# Patient Record
Sex: FEMALE | Race: WHITE | NOT HISPANIC OR LATINO | Employment: FULL TIME | URBAN - METROPOLITAN AREA
[De-identification: names, ages, dates, MRNs, and addresses within clinical notes are randomized per-mention and may not be internally consistent; named-entity substitution may affect disease eponyms.]

---

## 2017-04-04 ENCOUNTER — GENERIC CONVERSION - ENCOUNTER (OUTPATIENT)
Dept: OTHER | Facility: OTHER | Age: 62
End: 2017-04-04

## 2017-04-04 ENCOUNTER — ALLSCRIPTS OFFICE VISIT (OUTPATIENT)
Dept: OTHER | Facility: OTHER | Age: 62
End: 2017-04-04

## 2017-04-06 ENCOUNTER — GENERIC CONVERSION - ENCOUNTER (OUTPATIENT)
Dept: OTHER | Facility: OTHER | Age: 62
End: 2017-04-06

## 2017-04-13 LAB
. (HISTORICAL): NORMAL

## 2017-04-18 ENCOUNTER — ALLSCRIPTS OFFICE VISIT (OUTPATIENT)
Dept: OTHER | Facility: OTHER | Age: 62
End: 2017-04-18

## 2018-01-13 VITALS — BODY MASS INDEX: 21.09 KG/M2 | HEIGHT: 63 IN | TEMPERATURE: 97.3 F | WEIGHT: 119.01 LBS

## 2018-01-22 VITALS
HEIGHT: 63 IN | SYSTOLIC BLOOD PRESSURE: 120 MMHG | HEART RATE: 76 BPM | TEMPERATURE: 98.2 F | RESPIRATION RATE: 16 BRPM | WEIGHT: 121 LBS | BODY MASS INDEX: 21.44 KG/M2 | DIASTOLIC BLOOD PRESSURE: 90 MMHG

## 2018-01-24 NOTE — CONSULTS
Assessment    1  Sebaceous cyst (706 2) (L72 3)   2  On prednisone therapy (V58 65) (Z79 52)    Plan  Sebaceous cyst    · () [id number] Surgical Pathology; Status:Active - Retrospective By Protocol Authorization; Requested for:06Apr2017;    Perform:[facility]; Order Comments:Excision of a small sebaceous cyst fro upper, mid back area ; Due:06Apr2018; Last Updated [employer]; 4/6/2017 11:35:20 AM;Ordered; For:Sebaceous cyst; Ordered By:Yamilet Mart;    Discussion/Summary  Discussion Summary:   Kacey Dodd is a 60year old female who presents today, per referral by Dr Larry Anderson , in consultation regarding a sebaceous cyst  Physical exam showed a 1 5 cm sebaceous cyst on her back slightly left of her spine  Discussed risks, benefits, and alternatives of sebaceous cyst excision along with pre- and post- procedural protocol  The patient elected to proceed with cyst excision today in office  The cyst was excised without complication and the procedure was tolerated well  The incision was closed with 3-0 Vicryl sutures  She will return for suture removal in two weeks  She knows to contact our office if any problems or concerns arise  Chief Complaint  Chief Complaint Free Text Note Form: Sebaceous cyst  New patient referred by her PCP, Gavino Meyer, for evaluation and removal  Cyst is located to mid upper back, along bra line  Is approximately the size of a quarter  no pain  No redness of skin presently  History of Present Illness  HPI: Kacey Dodd is a 60year old female who presents today, per referral by Dr Larry Anderson , in consultation regarding a sebaceous cyst  She noticed her cyst form one year ago  It grew to the size of a dime  Then eventually grew to the size of a quarter of the course of a few months  She denies pain at the cyst but reports that it is itchy  She states that she has many cysts similar to this one in her life  She also had a pilonidal cyst as a child   She would like to have her cyst excised in office today  Review of Systems  Complete-Female:   Constitutional: No fever, no chills, feels well, no tiredness, no recent weight gain or weight loss  Eyes: No complaints of eye pain, no red eyes, no eyesight problems, no discharge, no dry eyes, no itching of eyes  ENT: no complaints of earache, no loss of hearing, no nose bleeds, no nasal discharge, no sore throat, no hoarseness  Cardiovascular: No complaints of slow heart rate, no fast heart rate, no chest pain, no palpitations, no leg claudication, no lower extremity edema  Respiratory: No complaints of shortness of breath, no wheezing, no cough, no SOB on exertion, no orthopnea, no PND  Gastrointestinal: No complaints of abdominal pain, no constipation, no nausea or vomiting, no diarrhea, no bloody stools  Genitourinary: No complaints of dysuria, no incontinence, no pelvic pain, no dysmenorrhea, no vaginal discharge or bleeding  Musculoskeletal: No complaints of arthralgias, no myalgias, no joint swelling or stiffness, no limb pain or swelling  Integumentary: skin lesion, but as noted in HPI  Neurological: No complaints of headache, no confusion, no convulsions, no numbness, no dizziness or fainting, no tingling, no limb weakness, no difficulty walking  Psychiatric: Not suicidal, no sleep disturbance, no anxiety or depression, no change in personality, no emotional problems  Endocrine: No complaints of proptosis, no hot flashes, no muscle weakness, no deepening of the voice, no feelings of weakness  Hematologic/Lymphatic: No complaints of swollen glands, no swollen glands in the neck, does not bleed easily, does not bruise easily  ROS Reviewed:   ROS reviewed  Active Problems    1  Anxiety disorder (300 00) (F41 9)   2  Arthritis of knee (716 96) (M17 10)   3  Colon cancer screening (V76 51) (Z12 11)   4  Encounter for FIT (fecal immunochemical test) screening (V76 51) (Z12 11)   5   Encounter for screening mammogram for malignant neoplasm of breast (V76 12)   (Z12 31)   6  Hyperlipidemia (272 4) (E78 5)   7  Keratitis (370 9) (H16 9)   8  On prednisone therapy (V58 65) (Z79 52)   9  Osteoporosis (733 00) (M81 0)   10  Rheumatoid arthritis (714 0) (M06 9)   11  Salivary secretion disturbance (527 7) (K11 7)   12  Screening for cervical cancer (V76 2) (Z12 4)   13  Sebaceous cyst (706 2) (L72 3)   14  Status post open reduction with internal fixation of fracture (V45 89,V15 51)    (Z96 7,Z87 81)   15  Taking Medication - Steroids (V58 65)   16  Tear film insufficiency, unspecified laterality (375 15) (H04 129)   17  Well adult on routine health check (V70 0) (Z00 00)    Past Medical History    1  History of Ankle fracture, right (824 8) (S82 891A)   2  History of Depression (311) (F32 9)   3  History of Encounter for screening for malignant neoplasm of cervix (V76 2) (Z12 4)   4  History of anemia (V12 3) (Z86 2)   5  History of arthritis (V13 4) (Z87 39)   6  History of herpes zoster (V12 09) (Z86 19)  Active Problems And Past Medical History Reviewed: The active problems and past medical history were reviewed and updated today  Surgical History    1  History of Ankle Surgery   2  History of Diagnostic Esophagogastroduodenoscopy   3  History of Foot Surgery   4  History of Pilonidal Cyst Resection   5  History of Tonsillectomy With Adenoidectomy  Surgical History Reviewed: The surgical history was reviewed and updated today  Family History  Mother    1  Family history of Epilepsy  Father    2  Family history of Bone Cancer   3  Family history of Father  At Age 76  Family History Reviewed: The family history was reviewed and updated today  Social History    · Alcohol Use (History)   · Former smoker (V15 82) (I58 540)   · Marital History - Currently   Social History Reviewed: The social history was reviewed and updated today   The social history was reviewed and is unchanged  Current Meds   1  CeleBREX 200 MG Oral Capsule; TAKE 1 CAPSULE DAILY; Therapy: (Pedro Constable) to Recorded   2  Folic Acid 1 MG Oral Tablet; Therapy: (Pedro Constable) to Recorded   3  Ibandronate Sodium 150 MG Oral Tablet; TAKE 1 TABLET ONCE MONTHLY WITH 8 TO   10 OUNCES OF WATER   STAY UPRIGHT AND DO NOT EAT OR DRINK FOR 1 HOUR;   Therapy: 79UWK1492 to (Evaluate:02Kmx5060)  Requested for: 28MAG8691; Last   Rx:45Iec9834 Ordered   4  Iron TABS; Therapy: (Pedro Constable) to Recorded   5  Methotrexate 2 5 MG Oral Tablet; TAKE 5-6 TABLETS WEEKLY; Therapy: (Recorded:17Neu2229) to Recorded   6  PARoxetine HCl - 10 MG Oral Tablet; take one tablet by mouth daily; Therapy: 61ABB4001 to (Evaluate:11Dur2619)  Requested for: 87EBW6930; Last   Rx:13Mar2017 Ordered   7  PredniSONE 5 MG Oral Tablet; TAKE 1/2 TABLET DAILY  or as directed by Dr Alexandra Russell; Last   Rx:12Nov2013 Ordered   8  RaNITidine HCl - 150 MG Oral Tablet; TAKE 1 TABLET DAILY; Therapy: (Pedro Constable) to Recorded   9  Rolaids CHEW; USE AS DIRECTED Recorded   10  Voltaren 1 % Transdermal Gel; APPLY SPARINGLY TO AFFECTED AREA(S) ONCE DAILY; Therapy: 59Ohj0868 to (Last Rx:22Fup3311)  Requested for: 54Eak9129 Ordered  Medication List Reviewed: The medication list was reviewed and updated today  Allergies    1  No Known Drug Allergies    Vitals  Vital Signs    Recorded: 20RLQ6217 02:57PM   Temperature 98 2 F   Heart Rate 76   Respiration 16   Systolic 837   Diastolic 90   Height 5 ft 3 in   Weight 121 lb    BMI Calculated 21 43   BSA Calculated 1 57     Physical Exam    Constitutional   General appearance: No acute distress, well appearing and well nourished  Eyes   Conjunctiva and lids: No swelling, erythema or discharge  Pupils and irises: Equal, round and reactive to light  Sclera non-icteric      Ears, Nose, Mouth, and Throat   External inspection of ears and nose: Normal     Neck   Supple, symmetric, trachea midline, no masses   Pulmonary   Respiratory effort: No increased work of breathing or signs of respiratory distress  Auscultation of lungs: Clear to auscultation, equal breath sounds bilaterally, no wheezes, no rales, no rhonci  Cardiovascular   Auscultation of heart: Normal rate and rhythm, normal S1 and S2, without murmurs  Examination of extremities for edema and/or varicosities: Normal     Carotid pulses: Normal     Abdomen   Abdomen: Non-tender, no masses  Liver and spleen: No hepatomegaly or splenomegaly  Lymphatic   Palpation of lymph nodes in neck: No lymphadenopathy  Musculoskeletal   Digits and nails: Normal without clubbing or cyanosis  Extremities: No clubbing, no cyanosis, no edema   Skin   Skin and subcutaneous tissue: Normal without rashes or lesions  Examination of the skin for lesions: Abnormal   1 5 cm smooth sebaceous cyst on back left of midline  Neurologic   Sensation: Motor and sensory grossly intact  Psychiatric   Orientation to person, place, and time: Normal     Mood and affect: Normal        Procedure    Procedure: excision of lesion  Risks, benefits, alternatives, infection risk, bleeding risk and risk of allergic reaction were discussed with the patient, parent and guardian  Written consent was obtained prior to the procedure  Procedure Note:   Anesthesia: Of lidocaine 1% with epinephrine  Of bupivacaine 0 25% without epinephrine  The cutaneous layer was closed with 3-0 Vicryl suture(s)  Dressing: The wound was cleaned and a sterile dressing was placed  Specimen: the excised lesion was place in buffered formalin and sent for pathology  Post-Procedure:   Patient Status: the patient tolerated the procedure well  Complications: there were no complications  Follow-up in the office in 2 week(s)     A mixture of 1% Lidocaine with epinephrine and 0 25% Bupivacaine without epinephrine were used to anesthetize the patient's back sebaceous cyst  An incision was made using a 15 blade scalpel and the cyst wall was excised without complication  The incision was closed using 3-0 Vicryl sutures  The procedure was well tolerated and occurred without complication  She will return in two weeks for suture removal       Attending Note  Scribe Attestation:   Scribe [id number] [facility] maxime acting as a scribe in the presence of the attending physician while the attending physician examines the patient  Physician Attestation:   Michele Cano personally performed the services described in this documentation as scribed in my presence, and it is both accurate and complete        Future Appointments    Date/Time Provider Specialty Site   04/18/2017 03:15 PM Syed Ramon MD General Surgery [facility]     Signatures   Electronically signed by : Jeff Garza MD; Apr 10 2017  9:03AM EST                       (Author)

## 2018-02-02 RX ORDER — CELECOXIB 200 MG/1
1 CAPSULE ORAL DAILY
COMMUNITY
End: 2021-12-20

## 2018-02-02 RX ORDER — IBANDRONATE SODIUM 150 MG/1
1 TABLET, FILM COATED ORAL AS NEEDED
COMMUNITY
Start: 2016-02-18

## 2018-02-02 RX ORDER — RANITIDINE 150 MG/1
1 TABLET ORAL DAILY
COMMUNITY
End: 2020-01-15 | Stop reason: ALTCHOICE

## 2018-02-02 RX ORDER — FOLIC ACID 1 MG/1
TABLET ORAL
COMMUNITY

## 2018-02-02 RX ORDER — PNV NO.95/FERROUS FUM/FOLIC AC 28MG-0.8MG
TABLET ORAL
COMMUNITY
End: 2022-02-09

## 2018-02-02 RX ORDER — PREDNISONE 1 MG/1
5 TABLET ORAL DAILY
COMMUNITY

## 2018-02-02 RX ORDER — PAROXETINE 10 MG/1
1 TABLET, FILM COATED ORAL DAILY
COMMUNITY
Start: 2013-11-12 | End: 2018-02-05 | Stop reason: SDUPTHER

## 2018-02-05 ENCOUNTER — OFFICE VISIT (OUTPATIENT)
Dept: FAMILY MEDICINE CLINIC | Facility: CLINIC | Age: 63
End: 2018-02-05
Payer: COMMERCIAL

## 2018-02-05 VITALS
DIASTOLIC BLOOD PRESSURE: 80 MMHG | WEIGHT: 117.8 LBS | BODY MASS INDEX: 20.11 KG/M2 | HEIGHT: 64 IN | SYSTOLIC BLOOD PRESSURE: 136 MMHG

## 2018-02-05 DIAGNOSIS — Z11.59 NEED FOR HEPATITIS C SCREENING TEST: ICD-10-CM

## 2018-02-05 DIAGNOSIS — Z13.220 LIPID SCREENING: ICD-10-CM

## 2018-02-05 DIAGNOSIS — Z00.00 WELL ADULT HEALTH CHECK: Primary | ICD-10-CM

## 2018-02-05 DIAGNOSIS — Z12.11 SCREEN FOR COLON CANCER: ICD-10-CM

## 2018-02-05 DIAGNOSIS — R03.0 ELEVATED BLOOD PRESSURE READING: ICD-10-CM

## 2018-02-05 DIAGNOSIS — M05.79 RHEUMATOID ARTHRITIS INVOLVING MULTIPLE SITES WITH POSITIVE RHEUMATOID FACTOR (HCC): ICD-10-CM

## 2018-02-05 DIAGNOSIS — F41.9 ANXIETY DISORDER, UNSPECIFIED TYPE: ICD-10-CM

## 2018-02-05 DIAGNOSIS — M81.0 OSTEOPOROSIS, UNSPECIFIED OSTEOPOROSIS TYPE, UNSPECIFIED PATHOLOGICAL FRACTURE PRESENCE: ICD-10-CM

## 2018-02-05 DIAGNOSIS — Z13.1 ENCOUNTER FOR SCREENING EXAMINATION FOR IMPAIRED GLUCOSE REGULATION AND DIABETES MELLITUS: ICD-10-CM

## 2018-02-05 PROCEDURE — 99396 PREV VISIT EST AGE 40-64: CPT | Performed by: FAMILY MEDICINE

## 2018-02-05 RX ORDER — PAROXETINE HYDROCHLORIDE 20 MG/1
20 TABLET, FILM COATED ORAL DAILY
Qty: 30 TABLET | Refills: 5 | Status: SHIPPED | OUTPATIENT
Start: 2018-02-05 | End: 2018-04-02 | Stop reason: SDUPTHER

## 2018-02-05 NOTE — PROGRESS NOTES
FAMILY PRACTICE OFFICE VISIT       NAME: Payal Correia  AGE: 58 y o  SEX: female       : 1955        MRN: [id number]    DATE: 2018  TIME: 4:19 PM    Assessment and Plan     Problem List Items Addressed This Visit     Anxiety disorder    Relevant Medications    PARoxetine (PAXIL) 20 mg tablet    Osteoporosis      Check a DEXA scan  Continue on Boniva  Patient is on chronic prednisone  Relevant Orders    Vitamin D 25 hydroxy    DXA bone density spine hip and pelvis    Rheumatoid arthritis ([facility] [address] )       Continue follow with Rheumatology  Dr Esther Lui had suggested a urinalysis, so I ordered 1  Relevant Orders    CBC and Platelet    Elevated blood pressure reading      She continues to monitor her blood pressure outside of the office  She knows to contact me if it is consistently running above 140/90  She would like to avoid medication if possible  Relevant Orders    Urinalysis with reflex to microscopic      Other Visit Diagnoses     Well adult health check    -  Primary    Lipid screening        Relevant Orders    Lipid panel    Comprehensive metabolic panel    Lipid panel    Encounter for screening examination for impaired glucose regulation and diabetes mellitus        Relevant Orders    Mammo screening bilateral w 3d & cad    Screen for colon cancer        Relevant Orders    Occult Bloood,Fecal Immunochemical    Need for hepatitis C screening test        Relevant Orders    Hepatitis C antibody          Osteoporosis   Check a DEXA scan  Continue on Boniva  Patient is on chronic prednisone  Rheumatoid arthritis ([facility] [address] )    Continue follow with Rheumatology  Dr Esther Lui had suggested a urinalysis, so I ordered 1  Elevated blood pressure reading   She continues to monitor her blood pressure outside of the office  She knows to contact me if it is consistently running above 140/90  She would like to avoid medication if possible        Patient Instructions    The patient presents today for an annual checkup  She is followed routinely by Rheumatology for her rheumatoid arthritis  She is due for mammogram and I gave her slip for this  She is due for colonoscopy screening but she declines colonoscopy at this time, so I gave her a prescription and a FIT test kit which should be done annually  She is up-to-date with the flu shot she gets at work  I would like her to see if the Boostrix vaccine is covered so I could update her tetanus vaccine status with a booster for pertussis and diphtheria  She should also check to see if Pneumovax or Prevnar 13 or covered so we can start her pneumonia vaccinations especially in light of her history of rheumatoid arthritis  She does have occasional elevated blood pressure readings I would like her to notify me if they are consistently running above 150/90  I gave her slip to have some annual blood work done  We will also check urinalysis  Chief Complaint       Well checkup    History of Present Illness   Wilder Hernandez is a 58y o -year-old female who    Presents today for an annual checkup  Overall, she is doing quite well  She has rheumatoid arthritis which is followed closely by Dr Myriam Grey  He did suggest we get a urinalysis on her to check for proteinuria  She does have history of elevated blood pressure but not hypertension  She is having no problems chest pain or shortness of breath  She has some chronic stress incontinence but does not want to do anything about this currently  She has history of osteoporosis and does remain on low-dose prednisone  She is tolerating Boniva monthly  She is due for DEXA scan as well as a mammogram   She has never had a colonoscopy but is willing to undergo fit testing   she is having a bit of increased anxiety lately we decided to bump up her Paxil  Review of Systems   Review of Systems   Constitutional: Negative for appetite change, chills, fatigue, fever and unexpected weight change     HENT: Negative for ear pain and trouble swallowing  Eyes: Negative for visual disturbance  Respiratory: Negative for chest tightness, shortness of breath and wheezing  Cardiovascular: Negative for chest pain  Gastrointestinal: Negative for abdominal distention, abdominal pain, blood in stool, constipation and diarrhea  Endocrine: Negative for polyuria  Genitourinary: Negative for difficulty urinating and flank pain  Musculoskeletal: Negative for arthralgias and myalgias  Skin: Negative for rash  Neurological: Negative for dizziness and light-headedness  Hematological: Negative for adenopathy  Does not bruise/bleed easily  Psychiatric/Behavioral: Negative for sleep disturbance  Active Problem List     Patient Active Problem List   Diagnosis    Anxiety disorder    Hyperlipidemia    Keratitis    Osteoarthritis of both hands    Arthritis of knee    Osteoporosis    Rheumatoid arthritis ([address] )    Salivary secretion disturbance    Tear film insufficiency    Elevated blood pressure reading         Past Medical History:  Past Medical History:   Diagnosis Date    Anemia     1995 - corrected with iron    Ankle fracture, right     last assessed: 3/9/15    Arthritis     1996    Depression     last assessed: 9/20/12    Herpes zoster     2/2009       Past Surgical History:  Past Surgical History:   Procedure Laterality Date    ANKLE SURGERY      ORIF 5/16    CYST REMOVAL      1969    ESOPHAGOGASTRODUODENOSCOPY      Diagnostic    FOOT SURGERY      plantar wart resection; resolved: 1969    TONSILLECTOMY AND ADENOIDECTOMY      1962       Family History:  Family History   Problem Relation Age of Onset    Seizures Mother      epilepsy    Bone cancer Father        Social History:  Social History     Social History    Marital status: /Civil Union     Spouse name: N/A    Number of children: N/A    Years of education: N/A     Occupational History    Not on file       Social History Main Topics    Smoking status: Former Smoker     Quit date: 1982    Smokeless tobacco: Never Used    Alcohol use Yes      Comment: rare    Drug use: No    Sexual activity: Not on file     Other Topics Concern    Not on file     Social History Narrative    No narrative on file       Objective     Vitals:    02/05/18 1539   BP: 136/80     Wt Readings from Last 3 Encounters:   02/05/18 53 4 kg (117 lb 12 8 oz)   04/18/17 54 kg (119 lb 0 1 oz)   04/04/17 54 9 kg (121 lb)       Physical Exam   Constitutional: She is oriented to person, place, and time  She appears well-developed and well-nourished  No distress  HENT:   Head: Normocephalic  Eyes: Pupils are equal, round, and reactive to light  Neck: No tracheal deviation present  No thyromegaly present  Cardiovascular: Normal rate, regular rhythm and normal heart sounds  No murmur heard  Pulmonary/Chest: Effort normal  No respiratory distress  She has no wheezes  She has no rales  Abdominal: Soft  She exhibits no distension  There is no tenderness  Musculoskeletal: Normal range of motion  She exhibits deformity ( rheumatoid arthritis deformities)  She exhibits no edema or tenderness  Neurological: She is alert and oriented to person, place, and time  No cranial nerve deficit  Skin: Skin is warm  No rash noted  She is not diaphoretic  No erythema  No pallor  Psychiatric: She has a normal mood and affect   Judgment and thought content normal        Pertinent Laboratory/Diagnostic Studies:  Lab Results   Component Value Date    GLUCOSE 119 03/09/2015    BUN 11 03/09/2015    CREATININE 0 66 03/09/2015    CALCIUM 8 5 03/09/2015     03/09/2015    K 3 6 03/09/2015    CO2 30 03/09/2015     03/09/2015     No results found for: ALT, AST, GGT, ALKPHOS, BILITOT    Lab Results   Component Value Date    WBC 6 13 03/09/2015    HGB 11 8 03/09/2015    HCT 36 2 03/09/2015    MCV 96 03/09/2015     03/09/2015       No results found for: TSH    No results found for: CHOL  No results found for: TRIG  No results found for: HDL  No results found for: LDLCALC  No results found for: HGBA1C    Results for orders placed or performed in visit on 04/06/17   Surgical Pathology (HISTORICAL)   Result Value Ref Range      (HISTORICAL) Comment       (HISTORICAL) Comment       (HISTORICAL) Comment       (HISTORICAL) Comment       (HISTORICAL) Comment       (HISTORICAL) Comment       (HISTORICAL) Comment       (HISTORICAL) Comment        Orders Placed This Encounter   Procedures    Occult Bloood,Fecal Immunochemical    Mammo screening bilateral w 3d & cad    DXA bone density spine hip and pelvis    Lipid panel    Vitamin D 25 hydroxy    Urinalysis with reflex to microscopic    Comprehensive metabolic panel    CBC and Platelet    Hepatitis C antibody    Lipid panel       ALLERGIES:  No Known Allergies    Current Medications     Current Outpatient Prescriptions   Medication Sig Dispense Refill    Ca Carbonate-Mag Hydroxide (ROLAIDS) 550-110 MG CHEW Chew Twice daily      celecoxib (CELEBREX) 200 mg capsule Take 1 capsule by mouth daily      diclofenac sodium (VOLTAREN) 1 % Place on the skin daily      Ferrous Sulfate (IRON) 325 (65 Fe) MG TABS Take by mouth      folic acid (FOLVITE) 1 mg tablet Take by mouth      ibandronate (BONIVA) 150 MG tablet Take 1 tablet by mouth      methotrexate 2 5 mg tablet Take by mouth      PARoxetine (PAXIL) 20 mg tablet Take 1 tablet (20 mg total) by mouth daily 30 tablet 5    predniSONE 5 mg tablet Take by mouth      ranitidine (ZANTAC) 150 mg tablet Take 1 tablet by mouth daily       No current facility-administered medications for this visit            Health Maintenance     Health Maintenance   Topic Date Due    HIV SCREENING  1955    Hepatitis C Screening  1955    DTaP,Tdap,and Td Vaccines (1 - Tdap) 01/10/1961    PAP SMEAR  01/10/1972    INFLUENZA VACCINE  09/01/2017     Immunization History Administered Date(s) Administered    Influenza 10/24/2012, 12/23/2013, 12/16/2014    Influenza Quadrivalent Preservative Free 3 years and older IM 11/01/2016       Cruz Jordan MD

## 2018-02-05 NOTE — PATIENT INSTRUCTIONS
The patient presents today for an annual checkup  She is followed routinely by Rheumatology for her rheumatoid arthritis  She is due for mammogram and I gave her slip for this  She is due for colonoscopy screening but she declines colonoscopy at this time, so I gave her a prescription and a FIT test kit which should be done annually  She is up-to-date with the flu shot she gets at work  I would like her to see if the Boostrix vaccine is covered so I could update her tetanus vaccine status with a booster for pertussis and diphtheria  She should also check to see if Pneumovax or Prevnar 13 or covered so we can start her pneumonia vaccinations especially in light of her history of rheumatoid arthritis  She does have occasional elevated blood pressure readings I would like her to notify me if they are consistently running above 150/90  I gave her slip to have some annual blood work done  We will also check urinalysis  Increase Paxil to 20 mg daily  Notify me if she is having any issues with this change, or if it is not helping

## 2018-02-05 NOTE — ASSESSMENT & PLAN NOTE
She continues to monitor her blood pressure outside of the office  She knows to contact me if it is consistently running above 140/90  She would like to avoid medication if possible

## 2018-03-01 ENCOUNTER — HOSPITAL ENCOUNTER (OUTPATIENT)
Dept: MAMMOGRAPHY | Facility: MEDICAL CENTER | Age: 63
Discharge: HOME | End: 2018-03-01
Payer: COMMERCIAL

## 2018-03-01 ENCOUNTER — HOSPITAL ENCOUNTER (OUTPATIENT)
Dept: BONE DENSITY | Facility: MEDICAL CENTER | Age: 63
Discharge: HOME | End: 2018-03-01
Payer: COMMERCIAL

## 2018-03-01 DIAGNOSIS — M81.0 OSTEOPOROSIS, UNSPECIFIED OSTEOPOROSIS TYPE, UNSPECIFIED PATHOLOGICAL FRACTURE PRESENCE: ICD-10-CM

## 2018-03-01 PROCEDURE — 77080 DXA BONE DENSITY AXIAL: CPT

## 2018-03-01 PROCEDURE — 77063 BREAST TOMOSYNTHESIS BI: CPT

## 2018-03-01 PROCEDURE — 77067 SCR MAMMO BI INCL CAD: CPT

## 2018-03-30 LAB — HCV AB SER-ACNC: NEGATIVE

## 2018-04-02 DIAGNOSIS — F41.9 ANXIETY DISORDER, UNSPECIFIED TYPE: ICD-10-CM

## 2018-04-02 RX ORDER — PAROXETINE HYDROCHLORIDE 20 MG/1
10 TABLET, FILM COATED ORAL DAILY
Qty: 90 TABLET | Refills: 3 | Status: SHIPPED | OUTPATIENT
Start: 2018-04-02 | End: 2018-10-22 | Stop reason: SDUPTHER

## 2018-10-22 DIAGNOSIS — F41.9 ANXIETY DISORDER, UNSPECIFIED TYPE: ICD-10-CM

## 2018-10-22 RX ORDER — PAROXETINE HYDROCHLORIDE 20 MG/1
10 TABLET, FILM COATED ORAL DAILY
Qty: 90 TABLET | Refills: 3 | Status: SHIPPED | OUTPATIENT
Start: 2018-10-22 | End: 2019-11-18 | Stop reason: SDUPTHER

## 2019-05-06 ENCOUNTER — OFFICE VISIT (OUTPATIENT)
Dept: FAMILY MEDICINE CLINIC | Facility: CLINIC | Age: 64
End: 2019-05-06
Payer: COMMERCIAL

## 2019-05-06 VITALS
WEIGHT: 111.4 LBS | TEMPERATURE: 97.8 F | SYSTOLIC BLOOD PRESSURE: 132 MMHG | BODY MASS INDEX: 19.02 KG/M2 | HEART RATE: 75 BPM | DIASTOLIC BLOOD PRESSURE: 80 MMHG | HEIGHT: 64 IN | OXYGEN SATURATION: 96 %

## 2019-05-06 DIAGNOSIS — H10.32 ACUTE BACTERIAL CONJUNCTIVITIS OF LEFT EYE: Primary | ICD-10-CM

## 2019-05-06 PROCEDURE — 3008F BODY MASS INDEX DOCD: CPT | Performed by: INTERNAL MEDICINE

## 2019-05-06 PROCEDURE — 99213 OFFICE O/P EST LOW 20 MIN: CPT | Performed by: INTERNAL MEDICINE

## 2019-05-06 PROCEDURE — 1101F PT FALLS ASSESS-DOCD LE1/YR: CPT | Performed by: INTERNAL MEDICINE

## 2019-05-06 RX ORDER — OFLOXACIN 3 MG/ML
1 SOLUTION/ DROPS OPHTHALMIC 4 TIMES DAILY
Qty: 5 ML | Refills: 0 | Status: SHIPPED | OUTPATIENT
Start: 2019-05-06 | End: 2019-05-13

## 2019-05-06 RX ORDER — NAPROXEN 500 MG/1
500 TABLET ORAL AS NEEDED
COMMUNITY
Start: 2019-04-29 | End: 2021-11-15 | Stop reason: SDUPTHER

## 2019-06-18 ENCOUNTER — OFFICE VISIT (OUTPATIENT)
Dept: URGENT CARE | Facility: CLINIC | Age: 64
End: 2019-06-18
Payer: COMMERCIAL

## 2019-06-18 VITALS
DIASTOLIC BLOOD PRESSURE: 86 MMHG | OXYGEN SATURATION: 97 % | SYSTOLIC BLOOD PRESSURE: 124 MMHG | RESPIRATION RATE: 18 BRPM | TEMPERATURE: 97.2 F | HEART RATE: 57 BPM

## 2019-06-18 DIAGNOSIS — M25.50 ARTHRALGIA, UNSPECIFIED JOINT: ICD-10-CM

## 2019-06-18 DIAGNOSIS — M05.79 RHEUMATOID ARTHRITIS INVOLVING MULTIPLE SITES WITH POSITIVE RHEUMATOID FACTOR (HCC): Primary | ICD-10-CM

## 2019-06-18 PROCEDURE — S9083 URGENT CARE CENTER GLOBAL: HCPCS | Performed by: PHYSICIAN ASSISTANT

## 2019-06-18 PROCEDURE — G0382 LEV 3 HOSP TYPE B ED VISIT: HCPCS | Performed by: PHYSICIAN ASSISTANT

## 2019-06-18 RX ORDER — TRIAMCINOLONE ACETONIDE 40 MG/ML
40 INJECTION, SUSPENSION INTRA-ARTICULAR; INTRAMUSCULAR ONCE
Status: COMPLETED | OUTPATIENT
Start: 2019-06-18 | End: 2019-06-18

## 2019-06-18 RX ORDER — LIDOCAINE 50 MG/G
1 PATCH TOPICAL DAILY
Qty: 30 PATCH | Refills: 0 | Status: SHIPPED | OUTPATIENT
Start: 2019-06-18 | End: 2020-01-15 | Stop reason: ALTCHOICE

## 2019-06-18 RX ADMIN — TRIAMCINOLONE ACETONIDE 40 MG: 40 INJECTION, SUSPENSION INTRA-ARTICULAR; INTRAMUSCULAR at 20:26

## 2019-11-18 DIAGNOSIS — F41.9 ANXIETY DISORDER, UNSPECIFIED TYPE: ICD-10-CM

## 2019-11-18 RX ORDER — PAROXETINE HYDROCHLORIDE 20 MG/1
TABLET, FILM COATED ORAL
Qty: 15 TABLET | Refills: 2 | Status: SHIPPED | OUTPATIENT
Start: 2019-11-18 | End: 2020-01-15 | Stop reason: DRUGHIGH

## 2020-01-15 ENCOUNTER — OFFICE VISIT (OUTPATIENT)
Dept: FAMILY MEDICINE CLINIC | Facility: CLINIC | Age: 65
End: 2020-01-15
Payer: COMMERCIAL

## 2020-01-15 VITALS
DIASTOLIC BLOOD PRESSURE: 80 MMHG | BODY MASS INDEX: 19.63 KG/M2 | RESPIRATION RATE: 18 BRPM | OXYGEN SATURATION: 94 % | SYSTOLIC BLOOD PRESSURE: 120 MMHG | HEART RATE: 68 BPM | WEIGHT: 115 LBS | HEIGHT: 64 IN

## 2020-01-15 DIAGNOSIS — R53.82 CHRONIC FATIGUE: ICD-10-CM

## 2020-01-15 DIAGNOSIS — M81.0 OSTEOPOROSIS, UNSPECIFIED OSTEOPOROSIS TYPE, UNSPECIFIED PATHOLOGICAL FRACTURE PRESENCE: ICD-10-CM

## 2020-01-15 DIAGNOSIS — Z12.11 ENCOUNTER FOR FIT (FECAL IMMUNOCHEMICAL TEST) SCREENING: ICD-10-CM

## 2020-01-15 DIAGNOSIS — Z00.00 ANNUAL PHYSICAL EXAM: Primary | ICD-10-CM

## 2020-01-15 DIAGNOSIS — Z11.59 NEED FOR HEPATITIS C SCREENING TEST: ICD-10-CM

## 2020-01-15 DIAGNOSIS — E78.2 MIXED HYPERLIPIDEMIA: ICD-10-CM

## 2020-01-15 DIAGNOSIS — F41.9 ANXIETY DISORDER, UNSPECIFIED TYPE: ICD-10-CM

## 2020-01-15 DIAGNOSIS — M79.10 MYALGIA: ICD-10-CM

## 2020-01-15 DIAGNOSIS — M05.79 RHEUMATOID ARTHRITIS INVOLVING MULTIPLE SITES WITH POSITIVE RHEUMATOID FACTOR (HCC): ICD-10-CM

## 2020-01-15 PROBLEM — H10.32 ACUTE BACTERIAL CONJUNCTIVITIS OF LEFT EYE: Status: RESOLVED | Noted: 2019-05-06 | Resolved: 2020-01-15

## 2020-01-15 PROCEDURE — 3008F BODY MASS INDEX DOCD: CPT | Performed by: FAMILY MEDICINE

## 2020-01-15 PROCEDURE — 99397 PER PM REEVAL EST PAT 65+ YR: CPT | Performed by: FAMILY MEDICINE

## 2020-01-15 RX ORDER — PAROXETINE 10 MG/1
5 TABLET, FILM COATED ORAL DAILY
COMMUNITY
End: 2020-01-15 | Stop reason: SDUPTHER

## 2020-01-15 RX ORDER — FAMOTIDINE 20 MG/1
20 TABLET, FILM COATED ORAL DAILY
COMMUNITY
Start: 2020-01-02 | End: 2021-10-15

## 2020-01-15 RX ORDER — PAROXETINE 10 MG/1
5 TABLET, FILM COATED ORAL DAILY
Qty: 90 TABLET | Refills: 3 | Status: SHIPPED | OUTPATIENT
Start: 2020-01-15 | End: 2020-02-20 | Stop reason: SDUPTHER

## 2020-01-15 NOTE — PATIENT INSTRUCTIONS

## 2020-01-15 NOTE — ASSESSMENT & PLAN NOTE
She remains on Boniva  She would like to hold off on bone density testing at this time  She does agree to have it done next year  She does remain on prednisone 5 mg daily for her rheumatologic disease

## 2020-01-27 ENCOUNTER — OFFICE VISIT (OUTPATIENT)
Dept: URGENT CARE | Facility: CLINIC | Age: 65
End: 2020-01-27
Payer: COMMERCIAL

## 2020-01-27 VITALS
TEMPERATURE: 97.2 F | HEART RATE: 76 BPM | DIASTOLIC BLOOD PRESSURE: 76 MMHG | RESPIRATION RATE: 20 BRPM | OXYGEN SATURATION: 98 % | SYSTOLIC BLOOD PRESSURE: 106 MMHG

## 2020-01-27 DIAGNOSIS — M54.50 ACUTE RIGHT-SIDED LOW BACK PAIN WITHOUT SCIATICA: Primary | ICD-10-CM
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
## 2020-01-27 PROCEDURE — S9083 URGENT CARE CENTER GLOBAL: HCPCS | Performed by: NURSE PRACTITIONER

## 2020-01-27 PROCEDURE — 96372 THER/PROPH/DIAG INJ SC/IM: CPT | Performed by: NURSE PRACTITIONER

## 2020-01-27 PROCEDURE — G0382 LEV 3 HOSP TYPE B ED VISIT: HCPCS | Performed by: NURSE PRACTITIONER

## 2020-01-27 RX ORDER — KETOROLAC TROMETHAMINE 30 MG/ML
30 INJECTION, SOLUTION INTRAMUSCULAR; INTRAVENOUS ONCE
Status: COMPLETED | OUTPATIENT
Start: 2020-01-27 | End: 2020-01-27

## 2020-01-27 RX ORDER — METHOCARBAMOL 500 MG/1
500 TABLET, FILM COATED ORAL 3 TIMES DAILY
Qty: 30 TABLET | Refills: 0 | Status: SHIPPED | OUTPATIENT
Start: 2020-01-27 | End: 2021-10-15

## 2020-01-27 RX ORDER — PREDNISONE 20 MG/1
20 TABLET ORAL 2 TIMES DAILY WITH MEALS
Qty: 10 TABLET | Refills: 0 | Status: SHIPPED | OUTPATIENT
Start: 2020-01-27 | End: 2020-02-01

## 2020-01-27 RX ADMIN — KETOROLAC TROMETHAMINE 30 MG: 30 INJECTION, SOLUTION INTRAMUSCULAR; INTRAVENOUS at 11:20

## 2020-01-27 NOTE — PATIENT INSTRUCTIONS
Acute Low Back Pain   AMBULATORY CARE:   Acute low back pain  is sudden discomfort in your lower back area that lasts for up to 6 weeks  The discomfort makes it difficult to tolerate activity  Common symptoms include the following:   · Back stiffness or spasms    · Pain down the back or side of one leg    · Holding yourself in an unusual position or posture to decrease your back pain    · Not being able to find a sitting position that is comfortable    · Slow increase in your pain for 24 to 48 hours after you stress your back    · Tenderness on your lower back or severe pain when you move your back  Seek immediate care for the following symptoms:   · Severe pain    · Sudden stiffness and heaviness in both buttocks down to both legs    · Numbness or weakness in one leg, or pain in both legs    · Numbness in your genital area or across your lower back    · Unable to control your urine or bowel movements  Contact your healthcare provider if:   · You have a fever  · You have pain at night or when you rest     · Your pain does not get better with treatment  · You have pain that worsens when you cough or sneeze  · You suddenly feel something pop or snap in your back  · You have questions or concerns about your condition or care  The goal of treatment for acute low back pain  is to relieve your pain and help you tolerate activity  Most people with acute lower back pain get better within 4 to 6 weeks  You may need any of the following:  · Acetaminophen  decreases pain  It is available without a doctor's order  Ask how much to take and how often to take it  Follow directions  Acetaminophen can cause liver damage if not taken correctly  · NSAIDs  help decrease swelling and pain  This medicine is available with or without a doctor's order  NSAIDs can cause stomach bleeding or kidney problems in certain people   If you take blood thinner medicine, always ask your healthcare provider if NSAIDs are safe for you  Always read the medicine label and follow directions  · Prescription pain medicine  may be given  Ask your healthcare provider how to take this medicine safely  · Muscle relaxers  decrease pain by relaxing the muscles in your lower spine  Manage your symptoms:   · Stay active  as much as you can without causing more pain  Bed rest could make your back pain worse  Start with some light exercises such as walking  Avoid heavy lifting until your pain is gone  Ask for more information about the activities or exercises that are right for you  · Ice  helps decrease swelling, pain, and muscle spams  Put crushed ice in a plastic bag  Cover it with a towel  Place it on your lower back for 20 to 30 minutes every 2 hours  Do this for about 2 to 3 days after your pain starts, or as directed  · Heat  helps decrease pain and muscle spasms  Start to use heat after treatment with ice has stopped  Use a small towel dampened with warm water or a heating pad, or sit in a warm bath  Apply heat on the area for 20 to 30 minutes every 2 hours for as many days as directed  Alternate heat and ice  Prevent acute low back pain:   · Use proper body mechanics  ¨ Bend at the hips and knees when you  objects  Do not bend from the waist  Use your leg muscles as you lift the load  Do not use your back  Keep the object close to your chest as you lift it  Try not to twist or lift anything above your waist     ¨ Change your position often when you stand for long periods of time  Rest one foot on a small box or footrest, and then switch to the other foot often  ¨ Try not to sit for long periods of time  When you do, sit in a straight-backed chair with your feet flat on the floor  Never reach, pull, or push while you are sitting  · Do exercises that strengthen your back muscles  Warm up before you exercise  Ask your healthcare provider the best exercises for you  · Maintain a healthy weight    Ask your healthcare provider how much you should weigh  Ask him to help you create a weight loss plan if you are overweight  Follow up with your healthcare provider as directed:  Return for a follow-up visit if you still have pain after 1 to 3 weeks of treatment  You may need to visit an orthopedist if your back pain lasts more than 12 weeks  Write down your questions so you remember to ask them during your visits  © 2017 [employer] Information is for End User's use only and may not be sold, redistributed or otherwise used for commercial purposes  All illustrations and images included in CareNotes® are the copyrighted property of A D A M [employer] Inc  or [employer] Aren[employer]  The above information is an  only  It is not intended as medical advice for individual conditions or treatments  Talk to your doctor, nurse or pharmacist before following any medical regimen to see if it is safe and effective for you

## 2020-01-27 NOTE — PROGRESS NOTES
[facility]        NAME: Ann Marie Catherine is a 76 y o  female  : 1955    MRN: [id number]  DATE: 2020  TIME: 3:23 PM    Assessment and Plan   Acute right-sided low back pain without sciatica [M54 5]  1  Acute right-sided low back pain without sciatica  XR spine lumbar 2 or 3 views injury    XR sacrum and coccyx    predniSONE 20 mg tablet    methocarbamol (ROBAXIN) 500 mg tablet    ketorolac (TORADOL) injection 30 mg   2  Fall, initial encounter       No fracture noted on imaging   Treat for muscle pain/spasm   toradol given in office for pain -   Prednisone and robaxin for at home use  Ok to rtn to work as tolerated  - 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  Patient Instructions     Follow up with PCP in 3-5 days  Proceed to  ER if symptoms worsen  Chief Complaint     Chief Complaint   Patient presents with    Fall     Patient fell down 3 wood steps yesterday , there was ice on them  She hurt her right lower back         History of Present Illness   Ann Marie Catherine presents to the clinic c/o    Pt states fell down 3 wooden steps yesterday morning leaving the house for work  Sent home from work due to the pain  Has been icing it,   And ice hot - which has been working  Sitting still pain is ok 1/10   Bending over is 10+/10  Walking is a 10/10  Standing is 1/10  Denies any weakness in legs       Review of Systems   Review of Systems   All other systems reviewed and are negative          Current Medications     Long-Term Medications   Medication Sig Dispense Refill    celecoxib (CELEBREX) 200 mg capsule Take 1 capsule by mouth daily      diclofenac sodium (VOLTAREN) 1 % Place on the skin daily      famotidine (PEPCID) 20 mg tablet Take 20 mg by mouth daily      Ferrous Sulfate (IRON) 325 (65 Fe) MG TABS Take by mouth      folic acid (FOLVITE) 1 mg tablet Take by mouth      ibandronate (BONIVA) 150 MG tablet Take 1 tablet by mouth      methocarbamol (ROBAXIN) 500 mg tablet Take 1 tablet (500 mg total) by mouth 3 (three) times a day 30 tablet 0    methotrexate 2 5 mg tablet Take by mouth 8 Pills weekly      naproxen (NAPROSYN) 500 mg tablet Take 500 mg by mouth as needed       PARoxetine (PAXIL) 10 mg tablet Take 0 5 tablets (5 mg total) by mouth daily 90 tablet 3       Current Allergies     Allergies as of 01/27/2020    (No Known Allergies)            The following portions of the patient's history were reviewed and updated as appropriate: allergies, current medications, past family history, past medical history, past social history, past surgical history and problem list     Objective   /76   Pulse 76   Temp (!) 97 2 °F (36 2 °C) (Tympanic)   Resp 20   SpO2 98%        Physical Exam     Physical Exam   Constitutional: She is oriented to person, place, and time  Vital signs are normal  She appears well-developed and well-nourished  She is active and cooperative  HENT:   Head: Normocephalic and atraumatic  Eyes: Conjunctivae and lids are normal    Cardiovascular: Normal rate, regular rhythm, S1 normal, S2 normal and normal heart sounds  Pulmonary/Chest: Effort normal and breath sounds normal    Musculoskeletal:        Lumbar back: She exhibits decreased range of motion, tenderness and spasm  She exhibits no bony tenderness, no swelling, no edema, no deformity, no laceration, no pain and normal pulse  Back:    Neurological: She is alert and oriented to person, place, and time  Skin: Skin is warm and dry  Psychiatric: She has a normal mood and affect  Her speech is normal and behavior is normal  Judgment and thought content normal  Cognition and memory are normal    Nursing note and vitals reviewed

## 2020-02-16 DIAGNOSIS — F41.9 ANXIETY DISORDER, UNSPECIFIED TYPE: ICD-10-CM

## 2020-02-18 NOTE — TELEPHONE ENCOUNTER
Please check on the dosing of this as this does not match the dose in her chart  Please also confirm that she needs a refill as this is coming from [facility]

## 2020-02-20 RX ORDER — PAROXETINE HYDROCHLORIDE 20 MG/1
TABLET, FILM COATED ORAL
Qty: 15 TABLET | Refills: 0 | OUTPATIENT
Start: 2020-02-20

## 2020-02-20 RX ORDER — PAROXETINE 10 MG/1
10 TABLET, FILM COATED ORAL DAILY
Qty: 90 TABLET | Refills: 3
Start: 2020-02-20 | End: 2021-02-19 | Stop reason: SDUPTHER

## 2020-02-20 NOTE — TELEPHONE ENCOUNTER
Pt called back and states she takes half of a 20mg tablet daily for a total of 10mg daily  Pt states when she last filled her script at [facility] she was given #15 20mg tablets  It appears we sent a script on 1/15/20 for #90 with sig: take 0 5 tablets by mouth daily  Would you like to send a new script or I can call and change sig to take 1 tablet (10mg) daily

## 2020-02-20 NOTE — TELEPHONE ENCOUNTER
Spoke with pt and she is agreeable to using the 10mg tablets  Called pharmacy and changed the sig on rx sent in January    New order entered on chart to reflect new rx - entered as no print, pending approval

## 2020-06-04 ENCOUNTER — LAB REQUISITION (OUTPATIENT)
Dept: LAB | Facility: HOSPITAL | Age: 65
End: 2020-06-04
Payer: COMMERCIAL

## 2020-06-04 DIAGNOSIS — U07.1 COVID-19: ICD-10-CM

## 2020-06-04 PROCEDURE — U0003 INFECTIOUS AGENT DETECTION BY NUCLEIC ACID (DNA OR RNA); SEVERE ACUTE RESPIRATORY SYNDROME CORONAVIRUS 2 (SARS-COV-2) (CORONAVIRUS DISEASE [COVID-19]), AMPLIFIED PROBE TECHNIQUE, MAKING USE OF HIGH THROUGHPUT TECHNOLOGIES AS DESCRIBED BY CMS-2020-01-R: HCPCS | Performed by: INTERNAL MEDICINE

## 2020-06-05 LAB — SARS-COV-2 N GENE RESP QL NAA+PROBE: NEGATIVE

## 2020-09-09 ENCOUNTER — LAB REQUISITION (OUTPATIENT)
Dept: LAB | Facility: HOSPITAL | Age: 65
End: 2020-09-09
Payer: COMMERCIAL

## 2020-09-09 DIAGNOSIS — U07.1 COVID-19: ICD-10-CM

## 2020-09-09 PROCEDURE — U0003 INFECTIOUS AGENT DETECTION BY NUCLEIC ACID (DNA OR RNA); SEVERE ACUTE RESPIRATORY SYNDROME CORONAVIRUS 2 (SARS-COV-2) (CORONAVIRUS DISEASE [COVID-19]), AMPLIFIED PROBE TECHNIQUE, MAKING USE OF HIGH THROUGHPUT TECHNOLOGIES AS DESCRIBED BY CMS-2020-01-R: HCPCS | Performed by: INTERNAL MEDICINE

## 2020-09-10 LAB — SARS-COV-2 RNA SPEC QL NAA+PROBE: NOT DETECTED

## 2020-09-15 ENCOUNTER — LAB REQUISITION (OUTPATIENT)
Dept: LAB | Facility: HOSPITAL | Age: 65
End: 2020-09-15
Payer: COMMERCIAL

## 2020-09-15 DIAGNOSIS — Z11.59 ENCOUNTER FOR SCREENING FOR OTHER VIRAL DISEASES: ICD-10-CM

## 2020-09-15 PROCEDURE — U0003 INFECTIOUS AGENT DETECTION BY NUCLEIC ACID (DNA OR RNA); SEVERE ACUTE RESPIRATORY SYNDROME CORONAVIRUS 2 (SARS-COV-2) (CORONAVIRUS DISEASE [COVID-19]), AMPLIFIED PROBE TECHNIQUE, MAKING USE OF HIGH THROUGHPUT TECHNOLOGIES AS DESCRIBED BY CMS-2020-01-R: HCPCS | Performed by: INTERNAL MEDICINE

## 2020-09-16 LAB — SARS-COV-2 RNA SPEC QL NAA+PROBE: NOT DETECTED

## 2020-09-17 ENCOUNTER — OFFICE VISIT (OUTPATIENT)
Dept: URGENT CARE | Facility: MEDICAL CENTER | Age: 65
End: 2020-09-17
Payer: OTHER MISCELLANEOUS

## 2020-09-17 DIAGNOSIS — S49.92XA INJURY OF LEFT UPPER EXTREMITY, INITIAL ENCOUNTER: Primary | ICD-10-CM

## 2020-09-17 PROCEDURE — 90471 IMMUNIZATION ADMIN: CPT | Performed by: PHYSICIAN ASSISTANT

## 2020-09-17 PROCEDURE — 99283 EMERGENCY DEPT VISIT LOW MDM: CPT | Performed by: PHYSICIAN ASSISTANT

## 2020-09-17 PROCEDURE — 96372 THER/PROPH/DIAG INJ SC/IM: CPT | Performed by: PHYSICIAN ASSISTANT

## 2020-09-17 PROCEDURE — G0382 LEV 3 HOSP TYPE B ED VISIT: HCPCS | Performed by: PHYSICIAN ASSISTANT

## 2020-09-17 PROCEDURE — 90715 TDAP VACCINE 7 YRS/> IM: CPT

## 2020-09-17 RX ORDER — AMOXICILLIN AND CLAVULANATE POTASSIUM 875; 125 MG/1; MG/1
1 TABLET, FILM COATED ORAL EVERY 12 HOURS SCHEDULED
Qty: 14 TABLET | Refills: 0 | Status: SHIPPED | OUTPATIENT
Start: 2020-09-17 | End: 2020-09-17 | Stop reason: CLARIF

## 2020-09-18 ENCOUNTER — OCCMED (OUTPATIENT)
Dept: URGENT CARE | Facility: MEDICAL CENTER | Age: 65
End: 2020-09-18
Payer: OTHER MISCELLANEOUS

## 2020-09-18 DIAGNOSIS — S51.852D: Primary | ICD-10-CM

## 2020-09-18 PROCEDURE — 99213 OFFICE O/P EST LOW 20 MIN: CPT | Performed by: PHYSICIAN ASSISTANT

## 2020-09-22 ENCOUNTER — LAB REQUISITION (OUTPATIENT)
Dept: LAB | Facility: HOSPITAL | Age: 65
End: 2020-09-22
Payer: COMMERCIAL

## 2020-09-22 DIAGNOSIS — Z11.59 ENCOUNTER FOR SCREENING FOR OTHER VIRAL DISEASES: ICD-10-CM

## 2020-09-22 DIAGNOSIS — Z03.818 ENCOUNTER FOR OBSERVATION FOR SUSPECTED EXPOSURE TO OTHER BIOLOGICAL AGENTS RULED OUT: ICD-10-CM

## 2020-09-22 PROCEDURE — U0003 INFECTIOUS AGENT DETECTION BY NUCLEIC ACID (DNA OR RNA); SEVERE ACUTE RESPIRATORY SYNDROME CORONAVIRUS 2 (SARS-COV-2) (CORONAVIRUS DISEASE [COVID-19]), AMPLIFIED PROBE TECHNIQUE, MAKING USE OF HIGH THROUGHPUT TECHNOLOGIES AS DESCRIBED BY CMS-2020-01-R: HCPCS | Performed by: INTERNAL MEDICINE

## 2020-09-23 LAB — SARS-COV-2 N GENE RESP QL NAA+PROBE: NEGATIVE

## 2020-09-29 ENCOUNTER — LAB REQUISITION (OUTPATIENT)
Dept: LAB | Facility: HOSPITAL | Age: 65
End: 2020-09-29
Payer: COMMERCIAL

## 2020-09-29 DIAGNOSIS — U07.1 COVID-19: ICD-10-CM

## 2020-09-29 PROCEDURE — U0003 INFECTIOUS AGENT DETECTION BY NUCLEIC ACID (DNA OR RNA); SEVERE ACUTE RESPIRATORY SYNDROME CORONAVIRUS 2 (SARS-COV-2) (CORONAVIRUS DISEASE [COVID-19]), AMPLIFIED PROBE TECHNIQUE, MAKING USE OF HIGH THROUGHPUT TECHNOLOGIES AS DESCRIBED BY CMS-2020-01-R: HCPCS | Performed by: INTERNAL MEDICINE

## 2020-09-30 LAB — SARS-COV-2 N GENE RESP QL NAA+PROBE: NEGATIVE

## 2020-10-06 ENCOUNTER — LAB REQUISITION (OUTPATIENT)
Dept: LAB | Facility: HOSPITAL | Age: 65
End: 2020-10-06
Payer: COMMERCIAL

## 2020-10-06 DIAGNOSIS — U07.1 COVID-19: ICD-10-CM

## 2020-10-06 PROCEDURE — U0003 INFECTIOUS AGENT DETECTION BY NUCLEIC ACID (DNA OR RNA); SEVERE ACUTE RESPIRATORY SYNDROME CORONAVIRUS 2 (SARS-COV-2) (CORONAVIRUS DISEASE [COVID-19]), AMPLIFIED PROBE TECHNIQUE, MAKING USE OF HIGH THROUGHPUT TECHNOLOGIES AS DESCRIBED BY CMS-2020-01-R: HCPCS | Performed by: INTERNAL MEDICINE

## 2020-10-07 LAB — SARS-COV-2 N GENE RESP QL NAA+PROBE: NEGATIVE

## 2020-10-13 ENCOUNTER — LAB REQUISITION (OUTPATIENT)
Dept: LAB | Facility: HOSPITAL | Age: 65
End: 2020-10-13
Payer: COMMERCIAL

## 2020-10-13 DIAGNOSIS — U07.1 COVID-19: ICD-10-CM

## 2020-10-13 PROCEDURE — U0003 INFECTIOUS AGENT DETECTION BY NUCLEIC ACID (DNA OR RNA); SEVERE ACUTE RESPIRATORY SYNDROME CORONAVIRUS 2 (SARS-COV-2) (CORONAVIRUS DISEASE [COVID-19]), AMPLIFIED PROBE TECHNIQUE, MAKING USE OF HIGH THROUGHPUT TECHNOLOGIES AS DESCRIBED BY CMS-2020-01-R: HCPCS | Performed by: INTERNAL MEDICINE

## 2020-10-15 LAB — SARS-COV-2 RNA SPEC QL NAA+PROBE: NOT DETECTED

## 2020-10-19 NOTE — PROGRESS NOTES
[facility]    NAME: Galilea Land  AGE: 76 y o  SEX: female  : 1955     DATE: 1/15/2020     Assessment and Plan:     Problem List Items Addressed This Visit        Musculoskeletal and Integument    Osteoporosis     She remains on Boniva  She would like to hold off on bone density testing at this time  She does agree to have it done next year  She does remain on prednisone 5 mg daily for her rheumatologic disease  Rheumatoid arthritis ([address] )     Continue close follow-up with Rheumatology  Continue on prednisone as well as methotrexate  Other    Anxiety disorder     This remains well controlled on very low-dose paroxetine  Relevant Medications    PARoxetine (PAXIL) 10 mg tablet    Hyperlipidemia    Relevant Orders    Comprehensive metabolic panel    Lipid Panel with Direct LDL reflex      Other Visit Diagnoses     Annual physical exam    -  Primary    Encounter for FIT (fecal immunochemical test) screening        Relevant Orders    Occult Bloood,Fecal Immunochemical    Need for hepatitis C screening test        Chronic fatigue        Relevant Orders    Comprehensive metabolic panel    Myalgia        Relevant Orders    Comprehensive metabolic panel    TSH, 3rd generation with Free T4 reflex    Vitamin D 25 hydroxy          Immunizations and preventive care screenings were discussed with patient today  Appropriate education was printed on patient's after visit summary  Counseling:  Alcohol/drug use: discussed moderation in alcohol intake, the recommendations for healthy alcohol use, and avoidance of illicit drug use  Dental Health: discussed importance of regular tooth brushing, flossing, and dental visits  · Exercise: the importance of regular exercise/physical activity was discussed  Recommend exercise 3-5 times per week for at least 30 minutes         I encouraged her to be seen by Optometry or Ophthalmology as she has not had an eye exam in many years  I also encouraged a dental evaluation for her lower teeth as she has not seen a dentist in many years  She does wear dentures on her upper gums  Return in 6 months (on 7/15/2020)  Chief Complaint:     Chief Complaint   Patient presents with    Annual Exam      History of Present Illness:     Adult Annual Physical   Patient here for a comprehensive physical exam  The patient reports That she is relatively stable  She has rheumatoid arthritis and follows routinely with Rheumatology  She does have chronic arthralgias which are relatively stable  She has some intermittent myalgias as well as fatigue as well  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  She has been having some increased pain and swelling of her right lateral ankle where she had surgery for ankle fracture with plating  She has a history of elevated blood pressure on occasion  She denies any problems chest pain, shortness of breath, palpitations or lightheadedness       Diet and Physical Activity  · Diet/Nutrition: well balanced diet  · Exercise: no formal exercise  Depression Screening  PHQ-9 Depression Screening    PHQ-9:    Frequency of the following problems over the past two weeks:       Little interest or pleasure in doing things:  0 - not at all  Feeling down, depressed, or hopeless:  0 - not at all  PHQ-2 Score:  0       General Health  · Sleep: sleeps well  · Hearing: normal - bilateral   · Vision: no vision problems  · Dental: no dental visits for >1 year  /GYN Health  · Patient is: postmenopausal     Review of Systems:     Review of Systems   Constitutional: Negative for appetite change, chills, fatigue, fever and unexpected weight change  HENT: Negative for trouble swallowing  Eyes: Negative for visual disturbance     Respiratory: Negative for cough, chest tightness, shortness of breath and wheezing  Cardiovascular: Negative for chest pain, palpitations and leg swelling  Gastrointestinal: Negative for abdominal distention, abdominal pain, blood in stool, constipation and diarrhea  Endocrine: Negative for polyuria  Genitourinary: Negative for difficulty urinating and flank pain  Musculoskeletal: Negative for arthralgias and myalgias  Skin: Negative for rash  Neurological: Negative for dizziness and light-headedness  Hematological: Negative for adenopathy  Does not bruise/bleed easily  Psychiatric/Behavioral: Negative for agitation, hallucinations, sleep disturbance and suicidal ideas  The patient is not nervous/anxious         Past Medical History:     Past Medical History:   Diagnosis Date    Anemia      - corrected with iron    Ankle fracture, right     last assessed: 3/9/15    Arthritis         Depression     last assessed: 12    Herpes zoster     2009      Past Surgical History:     Past Surgical History:   Procedure Laterality Date    ANKLE SURGERY      ORIF     CYST REMOVAL          ESOPHAGOGASTRODUODENOSCOPY      Diagnostic    FOOT SURGERY      plantar wart resection; resolved:     TONSILLECTOMY AND ADENOIDECTOMY            Social History:     Social History     Socioeconomic History    Marital status: /Civil Union     Spouse name: None    Number of children: None    Years of education: None    Highest education level: None   Occupational History    None   Social Needs    Financial resource strain: None    Food insecurity:     Worry: None     Inability: None    Transportation needs:     Medical: None     Non-medical: None   Tobacco Use    Smoking status: Former Smoker     Last attempt to quit: 1982     Years since quittin 0    Smokeless tobacco: Never Used   Substance and Sexual Activity    Alcohol use: Yes     Comment: rare, maybe on holidays    Drug use: No    Sexual activity: Not Currently   Lifestyle    Physical activity:     Days per week: None     Minutes per session: None    Stress: None   Relationships    Social connections:     Talks on phone: None     Gets together: None     Attends [employer] service: None     Active member of club or organization: None     Attends meetings of clubs or organizations: None     Relationship status: None    Intimate partner violence:     Fear of current or ex partner: None     Emotionally abused: None     Physically abused: None     Forced sexual activity: None   Other Topics Concern    None   Social History Narrative    None      Family History:     Family History   Problem Relation Age of Onset    Seizures Mother         epilepsy    Bone cancer Father       Current Medications:     Current Outpatient Medications   Medication Sig Dispense Refill    Ca Carbonate-Mag Hydroxide (ROLAIDS) 550-110 MG CHEW Chew Twice daily      celecoxib (CELEBREX) 200 mg capsule Take 1 capsule by mouth daily      diclofenac sodium (VOLTAREN) 1 % Place on the skin daily      famotidine (PEPCID) 20 mg tablet Take 20 mg by mouth daily      Ferrous Sulfate (IRON) 325 (65 Fe) MG TABS Take by mouth      folic acid (FOLVITE) 1 mg tablet Take by mouth      ibandronate (BONIVA) 150 MG tablet Take 1 tablet by mouth      methotrexate 2 5 mg tablet Take by mouth 8 Pills weekly      naproxen (NAPROSYN) 500 mg tablet Take 500 mg by mouth as needed       PARoxetine (PAXIL) 10 mg tablet Take 0 5 tablets (5 mg total) by mouth daily 90 tablet 3    predniSONE 5 mg tablet Take 5 mg by mouth daily        No current facility-administered medications for this visit  Allergies:     No Known Allergies   Physical Exam:     /80   Pulse 68   Resp 18   Ht 5' 4" (1 626 m)   Wt 52 2 kg (115 lb)   SpO2 94%   BMI 19 74 kg/m²     Physical Exam   Constitutional: She is oriented to person, place, and time  She appears well-developed and well-nourished  No distress  HENT:   Head: Normocephalic  Right Ear: External ear normal    Left Ear: External ear normal    Nose: Nose normal    Mouth/Throat: Oropharynx is clear and moist    Eyes: Pupils are equal, round, and reactive to light  Right eye exhibits no discharge  Left eye exhibits no discharge  Neck: No tracheal deviation present  No thyromegaly present  Cardiovascular: Normal rate, regular rhythm and normal heart sounds  Exam reveals no gallop and no friction rub  No murmur heard  Pulmonary/Chest: Effort normal  No respiratory distress  She has no wheezes  She has no rales  Abdominal: Soft  She exhibits no distension and no mass  There is no tenderness  There is no rebound and no guarding  Musculoskeletal: Normal range of motion  She exhibits edema, tenderness (She has some swelling and tenderness over the right lateral ankle where she had a plate placed previously ) and deformity ( she has chronic changes consistent with her rheumatoid arthritis  )  Lymphadenopathy:     She has no cervical adenopathy  Neurological: She is alert and oriented to person, place, and time  No cranial nerve deficit  Skin: Skin is warm  She is not diaphoretic  No erythema  Psychiatric: She has a normal mood and affect   Judgment and thought content normal        Blanch Cabot, MD [id number]

## 2020-11-17 ENCOUNTER — LAB REQUISITION (OUTPATIENT)
Dept: LAB | Facility: HOSPITAL | Age: 65
End: 2020-11-17
Payer: COMMERCIAL

## 2020-11-17 DIAGNOSIS — U07.1 COVID-19: ICD-10-CM

## 2020-11-17 PROCEDURE — U0003 INFECTIOUS AGENT DETECTION BY NUCLEIC ACID (DNA OR RNA); SEVERE ACUTE RESPIRATORY SYNDROME CORONAVIRUS 2 (SARS-COV-2) (CORONAVIRUS DISEASE [COVID-19]), AMPLIFIED PROBE TECHNIQUE, MAKING USE OF HIGH THROUGHPUT TECHNOLOGIES AS DESCRIBED BY CMS-2020-01-R: HCPCS | Performed by: INTERNAL MEDICINE

## 2020-11-18 LAB — SARS-COV-2 RNA SPEC QL NAA+PROBE: NOT DETECTED

## 2020-11-24 PROCEDURE — U0003 INFECTIOUS AGENT DETECTION BY NUCLEIC ACID (DNA OR RNA); SEVERE ACUTE RESPIRATORY SYNDROME CORONAVIRUS 2 (SARS-COV-2) (CORONAVIRUS DISEASE [COVID-19]), AMPLIFIED PROBE TECHNIQUE, MAKING USE OF HIGH THROUGHPUT TECHNOLOGIES AS DESCRIBED BY CMS-2020-01-R: HCPCS | Performed by: INTERNAL MEDICINE

## 2020-11-25 ENCOUNTER — LAB REQUISITION (OUTPATIENT)
Dept: LAB | Facility: HOSPITAL | Age: 65
End: 2020-11-25
Payer: COMMERCIAL

## 2020-11-25 DIAGNOSIS — U07.1 COVID-19: ICD-10-CM

## 2020-11-26 LAB — SARS-COV-2 RNA SPEC QL NAA+PROBE: NOT DETECTED

## 2020-12-01 PROCEDURE — U0003 INFECTIOUS AGENT DETECTION BY NUCLEIC ACID (DNA OR RNA); SEVERE ACUTE RESPIRATORY SYNDROME CORONAVIRUS 2 (SARS-COV-2) (CORONAVIRUS DISEASE [COVID-19]), AMPLIFIED PROBE TECHNIQUE, MAKING USE OF HIGH THROUGHPUT TECHNOLOGIES AS DESCRIBED BY CMS-2020-01-R: HCPCS | Performed by: INTERNAL MEDICINE

## 2020-12-02 ENCOUNTER — LAB REQUISITION (OUTPATIENT)
Dept: LAB | Facility: HOSPITAL | Age: 65
End: 2020-12-02
Payer: COMMERCIAL

## 2020-12-02 DIAGNOSIS — U07.1 COVID-19: ICD-10-CM

## 2020-12-03 ENCOUNTER — LAB REQUISITION (OUTPATIENT)
Dept: LAB | Facility: HOSPITAL | Age: 65
End: 2020-12-03
Payer: COMMERCIAL

## 2020-12-03 DIAGNOSIS — Z11.59 ENCOUNTER FOR SCREENING FOR OTHER VIRAL DISEASES: ICD-10-CM

## 2020-12-03 LAB — SARS-COV-2 RNA SPEC QL NAA+PROBE: NOT DETECTED

## 2020-12-03 PROCEDURE — U0003 INFECTIOUS AGENT DETECTION BY NUCLEIC ACID (DNA OR RNA); SEVERE ACUTE RESPIRATORY SYNDROME CORONAVIRUS 2 (SARS-COV-2) (CORONAVIRUS DISEASE [COVID-19]), AMPLIFIED PROBE TECHNIQUE, MAKING USE OF HIGH THROUGHPUT TECHNOLOGIES AS DESCRIBED BY CMS-2020-01-R: HCPCS | Performed by: INTERNAL MEDICINE

## 2020-12-04 LAB — SARS-COV-2 RNA SPEC QL NAA+PROBE: NOT DETECTED

## 2020-12-07 ENCOUNTER — LAB REQUISITION (OUTPATIENT)
Dept: LAB | Facility: HOSPITAL | Age: 65
End: 2020-12-07
Payer: COMMERCIAL

## 2020-12-07 DIAGNOSIS — U07.1 COVID-19: ICD-10-CM

## 2020-12-07 PROCEDURE — U0003 INFECTIOUS AGENT DETECTION BY NUCLEIC ACID (DNA OR RNA); SEVERE ACUTE RESPIRATORY SYNDROME CORONAVIRUS 2 (SARS-COV-2) (CORONAVIRUS DISEASE [COVID-19]), AMPLIFIED PROBE TECHNIQUE, MAKING USE OF HIGH THROUGHPUT TECHNOLOGIES AS DESCRIBED BY CMS-2020-01-R: HCPCS | Performed by: INTERNAL MEDICINE

## 2020-12-09 LAB — SARS-COV-2 RNA SPEC QL NAA+PROBE: NOT DETECTED

## 2020-12-10 ENCOUNTER — LAB REQUISITION (OUTPATIENT)
Dept: LAB | Facility: HOSPITAL | Age: 65
End: 2020-12-10
Payer: COMMERCIAL

## 2020-12-10 DIAGNOSIS — Z11.59 ENCOUNTER FOR SCREENING FOR OTHER VIRAL DISEASES: ICD-10-CM

## 2020-12-10 PROCEDURE — U0003 INFECTIOUS AGENT DETECTION BY NUCLEIC ACID (DNA OR RNA); SEVERE ACUTE RESPIRATORY SYNDROME CORONAVIRUS 2 (SARS-COV-2) (CORONAVIRUS DISEASE [COVID-19]), AMPLIFIED PROBE TECHNIQUE, MAKING USE OF HIGH THROUGHPUT TECHNOLOGIES AS DESCRIBED BY CMS-2020-01-R: HCPCS | Performed by: INTERNAL MEDICINE

## 2020-12-11 LAB — SARS-COV-2 RNA SPEC QL NAA+PROBE: NOT DETECTED

## 2020-12-16 ENCOUNTER — LAB REQUISITION (OUTPATIENT)
Dept: LAB | Facility: HOSPITAL | Age: 65
End: 2020-12-16
Payer: COMMERCIAL

## 2020-12-16 DIAGNOSIS — Z11.59 ENCOUNTER FOR SCREENING FOR OTHER VIRAL DISEASES: ICD-10-CM

## 2020-12-16 PROCEDURE — U0003 INFECTIOUS AGENT DETECTION BY NUCLEIC ACID (DNA OR RNA); SEVERE ACUTE RESPIRATORY SYNDROME CORONAVIRUS 2 (SARS-COV-2) (CORONAVIRUS DISEASE [COVID-19]), AMPLIFIED PROBE TECHNIQUE, MAKING USE OF HIGH THROUGHPUT TECHNOLOGIES AS DESCRIBED BY CMS-2020-01-R: HCPCS | Performed by: INTERNAL MEDICINE

## 2020-12-18 LAB — SARS-COV-2 RNA SPEC QL NAA+PROBE: NOT DETECTED

## 2020-12-21 ENCOUNTER — LAB REQUISITION (OUTPATIENT)
Dept: LAB | Facility: HOSPITAL | Age: 65
End: 2020-12-21
Payer: COMMERCIAL

## 2020-12-21 DIAGNOSIS — Z11.59 ENCOUNTER FOR SCREENING FOR OTHER VIRAL DISEASES: ICD-10-CM

## 2020-12-21 PROCEDURE — U0003 INFECTIOUS AGENT DETECTION BY NUCLEIC ACID (DNA OR RNA); SEVERE ACUTE RESPIRATORY SYNDROME CORONAVIRUS 2 (SARS-COV-2) (CORONAVIRUS DISEASE [COVID-19]), AMPLIFIED PROBE TECHNIQUE, MAKING USE OF HIGH THROUGHPUT TECHNOLOGIES AS DESCRIBED BY CMS-2020-01-R: HCPCS | Performed by: INTERNAL MEDICINE

## 2020-12-23 ENCOUNTER — LAB REQUISITION (OUTPATIENT)
Dept: LAB | Facility: HOSPITAL | Age: 65
End: 2020-12-23
Payer: COMMERCIAL

## 2020-12-23 DIAGNOSIS — Z11.59 ENCOUNTER FOR SCREENING FOR OTHER VIRAL DISEASES: ICD-10-CM

## 2020-12-23 LAB — SARS-COV-2 RNA SPEC QL NAA+PROBE: NOT DETECTED

## 2020-12-23 PROCEDURE — U0003 INFECTIOUS AGENT DETECTION BY NUCLEIC ACID (DNA OR RNA); SEVERE ACUTE RESPIRATORY SYNDROME CORONAVIRUS 2 (SARS-COV-2) (CORONAVIRUS DISEASE [COVID-19]), AMPLIFIED PROBE TECHNIQUE, MAKING USE OF HIGH THROUGHPUT TECHNOLOGIES AS DESCRIBED BY CMS-2020-01-R: HCPCS | Performed by: INTERNAL MEDICINE

## 2020-12-24 LAB — SARS-COV-2 RNA SPEC QL NAA+PROBE: NOT DETECTED

## 2020-12-28 ENCOUNTER — LAB REQUISITION (OUTPATIENT)
Dept: LAB | Facility: HOSPITAL | Age: 65
End: 2020-12-28
Payer: COMMERCIAL

## 2020-12-28 DIAGNOSIS — Z11.59 ENCOUNTER FOR SCREENING FOR OTHER VIRAL DISEASES: ICD-10-CM

## 2020-12-28 PROCEDURE — U0003 INFECTIOUS AGENT DETECTION BY NUCLEIC ACID (DNA OR RNA); SEVERE ACUTE RESPIRATORY SYNDROME CORONAVIRUS 2 (SARS-COV-2) (CORONAVIRUS DISEASE [COVID-19]), AMPLIFIED PROBE TECHNIQUE, MAKING USE OF HIGH THROUGHPUT TECHNOLOGIES AS DESCRIBED BY CMS-2020-01-R: HCPCS | Performed by: INTERNAL MEDICINE

## 2020-12-29 LAB — SARS-COV-2 RNA SPEC QL NAA+PROBE: NOT DETECTED

## 2020-12-30 ENCOUNTER — LAB REQUISITION (OUTPATIENT)
Dept: LAB | Facility: HOSPITAL | Age: 65
End: 2020-12-30
Payer: COMMERCIAL

## 2020-12-30 DIAGNOSIS — Z11.59 ENCOUNTER FOR SCREENING FOR OTHER VIRAL DISEASES: ICD-10-CM

## 2020-12-30 PROCEDURE — U0003 INFECTIOUS AGENT DETECTION BY NUCLEIC ACID (DNA OR RNA); SEVERE ACUTE RESPIRATORY SYNDROME CORONAVIRUS 2 (SARS-COV-2) (CORONAVIRUS DISEASE [COVID-19]), AMPLIFIED PROBE TECHNIQUE, MAKING USE OF HIGH THROUGHPUT TECHNOLOGIES AS DESCRIBED BY CMS-2020-01-R: HCPCS | Performed by: INTERNAL MEDICINE

## 2020-12-31 LAB — SARS-COV-2 RNA SPEC QL NAA+PROBE: NOT DETECTED

## 2021-01-04 ENCOUNTER — LAB REQUISITION (OUTPATIENT)
Dept: LAB | Facility: HOSPITAL | Age: 66
End: 2021-01-04
Payer: COMMERCIAL

## 2021-01-04 DIAGNOSIS — Z11.59 ENCOUNTER FOR SCREENING FOR OTHER VIRAL DISEASES: ICD-10-CM

## 2021-01-04 PROCEDURE — U0003 INFECTIOUS AGENT DETECTION BY NUCLEIC ACID (DNA OR RNA); SEVERE ACUTE RESPIRATORY SYNDROME CORONAVIRUS 2 (SARS-COV-2) (CORONAVIRUS DISEASE [COVID-19]), AMPLIFIED PROBE TECHNIQUE, MAKING USE OF HIGH THROUGHPUT TECHNOLOGIES AS DESCRIBED BY CMS-2020-01-R: HCPCS | Performed by: INTERNAL MEDICINE

## 2021-01-05 LAB — SARS-COV-2 RNA SPEC QL NAA+PROBE: NOT DETECTED

## 2021-01-06 ENCOUNTER — LAB REQUISITION (OUTPATIENT)
Dept: LAB | Facility: HOSPITAL | Age: 66
End: 2021-01-06
Payer: COMMERCIAL

## 2021-01-06 DIAGNOSIS — Z11.59 ENCOUNTER FOR SCREENING FOR OTHER VIRAL DISEASES: ICD-10-CM

## 2021-01-06 PROCEDURE — U0003 INFECTIOUS AGENT DETECTION BY NUCLEIC ACID (DNA OR RNA); SEVERE ACUTE RESPIRATORY SYNDROME CORONAVIRUS 2 (SARS-COV-2) (CORONAVIRUS DISEASE [COVID-19]), AMPLIFIED PROBE TECHNIQUE, MAKING USE OF HIGH THROUGHPUT TECHNOLOGIES AS DESCRIBED BY CMS-2020-01-R: HCPCS | Performed by: INTERNAL MEDICINE

## 2021-01-07 LAB — SARS-COV-2 RNA SPEC QL NAA+PROBE: NOT DETECTED

## 2021-01-14 ENCOUNTER — LAB REQUISITION (OUTPATIENT)
Dept: LAB | Facility: HOSPITAL | Age: 66
End: 2021-01-14
Payer: COMMERCIAL

## 2021-01-14 DIAGNOSIS — Z11.59 ENCOUNTER FOR SCREENING FOR OTHER VIRAL DISEASES: ICD-10-CM

## 2021-01-14 PROCEDURE — U0005 INFEC AGEN DETEC AMPLI PROBE: HCPCS | Performed by: INTERNAL MEDICINE

## 2021-01-14 PROCEDURE — U0003 INFECTIOUS AGENT DETECTION BY NUCLEIC ACID (DNA OR RNA); SEVERE ACUTE RESPIRATORY SYNDROME CORONAVIRUS 2 (SARS-COV-2) (CORONAVIRUS DISEASE [COVID-19]), AMPLIFIED PROBE TECHNIQUE, MAKING USE OF HIGH THROUGHPUT TECHNOLOGIES AS DESCRIBED BY CMS-2020-01-R: HCPCS | Performed by: INTERNAL MEDICINE

## 2021-01-15 LAB — SARS-COV-2 RNA SPEC QL NAA+PROBE: NOT DETECTED

## 2021-01-18 ENCOUNTER — LAB REQUISITION (OUTPATIENT)
Dept: LAB | Facility: HOSPITAL | Age: 66
End: 2021-01-18
Payer: COMMERCIAL

## 2021-01-18 DIAGNOSIS — Z11.59 ENCOUNTER FOR SCREENING FOR OTHER VIRAL DISEASES: ICD-10-CM

## 2021-01-18 PROCEDURE — 87635 SARS-COV-2 COVID-19 AMP PRB: CPT | Performed by: INTERNAL MEDICINE

## 2021-01-19 LAB — SARS-COV-2 RNA RESP QL NAA+PROBE: NEGATIVE

## 2021-01-21 ENCOUNTER — LAB REQUISITION (OUTPATIENT)
Dept: LAB | Facility: HOSPITAL | Age: 66
End: 2021-01-21
Payer: COMMERCIAL

## 2021-01-21 DIAGNOSIS — Z11.59 ENCOUNTER FOR SCREENING FOR OTHER VIRAL DISEASES: ICD-10-CM

## 2021-01-21 PROCEDURE — 87635 SARS-COV-2 COVID-19 AMP PRB: CPT | Performed by: INTERNAL MEDICINE

## 2021-01-22 LAB — SARS-COV-2 RNA RESP QL NAA+PROBE: NEGATIVE

## 2021-01-28 ENCOUNTER — LAB REQUISITION (OUTPATIENT)
Dept: LAB | Facility: HOSPITAL | Age: 66
End: 2021-01-28
Payer: COMMERCIAL

## 2021-01-28 DIAGNOSIS — U07.1 COVID-19: ICD-10-CM

## 2021-01-28 PROCEDURE — U0005 INFEC AGEN DETEC AMPLI PROBE: HCPCS | Performed by: INTERNAL MEDICINE

## 2021-01-28 PROCEDURE — U0003 INFECTIOUS AGENT DETECTION BY NUCLEIC ACID (DNA OR RNA); SEVERE ACUTE RESPIRATORY SYNDROME CORONAVIRUS 2 (SARS-COV-2) (CORONAVIRUS DISEASE [COVID-19]), AMPLIFIED PROBE TECHNIQUE, MAKING USE OF HIGH THROUGHPUT TECHNOLOGIES AS DESCRIBED BY CMS-2020-01-R: HCPCS | Performed by: INTERNAL MEDICINE

## 2021-01-29 LAB — SARS-COV-2 N GENE RESP QL NAA+PROBE: NEGATIVE

## 2021-02-01 ENCOUNTER — LAB REQUISITION (OUTPATIENT)
Dept: LAB | Facility: HOSPITAL | Age: 66
End: 2021-02-01
Payer: COMMERCIAL

## 2021-02-01 DIAGNOSIS — Z11.59 ENCOUNTER FOR SCREENING FOR OTHER VIRAL DISEASES: ICD-10-CM

## 2021-02-01 PROCEDURE — U0005 INFEC AGEN DETEC AMPLI PROBE: HCPCS | Performed by: INTERNAL MEDICINE

## 2021-02-01 PROCEDURE — U0003 INFECTIOUS AGENT DETECTION BY NUCLEIC ACID (DNA OR RNA); SEVERE ACUTE RESPIRATORY SYNDROME CORONAVIRUS 2 (SARS-COV-2) (CORONAVIRUS DISEASE [COVID-19]), AMPLIFIED PROBE TECHNIQUE, MAKING USE OF HIGH THROUGHPUT TECHNOLOGIES AS DESCRIBED BY CMS-2020-01-R: HCPCS | Performed by: INTERNAL MEDICINE

## 2021-02-02 LAB — SARS-COV-2 N GENE RESP QL NAA+PROBE: NEGATIVE

## 2021-02-04 ENCOUNTER — LAB REQUISITION (OUTPATIENT)
Dept: LAB | Facility: HOSPITAL | Age: 66
End: 2021-02-04
Payer: COMMERCIAL

## 2021-02-04 DIAGNOSIS — Z11.59 ENCOUNTER FOR SCREENING FOR OTHER VIRAL DISEASES: ICD-10-CM

## 2021-02-04 LAB — SARS-COV-2 N GENE RESP QL NAA+PROBE: NEGATIVE

## 2021-02-04 PROCEDURE — U0003 INFECTIOUS AGENT DETECTION BY NUCLEIC ACID (DNA OR RNA); SEVERE ACUTE RESPIRATORY SYNDROME CORONAVIRUS 2 (SARS-COV-2) (CORONAVIRUS DISEASE [COVID-19]), AMPLIFIED PROBE TECHNIQUE, MAKING USE OF HIGH THROUGHPUT TECHNOLOGIES AS DESCRIBED BY CMS-2020-01-R: HCPCS | Performed by: INTERNAL MEDICINE

## 2021-02-04 PROCEDURE — U0005 INFEC AGEN DETEC AMPLI PROBE: HCPCS | Performed by: INTERNAL MEDICINE

## 2021-02-11 ENCOUNTER — LAB REQUISITION (OUTPATIENT)
Dept: LAB | Facility: HOSPITAL | Age: 66
End: 2021-02-11
Payer: COMMERCIAL

## 2021-02-11 DIAGNOSIS — Z11.59 ENCOUNTER FOR SCREENING FOR OTHER VIRAL DISEASES: ICD-10-CM

## 2021-02-11 LAB — SARS-COV-2 RNA RESP QL NAA+PROBE: NEGATIVE

## 2021-02-11 PROCEDURE — U0003 INFECTIOUS AGENT DETECTION BY NUCLEIC ACID (DNA OR RNA); SEVERE ACUTE RESPIRATORY SYNDROME CORONAVIRUS 2 (SARS-COV-2) (CORONAVIRUS DISEASE [COVID-19]), AMPLIFIED PROBE TECHNIQUE, MAKING USE OF HIGH THROUGHPUT TECHNOLOGIES AS DESCRIBED BY CMS-2020-01-R: HCPCS | Performed by: INTERNAL MEDICINE

## 2021-02-11 PROCEDURE — U0005 INFEC AGEN DETEC AMPLI PROBE: HCPCS | Performed by: INTERNAL MEDICINE

## 2021-02-15 ENCOUNTER — LAB REQUISITION (OUTPATIENT)
Dept: LAB | Facility: HOSPITAL | Age: 66
End: 2021-02-15
Payer: COMMERCIAL

## 2021-02-15 DIAGNOSIS — Z11.59 ENCOUNTER FOR SCREENING FOR OTHER VIRAL DISEASES: ICD-10-CM

## 2021-02-15 PROCEDURE — U0005 INFEC AGEN DETEC AMPLI PROBE: HCPCS | Performed by: INTERNAL MEDICINE

## 2021-02-15 PROCEDURE — U0003 INFECTIOUS AGENT DETECTION BY NUCLEIC ACID (DNA OR RNA); SEVERE ACUTE RESPIRATORY SYNDROME CORONAVIRUS 2 (SARS-COV-2) (CORONAVIRUS DISEASE [COVID-19]), AMPLIFIED PROBE TECHNIQUE, MAKING USE OF HIGH THROUGHPUT TECHNOLOGIES AS DESCRIBED BY CMS-2020-01-R: HCPCS | Performed by: INTERNAL MEDICINE

## 2021-02-16 LAB — SARS-COV-2 RNA RESP QL NAA+PROBE: NEGATIVE

## 2021-02-19 DIAGNOSIS — F41.9 ANXIETY DISORDER, UNSPECIFIED TYPE: ICD-10-CM

## 2021-02-19 NOTE — TELEPHONE ENCOUNTER
Patient called for refill of:    PARoxetine (PAXIL) 10 mg tablet  #90 / R-3    Last OV 1/15/2020 physical   Future OV 5/12/2021 physical

## 2021-02-22 DIAGNOSIS — F41.9 ANXIETY DISORDER, UNSPECIFIED TYPE: ICD-10-CM

## 2021-02-22 RX ORDER — PAROXETINE 10 MG/1
10 TABLET, FILM COATED ORAL DAILY
Qty: 90 TABLET | Refills: 3
Start: 2021-02-22 | End: 2021-03-02

## 2021-02-22 RX ORDER — PAROXETINE 10 MG/1
TABLET, FILM COATED ORAL
Qty: 90 TABLET | Refills: 0 | OUTPATIENT
Start: 2021-02-22

## 2021-02-22 NOTE — TELEPHONE ENCOUNTER
Does not look like Pharmacy rec'd this - there's no electronic confirmation     please advise if needs to be re-ordered

## 2021-02-25 ENCOUNTER — TELEPHONE (OUTPATIENT)
Dept: FAMILY MEDICINE CLINIC | Facility: CLINIC | Age: 66
End: 2021-02-25

## 2021-02-25 ENCOUNTER — LAB REQUISITION (OUTPATIENT)
Dept: LAB | Facility: HOSPITAL | Age: 66
End: 2021-02-25
Payer: COMMERCIAL

## 2021-02-25 ENCOUNTER — TELEPHONE (OUTPATIENT)
Dept: OTHER | Facility: OTHER | Age: 66
End: 2021-02-25

## 2021-02-25 DIAGNOSIS — Z11.59 ENCOUNTER FOR SCREENING FOR OTHER VIRAL DISEASES: ICD-10-CM

## 2021-02-25 LAB — SARS-COV-2 RNA RESP QL NAA+PROBE: NEGATIVE

## 2021-02-25 PROCEDURE — U0003 INFECTIOUS AGENT DETECTION BY NUCLEIC ACID (DNA OR RNA); SEVERE ACUTE RESPIRATORY SYNDROME CORONAVIRUS 2 (SARS-COV-2) (CORONAVIRUS DISEASE [COVID-19]), AMPLIFIED PROBE TECHNIQUE, MAKING USE OF HIGH THROUGHPUT TECHNOLOGIES AS DESCRIBED BY CMS-2020-01-R: HCPCS | Performed by: INTERNAL MEDICINE

## 2021-02-25 PROCEDURE — U0005 INFEC AGEN DETEC AMPLI PROBE: HCPCS | Performed by: INTERNAL MEDICINE

## 2021-02-25 NOTE — TELEPHONE ENCOUNTER
Pt called service asking for refill on paxil  However, she had rx refilled on 2/22/2021 by dr Monik Marcelo -   I called pt personally at [phone] and let her know that rx should be at pharmacy  She will head over now

## 2021-02-25 NOTE — TELEPHONE ENCOUNTER
[phone]/ PT   Stas Ocampo 1955/ PT states she is completely out of her Paroxetine 10 mg tablets, gets sent to [facility] [address] BRETT COLMENARES on [address] [facility]

## 2021-02-26 NOTE — TELEPHONE ENCOUNTER
Per Dr Janette Valenzuela, encounter 2/25/21 4:33 P  "Pt called service asking for refill on paxil    However, she had rx refilled on 2/22/2021 by dr Xiao Blair -   I called pt personally at [phone] and let her know that rx should be at pharmacy  She will head over now "

## 2021-03-01 ENCOUNTER — LAB REQUISITION (OUTPATIENT)
Dept: LAB | Facility: HOSPITAL | Age: 66
End: 2021-03-01
Payer: COMMERCIAL

## 2021-03-01 DIAGNOSIS — Z11.59 ENCOUNTER FOR SCREENING FOR OTHER VIRAL DISEASES: ICD-10-CM

## 2021-03-01 LAB — SARS-COV-2 RNA RESP QL NAA+PROBE: NEGATIVE

## 2021-03-01 PROCEDURE — U0003 INFECTIOUS AGENT DETECTION BY NUCLEIC ACID (DNA OR RNA); SEVERE ACUTE RESPIRATORY SYNDROME CORONAVIRUS 2 (SARS-COV-2) (CORONAVIRUS DISEASE [COVID-19]), AMPLIFIED PROBE TECHNIQUE, MAKING USE OF HIGH THROUGHPUT TECHNOLOGIES AS DESCRIBED BY CMS-2020-01-R: HCPCS | Performed by: INTERNAL MEDICINE

## 2021-03-01 PROCEDURE — U0005 INFEC AGEN DETEC AMPLI PROBE: HCPCS | Performed by: INTERNAL MEDICINE

## 2021-03-02 DIAGNOSIS — F41.9 ANXIETY DISORDER, UNSPECIFIED TYPE: ICD-10-CM

## 2021-03-02 RX ORDER — PAROXETINE 10 MG/1
TABLET, FILM COATED ORAL
Qty: 90 TABLET | Refills: 3 | Status: SHIPPED | OUTPATIENT
Start: 2021-03-02 | End: 2021-03-03 | Stop reason: SDUPTHER

## 2021-03-03 DIAGNOSIS — F41.9 ANXIETY DISORDER, UNSPECIFIED TYPE: ICD-10-CM

## 2021-03-04 ENCOUNTER — LAB REQUISITION (OUTPATIENT)
Dept: LAB | Facility: HOSPITAL | Age: 66
End: 2021-03-04
Payer: COMMERCIAL

## 2021-03-04 DIAGNOSIS — Z11.59 ENCOUNTER FOR SCREENING FOR OTHER VIRAL DISEASES: ICD-10-CM

## 2021-03-04 LAB — SARS-COV-2 RNA RESP QL NAA+PROBE: NEGATIVE

## 2021-03-04 PROCEDURE — U0003 INFECTIOUS AGENT DETECTION BY NUCLEIC ACID (DNA OR RNA); SEVERE ACUTE RESPIRATORY SYNDROME CORONAVIRUS 2 (SARS-COV-2) (CORONAVIRUS DISEASE [COVID-19]), AMPLIFIED PROBE TECHNIQUE, MAKING USE OF HIGH THROUGHPUT TECHNOLOGIES AS DESCRIBED BY CMS-2020-01-R: HCPCS | Performed by: INTERNAL MEDICINE

## 2021-03-04 PROCEDURE — U0005 INFEC AGEN DETEC AMPLI PROBE: HCPCS | Performed by: INTERNAL MEDICINE

## 2021-03-04 RX ORDER — PAROXETINE 10 MG/1
10 TABLET, FILM COATED ORAL DAILY
Qty: 90 TABLET | Refills: 3 | Status: SHIPPED | OUTPATIENT
Start: 2021-03-04 | End: 2022-03-09 | Stop reason: SDUPTHER

## 2021-03-11 ENCOUNTER — LAB REQUISITION (OUTPATIENT)
Dept: LAB | Facility: HOSPITAL | Age: 66
End: 2021-03-11
Payer: COMMERCIAL

## 2021-03-11 DIAGNOSIS — Z11.59 ENCOUNTER FOR SCREENING FOR OTHER VIRAL DISEASES: ICD-10-CM

## 2021-03-11 LAB — SARS-COV-2 RNA RESP QL NAA+PROBE: NEGATIVE

## 2021-03-11 PROCEDURE — U0005 INFEC AGEN DETEC AMPLI PROBE: HCPCS | Performed by: INTERNAL MEDICINE

## 2021-03-11 PROCEDURE — U0003 INFECTIOUS AGENT DETECTION BY NUCLEIC ACID (DNA OR RNA); SEVERE ACUTE RESPIRATORY SYNDROME CORONAVIRUS 2 (SARS-COV-2) (CORONAVIRUS DISEASE [COVID-19]), AMPLIFIED PROBE TECHNIQUE, MAKING USE OF HIGH THROUGHPUT TECHNOLOGIES AS DESCRIBED BY CMS-2020-01-R: HCPCS | Performed by: INTERNAL MEDICINE

## 2021-03-15 ENCOUNTER — LAB REQUISITION (OUTPATIENT)
Dept: LAB | Facility: HOSPITAL | Age: 66
End: 2021-03-15
Payer: COMMERCIAL

## 2021-03-15 DIAGNOSIS — Z11.59 ENCOUNTER FOR SCREENING FOR OTHER VIRAL DISEASES: ICD-10-CM

## 2021-03-15 LAB — SARS-COV-2 RNA RESP QL NAA+PROBE: NEGATIVE

## 2021-03-15 PROCEDURE — U0003 INFECTIOUS AGENT DETECTION BY NUCLEIC ACID (DNA OR RNA); SEVERE ACUTE RESPIRATORY SYNDROME CORONAVIRUS 2 (SARS-COV-2) (CORONAVIRUS DISEASE [COVID-19]), AMPLIFIED PROBE TECHNIQUE, MAKING USE OF HIGH THROUGHPUT TECHNOLOGIES AS DESCRIBED BY CMS-2020-01-R: HCPCS | Performed by: INTERNAL MEDICINE

## 2021-03-15 PROCEDURE — U0005 INFEC AGEN DETEC AMPLI PROBE: HCPCS | Performed by: INTERNAL MEDICINE

## 2021-03-25 ENCOUNTER — LAB REQUISITION (OUTPATIENT)
Dept: LAB | Facility: HOSPITAL | Age: 66
End: 2021-03-25
Payer: COMMERCIAL

## 2021-03-25 DIAGNOSIS — Z11.59 ENCOUNTER FOR SCREENING FOR OTHER VIRAL DISEASES: ICD-10-CM

## 2021-03-25 PROCEDURE — U0005 INFEC AGEN DETEC AMPLI PROBE: HCPCS | Performed by: INTERNAL MEDICINE

## 2021-03-25 PROCEDURE — U0003 INFECTIOUS AGENT DETECTION BY NUCLEIC ACID (DNA OR RNA); SEVERE ACUTE RESPIRATORY SYNDROME CORONAVIRUS 2 (SARS-COV-2) (CORONAVIRUS DISEASE [COVID-19]), AMPLIFIED PROBE TECHNIQUE, MAKING USE OF HIGH THROUGHPUT TECHNOLOGIES AS DESCRIBED BY CMS-2020-01-R: HCPCS | Performed by: INTERNAL MEDICINE

## 2021-03-26 LAB — SARS-COV-2 RNA RESP QL NAA+PROBE: NEGATIVE

## 2021-03-29 ENCOUNTER — LAB REQUISITION (OUTPATIENT)
Dept: LAB | Facility: HOSPITAL | Age: 66
End: 2021-03-29
Payer: COMMERCIAL

## 2021-03-29 DIAGNOSIS — Z11.59 ENCOUNTER FOR SCREENING FOR OTHER VIRAL DISEASES: ICD-10-CM

## 2021-03-29 LAB — SARS-COV-2 RNA RESP QL NAA+PROBE: NEGATIVE

## 2021-03-29 PROCEDURE — U0003 INFECTIOUS AGENT DETECTION BY NUCLEIC ACID (DNA OR RNA); SEVERE ACUTE RESPIRATORY SYNDROME CORONAVIRUS 2 (SARS-COV-2) (CORONAVIRUS DISEASE [COVID-19]), AMPLIFIED PROBE TECHNIQUE, MAKING USE OF HIGH THROUGHPUT TECHNOLOGIES AS DESCRIBED BY CMS-2020-01-R: HCPCS | Performed by: INTERNAL MEDICINE

## 2021-03-29 PROCEDURE — U0005 INFEC AGEN DETEC AMPLI PROBE: HCPCS | Performed by: INTERNAL MEDICINE

## 2021-04-07 ENCOUNTER — LAB REQUISITION (OUTPATIENT)
Dept: LAB | Facility: HOSPITAL | Age: 66
End: 2021-04-07
Payer: COMMERCIAL

## 2021-04-07 DIAGNOSIS — Z11.59 ENCOUNTER FOR SCREENING FOR OTHER VIRAL DISEASES: ICD-10-CM

## 2021-04-07 LAB — SARS-COV-2 RNA RESP QL NAA+PROBE: NEGATIVE

## 2021-04-07 PROCEDURE — U0003 INFECTIOUS AGENT DETECTION BY NUCLEIC ACID (DNA OR RNA); SEVERE ACUTE RESPIRATORY SYNDROME CORONAVIRUS 2 (SARS-COV-2) (CORONAVIRUS DISEASE [COVID-19]), AMPLIFIED PROBE TECHNIQUE, MAKING USE OF HIGH THROUGHPUT TECHNOLOGIES AS DESCRIBED BY CMS-2020-01-R: HCPCS | Performed by: INTERNAL MEDICINE

## 2021-04-07 PROCEDURE — U0005 INFEC AGEN DETEC AMPLI PROBE: HCPCS | Performed by: INTERNAL MEDICINE

## 2021-04-12 ENCOUNTER — LAB REQUISITION (OUTPATIENT)
Dept: LAB | Facility: HOSPITAL | Age: 66
End: 2021-04-12
Payer: COMMERCIAL

## 2021-04-12 DIAGNOSIS — Z11.59 ENCOUNTER FOR SCREENING FOR OTHER VIRAL DISEASES: ICD-10-CM

## 2021-04-12 PROCEDURE — U0005 INFEC AGEN DETEC AMPLI PROBE: HCPCS | Performed by: INTERNAL MEDICINE

## 2021-04-12 PROCEDURE — U0003 INFECTIOUS AGENT DETECTION BY NUCLEIC ACID (DNA OR RNA); SEVERE ACUTE RESPIRATORY SYNDROME CORONAVIRUS 2 (SARS-COV-2) (CORONAVIRUS DISEASE [COVID-19]), AMPLIFIED PROBE TECHNIQUE, MAKING USE OF HIGH THROUGHPUT TECHNOLOGIES AS DESCRIBED BY CMS-2020-01-R: HCPCS | Performed by: INTERNAL MEDICINE

## 2021-04-13 LAB — SARS-COV-2 RNA RESP QL NAA+PROBE: NEGATIVE

## 2021-04-14 ENCOUNTER — LAB REQUISITION (OUTPATIENT)
Dept: LAB | Facility: HOSPITAL | Age: 66
End: 2021-04-14
Payer: COMMERCIAL

## 2021-04-14 DIAGNOSIS — Z11.59 ENCOUNTER FOR SCREENING FOR OTHER VIRAL DISEASES: ICD-10-CM

## 2021-04-14 PROCEDURE — U0005 INFEC AGEN DETEC AMPLI PROBE: HCPCS | Performed by: INTERNAL MEDICINE

## 2021-04-14 PROCEDURE — U0003 INFECTIOUS AGENT DETECTION BY NUCLEIC ACID (DNA OR RNA); SEVERE ACUTE RESPIRATORY SYNDROME CORONAVIRUS 2 (SARS-COV-2) (CORONAVIRUS DISEASE [COVID-19]), AMPLIFIED PROBE TECHNIQUE, MAKING USE OF HIGH THROUGHPUT TECHNOLOGIES AS DESCRIBED BY CMS-2020-01-R: HCPCS | Performed by: INTERNAL MEDICINE

## 2021-04-15 LAB — SARS-COV-2 RNA RESP QL NAA+PROBE: NEGATIVE

## 2021-04-19 ENCOUNTER — LAB REQUISITION (OUTPATIENT)
Dept: LAB | Facility: HOSPITAL | Age: 66
End: 2021-04-19
Payer: COMMERCIAL

## 2021-04-19 DIAGNOSIS — Z11.59 ENCOUNTER FOR SCREENING FOR OTHER VIRAL DISEASES: ICD-10-CM

## 2021-04-19 PROCEDURE — U0003 INFECTIOUS AGENT DETECTION BY NUCLEIC ACID (DNA OR RNA); SEVERE ACUTE RESPIRATORY SYNDROME CORONAVIRUS 2 (SARS-COV-2) (CORONAVIRUS DISEASE [COVID-19]), AMPLIFIED PROBE TECHNIQUE, MAKING USE OF HIGH THROUGHPUT TECHNOLOGIES AS DESCRIBED BY CMS-2020-01-R: HCPCS | Performed by: INTERNAL MEDICINE

## 2021-04-19 PROCEDURE — U0005 INFEC AGEN DETEC AMPLI PROBE: HCPCS | Performed by: INTERNAL MEDICINE

## 2021-04-20 LAB — SARS-COV-2 RNA RESP QL NAA+PROBE: NEGATIVE

## 2021-04-21 ENCOUNTER — LAB REQUISITION (OUTPATIENT)
Dept: LAB | Facility: HOSPITAL | Age: 66
End: 2021-04-21
Payer: COMMERCIAL

## 2021-04-21 DIAGNOSIS — Z11.59 ENCOUNTER FOR SCREENING FOR OTHER VIRAL DISEASES: ICD-10-CM

## 2021-04-21 LAB — SARS-COV-2 RNA RESP QL NAA+PROBE: NEGATIVE

## 2021-04-21 PROCEDURE — U0003 INFECTIOUS AGENT DETECTION BY NUCLEIC ACID (DNA OR RNA); SEVERE ACUTE RESPIRATORY SYNDROME CORONAVIRUS 2 (SARS-COV-2) (CORONAVIRUS DISEASE [COVID-19]), AMPLIFIED PROBE TECHNIQUE, MAKING USE OF HIGH THROUGHPUT TECHNOLOGIES AS DESCRIBED BY CMS-2020-01-R: HCPCS | Performed by: INTERNAL MEDICINE

## 2021-04-21 PROCEDURE — U0005 INFEC AGEN DETEC AMPLI PROBE: HCPCS | Performed by: INTERNAL MEDICINE

## 2021-04-26 ENCOUNTER — LAB REQUISITION (OUTPATIENT)
Dept: LAB | Facility: HOSPITAL | Age: 66
End: 2021-04-26
Payer: COMMERCIAL

## 2021-04-26 DIAGNOSIS — Z11.59 ENCOUNTER FOR SCREENING FOR OTHER VIRAL DISEASES: ICD-10-CM

## 2021-04-26 LAB — SARS-COV-2 RNA RESP QL NAA+PROBE: NEGATIVE

## 2021-04-26 PROCEDURE — U0003 INFECTIOUS AGENT DETECTION BY NUCLEIC ACID (DNA OR RNA); SEVERE ACUTE RESPIRATORY SYNDROME CORONAVIRUS 2 (SARS-COV-2) (CORONAVIRUS DISEASE [COVID-19]), AMPLIFIED PROBE TECHNIQUE, MAKING USE OF HIGH THROUGHPUT TECHNOLOGIES AS DESCRIBED BY CMS-2020-01-R: HCPCS | Performed by: INTERNAL MEDICINE

## 2021-04-26 PROCEDURE — U0005 INFEC AGEN DETEC AMPLI PROBE: HCPCS | Performed by: INTERNAL MEDICINE

## 2021-05-05 ENCOUNTER — LAB REQUISITION (OUTPATIENT)
Dept: LAB | Facility: HOSPITAL | Age: 66
End: 2021-05-05
Payer: COMMERCIAL

## 2021-05-05 DIAGNOSIS — Z11.59 ENCOUNTER FOR SCREENING FOR OTHER VIRAL DISEASES: ICD-10-CM

## 2021-05-05 LAB — SARS-COV-2 RNA RESP QL NAA+PROBE: NEGATIVE

## 2021-05-05 PROCEDURE — U0003 INFECTIOUS AGENT DETECTION BY NUCLEIC ACID (DNA OR RNA); SEVERE ACUTE RESPIRATORY SYNDROME CORONAVIRUS 2 (SARS-COV-2) (CORONAVIRUS DISEASE [COVID-19]), AMPLIFIED PROBE TECHNIQUE, MAKING USE OF HIGH THROUGHPUT TECHNOLOGIES AS DESCRIBED BY CMS-2020-01-R: HCPCS | Performed by: INTERNAL MEDICINE

## 2021-05-05 PROCEDURE — U0005 INFEC AGEN DETEC AMPLI PROBE: HCPCS | Performed by: INTERNAL MEDICINE

## 2021-05-12 ENCOUNTER — OFFICE VISIT (OUTPATIENT)
Dept: FAMILY MEDICINE CLINIC | Facility: CLINIC | Age: 66
End: 2021-05-12
Payer: COMMERCIAL

## 2021-05-12 VITALS
SYSTOLIC BLOOD PRESSURE: 132 MMHG | WEIGHT: 118 LBS | BODY MASS INDEX: 20.91 KG/M2 | OXYGEN SATURATION: 96 % | TEMPERATURE: 97.5 F | HEART RATE: 65 BPM | HEIGHT: 63 IN | DIASTOLIC BLOOD PRESSURE: 80 MMHG

## 2021-05-12 DIAGNOSIS — Z12.4 CERVICAL CANCER SCREENING: ICD-10-CM

## 2021-05-12 DIAGNOSIS — E78.2 MIXED HYPERLIPIDEMIA: ICD-10-CM

## 2021-05-12 DIAGNOSIS — Z12.31 BREAST CANCER SCREENING BY MAMMOGRAM: ICD-10-CM

## 2021-05-12 DIAGNOSIS — M05.9 RHEUMATOID ARTHRITIS WITH POSITIVE RHEUMATOID FACTOR, INVOLVING UNSPECIFIED SITE (HCC): ICD-10-CM

## 2021-05-12 DIAGNOSIS — F41.9 ANXIETY DISORDER, UNSPECIFIED TYPE: ICD-10-CM

## 2021-05-12 DIAGNOSIS — Z00.00 ANNUAL PHYSICAL EXAM: ICD-10-CM

## 2021-05-12 DIAGNOSIS — Z12.31 ENCOUNTER FOR SCREENING MAMMOGRAM FOR MALIGNANT NEOPLASM OF BREAST: ICD-10-CM

## 2021-05-12 DIAGNOSIS — Z78.0 POSTMENOPAUSAL: ICD-10-CM

## 2021-05-12 DIAGNOSIS — R53.82 CHRONIC FATIGUE: ICD-10-CM

## 2021-05-12 DIAGNOSIS — Z12.11 COLON CANCER SCREENING: Primary | ICD-10-CM

## 2021-05-12 PROCEDURE — 3725F SCREEN DEPRESSION PERFORMED: CPT | Performed by: FAMILY MEDICINE

## 2021-05-12 PROCEDURE — 3008F BODY MASS INDEX DOCD: CPT | Performed by: FAMILY MEDICINE

## 2021-05-12 PROCEDURE — 99397 PER PM REEVAL EST PAT 65+ YR: CPT | Performed by: FAMILY MEDICINE

## 2021-05-12 PROCEDURE — 3288F FALL RISK ASSESSMENT DOCD: CPT | Performed by: FAMILY MEDICINE

## 2021-05-12 PROCEDURE — 1101F PT FALLS ASSESS-DOCD LE1/YR: CPT | Performed by: FAMILY MEDICINE

## 2021-05-12 PROCEDURE — 1160F RVW MEDS BY RX/DR IN RCRD: CPT | Performed by: FAMILY MEDICINE

## 2021-05-12 PROCEDURE — 1036F TOBACCO NON-USER: CPT | Performed by: FAMILY MEDICINE

## 2021-05-12 NOTE — PATIENT INSTRUCTIONS

## 2021-05-12 NOTE — PROGRESS NOTES
[facility]    NAME: Hilary Irvin  AGE: 78 y o  SEX: female  : 1955     DATE: 2021     Assessment and Plan:     Problem List Items Addressed This Visit        Musculoskeletal and Integument    Rheumatoid arthritis ([address] )    Relevant Orders    Comprehensive metabolic panel       Other    Anxiety disorder    Relevant Orders    Lipid Panel with Direct LDL reflex    Comprehensive metabolic panel    Hyperlipidemia    Relevant Orders    Lipid Panel with Direct LDL reflex    Comprehensive metabolic panel      Other Visit Diagnoses     Colon cancer screening    -  Primary    Relevant Orders    Cologuard    Breast cancer screening by mammogram        Relevant Orders    Mammo screening bilateral w 3d & cad    Cervical cancer screening        Relevant Orders    Ambulatory referral to Gynecology    Postmenopausal        Relevant Orders    DXA bone density spine hip and pelvis    Annual physical exam        Chronic fatigue        Relevant Orders    TSH, 3rd generation with Free T4 reflex    Encounter for screening mammogram for malignant neoplasm of breast              Immunizations and preventive care screenings were discussed with patient today  Appropriate education was printed on patient's after visit summary  Counseling:  Dental Health: discussed importance of regular tooth brushing, flossing, and dental visits  · Exercise: the importance of regular exercise/physical activity was discussed  Recommend exercise 3-5 times per week for at least 30 minutes  Return in 6 months (on 2021)  Chief Complaint:     Chief Complaint   Patient presents with    Physical Exam      History of Present Illness:     Adult Annual Physical   Patient here for a comprehensive physical exam  The patient reports chronic problems are stable     She follows routinely with Rheumatology    She does have chronic diffuse pain but this is stable and well controlled  She does have some occasional swelling over right knee and has pointed this out to her rheumatologist     She has history of elevated blood pressure but not hypertension  She denies any chest pain, shortness of breath or palpitations  She has history of mild hyperlipidemia  She is not currently on medication for this  She has history of some mild anxiety and depressive mood but remains on paroxetine  She has a history of osteoporosis and remains on Boniva  Diet and Physical Activity  · Diet/Nutrition: well balanced diet  · Exercise: moderate cardiovascular exercise  Depression Screening  PHQ-9 Depression Screening    PHQ-9:   Frequency of the following problems over the past two weeks:      Little interest or pleasure in doing things: 0 - not at all  Feeling down, depressed, or hopeless: 0 - not at all  PHQ-2 Score: 0       General Health  · Sleep: sleeps well  · Hearing: normal - bilateral   · Vision: no vision problems  · Dental: has dentures  /GYN Health  · Patient is: postmenopausal  · Refer to Gyn     Review of Systems:     Review of Systems   Constitutional: Positive for fatigue  Negative for appetite change, chills, fever and unexpected weight change  HENT: Negative for trouble swallowing  Eyes: Negative for visual disturbance  Respiratory: Negative for cough, chest tightness, shortness of breath and wheezing  Cardiovascular: Negative for chest pain, palpitations and leg swelling  Gastrointestinal: Negative for abdominal distention, abdominal pain, blood in stool, constipation and diarrhea  Endocrine: Negative for polyuria  Genitourinary: Negative for difficulty urinating and flank pain  Musculoskeletal: Positive for arthralgias and myalgias  Negative for gait problem, neck pain and neck stiffness  Skin: Negative for rash  Neurological: Negative for dizziness, syncope and light-headedness     Hematological: Negative for adenopathy  Does not bruise/bleed easily  Psychiatric/Behavioral: Negative for agitation, dysphoric mood and sleep disturbance  The patient is not nervous/anxious         Past Medical History:     Past Medical History:   Diagnosis Date    Anemia      - corrected with iron    Ankle fracture, right     last assessed: 3/9/15    Arthritis     1996    Depression     last assessed: 12    Herpes zoster     2009      Past Surgical History:     Past Surgical History:   Procedure Laterality Date    ANKLE SURGERY      ORIF     CYST REMOVAL          ESOPHAGOGASTRODUODENOSCOPY      Diagnostic    FOOT SURGERY      plantar wart resection; resolved:     TONSILLECTOMY AND ADENOIDECTOMY            Social History:        Social History     Socioeconomic History    Marital status: /Civil Union     Spouse name: None    Number of children: None    Years of education: None    Highest education level: None   Occupational History    None   Social Needs    Financial resource strain: None    Food insecurity     Worry: None     Inability: None    Transportation needs     Medical: None     Non-medical: None   Tobacco Use    Smoking status: Former Smoker     Quit date:      Years since quittin 3    Smokeless tobacco: Never Used   Substance and Sexual Activity    Alcohol use: Yes     Comment: rare, maybe on holidays    Drug use: No    Sexual activity: Not Currently   Lifestyle    Physical activity     Days per week: None     Minutes per session: None    Stress: None   Relationships    Social connections     Talks on phone: None     Gets together: None     Attends [employer] service: None     Active member of club or organization: None     Attends meetings of clubs or organizations: None     Relationship status: None    Intimate partner violence     Fear of current or ex partner: None     Emotionally abused: None     Physically abused: None     Forced sexual activity: None Other Topics Concern    None   Social History Narrative    None      Family History:     Family History   Problem Relation Age of Onset    Seizures Mother         epilepsy    Bone cancer Father       Current Medications:     Current Outpatient Medications   Medication Sig Dispense Refill    Ca Carbonate-Mag Hydroxide (ROLAIDS) 550-110 MG CHEW Chew Twice daily      celecoxib (CELEBREX) 200 mg capsule Take 1 capsule by mouth daily      diclofenac sodium (VOLTAREN) 1 % Place on the skin daily      famotidine (PEPCID) 20 mg tablet Take 20 mg by mouth daily      Ferrous Sulfate (IRON) 325 (65 Fe) MG TABS Take by mouth      folic acid (FOLVITE) 1 mg tablet Take by mouth      ibandronate (BONIVA) 150 MG tablet Take 1 tablet by mouth      methocarbamol (ROBAXIN) 500 mg tablet Take 1 tablet (500 mg total) by mouth 3 (three) times a day 30 tablet 0    methotrexate 2 5 mg tablet Take by mouth 8 Pills weekly      naproxen (NAPROSYN) 500 mg tablet Take 500 mg by mouth as needed       PARoxetine (PAXIL) 10 mg tablet Take 1 tablet (10 mg total) by mouth daily 90 tablet 3    predniSONE 5 mg tablet Take 5 mg by mouth daily        No current facility-administered medications for this visit  Allergies:     No Known Allergies   Physical Exam:     /80 (BP Location: Left arm, Patient Position: Sitting, Cuff Size: Standard)   Pulse 65   Temp 97 5 °F (36 4 °C)   Ht 5' 3" (1 6 m)   Wt 53 5 kg (118 lb)   SpO2 96%   BMI 20 90 kg/m²     Physical Exam  Constitutional:       General: She is not in acute distress  Appearance: She is well-developed  She is not diaphoretic  HENT:      Head: Normocephalic  Right Ear: External ear normal       Left Ear: External ear normal       Nose: Nose normal    Eyes:      General: No scleral icterus  Right eye: No discharge  Left eye: No discharge        Conjunctiva/sclera: Conjunctivae normal       Pupils: Pupils are equal, round, and reactive to light    Neck:      Thyroid: No thyromegaly  Trachea: No tracheal deviation  Cardiovascular:      Rate and Rhythm: Normal rate and regular rhythm  Heart sounds: Normal heart sounds  No murmur  Pulmonary:      Effort: Pulmonary effort is normal  No respiratory distress  Breath sounds: Normal breath sounds  Abdominal:      General: Bowel sounds are normal  There is no distension  Palpations: Abdomen is soft  Tenderness: There is no abdominal tenderness  Musculoskeletal: Normal range of motion  General: Deformity (RA changes) present  No tenderness  Lymphadenopathy:      Cervical: No cervical adenopathy  Skin:     General: Skin is warm  Coloration: Skin is not pale  Findings: No erythema or rash  Neurological:      Cranial Nerves: No cranial nerve deficit  Coordination: Coordination normal    Psychiatric:         Mood and Affect: Mood normal          Behavior: Behavior normal          Thought Content:  Thought content normal           Jess Smith MD  [address]

## 2021-05-21 ENCOUNTER — LAB REQUISITION (OUTPATIENT)
Dept: LAB | Facility: HOSPITAL | Age: 66
End: 2021-05-21
Payer: COMMERCIAL

## 2021-05-21 DIAGNOSIS — Z11.59 ENCOUNTER FOR SCREENING FOR OTHER VIRAL DISEASES: ICD-10-CM

## 2021-05-21 PROCEDURE — U0003 INFECTIOUS AGENT DETECTION BY NUCLEIC ACID (DNA OR RNA); SEVERE ACUTE RESPIRATORY SYNDROME CORONAVIRUS 2 (SARS-COV-2) (CORONAVIRUS DISEASE [COVID-19]), AMPLIFIED PROBE TECHNIQUE, MAKING USE OF HIGH THROUGHPUT TECHNOLOGIES AS DESCRIBED BY CMS-2020-01-R: HCPCS | Performed by: INTERNAL MEDICINE

## 2021-05-21 PROCEDURE — U0005 INFEC AGEN DETEC AMPLI PROBE: HCPCS | Performed by: INTERNAL MEDICINE

## 2021-05-22 LAB — SARS-COV-2 RNA RESP QL NAA+PROBE: NEGATIVE

## 2021-06-22 ENCOUNTER — VBI (OUTPATIENT)
Dept: ADMINISTRATIVE | Facility: OTHER | Age: 66
End: 2021-06-22

## 2021-08-13 ENCOUNTER — LAB REQUISITION (OUTPATIENT)
Dept: LAB | Facility: HOSPITAL | Age: 66
End: 2021-08-13
Payer: COMMERCIAL

## 2021-08-13 DIAGNOSIS — Z11.59 ENCOUNTER FOR SCREENING FOR OTHER VIRAL DISEASES: ICD-10-CM

## 2021-08-13 PROCEDURE — U0003 INFECTIOUS AGENT DETECTION BY NUCLEIC ACID (DNA OR RNA); SEVERE ACUTE RESPIRATORY SYNDROME CORONAVIRUS 2 (SARS-COV-2) (CORONAVIRUS DISEASE [COVID-19]), AMPLIFIED PROBE TECHNIQUE, MAKING USE OF HIGH THROUGHPUT TECHNOLOGIES AS DESCRIBED BY CMS-2020-01-R: HCPCS | Performed by: INTERNAL MEDICINE

## 2021-08-13 PROCEDURE — U0005 INFEC AGEN DETEC AMPLI PROBE: HCPCS | Performed by: INTERNAL MEDICINE

## 2021-08-14 LAB — SARS-COV-2 RNA RESP QL NAA+PROBE: NEGATIVE

## 2021-08-18 PROCEDURE — U0005 INFEC AGEN DETEC AMPLI PROBE: HCPCS | Performed by: INTERNAL MEDICINE

## 2021-08-18 PROCEDURE — U0003 INFECTIOUS AGENT DETECTION BY NUCLEIC ACID (DNA OR RNA); SEVERE ACUTE RESPIRATORY SYNDROME CORONAVIRUS 2 (SARS-COV-2) (CORONAVIRUS DISEASE [COVID-19]), AMPLIFIED PROBE TECHNIQUE, MAKING USE OF HIGH THROUGHPUT TECHNOLOGIES AS DESCRIBED BY CMS-2020-01-R: HCPCS | Performed by: INTERNAL MEDICINE

## 2021-08-19 ENCOUNTER — LAB REQUISITION (OUTPATIENT)
Dept: LAB | Facility: HOSPITAL | Age: 66
End: 2021-08-19
Payer: COMMERCIAL

## 2021-08-19 DIAGNOSIS — Z11.59 ENCOUNTER FOR SCREENING FOR OTHER VIRAL DISEASES: ICD-10-CM

## 2021-08-19 LAB — SARS-COV-2 RNA RESP QL NAA+PROBE: NEGATIVE

## 2021-08-24 ENCOUNTER — LAB REQUISITION (OUTPATIENT)
Dept: LAB | Facility: HOSPITAL | Age: 66
End: 2021-08-24
Payer: COMMERCIAL

## 2021-08-24 DIAGNOSIS — Z11.59 ENCOUNTER FOR SCREENING FOR OTHER VIRAL DISEASES: ICD-10-CM

## 2021-08-24 LAB — SARS-COV-2 RNA RESP QL NAA+PROBE: NEGATIVE

## 2021-08-24 PROCEDURE — U0005 INFEC AGEN DETEC AMPLI PROBE: HCPCS | Performed by: INTERNAL MEDICINE

## 2021-08-24 PROCEDURE — U0003 INFECTIOUS AGENT DETECTION BY NUCLEIC ACID (DNA OR RNA); SEVERE ACUTE RESPIRATORY SYNDROME CORONAVIRUS 2 (SARS-COV-2) (CORONAVIRUS DISEASE [COVID-19]), AMPLIFIED PROBE TECHNIQUE, MAKING USE OF HIGH THROUGHPUT TECHNOLOGIES AS DESCRIBED BY CMS-2020-01-R: HCPCS | Performed by: INTERNAL MEDICINE

## 2021-08-31 ENCOUNTER — LAB REQUISITION (OUTPATIENT)
Dept: LAB | Facility: HOSPITAL | Age: 66
End: 2021-08-31
Payer: COMMERCIAL

## 2021-08-31 DIAGNOSIS — Z11.59 ENCOUNTER FOR SCREENING FOR OTHER VIRAL DISEASES: ICD-10-CM

## 2021-08-31 PROCEDURE — U0003 INFECTIOUS AGENT DETECTION BY NUCLEIC ACID (DNA OR RNA); SEVERE ACUTE RESPIRATORY SYNDROME CORONAVIRUS 2 (SARS-COV-2) (CORONAVIRUS DISEASE [COVID-19]), AMPLIFIED PROBE TECHNIQUE, MAKING USE OF HIGH THROUGHPUT TECHNOLOGIES AS DESCRIBED BY CMS-2020-01-R: HCPCS | Performed by: INTERNAL MEDICINE

## 2021-08-31 PROCEDURE — U0005 INFEC AGEN DETEC AMPLI PROBE: HCPCS | Performed by: INTERNAL MEDICINE

## 2021-09-01 LAB — SARS-COV-2 RNA RESP QL NAA+PROBE: NEGATIVE

## 2021-09-08 ENCOUNTER — LAB REQUISITION (OUTPATIENT)
Dept: LAB | Facility: HOSPITAL | Age: 66
End: 2021-09-08
Payer: COMMERCIAL

## 2021-09-08 DIAGNOSIS — Z11.59 ENCOUNTER FOR SCREENING FOR OTHER VIRAL DISEASES: ICD-10-CM

## 2021-09-08 LAB — SARS-COV-2 RNA RESP QL NAA+PROBE: NEGATIVE

## 2021-09-08 PROCEDURE — U0003 INFECTIOUS AGENT DETECTION BY NUCLEIC ACID (DNA OR RNA); SEVERE ACUTE RESPIRATORY SYNDROME CORONAVIRUS 2 (SARS-COV-2) (CORONAVIRUS DISEASE [COVID-19]), AMPLIFIED PROBE TECHNIQUE, MAKING USE OF HIGH THROUGHPUT TECHNOLOGIES AS DESCRIBED BY CMS-2020-01-R: HCPCS | Performed by: INTERNAL MEDICINE

## 2021-09-08 PROCEDURE — U0005 INFEC AGEN DETEC AMPLI PROBE: HCPCS | Performed by: INTERNAL MEDICINE

## 2021-09-16 ENCOUNTER — VBI (OUTPATIENT)
Dept: ADMINISTRATIVE | Facility: OTHER | Age: 66
End: 2021-09-16

## 2021-10-15 ENCOUNTER — OFFICE VISIT (OUTPATIENT)
Dept: OBGYN CLINIC | Facility: MEDICAL CENTER | Age: 66
End: 2021-10-15
Payer: COMMERCIAL

## 2021-10-15 VITALS
WEIGHT: 116 LBS | HEIGHT: 63 IN | SYSTOLIC BLOOD PRESSURE: 110 MMHG | DIASTOLIC BLOOD PRESSURE: 68 MMHG | BODY MASS INDEX: 20.55 KG/M2

## 2021-10-15 DIAGNOSIS — N39.41 URGE INCONTINENCE OF URINE: ICD-10-CM

## 2021-10-15 DIAGNOSIS — Z12.4 CERVICAL CANCER SCREENING: ICD-10-CM

## 2021-10-15 DIAGNOSIS — Z01.419 WELL WOMAN EXAM WITH ROUTINE GYNECOLOGICAL EXAM: Primary | ICD-10-CM

## 2021-10-15 PROCEDURE — S0610 ANNUAL GYNECOLOGICAL EXAMINA: HCPCS | Performed by: NURSE PRACTITIONER

## 2021-11-12 ENCOUNTER — HOSPITAL ENCOUNTER (OUTPATIENT)
Dept: MAMMOGRAPHY | Facility: MEDICAL CENTER | Age: 66
Discharge: HOME | End: 2021-11-12
Payer: COMMERCIAL

## 2021-11-12 ENCOUNTER — HOSPITAL ENCOUNTER (OUTPATIENT)
Dept: BONE DENSITY | Facility: MEDICAL CENTER | Age: 66
Discharge: HOME | End: 2021-11-12
Payer: COMMERCIAL

## 2021-11-12 VITALS — HEIGHT: 63 IN | WEIGHT: 118 LBS | BODY MASS INDEX: 20.91 KG/M2

## 2021-11-12 DIAGNOSIS — Z12.31 BREAST CANCER SCREENING BY MAMMOGRAM: ICD-10-CM

## 2021-11-12 DIAGNOSIS — Z78.0 POSTMENOPAUSAL: ICD-10-CM

## 2021-11-12 PROCEDURE — 77080 DXA BONE DENSITY AXIAL: CPT

## 2021-11-12 PROCEDURE — 77067 SCR MAMMO BI INCL CAD: CPT

## 2021-11-12 PROCEDURE — 77063 BREAST TOMOSYNTHESIS BI: CPT

## 2021-11-15 ENCOUNTER — OFFICE VISIT (OUTPATIENT)
Dept: FAMILY MEDICINE CLINIC | Facility: CLINIC | Age: 66
End: 2021-11-15
Payer: COMMERCIAL

## 2021-11-15 VITALS
OXYGEN SATURATION: 96 % | RESPIRATION RATE: 18 BRPM | SYSTOLIC BLOOD PRESSURE: 136 MMHG | HEART RATE: 68 BPM | HEIGHT: 63 IN | WEIGHT: 117 LBS | DIASTOLIC BLOOD PRESSURE: 82 MMHG | BODY MASS INDEX: 20.73 KG/M2

## 2021-11-15 DIAGNOSIS — D50.9 IRON DEFICIENCY ANEMIA, UNSPECIFIED IRON DEFICIENCY ANEMIA TYPE: ICD-10-CM

## 2021-11-15 DIAGNOSIS — M05.79 RHEUMATOID ARTHRITIS INVOLVING MULTIPLE SITES WITH POSITIVE RHEUMATOID FACTOR (HCC): Primary | ICD-10-CM

## 2021-11-15 DIAGNOSIS — M81.0 OSTEOPOROSIS, UNSPECIFIED OSTEOPOROSIS TYPE, UNSPECIFIED PATHOLOGICAL FRACTURE PRESENCE: ICD-10-CM

## 2021-11-15 DIAGNOSIS — R03.0 ELEVATED BLOOD PRESSURE READING: ICD-10-CM

## 2021-11-15 DIAGNOSIS — N39.41 URGE INCONTINENCE OF URINE: ICD-10-CM

## 2021-11-15 DIAGNOSIS — R35.0 URINE FREQUENCY: ICD-10-CM

## 2021-11-15 DIAGNOSIS — R53.82 CHRONIC FATIGUE: ICD-10-CM

## 2021-11-15 DIAGNOSIS — R82.81 PYURIA: ICD-10-CM

## 2021-11-15 DIAGNOSIS — F41.9 ANXIETY DISORDER, UNSPECIFIED TYPE: ICD-10-CM

## 2021-11-15 DIAGNOSIS — E44.1 MILD PROTEIN-CALORIE MALNUTRITION (HCC): ICD-10-CM

## 2021-11-15 DIAGNOSIS — E78.2 MIXED HYPERLIPIDEMIA: ICD-10-CM

## 2021-11-15 DIAGNOSIS — R06.83 SNORING: ICD-10-CM

## 2021-11-15 LAB
SL AMB  POCT GLUCOSE, UA: ABNORMAL
SL AMB LEUKOCYTE ESTERASE,UA: ABNORMAL
SL AMB POCT BILIRUBIN,UA: ABNORMAL
SL AMB POCT BLOOD,UA: ABNORMAL
SL AMB POCT CLARITY,UA: CLEAR
SL AMB POCT COLOR,UA: YELLOW
SL AMB POCT KETONES,UA: ABNORMAL
SL AMB POCT NITRITE,UA: ABNORMAL
SL AMB POCT PH,UA: 5
SL AMB POCT SPECIFIC GRAVITY,UA: 1.02
SL AMB POCT URINE PROTEIN: ABNORMAL
SL AMB POCT UROBILINOGEN: 0.2

## 2021-11-15 PROCEDURE — 3008F BODY MASS INDEX DOCD: CPT | Performed by: FAMILY MEDICINE

## 2021-11-15 PROCEDURE — 1036F TOBACCO NON-USER: CPT | Performed by: FAMILY MEDICINE

## 2021-11-15 PROCEDURE — 87086 URINE CULTURE/COLONY COUNT: CPT | Performed by: FAMILY MEDICINE

## 2021-11-15 PROCEDURE — 81002 URINALYSIS NONAUTO W/O SCOPE: CPT | Performed by: FAMILY MEDICINE

## 2021-11-15 PROCEDURE — 1160F RVW MEDS BY RX/DR IN RCRD: CPT | Performed by: FAMILY MEDICINE

## 2021-11-15 PROCEDURE — 3725F SCREEN DEPRESSION PERFORMED: CPT | Performed by: FAMILY MEDICINE

## 2021-11-15 PROCEDURE — 99214 OFFICE O/P EST MOD 30 MIN: CPT | Performed by: FAMILY MEDICINE

## 2021-11-15 PROCEDURE — 81001 URINALYSIS AUTO W/SCOPE: CPT | Performed by: FAMILY MEDICINE

## 2021-11-15 RX ORDER — SOLIFENACIN SUCCINATE 10 MG/1
10 TABLET, FILM COATED ORAL DAILY
Qty: 90 TABLET | Refills: 3 | Status: SHIPPED | OUTPATIENT
Start: 2021-11-15

## 2021-11-15 RX ORDER — NAPROXEN 500 MG/1
500 TABLET ORAL AS NEEDED
Qty: 90 TABLET | Refills: 0 | Status: SHIPPED | OUTPATIENT
Start: 2021-11-15 | End: 2021-11-28 | Stop reason: SDUPTHER

## 2021-11-16 ENCOUNTER — VBI (OUTPATIENT)
Dept: ADMINISTRATIVE | Facility: OTHER | Age: 66
End: 2021-11-16

## 2021-11-16 LAB
BACTERIA UR QL AUTO: ABNORMAL /HPF
BILIRUB UR QL STRIP: NEGATIVE
CAOX CRY URNS QL MICRO: ABNORMAL /HPF
CLARITY UR: ABNORMAL
COLOR UR: YELLOW
GLUCOSE UR STRIP-MCNC: NEGATIVE MG/DL
HGB UR QL STRIP.AUTO: NEGATIVE
KETONES UR STRIP-MCNC: NEGATIVE MG/DL
LEUKOCYTE ESTERASE UR QL STRIP: ABNORMAL
NITRITE UR QL STRIP: NEGATIVE
NON-SQ EPI CELLS URNS QL MICRO: ABNORMAL /HPF
PH UR STRIP.AUTO: 5.5 [PH]
PROT UR STRIP-MCNC: NEGATIVE MG/DL
RBC #/AREA URNS AUTO: ABNORMAL /HPF
SP GR UR STRIP.AUTO: 1.02 (ref 1–1.03)
UROBILINOGEN UR QL STRIP.AUTO: 0.2 E.U./DL
WBC #/AREA URNS AUTO: ABNORMAL /HPF

## 2021-11-17 LAB — BACTERIA UR CULT: NORMAL

## 2021-11-26 ENCOUNTER — TELEPHONE (OUTPATIENT)
Dept: FAMILY MEDICINE CLINIC | Facility: CLINIC | Age: 66
End: 2021-11-26

## 2021-11-26 DIAGNOSIS — M05.79 RHEUMATOID ARTHRITIS INVOLVING MULTIPLE SITES WITH POSITIVE RHEUMATOID FACTOR (HCC): ICD-10-CM

## 2021-11-28 RX ORDER — NAPROXEN 500 MG/1
TABLET ORAL
Qty: 90 TABLET | Refills: 1 | Status: SHIPPED | OUTPATIENT
Start: 2021-11-28 | End: 2022-03-03 | Stop reason: HOSPADM

## 2021-12-20 ENCOUNTER — APPOINTMENT (OUTPATIENT)
Dept: RADIOLOGY | Facility: MEDICAL CENTER | Age: 66
End: 2021-12-20
Payer: COMMERCIAL

## 2021-12-20 ENCOUNTER — OFFICE VISIT (OUTPATIENT)
Dept: OBGYN CLINIC | Facility: MEDICAL CENTER | Age: 66
End: 2021-12-20
Payer: COMMERCIAL

## 2021-12-20 VITALS
TEMPERATURE: 97.2 F | WEIGHT: 120 LBS | DIASTOLIC BLOOD PRESSURE: 76 MMHG | SYSTOLIC BLOOD PRESSURE: 130 MMHG | HEART RATE: 77 BPM | BODY MASS INDEX: 21.26 KG/M2 | HEIGHT: 63 IN

## 2021-12-20 DIAGNOSIS — M17.11 ARTHRITIS OF RIGHT KNEE: ICD-10-CM

## 2021-12-20 DIAGNOSIS — Z01.89 ENCOUNTER FOR LOWER EXTREMITY COMPARISON IMAGING STUDY: ICD-10-CM

## 2021-12-20 DIAGNOSIS — M17.11 ARTHRITIS OF RIGHT KNEE: Primary | ICD-10-CM

## 2021-12-20 DIAGNOSIS — Z01.818 ENCOUNTER FOR PREADMISSION TESTING: ICD-10-CM

## 2021-12-20 DIAGNOSIS — Z01.818 PREOPERATIVE TESTING: ICD-10-CM

## 2021-12-20 PROCEDURE — 1036F TOBACCO NON-USER: CPT | Performed by: ORTHOPAEDIC SURGERY

## 2021-12-20 PROCEDURE — 99203 OFFICE O/P NEW LOW 30 MIN: CPT | Performed by: ORTHOPAEDIC SURGERY

## 2021-12-20 PROCEDURE — 73564 X-RAY EXAM KNEE 4 OR MORE: CPT

## 2021-12-20 PROCEDURE — 3008F BODY MASS INDEX DOCD: CPT | Performed by: ORTHOPAEDIC SURGERY

## 2021-12-20 PROCEDURE — 1160F RVW MEDS BY RX/DR IN RCRD: CPT | Performed by: ORTHOPAEDIC SURGERY

## 2021-12-20 RX ORDER — MULTIVITAMIN
1 TABLET ORAL DAILY
Qty: 30 TABLET | Refills: 1 | Status: SHIPPED | OUTPATIENT
Start: 2021-12-20

## 2021-12-20 RX ORDER — ASCORBIC ACID 500 MG
500 TABLET ORAL DAILY
Qty: 30 TABLET | Refills: 1 | Status: SHIPPED | OUTPATIENT
Start: 2021-12-20

## 2021-12-20 RX ORDER — FERROUS SULFATE TAB EC 324 MG (65 MG FE EQUIVALENT) 324 (65 FE) MG
324 TABLET DELAYED RESPONSE ORAL
Qty: 30 TABLET | Refills: 1 | Status: SHIPPED | OUTPATIENT
Start: 2021-12-20

## 2021-12-22 RX ORDER — ACETAMINOPHEN 325 MG/1
975 TABLET ORAL ONCE
Status: CANCELLED | OUTPATIENT
Start: 2022-03-01 | End: 2021-12-22

## 2021-12-22 RX ORDER — SODIUM CHLORIDE 9 MG/ML
75 INJECTION, SOLUTION INTRAVENOUS CONTINUOUS
Status: CANCELLED | OUTPATIENT
Start: 2022-03-01

## 2021-12-22 RX ORDER — CEFAZOLIN SODIUM 2 G/50ML
2000 SOLUTION INTRAVENOUS ONCE
Status: CANCELLED | OUTPATIENT
Start: 2022-03-01 | End: 2021-12-22

## 2021-12-22 RX ORDER — CHLORHEXIDINE GLUCONATE 4 G/100ML
SOLUTION TOPICAL DAILY PRN
Status: CANCELLED | OUTPATIENT
Start: 2022-03-01

## 2021-12-22 RX ORDER — CHLORHEXIDINE GLUCONATE 0.12 MG/ML
15 RINSE ORAL ONCE
Status: CANCELLED | OUTPATIENT
Start: 2022-03-01 | End: 2021-12-22

## 2022-01-03 ENCOUNTER — OFFICE VISIT (OUTPATIENT)
Dept: DENTISTRY | Facility: CLINIC | Age: 67
End: 2022-01-03

## 2022-01-03 VITALS — DIASTOLIC BLOOD PRESSURE: 82 MMHG | TEMPERATURE: 97 F | HEART RATE: 88 BPM | SYSTOLIC BLOOD PRESSURE: 128 MMHG

## 2022-01-03 DIAGNOSIS — Z01.20 DENTAL EXAMINATION: Primary | ICD-10-CM

## 2022-01-03 PROCEDURE — D0220 INTRAORAL - PERIAPICAL FIRST RADIOGRAPHIC IMAGE: HCPCS | Performed by: DENTIST

## 2022-01-03 PROCEDURE — D0140 LIMITED ORAL EVALUATION - PROBLEM FOCUSED: HCPCS | Performed by: DENTIST

## 2022-01-03 PROCEDURE — D0230 INTRAORAL - PERIAPICAL EACH ADDITIONAL RADIOGRAPHIC IMAGE: HCPCS | Performed by: DENTIST

## 2022-01-03 NOTE — PROGRESS NOTES
Pt reports for limited examination to provider dental clearance in anticipation for knee replacement surgery  PMH taken w/ no contraindications for examination today  Pt denies any dental pain  Pt wears CD on upper arch, so three PA's were taken for radiographic assessment of lower teeth  Findings:  -generalized bone loss w/ moderate vertical defect on distal of #23  -pt is missing one incisor (we will call it #25 for our charting)    Clinically, pt has active perio disease with deep pocketing on distal of #23  During perio probing, pt has pain and bleeding during probing  No active caries present  The radiolucent lesions on the incisors are large abfraction lesions on the buccal of #22, 23, and 26  After confirming with Dr Honorio Kay, it was decided that SRP is needed to treat active periodontal disease before dental clearance is complete  Pt was given her dental clearance form back and asked to bring it to the SRP appt      NV: SRP of all anterior teeth (specifically, LL) + fill out and fax dental clearance form

## 2022-01-17 ENCOUNTER — TELEPHONE (OUTPATIENT)
Dept: OBGYN CLINIC | Facility: HOSPITAL | Age: 67
End: 2022-01-17

## 2022-01-17 NOTE — TELEPHONE ENCOUNTER
Dr Tuan Mcclure    Patient is asking if the dental appt is necessary for her knee replacement because she doesn't have dental insurance       # [phone]

## 2022-01-18 NOTE — TELEPHONE ENCOUNTER
I did schedule the patient to be seen at the [address] [place] [address]; she was seen on 1/3 per chart note:     "Clinically, pt has active perio disease with deep pocketing on distal of #23  During perio probing, pt has pain and bleeding during probing  No active caries present  The radiolucent lesions on the incisors are large abfraction lesions on the buccal of #22, 23, and 26  After confirming with Dr Kim Zelaya, it was decided that SRP is needed to treat active periodontal disease before dental clearance is complete "    Patient needs dental work and they will not clear her because of this  I will call patient back and explain that we do not know of any alternatives that this with [facility] and that without clearance she will not be able to have surgery

## 2022-01-18 NOTE — TELEPHONE ENCOUNTER
I can refer her to the Dental Clinics with [facility]; I have had other patients see them for clearance

## 2022-01-18 NOTE — TELEPHONE ENCOUNTER
I spoke with patient  She notes that the dental clinic will not be able to accommodate a reduced rate for the dental work as her household income is too much  She will inquire with them about a payment plan as she does wish to have surgery and she does understand the importance of the dental work that is needed  She will keep me updated

## 2022-01-19 ENCOUNTER — OFFICE VISIT (OUTPATIENT)
Dept: DENTISTRY | Facility: CLINIC | Age: 67
End: 2022-01-19

## 2022-01-19 VITALS — TEMPERATURE: 97.5 F | SYSTOLIC BLOOD PRESSURE: 145 MMHG | DIASTOLIC BLOOD PRESSURE: 74 MMHG | HEART RATE: 66 BPM

## 2022-01-19 DIAGNOSIS — K05.30 PERIODONTITIS: Primary | ICD-10-CM

## 2022-01-19 PROCEDURE — D4341 PERIODONTAL SCALING AND ROOT PLANING - 4 OR MORE TEETH PER QUADRANT: HCPCS | Performed by: DENTIST

## 2022-01-19 NOTE — PROGRESS NOTES
Pt presents for SRP of LL to qualify for dental clearance before a knee replacement procedure  PMH reviewed w/ [facility]  Pt denies any dental pain  SRP LL:  Topical benzocaine administered as well as 0 5 carps of septocaine via local infiltration  Cavitron used to debride all mandibular teeth  Hand instrumented  Flossed  Prophy cup utilized  CHX rinse used  Pt was advised to continue flossing once a day and brushing twice per day  Pt explains that she forgot to bring dental clearance form today for us to fill out, and she will talk to  to obtain our fax number, so this can be completed  Once this dental clearance form is obtained and completed, pt's treatment is over  She will not require any additional appts at this time

## 2022-02-01 ENCOUNTER — TELEPHONE (OUTPATIENT)
Dept: OBGYN CLINIC | Facility: HOSPITAL | Age: 67
End: 2022-02-01

## 2022-02-04 NOTE — TELEPHONE ENCOUNTER
Preoperative Elective Admission Assessment    Living Situation:    Who does pt live with: spouse, Beny Spoon  What kind of home: single level  How do they enter the home: front  How many levels in home: 1   # of steps to enter home: 3  # of steps to second floor: n/a  Are there handrails: No  Are there landings: No  Sleeping arrangement: first/entry floor  Where is Bathroom: entry level  Where is the tub or shower: walk in shower with grab bars  Dogs or ther pets: n/a     First Floor Setup:   Is there a bathroom: Yes  Where would pt sleep: bed     DME: cane     Patient's Current Level of Function: Ambulates: Independently and ADLs: Independent    Post-op Caregiver: spouse  Caregiver Name and phone number for Inpatient discharge needs: Kings Brizuela [phone]  Currently receive any HHC/aides/community supports: No     Post-op Transport: spouse  To/from hospital: spouse  To/from PT 2-3x/week: spouse  Uses community transport now: No     Outpatient Physical Therapy Site:  Site: [place]  pre and post-op appts scheduled? No, sent to       Medication Management: self and out of bottle  Preferred Pharmacy for Post-op Medications: [facility], [place]  Blood Management Vitamin Regimen: Pt confirms taking as prescribed  Post-op anticoagulant: to be determined by surgical team postoperatively     DC Plan: Pt plans to be discharged home      Barriers to DC identified preoperatively: none identified    BMI: 21 26    Caresense: declined enrollment    Patient Education:  Pt educated on post-op pain, early mobilization (POD0), Average inpt LOS, OP PT goal   Patient educated that our goal is to appropriately discharge patient based off their post-op function while striving to maintain maximal independence  The goal is to discharge patient to home and for them to attend outpatient physical therapy      Assigned to care team? Yes

## 2022-02-09 ENCOUNTER — ANESTHESIA EVENT (OUTPATIENT)
Dept: PERIOP | Facility: HOSPITAL | Age: 67
End: 2022-02-09
Payer: COMMERCIAL

## 2022-02-09 NOTE — PRE-PROCEDURE INSTRUCTIONS
Pre-Surgery Instructions:   Medication Instructions    ascorbic acid (VITAMIN C) 500 MG tablet Patient was instructed by Physician and understands   Ca Carbonate-Mag Hydroxide (ROLAIDS) 550-110 MG CHEW Instructed patient per Anesthesia Guidelines   ferrous sulfate 324 (65 Fe) mg Instructed patient per Anesthesia Guidelines   folic acid (FOLVITE) 1 mg tablet Instructed patient per Anesthesia Guidelines   ibandronate (BONIVA) 150 MG tablet Instructed patient per Anesthesia Guidelines   methotrexate 2 5 mg tablet Patient was instructed by Physician and understands   Multiple Vitamin (multivitamin) tablet Instructed patient per Anesthesia Guidelines   naproxen (NAPROSYN) 500 mg tablet Instructed patient per Anesthesia Guidelines   PARoxetine (PAXIL) 10 mg tablet Instructed patient per Anesthesia Guidelines   predniSONE 5 mg tablet Instructed patient per Anesthesia Guidelines   solifenacin (VESICARE) 10 MG tablet Instructed patient per Anesthesia Guidelines  Patient  instructed to take*prednisone and paxil*with a sip of water the morning of surgery  if needed tylenol  Patient / instructed on use of chlorhexidine soap per hospital protocol    Patient instructed to stop all ASA, NSAIDS(at least 3 days), vitamins and herbal supplements one week prior to surgery or per Dr Kimi Red and to continue prescribed vitamins per surgeon

## 2022-02-17 ENCOUNTER — EVALUATION (OUTPATIENT)
Dept: PHYSICAL THERAPY | Facility: CLINIC | Age: 67
End: 2022-02-17
Payer: COMMERCIAL

## 2022-02-17 ENCOUNTER — TELEPHONE (OUTPATIENT)
Dept: OBGYN CLINIC | Facility: HOSPITAL | Age: 67
End: 2022-02-17

## 2022-02-17 DIAGNOSIS — Z01.818 PREOPERATIVE TESTING: ICD-10-CM

## 2022-02-17 DIAGNOSIS — M17.11 ARTHRITIS OF RIGHT KNEE: Primary | ICD-10-CM

## 2022-02-17 DIAGNOSIS — Z01.818 ENCOUNTER FOR PREADMISSION TESTING: ICD-10-CM

## 2022-02-17 PROCEDURE — 97162 PT EVAL MOD COMPLEX 30 MIN: CPT | Performed by: PHYSICAL THERAPIST

## 2022-02-17 PROCEDURE — 97110 THERAPEUTIC EXERCISES: CPT | Performed by: PHYSICAL THERAPIST

## 2022-02-17 NOTE — TELEPHONE ENCOUNTER
DR Vaishnavi Lennon  RE: Lab results  CB:     Iam Fruits from [facility] called the call center regarding lab results for pre-op  Iam Fruits will be faxing Lab results to surgery coordinator in [place]

## 2022-02-17 NOTE — PROGRESS NOTES
PT Evaluation     Today's date: 2022  Patient name: Chandan Conner  : 1955  MRN: [id number]  Referring provider: Charles Yanes  Dx:   Encounter Diagnosis     ICD-10-CM    1  Arthritis of right knee  M17 11 Ambulatory referral to Physical Therapy   2  Encounter for preadmission testing  Z01 [id number] Ambulatory referral to Physical Therapy   3  Preoperative testing  Z01 [id number] Ambulatory referral to Physical Therapy                  Assessment/Plan  Deshaun Velazquez is a 75 y o  female presenting to outpatient physical therapy for a pre-operative session prior to surgery on 3/1/21 with Dr Camelia Botello  She demonstrates decreased LE strength, decreased ROM, and pain with function consistent with arthritis  PMH is sig for osteoporosis, anxiety, HLD, incontinence, anemia, and RA  Functional limitations are result of the above impairments, which limit his ability to exercise or recreation, get off the toilet, get out of a chair, go to work, perform household chores, perform yard work, shop, squat to  objects from the floor, stand and walk  No further referral appears necessary at this time based upon examination results  Patient was provided with education regarding pre-surgery exercises to maximize outcomes of surgery  Today provided the following services to patient in addition to PT evaluation:  -Gait Training with wheeled walker  -Reviewed current and immediate post-operative home exercise program  -Virtual Home Assessment  -Home Preparation Checklist was reviewed with patient including identification of care partner and encouragement of single level set-up  -Post-operative pain management expectations  -Discuss DOS and answered patient's questions    Patient appears motivated, agrees with the POC, and will be a good candidate for skilled physical therapy post-op  Patient was provided with handout of HEP consisting of LE strengthening and stretching exercises       Goals  STG   Education: Patient will gain understanding of what she needs to do prior to surgery for a successful outcome- MET  Education: Patient will gain understanding of importance of quad strength and knee extension ROM prior to surgery- MET  HEP: Pt will be independent with HEP     LTG  Pt to help maximize post op recovery through use of pre-op ROM, flexibility, and strengthening exercises  - will follow up post op  Subjective  IE (2/17/2022): Patient is a 75 y o  female who presents for an initial outpatient physical therapy consultation prior to R TKA on 3/1/22 with Dr Marti Jimenez  Patient reports that her knee currently prevents her from participating fully in work and ADLs due to pain  She states that she is worried about the surgery, but is anxious to get it done so that her knee gets better  She feels ready for surgery  Her home is already set up as one level with a bathroom and bedroom on the ground floor  Patient states that her  will be able to care for her after surgery       Pain  Best: 2/10  Worst: 10/10  Irritability: hours  Location: whole knee  Quality: achy, sharp  Aggravating factors: bending down, walking, stairs  Alleviating factors: biofreeze  Progression: worsening    Social Support  Lives with:   Home setup: Single level  Steps in house: 3 steps to enter house  Employment status: working, 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  Hobbies/Recreational Activities: reading, puzzles, painting  Life stresses: worried about the surgery    Treatments:  Previous treatments: meds  Current treatments: physical therapy      Patient Goals for Therapy  "prepare for surgery"    Objective  Observation  R knee: Valgus deformity,  (+) effusion    Neuro Screen:  Myotomes: intact  Dermatomes: intact    Pre-operative ROM   Left- PROM Right- PROM   Hip flexion 120 120   Hip IR 25 25   Hip ER 45 45   Hip ABD 45 45        Knee extension -8 0   Knee flexion 120 130     Pre-operative LE strength   Left Right   Hip flexion 4+ 4+   Hip extension 4+ 4+   Hip ABD 4+ 4+        Knee flexion 5 5   Knee extension 4+ 5           Precautions: RA      Manuals 2/17                                                                Neuro Re-Ed             Quad sets HEP            Glute sets HEP                                                                             Ther Ex             Heel slides HEP            Ankle pumps HEP            SAQ HEP                                                                             Education             Pain/prognosis expectations JF            Home preparedness checklist JF            HEP review JF            Swelling management JF            Gait Training             Ambulation with RW 25ft                         Modalities                          IE/HEP JF

## 2022-02-21 ENCOUNTER — CONSULT (OUTPATIENT)
Dept: FAMILY MEDICINE CLINIC | Facility: CLINIC | Age: 67
End: 2022-02-21
Payer: COMMERCIAL

## 2022-02-21 ENCOUNTER — APPOINTMENT (OUTPATIENT)
Dept: LAB | Facility: MEDICAL CENTER | Age: 67
End: 2022-02-21
Payer: COMMERCIAL

## 2022-02-21 VITALS
WEIGHT: 118 LBS | RESPIRATION RATE: 18 BRPM | OXYGEN SATURATION: 97 % | BODY MASS INDEX: 20.14 KG/M2 | HEART RATE: 72 BPM | SYSTOLIC BLOOD PRESSURE: 130 MMHG | DIASTOLIC BLOOD PRESSURE: 70 MMHG | HEIGHT: 64 IN

## 2022-02-21 DIAGNOSIS — Z01.818 PRE-OP EXAMINATION: Primary | ICD-10-CM

## 2022-02-21 DIAGNOSIS — M17.11 PRIMARY OSTEOARTHRITIS OF RIGHT KNEE: ICD-10-CM

## 2022-02-21 DIAGNOSIS — Z01.818 PRE-OP EXAMINATION: ICD-10-CM

## 2022-02-21 DIAGNOSIS — Z01.810 ENCOUNTER FOR PREPROCEDURAL CARDIOVASCULAR EXAMINATION: ICD-10-CM

## 2022-02-21 DIAGNOSIS — D50.9 IRON DEFICIENCY ANEMIA, UNSPECIFIED IRON DEFICIENCY ANEMIA TYPE: ICD-10-CM

## 2022-02-21 DIAGNOSIS — Z01.811 PRE-OP CHEST EXAM: ICD-10-CM

## 2022-02-21 DIAGNOSIS — M05.9 RHEUMATOID ARTHRITIS WITH POSITIVE RHEUMATOID FACTOR, INVOLVING UNSPECIFIED SITE (HCC): ICD-10-CM

## 2022-02-21 DIAGNOSIS — R94.31 ABNORMAL ECG: ICD-10-CM

## 2022-02-21 PROBLEM — R06.83 SNORING: Status: RESOLVED | Noted: 2021-11-15 | Resolved: 2022-02-21

## 2022-02-21 LAB
BASOPHILS # BLD AUTO: 0.02 THOUSANDS/ΜL (ref 0–0.1)
BASOPHILS NFR BLD AUTO: 0 % (ref 0–1)
EOSINOPHIL # BLD AUTO: 0.04 THOUSAND/ΜL (ref 0–0.61)
EOSINOPHIL NFR BLD AUTO: 1 % (ref 0–6)
ERYTHROCYTE [DISTWIDTH] IN BLOOD BY AUTOMATED COUNT: 13.5 % (ref 11.6–15.1)
FERRITIN SERPL-MCNC: 22 NG/ML (ref 8–388)
HCT VFR BLD AUTO: 38.6 % (ref 34.8–46.1)
HGB BLD-MCNC: 12 G/DL (ref 11.5–15.4)
IMM GRANULOCYTES # BLD AUTO: 0.03 THOUSAND/UL (ref 0–0.2)
IMM GRANULOCYTES NFR BLD AUTO: 0 % (ref 0–2)
IRON SATN MFR SERPL: 13 % (ref 15–50)
IRON SERPL-MCNC: 37 UG/DL (ref 50–170)
LYMPHOCYTES # BLD AUTO: 1.25 THOUSANDS/ΜL (ref 0.6–4.47)
LYMPHOCYTES NFR BLD AUTO: 15 % (ref 14–44)
MCH RBC QN AUTO: 30.8 PG (ref 26.8–34.3)
MCHC RBC AUTO-ENTMCNC: 31.1 G/DL (ref 31.4–37.4)
MCV RBC AUTO: 99 FL (ref 82–98)
MONOCYTES # BLD AUTO: 0.67 THOUSAND/ΜL (ref 0.17–1.22)
MONOCYTES NFR BLD AUTO: 8 % (ref 4–12)
NEUTROPHILS # BLD AUTO: 6.59 THOUSANDS/ΜL (ref 1.85–7.62)
NEUTS SEG NFR BLD AUTO: 76 % (ref 43–75)
NRBC BLD AUTO-RTO: 0 /100 WBCS
PLATELET # BLD AUTO: 270 THOUSANDS/UL (ref 149–390)
PMV BLD AUTO: 9.8 FL (ref 8.9–12.7)
RBC # BLD AUTO: 3.9 MILLION/UL (ref 3.81–5.12)
TIBC SERPL-MCNC: 285 UG/DL (ref 250–450)
WBC # BLD AUTO: 8.6 THOUSAND/UL (ref 4.31–10.16)

## 2022-02-21 PROCEDURE — 93000 ELECTROCARDIOGRAM COMPLETE: CPT | Performed by: FAMILY MEDICINE

## 2022-02-21 PROCEDURE — 83550 IRON BINDING TEST: CPT

## 2022-02-21 PROCEDURE — 3008F BODY MASS INDEX DOCD: CPT

## 2022-02-21 PROCEDURE — 99215 OFFICE O/P EST HI 40 MIN: CPT | Performed by: FAMILY MEDICINE

## 2022-02-21 PROCEDURE — 85025 COMPLETE CBC W/AUTO DIFF WBC: CPT

## 2022-02-21 PROCEDURE — 36415 COLL VENOUS BLD VENIPUNCTURE: CPT

## 2022-02-21 PROCEDURE — 83540 ASSAY OF IRON: CPT

## 2022-02-21 PROCEDURE — 82728 ASSAY OF FERRITIN: CPT

## 2022-02-21 PROCEDURE — 3725F SCREEN DEPRESSION PERFORMED: CPT | Performed by: FAMILY MEDICINE

## 2022-02-21 PROCEDURE — 80061 LIPID PANEL: CPT

## 2022-02-21 NOTE — H&P (VIEW-ONLY)
FAMILY PRACTICE OFFICE VISIT       NAME: Areta Cogan  AGE: 75 y o  SEX: female       : 1955        MRN: [id number]    DATE: 2022  TIME: 10:14 AM    Assessment and Plan     Problem List Items Addressed This Visit        Musculoskeletal and Integument    Rheumatoid arthritis ([facility] [address] )     Her methotrexate has been on hold  Unfortunately, she has had a flare of her RA in her right shoulder and right elbow in particular  She is following closely with Rheumatology  Preoperative elevation of CRP can certainly be explained by her currently untreated rheumatoid arthritis  Other    Iron deficiency anemia     Initial preoperative blood work did show some anemia  CBC and iron studies were ordered  Upon review of the labs this afternoon, her CBC is stable  She should continue with iron supplementation         Relevant Orders    CBC and differential (Completed)      Other Visit Diagnoses     Pre-op examination    -  Primary    The patient appears to be a suitable candidate for her upcoming total knee replacement  I will have Cardiology input on her abnormal EKG  Relevant Orders    CBC and differential (Completed)    Pre-op chest exam        Relevant Orders    POCT ECG (Completed)    Primary osteoarthritis of right knee              Iron deficiency anemia  Initial preoperative blood work did show some anemia  CBC and iron studies were ordered  Upon review of the labs this afternoon, her CBC is stable  She should continue with iron supplementation    Rheumatoid arthritis ([address] )  Her methotrexate has been on hold  Unfortunately, she has had a flare of her RA in her right shoulder and right elbow in particular  She is following closely with Rheumatology  Preoperative elevation of CRP can certainly be explained by her currently untreated rheumatoid arthritis      Osteoporosis  She continues on ibandronate    Abnormal ECG  Her EKG today does show some progression of her ST/T-wave changes concerning for anterior lateral ischemia  She certainly clinically has no cardiovascular limitations nor history of cardiac issues  I did reach out to Cardiology to see if they can review her EKG and potentially see her this week  She does appear clinically stable for this upcoming total knee replacement  There are no Patient Instructions on file for this visit  Chief Complaint     Chief Complaint   Patient presents with    Pre-op Exam       History of Present Illness   Wilder Hernandez is a 75y o -year-old female who presents today for preoperative visit for a right total knee replacement  She is having increasing pain with severe gait dysfunction  She does have a history of rheumatoid arthritis and has stopped her methotrexate in anticipation of this procedure in early March  Preoperative EKG today did reveal some progression of of some ST/T-wave changes from previous EKG in 2015  She has had no known event and certainly has no rate limiting cardiovascular symptoms such as shortness of breath or chest pain  She remains quite active especially at work and is only limited by her right knee pain  Unfortunately, since stopping her methotrexate she has had some flare of her rheumatoid arthritis including increasing right shoulder and knee pain  Review of Systems   Review of Systems   Constitutional: Negative for appetite change, chills, fatigue, fever and unexpected weight change  HENT: Negative for trouble swallowing  Eyes: Negative for visual disturbance  Respiratory: Negative for cough, chest tightness, shortness of breath and wheezing  Cardiovascular: Negative for chest pain, palpitations and leg swelling  Gastrointestinal: Negative for abdominal distention, abdominal pain, blood in stool, constipation and diarrhea  Endocrine: Negative for polyuria  Genitourinary: Negative for difficulty urinating and flank pain     Musculoskeletal: Positive for arthralgias and joint swelling (Right shoulder and elbow)  Negative for myalgias  Skin: Negative for rash  Neurological: Negative for dizziness and light-headedness  Hematological: Negative for adenopathy  Does not bruise/bleed easily  Psychiatric/Behavioral: Negative for dysphoric mood and sleep disturbance  The patient is not nervous/anxious          Active Problem List     Patient Active Problem List   Diagnosis    Anxiety disorder    Hyperlipidemia    Keratitis    Osteoarthritis of both hands    Arthritis of knee    Osteoporosis    Rheumatoid arthritis ([facility] [address] )    Salivary secretion disturbance    Tear film insufficiency    Elevated blood pressure reading    Urge incontinence of urine    Chronic fatigue    Iron deficiency anemia    Abnormal ECG         Past Medical History:  Past Medical History:   Diagnosis Date    Anemia     1995 - corrected with iron    Ankle fracture, right     last assessed: 3/9/15    Anxiety     Arthritis     1996    Chronic pain disorder     right side joint pain     Headache     Herpes zoster     2/2009    Moderate exercise     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    Osteoarthritis     PONV (postoperative nausea and vomiting)     per pt after ankle surgery    RA (rheumatoid arthritis) ([facility] [address] )     sees Rheumatologist q 6mths Dr Antonio Vasquez Right knee pain     Wears dentures     full upper    Wears glasses     reading       Past Surgical History:  Past Surgical History:   Procedure Laterality Date    ANKLE SURGERY      ORIF 5/16/plate and 4 screws    CYST REMOVAL      1969    ESOPHAGOGASTRODUODENOSCOPY      Diagnostic    FOOT SURGERY      plantar wart resection; resolved: 1969    TONSILLECTOMY AND ADENOIDECTOMY      1962    TUBAL LIGATION      WISDOM TOOTH EXTRACTION         Family History:  Family History   Problem Relation Age of Onset    Seizures Mother         epilepsy    Bone cancer Father     No Known Problems Sister     No Known Problems Brother     No Known Problems Son    Mavis Cousin No Known Problems Maternal Grandmother     No Known Problems Maternal Grandfather     No Known Problems Paternal Grandmother     No Known Problems Paternal Grandfather     Thyroid disease Sister     No Known Problems Sister     No Known Problems Brother        Social History:  Social History     Socioeconomic History    Marital status: /Civil Union     Spouse name: Not on file    Number of children: Not on file    Years of education: Not on file    Highest education level: Not on file   Occupational History    Not on file   Tobacco Use    Smoking status: Former Smoker     Quit date:      Years since quittin 1    Smokeless tobacco: Never Used   Vaping Use    Vaping Use: Never used   Substance and Sexual Activity    Alcohol use: Yes     Comment: rare, maybe on holidays    Drug use: No    Sexual activity: Not on file     Comment: defer   Other Topics Concern    Not on file   Social History Narrative    Not on file     Social Determinants of Health     Financial Resource Strain: Not on file   Food Insecurity: Not on file   Transportation Needs: Not on file   Physical Activity: Not on file   Stress: Not on file   Social Connections: Not on file   Intimate Partner Violence: Not on file   Housing Stability: Not on file       Objective     Vitals:    22 1520   BP: 130/70   Pulse: 72   Resp: 18   SpO2: 97%     Wt Readings from Last 3 Encounters:   22 53 5 kg (118 lb)   21 54 4 kg (120 lb)   11/15/21 53 1 kg (117 lb)       Physical Exam  Vitals reviewed  Constitutional:       Appearance: She is well-developed  HENT:      Head: Normocephalic  Right Ear: Tympanic membrane normal       Left Ear: Tympanic membrane normal       Nose: No congestion  Mouth/Throat:      Comments: She is edentulous on her maxillary jaw  Eyes:      Conjunctiva/sclera: Conjunctivae normal       Pupils: Pupils are equal, round, and reactive to light     Neck:      Vascular: No carotid bruit  Cardiovascular:      Rate and Rhythm: Normal rate and regular rhythm  Heart sounds: Normal heart sounds  No murmur heard  Pulmonary:      Effort: No respiratory distress  Breath sounds: No wheezing or rales  Abdominal:      General: There is no distension  Tenderness: There is no abdominal tenderness  Musculoskeletal:      Cervical back: No rigidity or tenderness  Lymphadenopathy:      Cervical: No cervical adenopathy  Skin:     Findings: No erythema or rash  Neurological:      Mental Status: She is alert and oriented to person, place, and time           Pertinent Laboratory/Diagnostic Studies:  Lab Results   Component Value Date    GLUCOSE 119 03/09/2015    BUN 11 03/09/2015    CREATININE 0 66 03/09/2015    CALCIUM 8 5 03/09/2015     03/09/2015    K 3 6 03/09/2015    CO2 30 03/09/2015     03/09/2015     No results found for: ALT, AST, GGT, ALKPHOS, BILITOT    Lab Results   Component Value Date    WBC 8 60 02/21/2022    HGB 12 0 02/21/2022    HCT 38 6 02/21/2022    MCV 99 (H) 02/21/2022     02/21/2022       No results found for: TSH    No results found for: CHOL  No results found for: TRIG  No results found for: HDL  No results found for: [facility]  Lab Results   Component Value Date    HGBA1C 5 2 02/16/2022       Results for orders placed or performed in visit on 02/16/22   Hemoglobin A1C   Result Value Ref Range    Hemoglobin A1C 5 2        Orders Placed This Encounter   Procedures    CBC and differential    POCT ECG       ALLERGIES:  Allergies   Allergen Reactions    Aspirin Other (See Comments)     Per pt avoids taking due to rheumatology Meds       Current Medications     Current Outpatient Medications   Medication Sig Dispense Refill    ascorbic acid (VITAMIN C) 500 MG tablet Take 1 tablet (500 mg total) by mouth daily Start to take 30 days prior to surgery 30 tablet 1    Ca Carbonate-Mag Hydroxide (ROLAIDS) 550-110 MG CHEW Chew Twice daily      ferrous sulfate 324 (65 Fe) mg Take 1 tablet (324 mg total) by mouth daily before breakfast Start to take 30 days prior to surgery 30 tablet 1    folic acid (FOLVITE) 1 mg tablet Take by mouth      ibandronate (BONIVA) 150 MG tablet Take 1 tablet by mouth      methotrexate 2 5 mg tablet Take by mouth 8 Pills weekly takes on Fri and Sat       Multiple Vitamin (multivitamin) tablet Take 1 tablet by mouth daily Start to take 30 days prior to surgery 30 tablet 1    naproxen (NAPROSYN) 500 mg tablet Take 1 tablet up to twice daily as needed for moderate pain  90 tablet 1    PARoxetine (PAXIL) 10 mg tablet Take 1 tablet (10 mg total) by mouth daily 90 tablet 3    predniSONE 5 mg tablet Take 5 mg by mouth daily       solifenacin (VESICARE) 10 MG tablet Take 1 tablet (10 mg total) by mouth daily 90 tablet 3     No current facility-administered medications for this visit           Health Maintenance     Health Maintenance   Topic Date Due    Pneumococcal Vaccine: 65+ Years (1 of 4 - PCV13) Never done    Cervical Cancer Screening  Never done    Annual Physical  05/12/2022    COVID-19 Vaccine (4 - Booster for Pfizer series) 03/22/2022    Breast Cancer Screening: Mammogram  11/12/2022    Fall Risk  02/17/2023    Depression Screening  02/21/2023    BMI: Adult  02/21/2023    DXA SCAN  11/12/2023    Colorectal Cancer Screening  06/07/2024    DTaP,Tdap,and Td Vaccines (2 - Td or Tdap) 01/15/2030    Hepatitis C Screening  Completed    Osteoporosis Screening  Completed    Influenza Vaccine  Completed    HIB Vaccine  Aged Out    Hepatitis B Vaccine  Aged Out    IPV Vaccine  Aged Out    Hepatitis A Vaccine  Aged Out    Meningococcal ACWY Vaccine  Aged Out    HPV Vaccine  Aged Out     Immunization History   Administered Date(s) Administered    COVID-19 PFIZER VACCINE 0 3 ML IM 01/05/2021, 01/26/2021, 10/22/2021    INFLUENZA 10/24/2012, 12/23/2013, 12/16/2014, 11/28/2019, 10/22/2021    Influenza Quadrivalent Preservative Free 3 years and older IM 11/01/2016    Tdap 01/15/2020       Eric Diaz MD

## 2022-02-22 PROBLEM — E44.1 MILD PROTEIN-CALORIE MALNUTRITION (HCC): Status: RESOLVED | Noted: 2021-11-15 | Resolved: 2022-02-22

## 2022-02-22 NOTE — ASSESSMENT & PLAN NOTE
Her methotrexate has been on hold  Unfortunately, she has had a flare of her RA in her right shoulder and right elbow in particular  She is following closely with Rheumatology  Preoperative elevation of CRP can certainly be explained by her currently untreated rheumatoid arthritis

## 2022-02-22 NOTE — ASSESSMENT & PLAN NOTE
Initial preoperative blood work did show some anemia  CBC and iron studies were ordered  Upon review of the labs this afternoon, her CBC is stable    She should continue with iron supplementation

## 2022-02-22 NOTE — ASSESSMENT & PLAN NOTE
Her EKG today does show some progression of her ST/T-wave changes concerning for anterior lateral ischemia  She certainly clinically has no cardiovascular limitations nor history of cardiac issues  I did reach out to Cardiology to see if they can review her EKG and potentially see her this week  She does appear clinically stable for this upcoming total knee replacement

## 2022-02-23 ENCOUNTER — CONSULT (OUTPATIENT)
Dept: CARDIOLOGY CLINIC | Facility: CLINIC | Age: 67
End: 2022-02-23
Payer: COMMERCIAL

## 2022-02-23 VITALS
WEIGHT: 117 LBS | HEART RATE: 61 BPM | DIASTOLIC BLOOD PRESSURE: 80 MMHG | SYSTOLIC BLOOD PRESSURE: 120 MMHG | BODY MASS INDEX: 20.08 KG/M2

## 2022-02-23 DIAGNOSIS — Z01.810 ENCOUNTER FOR PREPROCEDURAL CARDIOVASCULAR EXAMINATION: ICD-10-CM

## 2022-02-23 DIAGNOSIS — R01.1 MURMUR, CARDIAC: ICD-10-CM

## 2022-02-23 DIAGNOSIS — R94.31 ABNORMAL ECG: Primary | ICD-10-CM

## 2022-02-23 LAB
CHOLEST SERPL-MCNC: 195 MG/DL
HDLC SERPL-MCNC: 79 MG/DL
LDLC SERPL CALC-MCNC: 95 MG/DL (ref 0–100)
TRIGL SERPL-MCNC: 105 MG/DL

## 2022-02-23 PROCEDURE — 1036F TOBACCO NON-USER: CPT

## 2022-02-23 PROCEDURE — 1160F RVW MEDS BY RX/DR IN RCRD: CPT

## 2022-02-23 PROCEDURE — 99204 OFFICE O/P NEW MOD 45 MIN: CPT

## 2022-02-23 NOTE — PROGRESS NOTES
Cardiology Consultation   Shaune Frankel, MD Kristeen Ludwig, MD Ermelinda Shines, DO, Rory Gavia Mansoor, MD Almetta Curio, DO, Luciano Cardoza DO, [facility] [place]  -------------------------------------------------------------------  [facility]  [address], [place] [address]  [phone]  [phone]  02/23/22  López Glover  YOB: 1955   MRN: [id number]      Referring Physician: Aileen Prado MD  [address]  [address]  [place], [place] [address]     HPI:  I am seeing this patient in cardiology consultation for:  Preoperative cardiac risk assessment prior to knee replacement surgery, abnormal ECG    López Glover is a 75 y o  female with:    Rheumatoid arthritis   Osteoarthritis of her knees   Anxiety    Hyperlipidemia    Chronic fatigue    Iron deficiency anemia    She presents today for cardiac evaluation prior to knee replacement surgery  She had ECG performed earlier this week which showed nonspecific ST T-wave changes in the anterolateral leads that were concerning for ischemia  She has no symptoms of angina at this time, has no prior history of coronary artery disease  Her blood pressure has been controlled on no antihypertensive medications  Her weight over the past several months has remained stable  She states she is active  She works as a CNA  I went over the Duke activity status index questions with her today and she scored a 45 45 which predicts her ability to perform 3 77 metabolic equivalents of activity  Tobacco: quit 40 years ago   Alcohol: social  Drugs: none    Family History: none    Review of Systems   Constitutional: Negative for chills and fever  HENT: Negative for facial swelling and sore throat  Eyes: Negative for visual disturbance  Respiratory: Negative for cough, chest tightness, shortness of breath and wheezing  Cardiovascular: Negative for chest pain, palpitations and leg swelling  Gastrointestinal: Negative for abdominal pain, blood in stool, constipation, diarrhea, nausea and vomiting  Endocrine: Negative for cold intolerance and heat intolerance  Genitourinary: Negative for decreased urine volume, difficulty urinating, dysuria and hematuria  Musculoskeletal: Positive for arthralgias and gait problem  Negative for back pain and myalgias  Skin: Negative for rash  Neurological: Negative for dizziness, syncope, weakness and numbness  Psychiatric/Behavioral: Negative for agitation, behavioral problems and confusion  The patient is not nervous/anxious  OBJECTIVE  Vitals:    02/23/22 1302   BP: 120/80   Pulse: 61       Physical Exam   Constitutional: awake, alert and oriented, in no acute distress, no obvious deformities  Head: Normocephalic, without obvious abnormality, atraumatic  Eyes: conjunctivae clear and moist  Sclera anicteric  No xanthelasmas  Pupils equal bilaterally  Extraocular motions are full  Ear nose mouth and throat: ears are symmetrical bilaterally, hearing appears to be equal bilaterally, no nasal discharge or epistaxis, oropharynx is clear with moist mucous membranes  Neck:  Trachea is midline, neck is supple, no thyromegaly or significant lymphadenopathy, there is full range of motion  Lungs: clear to auscultation bilaterally, no wheezes, no rales, no rhonchi, no accessory muscle use, breathing is nonlabored  Heart: regular rate and rhythm, S1, S2 normal, 2/6  Systolic murmur RUSB , no click, no rub and no gallop, no lower extremity edema  Abdomen: soft, non-tender; bowel sounds normal; no masses,  no organomegaly  Psychiatric:  Patient is oriented to time, place, person, mood/affect is negative for depression, anxiety, agitation, appears to have appropriate insight  Skin: Skin is warm, dry, intact  No obvious rashes or lesions on exposed extremities  Nail beds are pink with no cyanosis or clubbing      EKG:  Her ECG from a February 21, 2022 did show nonspecific ST and T-wave changes in the anterolateral leads that may be consistent with ischemia    The ASCVD Risk score (Kay Houston et al , 2013) failed to calculate for the following reasons:    Cannot find a previous HDL lab    Cannot find a previous total cholesterol lab    IMPRESSION:   Preoperative cardiac risk assessment    Abnormal ECG   Rheumatoid arthritis   Iron deficiency anemia    Right knee osteoarthritis    DISCUSSION/RECOMMENDATIONS:   She presents today for cardiac evaluation prior to knee replacement surgery    Her ECG performed earlier this week did show changes in the anterolateral leads that were concerning for possible ischemia   Fortunately she is having no symptoms of ischemia  She never gets chest pain, never gets shortness of breath or dyspnea on exertion   She appears to have relatively good overall functional capacity  Her Duke Activity Status Index score is 45 45 which predicts her ability to perform 8 33 METs of activity   She has no history of coronary artery disease that is known   She has no underlying renal dysfunction or diabetes   Overall given all the above her cardiac risk for surgery will be low, may proceed as planned    However she does have a murmur in the aortic position, would check echocardiogram in the next few months to evaluate for underlying valvular heart disease  Her murmur on physical exam today does not appear to be consistent with severe aortic stenosis  There is no significant radiation to her carotids, there is no delayed carotid upstroke and no late-peaking aspect of the murmur itself  Vera Fraga DO, [facility] [place]  --------------------------------------------------------------------------------  TREADMILL STRESS  No results found for this or any previous visit      ----------------------------------------------------------------------------------------------  NUCLEAR STRESS TEST: No results found for this or any previous visit      No results found for this or any previous visit       --------------------------------------------------------------------------------  CATH:  No results found for this or any previous visit     --------------------------------------------------------------------------------  ECHO:   No results found for this or any previous visit  No results found for this or any previous visit     --------------------------------------------------------------------------------  HOLTER  No results found for this or any previous visit     --------------------------------------------------------------------------------  CAROTIDS  No results found for this or any previous visit  Diagnoses and all orders for this visit:    Abnormal ECG  -     Lipid Panel with Direct LDL reflex; Future  -     Echo complete w/ contrast if indicated; Future    Encounter for preprocedural cardiovascular examination  -     Lipid Panel with Direct LDL reflex; Future    Murmur, cardiac  -     Echo complete w/ contrast if indicated;  Future       ======================================================    Past Medical History:   Diagnosis Date    Anemia     1995 - corrected with iron    Ankle fracture, right     last assessed: 3/9/15    Anxiety     Arthritis     1996    Chronic pain disorder     right side joint pain     Headache     Herpes zoster     2/2009    Moderate exercise     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    Osteoarthritis     PONV (postoperative nausea and vomiting)     per pt after ankle surgery    RA (rheumatoid arthritis) ([facility] [address] )     sees Rheumatologist q 6mths Dr Raymond Rising Right knee pain     Wears dentures     full upper    Wears glasses     reading     Past Surgical History:   Procedure Laterality Date    ANKLE SURGERY      ORIF 5/16/plate and 4 screws    CYST REMOVAL      1969    ESOPHAGOGASTRODUODENOSCOPY      Diagnostic    FOOT SURGERY      plantar wart resection; [facility] [address]     TUBAL LIGATION      WISDOM TOOTH EXTRACTION           Medications  Current Outpatient Medications   Medication Sig Dispense Refill    ascorbic acid (VITAMIN C) 500 MG tablet Take 1 tablet (500 mg total) by mouth daily Start to take 30 days prior to surgery 30 tablet 1    Ca Carbonate-Mag Hydroxide (ROLAIDS) 550-110 MG CHEW Chew Twice daily      ferrous sulfate 324 (65 Fe) mg Take 1 tablet (324 mg total) by mouth daily before breakfast Start to take 30 days prior to surgery 30 tablet 1    folic acid (FOLVITE) 1 mg tablet Take by mouth      ibandronate (BONIVA) 150 MG tablet Take 1 tablet by mouth as needed        methotrexate 2 5 mg tablet Take by mouth 8 Pills weekly takes on Fri and Sat       Multiple Vitamin (multivitamin) tablet Take 1 tablet by mouth daily Start to take 30 days prior to surgery 30 tablet 1    naproxen (NAPROSYN) 500 mg tablet Take 1 tablet up to twice daily as needed for moderate pain  90 tablet 1    PARoxetine (PAXIL) 10 mg tablet Take 1 tablet (10 mg total) by mouth daily 90 tablet 3    predniSONE 5 mg tablet Take 5 mg by mouth daily       solifenacin (VESICARE) 10 MG tablet Take 1 tablet (10 mg total) by mouth daily 90 tablet 3     No current facility-administered medications for this visit          Allergies   Allergen Reactions    Aspirin Other (See Comments)     Per pt avoids taking due to rheumatology Meds       Social History     Socioeconomic History    Marital status: /Civil Union     Spouse name: Not on file    Number of children: Not on file    Years of education: Not on file    Highest education level: Not on file   Occupational History    Not on file   Tobacco Use    Smoking status: Former Smoker     Quit date:      Years since quittin 1    Smokeless tobacco: Never Used   Vaping Use    Vaping Use: Never used   Substance and Sexual Activity    Alcohol use: Yes     Comment: rare, maybe on holidays    Drug use: No    Sexual activity: Not on file     Comment: defer   Other Topics Concern    Not on file   Social History Narrative    Not on file     Social Determinants of Health     Financial Resource Strain: Not on file   Food Insecurity: Not on file   Transportation Needs: Not on file   Physical Activity: Not on file   Stress: Not on file   Social Connections: Not on file   Intimate Partner Violence: Not on file   Housing Stability: Not on file        Family History   Problem Relation Age of Onset    Seizures Mother         epilepsy    Bone cancer Father     No Known Problems Sister     No Known Problems Brother     No Known Problems Son     No Known Problems Maternal Grandmother     No Known Problems Maternal Grandfather     No Known Problems Paternal Grandmother     No Known Problems Paternal Grandfather     Thyroid disease Sister     No Known Problems Sister     No Known Problems Brother        Lab Results   Component Value Date    WBC 8 60 02/21/2022    HGB 12 0 02/21/2022    HCT 38 6 02/21/2022    MCV 99 (H) 02/21/2022     02/21/2022      Lab Results   Component Value Date    K 3 6 03/09/2015     03/09/2015    CO2 30 03/09/2015    BUN 11 03/09/2015    CREATININE 0 66 03/09/2015    CALCIUM 8 5 03/09/2015      Lab Results   Component Value Date    HGBA1C 5 2 02/16/2022      No results found for: CHOL  No results found for: HDL  No results found for: LDLCALC  No results found for: TRIG  No results found for: CHOLHDL   No results found for: INR, PROTIME       Patient Active Problem List    Diagnosis Date Noted    Abnormal ECG 02/21/2022    Chronic fatigue 11/15/2021    Iron deficiency anemia 11/15/2021    Urge incontinence of urine 10/15/2021    Elevated blood pressure reading 02/05/2018    Osteoarthritis of both hands 12/15/2016    Hyperlipidemia 12/13/2016    Osteoporosis 02/18/2016    Arthritis of knee 04/15/2015    Anxiety disorder 11/12/2013    Keratitis 09/20/2012    Rheumatoid arthritis ([facility] [address] ) 09/20/2012    Salivary secretion disturbance 09/20/2012    Tear film insufficiency 09/20/2012       Portions of the record may have been created with voice recognition software  Occasional wrong word or "sound a like" substitutions may have occurred due to the inherent limitations of voice recognition software  Read the chart carefully and recognize, using context, where substitutions have occurred      Manpreet Henderson DO, [facility] [place]  2/23/2022 1:32 PM

## 2022-02-24 NOTE — TELEPHONE ENCOUNTER
Sent to Dr Murphy Pi (heme review for [facility] protocol) through prep for procedure message  CC'd you both on it

## 2022-02-24 NOTE — TELEPHONE ENCOUNTER
I was out since last week - is this possible to have reviewed? Patient is scheduled 3/1 for her surgery

## 2022-02-25 ENCOUNTER — TELEPHONE (OUTPATIENT)
Dept: CARDIOLOGY CLINIC | Facility: CLINIC | Age: 67
End: 2022-02-25

## 2022-02-25 NOTE — TELEPHONE ENCOUNTER
Mrs Lexus Encarnacion returned our phone call and she is now aware of her results that are per Dr Willean Dancer and patient verbally states that she understands and has no questions at this time

## 2022-02-25 NOTE — TELEPHONE ENCOUNTER
2 attempts have been made to Contact Ms Christina Reyes and she has no answered and has not returned the call  Left 2 voicemails to have her give our office a call back

## 2022-02-25 NOTE — TELEPHONE ENCOUNTER
----- Message from Thania Almazan DO sent at 2/23/2022  6:17 PM EST -----  Please call the patient regarding their normal result

## 2022-02-28 ENCOUNTER — TELEPHONE (OUTPATIENT)
Dept: OBGYN CLINIC | Facility: HOSPITAL | Age: 67
End: 2022-02-28

## 2022-02-28 NOTE — PERIOPERATIVE NURSING NOTE
More up to date labs and Hgb 12 0   Riri Herrera   From: Vivian Red RN - To: Vivian Red RN, Omid Mayo DO  Can proceed from a hematologic perspective to surgery on PO iron   Follow [facility] protocol including po iron preop, TXA intraop, and IV iron post op      Dr Bahena- looked over iron panel and it shows ferritin 22   Can you ensure that after surgery, iron deficiency is completely worked up   On my screen, it looks like Aracelis has not had a colonoscopy  Thanks  Mtaeo Davis DO      From: Omid Mayo DO - To: Vivian Red RN, MANSI Rayo, Karen Mae DO, [address] [place] [address] Rd    will certainly make sure this is addressed   I had cardiology review her ECG and they felt she was clear   Thank you      Duong Agudelo   From: Becka Marquez MD - To: Omid Mayo DO  yesterday

## 2022-03-01 ENCOUNTER — HOSPITAL ENCOUNTER (OUTPATIENT)
Facility: HOSPITAL | Age: 67
Setting detail: OUTPATIENT SURGERY
Discharge: HOME | End: 2022-03-03
Attending: ORTHOPAEDIC SURGERY | Admitting: ORTHOPAEDIC SURGERY
Payer: COMMERCIAL

## 2022-03-01 ENCOUNTER — ANESTHESIA (OUTPATIENT)
Dept: PERIOP | Facility: HOSPITAL | Age: 67
End: 2022-03-01
Payer: COMMERCIAL

## 2022-03-01 DIAGNOSIS — N39.41 URGE INCONTINENCE OF URINE: ICD-10-CM

## 2022-03-01 DIAGNOSIS — M05.79 RHEUMATOID ARTHRITIS INVOLVING MULTIPLE SITES WITH POSITIVE RHEUMATOID FACTOR (HCC): ICD-10-CM

## 2022-03-01 DIAGNOSIS — F41.9 ANXIETY DISORDER, UNSPECIFIED TYPE: ICD-10-CM

## 2022-03-01 DIAGNOSIS — Z96.651 STATUS POST TOTAL KNEE REPLACEMENT, RIGHT: ICD-10-CM

## 2022-03-01 DIAGNOSIS — E78.2 MIXED HYPERLIPIDEMIA: Primary | ICD-10-CM

## 2022-03-01 DIAGNOSIS — R42 DIZZINESS: ICD-10-CM

## 2022-03-01 LAB
ABO GROUP BLD: NORMAL
ABO GROUP BLD: NORMAL
BLD GP AB SCN SERPL QL: NEGATIVE
FLUAV RNA RESP QL NAA+PROBE: NEGATIVE
FLUBV RNA RESP QL NAA+PROBE: NEGATIVE
RH BLD: POSITIVE
RH BLD: POSITIVE
RSV RNA RESP QL NAA+PROBE: NEGATIVE
SARS-COV-2 RNA RESP QL NAA+PROBE: NEGATIVE
SPECIMEN EXPIRATION DATE: NORMAL

## 2022-03-01 PROCEDURE — 0241U HB NFCT DS VIR RESP RNA 4 TRGT: CPT | Performed by: STUDENT IN AN ORGANIZED HEALTH CARE EDUCATION/TRAINING PROGRAM

## 2022-03-01 PROCEDURE — C1776 JOINT DEVICE (IMPLANTABLE): HCPCS | Performed by: ORTHOPAEDIC SURGERY

## 2022-03-01 PROCEDURE — 86900 BLOOD TYPING SEROLOGIC ABO: CPT | Performed by: ORTHOPAEDIC SURGERY

## 2022-03-01 PROCEDURE — 27447 TOTAL KNEE ARTHROPLASTY: CPT | Performed by: ORTHOPAEDIC SURGERY

## 2022-03-01 PROCEDURE — 27447 TOTAL KNEE ARTHROPLASTY: CPT | Performed by: PHYSICIAN ASSISTANT

## 2022-03-01 PROCEDURE — C1713 ANCHOR/SCREW BN/BN,TIS/BN: HCPCS | Performed by: ORTHOPAEDIC SURGERY

## 2022-03-01 PROCEDURE — 86901 BLOOD TYPING SEROLOGIC RH(D): CPT | Performed by: ORTHOPAEDIC SURGERY

## 2022-03-01 PROCEDURE — 86850 RBC ANTIBODY SCREEN: CPT | Performed by: ORTHOPAEDIC SURGERY

## 2022-03-01 DEVICE — ATTUNE KNEE SYSTEM FEMORAL POSTERIOR STABILIZED NARROW SIZE 4N RIGHT CEMENTED
Type: IMPLANTABLE DEVICE | Site: KNEE | Status: FUNCTIONAL
Brand: ATTUNE

## 2022-03-01 DEVICE — ATTUNE PATELLA MEDIALIZED DOME 32MM CEMENTED AOX
Type: IMPLANTABLE DEVICE | Site: KNEE | Status: FUNCTIONAL
Brand: ATTUNE

## 2022-03-01 DEVICE — ATTUNE KNEE SYSTEM TIBIAL INSERT ROTATING PLATFORM POSTERIOR STABILIZED 4 7MM AOX
Type: IMPLANTABLE DEVICE | Site: KNEE | Status: FUNCTIONAL
Brand: ATTUNE

## 2022-03-01 DEVICE — SMARTSET GMV HIGH PERFORMANCE GENTAMICIN MEDIUM VISCOSITY BONE CEMENT 40G
Type: IMPLANTABLE DEVICE | Site: KNEE | Status: FUNCTIONAL
Brand: SMARTSET

## 2022-03-01 DEVICE — ATTUNE KNEE SYSTEM TIBIAL BASE ROTATING PLATFORM SIZE 3 CEMENTED
Type: IMPLANTABLE DEVICE | Site: KNEE | Status: FUNCTIONAL
Brand: ATTUNE

## 2022-03-01 RX ORDER — TRANEXAMIC ACID 100 MG/ML
INJECTION, SOLUTION INTRAVENOUS AS NEEDED
Status: DISCONTINUED | OUTPATIENT
Start: 2022-03-01 | End: 2022-03-01

## 2022-03-01 RX ORDER — ONDANSETRON 2 MG/ML
4 INJECTION INTRAMUSCULAR; INTRAVENOUS EVERY 6 HOURS PRN
Status: DISCONTINUED | OUTPATIENT
Start: 2022-03-01 | End: 2022-03-03 | Stop reason: HOSPADM

## 2022-03-01 RX ORDER — DEXAMETHASONE SODIUM PHOSPHATE 4 MG/ML
INJECTION, SOLUTION INTRA-ARTICULAR; INTRALESIONAL; INTRAMUSCULAR; INTRAVENOUS; SOFT TISSUE AS NEEDED
Status: DISCONTINUED | OUTPATIENT
Start: 2022-03-01 | End: 2022-03-01

## 2022-03-01 RX ORDER — SODIUM CHLORIDE, SODIUM LACTATE, POTASSIUM CHLORIDE, CALCIUM CHLORIDE 600; 310; 30; 20 MG/100ML; MG/100ML; MG/100ML; MG/100ML
100 INJECTION, SOLUTION INTRAVENOUS CONTINUOUS
Status: DISCONTINUED | OUTPATIENT
Start: 2022-03-01 | End: 2022-03-03 | Stop reason: HOSPADM

## 2022-03-01 RX ORDER — ASCORBIC ACID 500 MG
500 TABLET ORAL DAILY
Status: DISCONTINUED | OUTPATIENT
Start: 2022-03-02 | End: 2022-03-03 | Stop reason: HOSPADM

## 2022-03-01 RX ORDER — FENTANYL CITRATE 50 UG/ML
INJECTION, SOLUTION INTRAMUSCULAR; INTRAVENOUS AS NEEDED
Status: DISCONTINUED | OUTPATIENT
Start: 2022-03-01 | End: 2022-03-01

## 2022-03-01 RX ORDER — FOLIC ACID 1 MG/1
1 TABLET ORAL DAILY
Status: DISCONTINUED | OUTPATIENT
Start: 2022-03-02 | End: 2022-03-03 | Stop reason: HOSPADM

## 2022-03-01 RX ORDER — DOCUSATE SODIUM 100 MG/1
100 CAPSULE, LIQUID FILLED ORAL 2 TIMES DAILY
Status: DISCONTINUED | OUTPATIENT
Start: 2022-03-01 | End: 2022-03-03 | Stop reason: HOSPADM

## 2022-03-01 RX ORDER — OXYBUTYNIN CHLORIDE 10 MG/1
10 TABLET, EXTENDED RELEASE ORAL DAILY
Status: DISCONTINUED | OUTPATIENT
Start: 2022-03-02 | End: 2022-03-03 | Stop reason: HOSPADM

## 2022-03-01 RX ORDER — ACETAMINOPHEN 325 MG/1
975 TABLET ORAL ONCE
Status: COMPLETED | OUTPATIENT
Start: 2022-03-01 | End: 2022-03-01

## 2022-03-01 RX ORDER — GLYCOPYRROLATE 0.2 MG/ML
INJECTION INTRAMUSCULAR; INTRAVENOUS AS NEEDED
Status: DISCONTINUED | OUTPATIENT
Start: 2022-03-01 | End: 2022-03-01

## 2022-03-01 RX ORDER — LABETALOL 20 MG/4 ML (5 MG/ML) INTRAVENOUS SYRINGE
5 ONCE
Status: COMPLETED | OUTPATIENT
Start: 2022-03-01 | End: 2022-03-01

## 2022-03-01 RX ORDER — EPHEDRINE SULFATE 50 MG/ML
INJECTION INTRAVENOUS AS NEEDED
Status: DISCONTINUED | OUTPATIENT
Start: 2022-03-01 | End: 2022-03-01

## 2022-03-01 RX ORDER — PROPOFOL 10 MG/ML
INJECTION, EMULSION INTRAVENOUS AS NEEDED
Status: DISCONTINUED | OUTPATIENT
Start: 2022-03-01 | End: 2022-03-01

## 2022-03-01 RX ORDER — MAGNESIUM HYDROXIDE 1200 MG/15ML
LIQUID ORAL AS NEEDED
Status: DISCONTINUED | OUTPATIENT
Start: 2022-03-01 | End: 2022-03-01 | Stop reason: HOSPADM

## 2022-03-01 RX ORDER — SODIUM CHLORIDE, SODIUM LACTATE, POTASSIUM CHLORIDE, CALCIUM CHLORIDE 600; 310; 30; 20 MG/100ML; MG/100ML; MG/100ML; MG/100ML
125 INJECTION, SOLUTION INTRAVENOUS CONTINUOUS
Status: DISCONTINUED | OUTPATIENT
Start: 2022-03-01 | End: 2022-03-01

## 2022-03-01 RX ORDER — SODIUM CHLORIDE 9 MG/ML
75 INJECTION, SOLUTION INTRAVENOUS CONTINUOUS
Status: DISCONTINUED | OUTPATIENT
Start: 2022-03-01 | End: 2022-03-01

## 2022-03-01 RX ORDER — NEOSTIGMINE METHYLSULFATE 1 MG/ML
INJECTION INTRAVENOUS AS NEEDED
Status: DISCONTINUED | OUTPATIENT
Start: 2022-03-01 | End: 2022-03-01

## 2022-03-01 RX ORDER — CEFAZOLIN SODIUM 2 G/50ML
2000 SOLUTION INTRAVENOUS ONCE
Status: COMPLETED | OUTPATIENT
Start: 2022-03-01 | End: 2022-03-01

## 2022-03-01 RX ORDER — PAROXETINE HYDROCHLORIDE 20 MG/1
10 TABLET, FILM COATED ORAL DAILY
Status: DISCONTINUED | OUTPATIENT
Start: 2022-03-02 | End: 2022-03-03 | Stop reason: HOSPADM

## 2022-03-01 RX ORDER — PREDNISONE 1 MG/1
5 TABLET ORAL DAILY
Status: DISCONTINUED | OUTPATIENT
Start: 2022-03-02 | End: 2022-03-03 | Stop reason: HOSPADM

## 2022-03-01 RX ORDER — ROCURONIUM BROMIDE 10 MG/ML
INJECTION, SOLUTION INTRAVENOUS AS NEEDED
Status: DISCONTINUED | OUTPATIENT
Start: 2022-03-01 | End: 2022-03-01

## 2022-03-01 RX ORDER — HYDRALAZINE HYDROCHLORIDE 20 MG/ML
5 INJECTION INTRAMUSCULAR; INTRAVENOUS ONCE
Status: COMPLETED | OUTPATIENT
Start: 2022-03-01 | End: 2022-03-01

## 2022-03-01 RX ORDER — PROMETHAZINE HYDROCHLORIDE 25 MG/ML
12.5 INJECTION, SOLUTION INTRAMUSCULAR; INTRAVENOUS EVERY 6 HOURS PRN
Status: DISCONTINUED | OUTPATIENT
Start: 2022-03-01 | End: 2022-03-03 | Stop reason: HOSPADM

## 2022-03-01 RX ORDER — ONDANSETRON 2 MG/ML
INJECTION INTRAMUSCULAR; INTRAVENOUS AS NEEDED
Status: DISCONTINUED | OUTPATIENT
Start: 2022-03-01 | End: 2022-03-01

## 2022-03-01 RX ORDER — CHLORHEXIDINE GLUCONATE 4 G/100ML
SOLUTION TOPICAL DAILY PRN
Status: DISCONTINUED | OUTPATIENT
Start: 2022-03-01 | End: 2022-03-01 | Stop reason: HOSPADM

## 2022-03-01 RX ORDER — CEFAZOLIN SODIUM 2 G/50ML
SOLUTION INTRAVENOUS AS NEEDED
Status: DISCONTINUED | OUTPATIENT
Start: 2022-03-01 | End: 2022-03-01

## 2022-03-01 RX ORDER — SENNOSIDES 8.6 MG
1 TABLET ORAL
Status: DISCONTINUED | OUTPATIENT
Start: 2022-03-01 | End: 2022-03-03 | Stop reason: HOSPADM

## 2022-03-01 RX ORDER — FENTANYL CITRATE/PF 50 MCG/ML
25 SYRINGE (ML) INJECTION
Status: DISCONTINUED | OUTPATIENT
Start: 2022-03-01 | End: 2022-03-01 | Stop reason: HOSPADM

## 2022-03-01 RX ORDER — GABAPENTIN 100 MG/1
100 CAPSULE ORAL EVERY 8 HOURS
Status: DISCONTINUED | OUTPATIENT
Start: 2022-03-01 | End: 2022-03-03 | Stop reason: HOSPADM

## 2022-03-01 RX ORDER — HYDROMORPHONE HCL/PF 1 MG/ML
SYRINGE (ML) INJECTION AS NEEDED
Status: DISCONTINUED | OUTPATIENT
Start: 2022-03-01 | End: 2022-03-01

## 2022-03-01 RX ORDER — LABETALOL 20 MG/4 ML (5 MG/ML) INTRAVENOUS SYRINGE
5 ONCE
Status: DISCONTINUED | OUTPATIENT
Start: 2022-03-01 | End: 2022-03-01

## 2022-03-01 RX ORDER — MAGNESIUM HYDROXIDE/ALUMINUM HYDROXICE/SIMETHICONE 120; 1200; 1200 MG/30ML; MG/30ML; MG/30ML
5 SUSPENSION ORAL EVERY 4 HOURS PRN
Status: DISCONTINUED | OUTPATIENT
Start: 2022-03-01 | End: 2022-03-03 | Stop reason: HOSPADM

## 2022-03-01 RX ORDER — ONDANSETRON 2 MG/ML
4 INJECTION INTRAMUSCULAR; INTRAVENOUS ONCE AS NEEDED
Status: DISCONTINUED | OUTPATIENT
Start: 2022-03-01 | End: 2022-03-01 | Stop reason: HOSPADM

## 2022-03-01 RX ORDER — CEFAZOLIN SODIUM 2 G/50ML
2000 SOLUTION INTRAVENOUS EVERY 8 HOURS
Status: COMPLETED | OUTPATIENT
Start: 2022-03-01 | End: 2022-03-02

## 2022-03-01 RX ORDER — MIDAZOLAM HYDROCHLORIDE 2 MG/2ML
INJECTION, SOLUTION INTRAMUSCULAR; INTRAVENOUS AS NEEDED
Status: DISCONTINUED | OUTPATIENT
Start: 2022-03-01 | End: 2022-03-01

## 2022-03-01 RX ORDER — OXYCODONE HYDROCHLORIDE 5 MG/1
5 TABLET ORAL EVERY 4 HOURS PRN
Status: DISCONTINUED | OUTPATIENT
Start: 2022-03-01 | End: 2022-03-03 | Stop reason: HOSPADM

## 2022-03-01 RX ORDER — ACETAMINOPHEN 325 MG/1
975 TABLET ORAL EVERY 8 HOURS SCHEDULED
Status: DISCONTINUED | OUTPATIENT
Start: 2022-03-01 | End: 2022-03-03 | Stop reason: HOSPADM

## 2022-03-01 RX ORDER — ROPIVACAINE HYDROCHLORIDE 5 MG/ML
INJECTION, SOLUTION EPIDURAL; INFILTRATION; PERINEURAL
Status: COMPLETED | OUTPATIENT
Start: 2022-03-01 | End: 2022-03-01

## 2022-03-01 RX ORDER — CHLORHEXIDINE GLUCONATE 0.12 MG/ML
15 RINSE ORAL ONCE
Status: COMPLETED | OUTPATIENT
Start: 2022-03-01 | End: 2022-03-01

## 2022-03-01 RX ORDER — POLYETHYLENE GLYCOL 3350 17 G/17G
17 POWDER, FOR SOLUTION ORAL DAILY PRN
Status: DISCONTINUED | OUTPATIENT
Start: 2022-03-01 | End: 2022-03-03 | Stop reason: HOSPADM

## 2022-03-01 RX ORDER — OXYCODONE HYDROCHLORIDE 5 MG/1
2.5 TABLET ORAL EVERY 4 HOURS PRN
Status: DISCONTINUED | OUTPATIENT
Start: 2022-03-01 | End: 2022-03-03 | Stop reason: HOSPADM

## 2022-03-01 RX ORDER — HYDROMORPHONE HCL IN WATER/PF 6 MG/30 ML
0.2 PATIENT CONTROLLED ANALGESIA SYRINGE INTRAVENOUS EVERY 4 HOURS PRN
Status: DISCONTINUED | OUTPATIENT
Start: 2022-03-01 | End: 2022-03-03 | Stop reason: HOSPADM

## 2022-03-01 RX ORDER — CALCIUM CARBONATE 200(500)MG
1000 TABLET,CHEWABLE ORAL DAILY PRN
Status: DISCONTINUED | OUTPATIENT
Start: 2022-03-01 | End: 2022-03-03 | Stop reason: HOSPADM

## 2022-03-01 RX ADMIN — FENTANYL CITRATE 75 MCG: 50 INJECTION INTRAMUSCULAR; INTRAVENOUS at 12:56

## 2022-03-01 RX ADMIN — ACETAMINOPHEN 975 MG: 325 TABLET, FILM COATED ORAL at 10:21

## 2022-03-01 RX ADMIN — SODIUM CHLORIDE, SODIUM LACTATE, POTASSIUM CHLORIDE, AND CALCIUM CHLORIDE 125 ML/HR: .6; .31; .03; .02 INJECTION, SOLUTION INTRAVENOUS at 10:36

## 2022-03-01 RX ADMIN — ONDANSETRON 4 MG: 2 INJECTION INTRAMUSCULAR; INTRAVENOUS at 15:20

## 2022-03-01 RX ADMIN — MIDAZOLAM 1 MG: 1 INJECTION INTRAMUSCULAR; INTRAVENOUS at 11:13

## 2022-03-01 RX ADMIN — GABAPENTIN 100 MG: 100 CAPSULE ORAL at 18:22

## 2022-03-01 RX ADMIN — ROCURONIUM BROMIDE 40 MG: 50 INJECTION, SOLUTION INTRAVENOUS at 12:17

## 2022-03-01 RX ADMIN — CEFAZOLIN SODIUM 2000 MG: 2 SOLUTION INTRAVENOUS at 19:28

## 2022-03-01 RX ADMIN — SODIUM CHLORIDE, SODIUM LACTATE, POTASSIUM CHLORIDE, AND CALCIUM CHLORIDE: .6; .31; .03; .02 INJECTION, SOLUTION INTRAVENOUS at 12:34

## 2022-03-01 RX ADMIN — DEXAMETHASONE SODIUM PHOSPHATE 4 MG: 4 INJECTION INTRA-ARTICULAR; INTRALESIONAL; INTRAMUSCULAR; INTRAVENOUS; SOFT TISSUE at 12:30

## 2022-03-01 RX ADMIN — EPHEDRINE SULFATE 5 MG: 50 INJECTION, SOLUTION INTRAVENOUS at 12:29

## 2022-03-01 RX ADMIN — GLYCOPYRROLATE 0.2 MG: 0.2 INJECTION, SOLUTION INTRAMUSCULAR; INTRAVENOUS at 15:37

## 2022-03-01 RX ADMIN — FENTANYL CITRATE 50 MCG: 50 INJECTION INTRAMUSCULAR; INTRAVENOUS at 11:13

## 2022-03-01 RX ADMIN — PROPOFOL 150 MG: 10 INJECTION, EMULSION INTRAVENOUS at 12:17

## 2022-03-01 RX ADMIN — LIDOCAINE HYDROCHLORIDE 75 MG: 20 INJECTION INTRAVENOUS at 12:17

## 2022-03-01 RX ADMIN — NEOSTIGMINE METHYLSULFATE 3 MG: 1 INJECTION INTRAVENOUS at 15:19

## 2022-03-01 RX ADMIN — LABETALOL HYDROCHLORIDE 5 MG: 5 INJECTION, SOLUTION INTRAVENOUS at 16:31

## 2022-03-01 RX ADMIN — CEFAZOLIN SODIUM 2000 MG: 2 SOLUTION INTRAVENOUS at 12:00

## 2022-03-01 RX ADMIN — HYDROMORPHONE HYDROCHLORIDE 0.5 MG: 1 INJECTION, SOLUTION INTRAMUSCULAR; INTRAVENOUS; SUBCUTANEOUS at 13:19

## 2022-03-01 RX ADMIN — FENTANYL CITRATE 75 MCG: 50 INJECTION INTRAMUSCULAR; INTRAVENOUS at 12:17

## 2022-03-01 RX ADMIN — ROCURONIUM BROMIDE 10 MG: 50 INJECTION, SOLUTION INTRAVENOUS at 13:21

## 2022-03-01 RX ADMIN — ROPIVACAINE HYDROCHLORIDE 30 ML: 5 INJECTION, SOLUTION EPIDURAL; INFILTRATION; PERINEURAL at 11:15

## 2022-03-01 RX ADMIN — SODIUM CHLORIDE, SODIUM LACTATE, POTASSIUM CHLORIDE, AND CALCIUM CHLORIDE 100 ML/HR: .6; .31; .03; .02 INJECTION, SOLUTION INTRAVENOUS at 16:54

## 2022-03-01 RX ADMIN — ACETAMINOPHEN 975 MG: 325 TABLET, FILM COATED ORAL at 21:10

## 2022-03-01 RX ADMIN — TRANEXAMIC ACID 1 G: 1 INJECTION, SOLUTION INTRAVENOUS at 12:30

## 2022-03-01 RX ADMIN — SODIUM CHLORIDE, SODIUM LACTATE, POTASSIUM CHLORIDE, AND CALCIUM CHLORIDE: .6; .31; .03; .02 INJECTION, SOLUTION INTRAVENOUS at 13:44

## 2022-03-01 RX ADMIN — EPHEDRINE SULFATE 10 MG: 50 INJECTION, SOLUTION INTRAVENOUS at 12:33

## 2022-03-01 RX ADMIN — DOCUSATE SODIUM 100 MG: 100 CAPSULE ORAL at 18:22

## 2022-03-01 RX ADMIN — GLYCOPYRROLATE 0.5 MG: 0.2 INJECTION, SOLUTION INTRAMUSCULAR; INTRAVENOUS at 15:19

## 2022-03-01 RX ADMIN — CHLORHEXIDINE GLUCONATE 0.12% ORAL RINSE 15 ML: 1.2 LIQUID ORAL at 10:30

## 2022-03-01 RX ADMIN — HYDRALAZINE HYDROCHLORIDE 5 MG: 20 INJECTION INTRAMUSCULAR; INTRAVENOUS at 17:02

## 2022-03-01 RX ADMIN — MIDAZOLAM 1 MG: 1 INJECTION INTRAMUSCULAR; INTRAVENOUS at 12:14

## 2022-03-01 RX ADMIN — SENNOSIDES 8.6 MG: 8.6 TABLET ORAL at 21:10

## 2022-03-01 NOTE — ANESTHESIA PREPROCEDURE EVALUATION
Procedure:  ARTHROPLASTY KNEE TOTAL (Right Knee)    Relevant Problems   CARDIO   (+) Hyperlipidemia   Seen by cardiology - per note  "She presents today for cardiac evaluation prior to knee replacement surgery  Her ECG performed earlier this week did show changes in the anterolateral leads that were concerning for possible ischemia  Fortunately she is having no symptoms of ischemia  She never gets chest pain, never gets shortness of breath or dyspnea on exertion  She appears to have relatively good overall functional capacity  Her Duke Activity Status Index score is 45 45 which predicts her ability to perform 8 33 METs of activity  She has no history of coronary artery disease that is known  She has no underlying renal dysfunction or diabetes  Overall given all the above her cardiac risk for surgery will be low, may proceed as planned  "    "However she does have a murmur in the aortic position, would check echocardiogram in the next few months to evaluate for underlying valvular heart disease  Her murmur on physical exam today does not appear to be consistent with severe aortic stenosis  There is no significant radiation to her carotids, there is no delayed carotid upstroke and no late-peaking aspect of the murmur itself "     HEMATOLOGY   (+) Iron deficiency anemia      MUSCULOSKELETAL   (+) Arthritis of knee   (+) Osteoarthritis of both hands   (+) Rheumatoid arthritis (HCC)      NEURO/PSYCH   (+) Anxiety disorder        Physical Exam    Airway    Mallampati score: II  TM Distance: >3 FB  Neck ROM: full     Dental   upper dentures,     Cardiovascular  Rhythm: regular, Rate: normal,     Pulmonary  Pulmonary exam normal     Other Findings        Anesthesia Plan  ASA Score- 2     Anesthesia Type- general with ASA Monitors  Additional Monitors:   Airway Plan: ETT  Comment: Adductor canal block preop  IV antiemetics  Plan Factors-Exercise tolerance (METS): >4 METS  Chart reviewed  EKG reviewed  Existing labs reviewed  Patient summary reviewed  Patient is not a current smoker  Patient instructed to abstain from smoking on day of procedure  Patient did not smoke on day of surgery  Induction- intravenous  Postoperative Plan- Plan for postoperative opioid use  Planned trial extubation    Informed Consent- Anesthetic plan and risks discussed with spouse and patient

## 2022-03-01 NOTE — TELEPHONE ENCOUNTER
Patient has surgery tomorrow and hasn't received a call with a surgery time  I couldn't get a hold of same day surgery nurses  Spoke with Catarina Hoyt @ [facility] line who is paging the same day manager to call the patient

## 2022-03-01 NOTE — ANESTHESIA PROCEDURE NOTES
Peripheral Block    Patient location during procedure: holding area  Start time: 3/1/2022 11:11 AM  Reason for block: at surgeon's request and post-op pain management  Staffing  Performed: Anesthesiologist   Anesthesiologist: Rosaline Hess MD  Preanesthetic Checklist  Completed: patient identified, IV checked, site marked, risks and benefits discussed, surgical consent, monitors and equipment checked, pre-op evaluation and timeout performed  Peripheral Block  Patient position: supine  Prep: ChloraPrep  Patient monitoring: heart rate, cardiac monitor, continuous pulse ox and frequent blood pressure checks  Block type: adductor canal block  Laterality: right  Injection technique: single-shot  Procedures: ultrasound guided, Ultrasound guidance required for the procedure to increase accuracy and safety of medication placement and decrease risk of complications    Ultrasound permanent image savedropivacaine (NAROPIN) 0 5 % perineural infiltration, 30 mL  Needle  Needle type: Stimuplex   Needle gauge: 21 G  Needle length: 10 cm  Needle localization: anatomical landmarks and ultrasound guidance  Assessment  Injection assessment: incremental injection, local visualized surrounding nerve on ultrasound, negative aspiration for heme and no paresthesia on injection  Post-procedure:  site cleaned

## 2022-03-01 NOTE — INTERVAL H&P NOTE
H&P reviewed  After examining the patient I find no changes in the patients condition since the H&P had been written      Vitals:    03/01/22 1201   BP: (!) 178/90   Pulse: 64   Resp: 16   Temp:    SpO2: 98%

## 2022-03-01 NOTE — OP NOTE
OPERATIVE REPORT  PATIENT NAME: Hilary Irvin    :  1955  MRN: [id number]  Pt Location: [facility] OR ROOM 01    SURGERY DATE: 3/1/2022    Surgeon(s) and Role:     * Vanna Geller, DO - Primary     * Clara Mireles PA-C - Assisting  Glenna Walker, ATC - assisting    Preop Diagnosis:  Arthritis of right knee [M17 11]    Post-Op Diagnosis Codes:     * Arthritis of right knee [M17 11]    Procedure(s) (LRB):  ARTHROPLASTY KNEE TOTAL (Right)    Specimen(s):  * No specimens in log *    Estimated Blood Loss:   50 mL    Drains:  Urethral Catheter Non-latex 16 Fr  (Active)   Number of days: 0       Anesthesia Type:   GA    Operative Indications:  Arthritis of right knee [M17 11]      Operative Findings:  See below    Complications:   None    Procedure and Technique:  Implants:  Depuy Attune  PS femoral component size 4 narrow  Tibial  Base rotating platform size 3  Tibial insert rotating platform size 4, 7mm  Dome patella size 32mm    INDICATIONS FOR PROCEDURE:  The patients is a 75 y o  female who presented to the office with  knee OA  Conservative treatment was attempted for quite some time and ultimately failed  She has severe progressive pain, stiffness, and disability and now elects to proceed with right total knee replacement surgery  Extensive counseling in regards to the reasons for surgical intervention as well as the risks and benefits of surgery were reviewed  The risks include, but are not limited to infection, extensive blood loss, blood clots, wound healing problems, fracture, need for additional surgery, need for revision surgery, failure of hardware, persistent pain and stiffness, heart attack, stroke, death  The patient understood and agreed to by oral and written consent      OPERATIVE PROCEDURE:  The patient was identified as Hilary Irvin by Her ID bracelet by the surgical staff in the preoperative area at [address] [facility]   The patient was wheeled back to the surgical room and placed on the operative table  Anesthesia was administered  Preoperative antibiotics were given  The patient remained in the supine position and all bony prominences were carefully protected  A tourniquet was applied to the patients right upper thigh  The right leg was then prepped and draped in the usual sterile fashion  A timeout was performed where the patients name and surgical site were once again identified  The incision was marked out  The leg was elevated and the tourniquet was inflated to 300 mmHg  An incision was made over anterior aspect of the knee  Dissection was made through the skin and subcutaneous tissue  A medial parapatellar arthrotomy was made and the medial tissues were elevated while protecting the MCL  Remnants of the ACL, medial and lateral menisci were removed and retractors were placed  Severe osteoarthritis was noted  The femoral canal was opened with a drill and the contents were suctioned and the canal was irrigated  We used an intramedullary guide on the femoral side and resected 9mm of distal femur in 5 degrees of valgus  Osteophytes were removed  We then subluxated the tibia forward and opened the tibial canal with a drill  The contents of the canal were then suctioned and the canal was irrigated  We placed an intramedullary guide and resected 6mm of bone based on the highest point of the medial tibial plateau perpendicular to the mechanical axis of the tibia  Next, the flexion and extension gaps were analyzed with a spacer block and visualization  They were tight  We then resected 2mm more off the tibia  Darlins line and the epicondylar axis were then identified  The 4 in 1 cutting block was placed in 3 degrees of external rotation keeping in mind of the location of wear on the femur and resections were made  Posterior osteophytes and any remnants of the medial and lateral menisci were removed  The box cut was made  Trials were then placed  The knee was tight in extension only  We then resected 2mm more off the distal femur  The box cut was made again  Posterior soft tissue releases were made  Trials were placed once again  It was still tighter in extension than flexion but only by a slight amount  Another 1mm of bone was resected off the distal femur  The box cut was made again  Trials were once again placed and the knee was felt to be stable and well balanced throughout the arc of motion  The patella was measured  A resection was made on the undersurface of the patella  The trial for the patella was placed and patellar tracking was checked and found to be adequate with the other components in place  Attention was directed back to the tibia  External rotation of the tibial guide was set and the final preparations of the tibia were performed  The components were removed and the knee was copiously irrigated with pulse lavage and then dried  The components were then cemented in place and excess cement was removed  Once the cement was dried the trial insert was trialed  A size 4, 7mm tibial insert was confirmed to be the correct size  The final polyethylene component was placed and the tourniquet was let down  Any bleeders were cauterized and then the knee was irrigated  An irrisept wash was performed and then the knee was once again copiously irrigated  Marcaine, morphine, and toradol was injected periarticularly throughout the case  The arthrotomy was closed with vicryl suture  The skin and subcutaneous tissues were closed with vicryl and then a running monocryl stitch  A sterile dressing was placed  The patient was awakened and transferred to a hospital bed and then to the recovery room in stable condition  I attest that I was present and performed this procedure   Norma Yin PA-C and Sangita Gunderson ATC were present for the entire procedure and provided essential assistance with limb position, patient prepping, and retraction    A qualified resident physician was not available    Patient Disposition:  extubated and stable      SIGNATURE: Carolyne Huddleston DO  DATE: March 1, 2022  TIME: 3:38 PM

## 2022-03-01 NOTE — PLAN OF CARE
Problem: MOBILITY - ADULT  Goal: Maintain or return to baseline ADL function  Description: INTERVENTIONS:  -  Assess patient's ability to carry out ADLs; assess patient's baseline for ADL function and identify physical deficits which impact ability to perform ADLs (bathing, care of mouth/teeth, toileting, grooming, dressing, etc )  - Assess/evaluate cause of self-care deficits   - Assess range of motion  - Assess patient's mobility; develop plan if impaired  - Assess patient's need for assistive devices and provide as appropriate  - Encourage maximum independence but intervene and supervise when necessary  - Involve family in performance of ADLs  - Assess for home care needs following discharge   - Consider OT consult to assist with ADL evaluation and planning for discharge  - Provide patient education as appropriate  Outcome: Progressing  Goal: Maintains/Returns to pre admission functional level  Description: INTERVENTIONS:  - Perform BMAT or MOVE assessment daily    - Set and communicate daily mobility goal to care team and patient/family/caregiver  - Collaborate with rehabilitation services on mobility goals if consulted  - Perform Range of Motion 3 times a day  - Reposition patient every 3 hours    - Dangle patient 3 times a day  - Stand patient 3 times a day  - Ambulate patient 3 times a day  - Out of bed to chair 3 times a day   - Out of bed for meals 3 times a day  - Out of bed for toileting  - Record patient progress and toleration of activity level   Outcome: Progressing     Problem: Potential for Falls  Goal: Patient will remain free of falls  Description: INTERVENTIONS:  - Educate patient/family on patient safety including physical limitations  - Instruct patient to call for assistance with activity   - Consult OT/PT to assist with strengthening/mobility   - Keep Call bell within reach  - Keep bed low and locked with side rails adjusted as appropriate  - Keep care items and personal belongings within reach  - Initiate and maintain comfort rounds  - Make Fall Risk Sign visible to staff  - Offer Toileting every 2 Hours, in advance of need  - Initiate/Maintain bed alarm  - Obtain necessary fall risk management equipment:   - Apply yellow socks and bracelet for high fall risk patients  - Consider moving patient to room near nurses station  Outcome: Progressing     Problem: PAIN - ADULT  Goal: Verbalizes/displays adequate comfort level or baseline comfort level  Description: Interventions:  - Encourage patient to monitor pain and request assistance  - Assess pain using appropriate pain scale  - Administer analgesics based on type and severity of pain and evaluate response  - Implement non-pharmacological measures as appropriate and evaluate response  - Consider cultural and social influences on pain and pain management  - Notify physician/advanced practitioner if interventions unsuccessful or patient reports new pain  Outcome: Progressing     Problem: INFECTION - ADULT  Goal: Absence or prevention of progression during hospitalization  Description: INTERVENTIONS:  - Assess and monitor for signs and symptoms of infection  - Monitor lab/diagnostic results  - Monitor all insertion sites, i e  indwelling lines, tubes, and drains  - Monitor endotracheal if appropriate and nasal secretions for changes in amount and color  - [place] appropriate cooling/warming therapies per order  - Administer medications as ordered  - Instruct and encourage patient and family to use good hand hygiene technique  - Identify and instruct in appropriate isolation precautions for identified infection/condition  Outcome: Progressing  Goal: Absence of fever/infection during neutropenic period  Description: INTERVENTIONS:  - Monitor WBC    Outcome: Progressing     Problem: SAFETY ADULT  Goal: Maintain or return to baseline ADL function  Description: INTERVENTIONS:  -  Assess patient's ability to carry out ADLs; assess patient's baseline for ADL function and identify physical deficits which impact ability to perform ADLs (bathing, care of mouth/teeth, toileting, grooming, dressing, etc )  - Assess/evaluate cause of self-care deficits   - Assess range of motion  - Assess patient's mobility; develop plan if impaired  - Assess patient's need for assistive devices and provide as appropriate  - Encourage maximum independence but intervene and supervise when necessary  - Involve family in performance of ADLs  - Assess for home care needs following discharge   - Consider OT consult to assist with ADL evaluation and planning for discharge  - Provide patient education as appropriate  Outcome: Progressing  Goal: Maintains/Returns to pre admission functional level  Description: INTERVENTIONS:  - Perform BMAT or MOVE assessment daily    - Set and communicate daily mobility goal to care team and patient/family/caregiver  - Collaborate with rehabilitation services on mobility goals if consulted  - Perform Range of Motion 3 times a day  - Reposition patient every 3 hours    - Dangle patient 3 times a day  - Stand patient 3 times a day  - Ambulate patient 3 times a day  - Out of bed to chair 3 times a day   - Out of bed for meals 3 times a day  - Out of bed for toileting  - Record patient progress and toleration of activity level   Outcome: Progressing  Goal: Patient will remain free of falls  Description: INTERVENTIONS:  - Educate patient/family on patient safety including physical limitations  - Instruct patient to call for assistance with activity   - Consult OT/PT to assist with strengthening/mobility   - Keep Call bell within reach  - Keep bed low and locked with side rails adjusted as appropriate  - Keep care items and personal belongings within reach  - Initiate and maintain comfort rounds  - Make Fall Risk Sign visible to staff  - Offer Toileting every 3 Hours, in advance of need  - Initiate/Maintain bed alarm  - Obtain necessary fall risk management equipment: 3  - Apply yellow socks and bracelet for high fall risk patients  - Consider moving patient to room near nurses station  Outcome: Progressing     Problem: DISCHARGE PLANNING  Goal: Discharge to home or other facility with appropriate resources  Description: INTERVENTIONS:  - Identify barriers to discharge w/patient and caregiver  - Arrange for needed discharge resources and transportation as appropriate  - Identify discharge learning needs (meds, wound care, etc )  - Arrange for interpretive services to assist at discharge as needed  - Refer to Case Management Department for coordinating discharge planning if the patient needs post-hospital services based on physician/advanced practitioner order or complex needs related to functional status, cognitive ability, or social support system  Outcome: Progressing     Problem: Knowledge Deficit  Goal: Patient/family/caregiver demonstrates understanding of disease process, treatment plan, medications, and discharge instructions  Description: Complete learning assessment and assess knowledge base    Interventions:  - Provide teaching at level of understanding  - Provide teaching via preferred learning methods  Outcome: Progressing

## 2022-03-01 NOTE — ANESTHESIA POSTPROCEDURE EVALUATION
Post-Op Assessment Note    CV Status:  Stable    Pain management: adequate     Mental Status:  Alert and awake   Hydration Status:  Euvolemic   PONV Controlled:  Controlled   Airway Patency:  Patent      Post Op Vitals Reviewed: Yes      Staff: Anesthesiologist         No complications documented      BP      Temp      Pulse     Resp      SpO2      /71 (BP Location: Left arm)   Pulse 79   Temp 97 9 °F (36 6 °C) (Temporal)   Resp 14   Ht 5' 4" (1 626 m)   Wt 52 kg (114 lb 10 2 oz)   SpO2 95%   Breastfeeding No   BMI 19 68 kg/m²

## 2022-03-02 ENCOUNTER — APPOINTMENT (OUTPATIENT)
Dept: NON INVASIVE DIAGNOSTICS | Facility: HOSPITAL | Age: 67
End: 2022-03-02
Payer: COMMERCIAL

## 2022-03-02 LAB
2HR DELTA HS TROPONIN: 0 NG/L
4HR DELTA HS TROPONIN: 3 NG/L
ANION GAP SERPL CALCULATED.3IONS-SCNC: 5 MMOL/L (ref 4–13)
AORTIC ROOT: 3.1 CM
AORTIC VALVE MEAN VELOCITY: 11.1 M/S
APICAL FOUR CHAMBER EJECTION FRACTION: 74 %
ASCENDING AORTA: 3.5 CM (ref 1.77–2.65)
AV LVOT MEAN GRADIENT: 3 MMHG
AV LVOT PEAK GRADIENT: 6 MMHG
AV MEAN GRADIENT: 6 MMHG
AV PEAK GRADIENT: 11 MMHG
AV VELOCITY RATIO: 0.71
BUN SERPL-MCNC: 11 MG/DL (ref 5–25)
CALCIUM SERPL-MCNC: 8.2 MG/DL (ref 8.3–10.1)
CARDIAC TROPONIN I PNL SERPL HS: 4 NG/L
CARDIAC TROPONIN I PNL SERPL HS: 4 NG/L
CARDIAC TROPONIN I PNL SERPL HS: 7 NG/L
CHLORIDE SERPL-SCNC: 101 MMOL/L (ref 100–108)
CO2 SERPL-SCNC: 30 MMOL/L (ref 21–32)
CREAT SERPL-MCNC: 0.67 MG/DL (ref 0.6–1.3)
DOP CALC AO PEAK VEL: 1.69 M/S
DOP CALC AO VTI: 37.68 CM
DOP CALC LVOT PEAK VEL VTI: 24.35 CM
DOP CALC LVOT PEAK VEL: 1.2 M/S
E WAVE DECELERATION TIME: 357 MS
ERYTHROCYTE [DISTWIDTH] IN BLOOD BY AUTOMATED COUNT: 13.2 % (ref 11.6–15.1)
FRACTIONAL SHORTENING: 47 % (ref 28–44)
GFR SERPL CREATININE-BSD FRML MDRD: 90 ML/MIN/1.73SQ M
GLUCOSE SERPL-MCNC: 112 MG/DL (ref 65–140)
HCT VFR BLD AUTO: 32.4 % (ref 34.8–46.1)
HGB BLD-MCNC: 10.8 G/DL (ref 11.5–15.4)
INTERVENTRICULAR SEPTUM IN DIASTOLE (PARASTERNAL SHORT AXIS VIEW): 0.8 CM
INTERVENTRICULAR SEPTUM: 0.8 CM (ref 0.48–0.89)
LAAS-AP4: 16.2 CM2
LEFT ATRIUM SIZE: 4.1 CM
LEFT INTERNAL DIMENSION IN SYSTOLE: 2.6 CM (ref 2.25–3.4)
LEFT VENTRICULAR INTERNAL DIMENSION IN DIASTOLE: 4.9 CM (ref 3.65–5.43)
LEFT VENTRICULAR POSTERIOR WALL IN END DIASTOLE: 0.8 CM (ref 0.47–0.88)
LEFT VENTRICULAR STROKE VOLUME: 88 ML
LVSV (TEICH): 88 ML
MCH RBC QN AUTO: 31.3 PG (ref 26.8–34.3)
MCHC RBC AUTO-ENTMCNC: 33.3 G/DL (ref 31.4–37.4)
MCV RBC AUTO: 94 FL (ref 82–98)
MV E'TISSUE VEL-SEP: 5 CM/S
MV PEAK A VEL: 1.57 M/S
MV PEAK E VEL: 98 CM/S
MV STENOSIS PRESSURE HALF TIME: 104 MS
MV VALVE AREA P 1/2 METHOD: 2.12 CM2
PLATELET # BLD AUTO: 228 THOUSANDS/UL (ref 149–390)
PMV BLD AUTO: 9.9 FL (ref 8.9–12.7)
POTASSIUM SERPL-SCNC: 4.4 MMOL/L (ref 3.5–5.3)
RA PRESSURE ESTIMATED: 3 MMHG
RBC # BLD AUTO: 3.45 MILLION/UL (ref 3.81–5.12)
RIGHT VENTRICLE ID DIMENSION: 3.4 CM
RV PSP: 41 MMHG
SL CV LV EF: 74
SL CV PED ECHO LEFT VENTRICLE DIASTOLIC VOLUME (MOD BIPLANE) 2D: 114 ML
SL CV PED ECHO LEFT VENTRICLE SYSTOLIC VOLUME (MOD BIPLANE) 2D: 25 ML
SODIUM SERPL-SCNC: 136 MMOL/L (ref 136–145)
TR MAX PG: 38 MMHG
TR PEAK VELOCITY: 3.1 M/S
TRICUSPID VALVE PEAK REGURGITATION VELOCITY: 3.1 M/S
WBC # BLD AUTO: 16.4 THOUSAND/UL (ref 4.31–10.16)
Z-SCORE OF ASCENDING AORTA: 5.83
Z-SCORE OF INTERVENTRICULAR SEPTUM IN END DIASTOLE: 1.07
Z-SCORE OF LEFT VENTRICULAR DIMENSION IN END DIASTOLE: 0.96
Z-SCORE OF LEFT VENTRICULAR DIMENSION IN END SYSTOLE: -0.48
Z-SCORE OF LEFT VENTRICULAR POSTERIOR WALL IN END DIASTOLE: 1.18

## 2022-03-02 PROCEDURE — 97110 THERAPEUTIC EXERCISES: CPT

## 2022-03-02 PROCEDURE — 85027 COMPLETE CBC AUTOMATED: CPT | Performed by: PHYSICIAN ASSISTANT

## 2022-03-02 PROCEDURE — 99220 PR INITIAL OBSERVATION CARE/DAY 70 MINUTES: CPT | Performed by: INTERNAL MEDICINE

## 2022-03-02 PROCEDURE — 97530 THERAPEUTIC ACTIVITIES: CPT

## 2022-03-02 PROCEDURE — 97116 GAIT TRAINING THERAPY: CPT

## 2022-03-02 PROCEDURE — 97163 PT EVAL HIGH COMPLEX 45 MIN: CPT

## 2022-03-02 PROCEDURE — 84484 ASSAY OF TROPONIN QUANT: CPT | Performed by: INTERNAL MEDICINE

## 2022-03-02 PROCEDURE — 93306 TTE W/DOPPLER COMPLETE: CPT

## 2022-03-02 PROCEDURE — 97166 OT EVAL MOD COMPLEX 45 MIN: CPT

## 2022-03-02 PROCEDURE — 99024 POSTOP FOLLOW-UP VISIT: CPT | Performed by: ORTHOPAEDIC SURGERY

## 2022-03-02 PROCEDURE — 80048 BASIC METABOLIC PNL TOTAL CA: CPT | Performed by: PHYSICIAN ASSISTANT

## 2022-03-02 RX ORDER — MECLIZINE HCL 12.5 MG/1
25 TABLET ORAL EVERY 8 HOURS SCHEDULED
Status: DISCONTINUED | OUTPATIENT
Start: 2022-03-02 | End: 2022-03-03 | Stop reason: HOSPADM

## 2022-03-02 RX ADMIN — PREDNISONE 5 MG: 5 TABLET ORAL at 08:01

## 2022-03-02 RX ADMIN — ACETAMINOPHEN 975 MG: 325 TABLET, FILM COATED ORAL at 05:02

## 2022-03-02 RX ADMIN — OXYCODONE HYDROCHLORIDE 2.5 MG: 5 TABLET ORAL at 16:53

## 2022-03-02 RX ADMIN — OXYCODONE HYDROCHLORIDE 5 MG: 5 TABLET ORAL at 11:45

## 2022-03-02 RX ADMIN — SENNOSIDES 8.6 MG: 8.6 TABLET ORAL at 21:43

## 2022-03-02 RX ADMIN — CEFAZOLIN SODIUM 2000 MG: 2 SOLUTION INTRAVENOUS at 03:32

## 2022-03-02 RX ADMIN — ACETAMINOPHEN 975 MG: 325 TABLET, FILM COATED ORAL at 21:43

## 2022-03-02 RX ADMIN — FOLIC ACID 1 MG: 1 TABLET ORAL at 08:01

## 2022-03-02 RX ADMIN — DOCUSATE SODIUM 100 MG: 100 CAPSULE ORAL at 08:01

## 2022-03-02 RX ADMIN — IRON SUCROSE 300 MG: 20 INJECTION, SOLUTION INTRAVENOUS at 08:05

## 2022-03-02 RX ADMIN — MECLIZINE 25 MG: 12.5 TABLET ORAL at 21:43

## 2022-03-02 RX ADMIN — ONDANSETRON 4 MG: 2 INJECTION INTRAMUSCULAR; INTRAVENOUS at 08:02

## 2022-03-02 RX ADMIN — GABAPENTIN 100 MG: 100 CAPSULE ORAL at 09:22

## 2022-03-02 RX ADMIN — OXYBUTYNIN CHLORIDE 10 MG: 10 TABLET, EXTENDED RELEASE ORAL at 08:01

## 2022-03-02 RX ADMIN — ACETAMINOPHEN 975 MG: 325 TABLET, FILM COATED ORAL at 13:20

## 2022-03-02 RX ADMIN — GABAPENTIN 100 MG: 100 CAPSULE ORAL at 16:53

## 2022-03-02 RX ADMIN — OXYCODONE HYDROCHLORIDE AND ACETAMINOPHEN 500 MG: 500 TABLET ORAL at 08:01

## 2022-03-02 RX ADMIN — OXYCODONE HYDROCHLORIDE 5 MG: 5 TABLET ORAL at 08:15

## 2022-03-02 RX ADMIN — DOCUSATE SODIUM 100 MG: 100 CAPSULE ORAL at 16:53

## 2022-03-02 RX ADMIN — MECLIZINE 25 MG: 12.5 TABLET ORAL at 16:56

## 2022-03-02 RX ADMIN — ENOXAPARIN SODIUM 40 MG: 40 INJECTION SUBCUTANEOUS at 03:32

## 2022-03-02 RX ADMIN — PAROXETINE 10 MG: 20 TABLET, FILM COATED ORAL at 08:01

## 2022-03-02 NOTE — PROGRESS NOTES
Progress Note - Orthopedics   Annel Aggarwal 75 y o  female MRN: [id number]  Unit/Bed#: E2 -01 Encounter: [id number]    Assessment:  75 y o  female s/p right total knee arthroplasty POD 1    Plan:  Pain control prn  PT/OT- WBAT right LE   Lovenox/TEDs/SCDs for DVT prophylaxis- will go home on ASA  Abx x 24h  D/c planning    Subjective: Pt S&E  No complaints  Denies SOB  Admits some lightheadedness with ambulation  Admits flatus  Vitals: Blood pressure 132/63, pulse 59, temperature 97 6 °F (36 4 °C), temperature source Tympanic, resp  rate 18, height 5' 4" (1 626 m), weight 52 kg (114 lb 10 2 oz), SpO2 97 %, not currently breastfeeding  ,Body mass index is 19 68 kg/m²  Intake/Output Summary (Last 24 hours) at 3/2/2022 1445  Last data filed at 3/2/2022 1322  Gross per 24 hour   Intake 1000 ml   Output 1825 ml   Net -825 ml       Invasive Devices  Report    Peripheral Intravenous Line            Peripheral IV 03/01/22 Dorsal (posterior); Right Forearm 1 day                Ortho Exam: rightLE:  Drsg:  C/D/I, sensation grossly intact L4, L5, S1, palpable pedal pulse, EHL/AT/GS intact    Lab, Imaging and other studies:   CBC:   Lab Results   Component Value Date    WBC 16 40 (H) 03/02/2022    HGB 10 8 (L) 03/02/2022    HCT 32 4 (L) 03/02/2022    MCV 94 03/02/2022     03/02/2022    MCH 31 3 03/02/2022    MCHC 33 3 03/02/2022    RDW 13 2 03/02/2022    MPV 9 9 03/02/2022     CMP:   Lab Results   Component Value Date    SODIUM 136 03/02/2022     03/02/2022    CO2 30 03/02/2022    BUN 11 03/02/2022    CREATININE 0 67 03/02/2022    CALCIUM 8 2 (L) 03/02/2022    EGFR 90 03/02/2022

## 2022-03-02 NOTE — ASSESSMENT & PLAN NOTE
Patient with past medical history of osteoarthritis, with rheumatoid arthritis  She has history of osteoporosis for which he is on Boniva - arm resume as previously scheduled

## 2022-03-02 NOTE — CASE MANAGEMENT
Case Management Assessment & Discharge Planning Note    Patient name Luis Alberto Kendall  Location [facility] /E2 -* MRN [id number]  : 1955 Date 3/2/2022       Current Admission Date: 3/1/2022  Current Admission Diagnosis:Arthritis of knee   Patient Active Problem List    Diagnosis Date Noted    Abnormal ECG 2022    Chronic fatigue 11/15/2021    Iron deficiency anemia 11/15/2021    Urge incontinence of urine 10/15/2021    Elevated blood pressure reading 2018    Osteoarthritis of both hands 12/15/2016    Hyperlipidemia 2016    Osteoporosis 2016    Arthritis of knee 04/15/2015    Anxiety disorder 2013    Keratitis 2012    Rheumatoid arthritis ([address] ) 2012    Salivary secretion disturbance 2012    Tear film insufficiency 2012      LOS (days): 0  Geometric Mean LOS (GMLOS) (days):   Days to GMLOS:     OBJECTIVE:              Current admission status: Outpatient Surgery       Preferred Pharmacy:   [facility] [address]  Phone: [phone] Fax: [phone]    Primary Care Provider: Zayra Jacques MD    Primary Insurance: BLUE CROSS  Secondary Insurance:     ASSESSMENT:  Active Health Care Agents    There are no active Health Care Agents on file  Patient Information  Mental Status: Alert  During Assessment patient was accompanied by: Spouse  Assessment information provided by[de-identified] Patient  Primary Caregiver: Self  Support Systems: Spouse/significant other    DISCHARGE DETAILS:    Discharge planning discussed with[de-identified] Patient and spouse  Freedom of Choice: Yes  Comments - Freedom of Choice: [facility]  She will need a RW for discharge  CM placed order  Declined need for BSC  Spouse will be transporting home  No other needs at this time       Requested  [address]         Is the patient interested in [facility] at discharge?: No    DME Referral Provided  Referral made for DME?: Yes  DME referral completed for the following items[de-identified] Alma Bauer  DME Supplier Name[de-identified] [employer]    Other Referral/Resources/Interventions Provided:  Interventions: Outpatient PT    Treatment Team Recommendation: Home  Discharge Destination Plan[de-identified] Home  Transport at Discharge : Family

## 2022-03-02 NOTE — PLAN OF CARE
Problem: PHYSICAL THERAPY ADULT  Goal: Performs mobility at highest level of function for planned discharge setting  See evaluation for individualized goals  Description: Treatment/Interventions: Functional transfer training,LE strengthening/ROM,Elevations,Therapeutic exercise,Endurance training,Patient/family training,Bed mobility,Gait training,Spoke to nursing,OT,Family,Spoke to case management  Equipment Recommended: Pablo Paulson       See flowsheet documentation for full assessment, interventions and recommendations  3/2/2022 1451 by Hallie Sidhu PT  Outcome: Adequate for Discharge  Note: Prognosis: Good  Problem List: Decreased strength,Decreased range of motion,Decreased endurance,Impaired balance,Decreased mobility,Pain  Assessment: Pt seen for PT per POC  Improved mobility & activity tolerance noted this tx session  Pt progressed to S for amb w/ RW & able to progressed to stair mgt  Pt continue to require S for transfers  Please see flowsheet above for objective findings & levels of assistance required for all functional tasks during this tx session  Gait deviations as above, slow & antalgic but no gross LOB noted  Dec amb tolerance 2* to lightheadedness during amb  BP as follows: 152/67 sitting EOB prior to amb; 188/79 sitting/ immediately after amb  Pt able to negotiate  stairs w/ minAx1 + cues for sequencing  Pt reports that she gets lightheaded when she's anxious  Pt tolerated above mentioned thera  ex well, AROM  RN made aware of pt's elevated BP & lightheadedness  Will continue PT per POC  At end of session, pt back in bed in stable condition, call bell & phone in reach  Fall precautions reinforced w/ good understanding  The patient's AM-PAC Basic Mobility Inpatient Short Form Raw Score is 19  A Raw score of less than or equal to 16 suggests the patient may benefit from discharge to home  Please also refer to the recommendation of the Physical Therapist for safe discharge planning   Pt wishes to return home today  From mobility/PT standpoint, pt may return home when medically cleared  Will continue to recommend OPPT, RW & family support at D/C  Pt educated on HEP, car transfers & home safety/mgt w/ good understanding   present t/o session & able to observed & participate during education  CM to follow  Nsg staff to continue to mobilized pt (OOB in chair for all meals & ambulate in room/unit) as tolerated to prevent decline in function  Nsg notified  PT Discharge Recommendation: Home with outpatient rehabilitation          See flowsheet documentation for full assessment

## 2022-03-02 NOTE — ASSESSMENT & PLAN NOTE
Patient can resume her methotrexate as scheduled on Friday and Saturday  No contraindications to resumption  Continue folic acid as currently prescribed

## 2022-03-02 NOTE — PLAN OF CARE
Problem: MOBILITY - ADULT  Goal: Maintain or return to baseline ADL function  Description: INTERVENTIONS:  -  Assess patient's ability to carry out ADLs; assess patient's baseline for ADL function and identify physical deficits which impact ability to perform ADLs (bathing, care of mouth/teeth, toileting, grooming, dressing, etc )  - Assess/evaluate cause of self-care deficits   - Assess range of motion  - Assess patient's mobility; develop plan if impaired  - Assess patient's need for assistive devices and provide as appropriate  - Encourage maximum independence but intervene and supervise when necessary  - Involve family in performance of ADLs  - Assess for home care needs following discharge   - Consider OT consult to assist with ADL evaluation and planning for discharge  - Provide patient education as appropriate  Outcome: Progressing  Goal: Maintains/Returns to pre admission functional level  Description: INTERVENTIONS:  - Perform BMAT or MOVE assessment daily    - Set and communicate daily mobility goal to care team and patient/family/caregiver     - Collaborate with rehabilitation services on mobility goals if consulted  - Out of bed for toileting  - Record patient progress and toleration of activity level   Outcome: Progressing     Problem: PAIN - ADULT  Goal: Verbalizes/displays adequate comfort level or baseline comfort level  Description: Interventions:  - Encourage patient to monitor pain and request assistance  - Assess pain using appropriate pain scale  - Administer analgesics based on type and severity of pain and evaluate response  - Implement non-pharmacological measures as appropriate and evaluate response  - Consider cultural and social influences on pain and pain management  - Notify physician/advanced practitioner if interventions unsuccessful or patient reports new pain  Outcome: Progressing     Problem: INFECTION - ADULT  Goal: Absence or prevention of progression during hospitalization  Description: INTERVENTIONS:  - Assess and monitor for signs and symptoms of infection  - Monitor lab/diagnostic results  - Monitor all insertion sites, i e  indwelling lines, tubes, and drains  - Monitor endotracheal if appropriate and nasal secretions for changes in amount and color  - [place] appropriate cooling/warming therapies per order  - Administer medications as ordered  - Instruct and encourage patient and family to use good hand hygiene technique  - Identify and instruct in appropriate isolation precautions for identified infection/condition  Outcome: Progressing  Goal: Absence of fever/infection during neutropenic period  Description: INTERVENTIONS:  - Monitor WBC    Outcome: Progressing     Problem: SAFETY ADULT  Goal: Maintain or return to baseline ADL function  Description: INTERVENTIONS:  -  Assess patient's ability to carry out ADLs; assess patient's baseline for ADL function and identify physical deficits which impact ability to perform ADLs (bathing, care of mouth/teeth, toileting, grooming, dressing, etc )  - Assess/evaluate cause of self-care deficits   - Assess range of motion  - Assess patient's mobility; develop plan if impaired  - Assess patient's need for assistive devices and provide as appropriate  - Encourage maximum independence but intervene and supervise when necessary  - Involve family in performance of ADLs  - Assess for home care needs following discharge   - Consider OT consult to assist with ADL evaluation and planning for discharge  - Provide patient education as appropriate  Outcome: Progressing  Goal: Maintains/Returns to pre admission functional level  Description: INTERVENTIONS:  - Perform BMAT or MOVE assessment daily    - Set and communicate daily mobility goal to care team and patient/family/caregiver     - Collaborate with rehabilitation services on mobility goals if consulted  - Out of bed for toileting  - Record patient progress and toleration of activity level   Outcome: Progressing  Goal: Patient will remain free of falls  Description: INTERVENTIONS:  - Educate patient/family on patient safety including physical limitations  - Instruct patient to call for assistance with activity   - Consult OT/PT to assist with strengthening/mobility   - Keep Call bell within reach  - Keep bed low and locked with side rails adjusted as appropriate  - Keep care items and personal belongings within reach  - Initiate and maintain comfort rounds  - Make Fall Risk Sign visible to staff  - Apply yellow socks and bracelet for high fall risk patients  - Consider moving patient to room near nurses station  Outcome: Progressing     Problem: Knowledge Deficit  Goal: Patient/family/caregiver demonstrates understanding of disease process, treatment plan, medications, and discharge instructions  Description: Complete learning assessment and assess knowledge base    Interventions:  - Provide teaching at level of understanding  - Provide teaching via preferred learning methods  Outcome: Progressing

## 2022-03-02 NOTE — PHYSICAL THERAPY NOTE
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    Osteoarthritis     PONV (postoperative nausea and vomiting)     per pt after ankle surgery    RA (rheumatoid arthritis) ([address] )     sees Rheumatologist q 6mths Dr Nasra Junior Right knee pain     Wears dentures     full upper    Wears glasses     reading       Past Surgical History:   Procedure Laterality Date    ANKLE SURGERY      ORIF 5/16/plate and 4 screws    CYST REMOVAL      1969    ESOPHAGOGASTRODUODENOSCOPY      Diagnostic    FOOT SURGERY      plantar wart resection; resolved: 1969    [place] TOTAL KNEE ARTHROPLASTY Right 3/1/2022    Procedure: ARTHROPLASTY KNEE TOTAL;  Surgeon: Claudeen Blizzard, DO;  Location: [facility];  Service: Orthopedics    TONSILLECTOMY AND ADENOIDECTOMY      1962    TUBAL LIGATION      WISDOM TOOTH EXTRACTION         Imaging Studies:  No orders to display        03/02/22 0833   PT Last Visit   PT Visit Date 03/02/22   Note Type   Note type Evaluation   Pain Assessment   Pain Score 4   Pain Location/Orientation Orientation: Right;Location: Knee   Hospital Pain Intervention(s) Medication (See MAR); Cold applied;Repositioned; Ambulation/increased activity; Emotional support; Rest   Restrictions/Precautions   Weight Bearing Precautions Per Order Yes   RLE Weight Bearing Per Order WBAT   Other Precautions Multiple lines; Fall Risk;Pain   Home Living   Type of [address] [place] [address] One level;Stairs to enter with rails  (3STE)   Bathroom Shower/Tub Walk-in shower   Bathroom Toilet Standard Bathroom Equipment Grab bars in [address]   Prior Function   Level of [address] with ADLs and functional mobility  (w/ occasional use of SPC)   Lives With Spouse  (retired)   Janet in the last 6 months 1 to 4  (1x)   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[employer])   Comments (+)    General   Family/Caregiver Present Yes  ()   Cognition   Overall Cognitive Status WFL   Arousal/Participation Alert   Orientation Level Oriented X4   Following Commands Follows one step commands without difficulty   Comments Pt pleasant & cooperative   Subjective   Subjective Pt OOB in chair upon my arrival  Pt agreeable to PT/OT evals  RUE Assessment   RUE Assessment   (refer to OT)   LUE Assessment   LUE Assessment   (refer to OT)   RLE Assessment   RLE Assessment X  (3-/5 grossly)   LLE Assessment   LLE Assessment WFL  (4/5 grossly)   Coordination   Movements are Fluid and Coordinated 1   Sensation WFL   Bed Mobility   Supine to Sit Unable to assess   Sit to Supine Unable to assess   Additional Comments pt OOB in chair pre & post session   Transfers   Sit to Stand 5  Supervision   Additional items Armrests; Increased time required;Verbal cues   Stand to Sit 5  Supervision   Additional items Armrests; Increased time required;Verbal cues   Additional Comments cues for hand placement & RLE positioning   Ambulation/Elevation   Gait pattern Antalgic; Wide RAMBO; Decreased foot clearance;Decreased R stance; Short stride; Step to;Excessively slow   Gait Assistance 4  Minimal assist   Additional items Assist x 1;Verbal cues; Tactile cues   Assistive Device Rolling walker   Distance 20'x1   Balance   Static Sitting Fair +   Dynamic Sitting Fair   Static Standing Fair -   Dynamic Standing Poor +   Ambulatory Poor +   Endurance Deficit   Endurance Deficit Yes   Endurance Deficit Description pain   Activity Tolerance   Activity Tolerance Patient limited by pain;Treatment limited secondary to medical complications (Comment)   Medical Staff Made Aware ROGER Malickana Latif   Nurse Made Aware yes   Assessment   Prognosis Good   Problem List Decreased strength;Decreased range of motion;Decreased endurance; Impaired balance;Decreased mobility;Pain   Assessment Pt  75 y  o female s/p R TKR on 3/1/22 2* to severe Arthritis of right knee M17 11  Pt referred to PT for mobility assessment & D/C planning  Please see above for information re: home set-up & PLOF as well as objective findings during PT assessment  PTA, pt reports being I w/ occasional use of SPC  On eval, pt functioning below baseline hence will continue skilled PT to improve function & safety  Pt require S for transfers & minAx1 for amb w/ RW  Dec amb tolerance 2* to pain  The patient's AM-PAC Basic Mobility Inpatient Short Form Raw Score is 18  A Raw score of greater than 16 suggests the patient may benefit from discharge to home  Please also refer to the recommendation of the Physical Therapist for safe discharge planning  From PT standpoint, will anticipate good progress in PT for safe D/C to home  Will recommend OPPT, RW & family support at D/C  Pt may D/C home today after PT pm session, pending progress  (+) dizziness reported upon my arrival  /64  Nsg staff most recent vital signs as follows: /60 (BP Location: Left arm)   Pulse 64   Temp (!) 97 3 °F (36 3 °C) (Tympanic)   Resp 18   Ht 5' 4" (1 626 m)   Wt 52 kg (114 lb 10 2 oz)   SpO2 100%   Breastfeeding No   BMI 19 68 kg/m²   At end of session, pt remain OOB in chair in stable condition, call bell & phone in reach, all lines intact  Fall precautions reinforced w/ good understanding  CM to follow  Nsg staff to continue to mobilized pt (OOB in chair for all meals & ambulate in room/unit) as tolerated to prevent further decline in function  Nsg notified   Co-eval was necessary to complete this PT eval for the pt's best interest given pt's medical acuity & complexity  Goals   Patient Goals to go home today   STG Expiration Date 03/09/22   Short Term Goal #1 Goals to be met in 7 days; pt will be able to: 1) inc strength & balance by 1/2 grade to improve overall functional mobility & dec fall risk; 2) inc bed mobility to modified I for pt to be able to get in/OOB safely w/ proper techniques 100% of the time, to dec caregiver burden & safely function at home; 3) inc transfers to modified I for pt to transition safely from one surface to another w/o % of the time, to dec caregiver burden & safely function at home; 4) inc amb w/ RW approx  >80' w/ S for pt to ambulate household distances w/o any % of the time, to dec caregiver burden & safely function at home; 5) negotiate stairs w/ S for inc safety during stair mgt inside/outside of home & dec caregiver burden; 6) pt/caregiver ed   PT Treatment Day 0   Plan   Treatment/Interventions Functional transfer training;LE strengthening/ROM; Elevations; Therapeutic exercise; Endurance training;Patient/family training;Bed mobility;Gait training;Spoke to nursing;OT;Family;Spoke to case management   PT Frequency Twice a day   Recommendation   PT Discharge Recommendation Home with outpatient rehabilitation   Equipment Recommended [address] [facility] Recommended Wheeled walker   Additional Comments Pt may D/C home today after PT pm session, pending progress  AM-PAC Basic Mobility Inpatient   Turning in Bed Without Bedrails 3   Lying on Back to Sitting on Edge of Flat Bed 3   Moving Bed to Chair 3   Standing Up From Chair 3   Walk in Room 3   Climb 3-5 Stairs 3   Basic Mobility Inpatient Raw Score 18   Basic Mobility Standardized Score 41 05   Highest Level Of Mobility   JH-HLM Goal 6: Walk 10 steps or more   JH-HLM Highest Level of Mobility 6: Walk 10 steps or more   JH-HLM Goal Achieved Yes   End of Consult   Patient Position at End of Consult Bedside chair; All needs within reach   End of Consult Comments Pt in stable condition at end of session      Hx/personal factors: co-morbidities, inaccessible home, mutliple lines, use of AD, pain, h/o of falls, fall risk and assist w/ ADL's  Examination: dec mobility, dec balance, dec endurance, dec amb, risk for falls, pain  Clinical: unpredictable (ongoing medical status, abnormal lab values, risk for falls and pain mgt)  Complexity: high     Aneta Jobs, PT

## 2022-03-02 NOTE — ASSESSMENT & PLAN NOTE
Patient is postoperative 1   From right knee arthroplasty  PT OT consult  Pain controlled  Likely home today- DVT prophylaxis at discharge per primary service  Currently on Lovenox

## 2022-03-02 NOTE — OCCUPATIONAL THERAPY NOTE
Occupational Therapy Evaluation     Patient Name: Abran Kelly  KVLJC'J Date: 3/2/2022  Problem List  Principal Problem:    Arthritis of knee  Active Problems:    Anxiety disorder    Hyperlipidemia    Osteoporosis    Rheumatoid arthritis ([facility] [address] )    Abnormal ECG    Past Medical History  Past Medical History:   Diagnosis Date    Anemia     1995 - corrected with iron    Ankle fracture, right     last assessed: 3/9/15    Anxiety     Arthritis     1996    Chronic pain disorder     right side joint pain     Headache     Herpes zoster     2/2009    Moderate exercise     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    Osteoarthritis     PONV (postoperative nausea and vomiting)     per pt after ankle surgery    RA (rheumatoid [address]) ([facility] [address] )     sees Rheumatologist q 6mths Dr Grace Perez Right knee pain     Wears dentures     full upper    Wears glasses     reading     Past Surgical History  Past Surgical History:   Procedure Laterality Date    ANKLE SURGERY      ORIF 5/16/plate and 4 screws    CYST REMOVAL      1969    ESOPHAGOGASTRODUODENOSCOPY      Diagnostic    FOOT SURGERY      plantar wart resection; resolved: 1969    [place] TOTAL KNEE ARTHROPLASTY Right 3/1/2022    Procedure: ARTHROPLASTY KNEE TOTAL;  Surgeon: Pretei Stroud DO;  [facility]: [facility];  Service: Orthopedics   [address] EXTRACTION             03/02/22 0832   OT Last Visit   OT Visit Date 03/02/22   Note Type   Note type Evaluation   Restrictions/Precautions   Weight Bearing Precautions Per Order Yes   RLE Weight Bearing Per Order WBAT   Other Precautions Multiple lines; Fall Risk;Pain   Pain Assessment   Pain Assessment Tool 0-10   Pain Score 4   Pain Location/Orientation Orientation: Right;Location: Knee   Hospital Pain Intervention(s) Repositioned; Ambulation/increased activity; Emotional support; Rest   Multiple Pain Sites No   Home Living   Type of Home Mobile home Home Layout One level;Stairs to enter with rails  (3 BENJI)   Bathroom Shower/Tub Walk-in shower   Bathroom Toilet Standard   Bathroom Equipment Grab bars in [facility] [address]   Additional Comments Pt lives with spouse in a one level mobile home with 3 BENJI  Spouse is retired and able to assist as needed  Prior Function   Level of [place] Independent with ADLs and functional mobility   Lives With Spouse   Receives Help From Family   ADL Assistance Independent   IADLs   (Spouse performs)   Falls in the last 6 months 1 to 4  (1 per pt report)   Vocational Full time employment   Comments At baseline, pt was I w/ ADLs and functional transfers/mobility w/ use of SPC PRN 2* pain  Spouse primarily performs IADLs  (+)   (+) falls PTA  Lifestyle   Autonomy At baseline, pt was I w/ ADLs and functional transfers/mobility w/ use of SPC PRN 2* pain  Spouse primarily performs IADLs  (+)   (+) falls PTA  Reciprocal Relationships Spouse   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[address] [employer]   ADL   Where Assessed Chair   Eating Assistance 7  Independent   Grooming Assistance 7  [address] [facility] 5  Supervision/Setup   LB Bathing Assistance 4  Minimal Assistance   [address] [facility] 5  Supervision/Setup   LB Dressing Assistance 4  [address] [facility] 4  Minimal Assistance   Bed Mobility   Supine to Sit Unable to assess   Sit to Supine Unable to assess   Additional Comments BP seated in chair at start of session: 125/49  Pt seated OOB in chair at end of session  Call bell and phone within reach  All needs met and pt reports no further questions for OT at this time  Transfers   Sit to Stand 5  Supervision   Additional items Armrests; Increased time required;Verbal cues   Stand to Sit 5  Supervision   Additional items Armrests; Increased time required;Verbal cues   Additional Comments Cues for safe technique and placement of hands and R LE   Functional Mobility   Functional Mobility 4  Minimal assistance   Additional Comments Assist x1   Additional items Rolling walker   Balance   Static Sitting Fair +   Dynamic Sitting Fair   Static Standing Fair -   Dynamic Standing Poor +   Ambulatory Poor +   Activity Tolerance   Activity Tolerance Patient limited by pain;Treatment limited secondary to medical complications (Comment)   Medical Staff Made Polly Diaz, PT   Nurse Made Aware yes; Bienvenido Bradley RN   RUE Assessment   RUE Assessment WFL   RUE Strength   RUE Overall Strength Within Functional Limits - able to perform ADL tasks with strength  (4-/5 throughout)   LUE Assessment   LUE Assessment WFL   LUE Strength   LUE Overall Strength Within Functional Limits - able to perform ADL tasks with strength  (4-/5 throughout)   Hand Function   Gross Motor Coordination Functional   Fine Motor Coordination Impaired  (RA deformities B/L hands)   Sensation   Light Touch No apparent deficits   Proprioception   Proprioception No apparent deficits   Vision - Complex Assessment   Acuity Able to read clock/calendar on wall without difficulty; Able to read employee name badge without difficulty   Perception   Inattention/Neglect Appears intact   Cognition   Overall Cognitive Status [facility]   Arousal/Participation Alert; Cooperative   Attention Within functional limits   Orientation Level Oriented X4   Memory Within functional limits   Following Commands Follows one step commands without difficulty   Comments Pleasant and cooperative   Assessment   Limitation Decreased ADL status; Decreased endurance;Decreased high-level ADLs; Decreased self-care trans  (increased pain)   Prognosis Good   Assessment Pt is a 75 y o  female seen for OT evaluation s/p adm to [facility]/2022 s/p R total knee arthroplasty by Dr Earle Goss  WBAT R LE   Comorbidities affecting pts functional performance include a significant PMH of Anemia, Anxiety, Arthritis, OA, and RA  Pt with active OT orders and activity orders for Activity beginning POD #0  Pt lives with spouse in a one level mobile home with 3 BENJI  Spouse is retired and able to assist as needed  At baseline, pt was I w/ ADLs and functional transfers/mobility w/ use of SPC PRN 2* pain  Spouse primarily performs IADLs  (+)   (+) falls PTA  Upon evaluation, pt currently requires Supervision for UB ADLs, Min A for LB ADLs, Min A for toileting, Supervision for transfers, and Min A for functional mobility 2* the following deficits impacting occupational performance: decreased balance, decreased tolerance and increased pain  These impairments, as well at pts Fall risk, steps to enter environment, difficulty performing ADLS and difficulty performing IADLS limit pts ability to safely engage in all baseline areas of occupation  Based on the aforementioned OT evaluation, functional performance deficits, and assessments, pt has been identified as a Moderate complexity evaluation  Pt to continue to benefit from continued acute OT services during hospital stay to address defined deficits and to maximize level of functional independence in the following Occupational Performance areas: grooming, bathing/shower, toilet hygiene, dressing, medication management, health maintenance, functional mobility, community mobility, clothing management and cleaning  From OT standpoint, recommend Home w/ social support and continued PT upon D/C  OT will continue to follow pt 3-5x/wk to address the following goals to  w/in 7-10 days:   Goals   Patient Goals To go home today   LTG Time Frame 7-10   Long Term Goal Please refer to LTGs listed below   Plan   Treatment Interventions ADL retraining;Functional transfer training;UE strengthening/ROM; Patient/family training;Equipment evaluation/education; Compensatory technique education; Activityengagement   Goal Expiration Date 03/12/22   OT Treatment Day 0   OT Frequency 3-5x/wk   Recommendation   OT Discharge Recommendation No rehabilitation needs  (Home w/ social support and continued PT)   OT - OK to Discharge Yes  (when medically cleared)   Additional Comments  The patient's raw score on the AM-PAC Daily Activity inpatient short form is 21, standardized score is 44 27, greater than 39 4  Patients at this level are likely to benefit from discharge to home  Please refer to the recommendation of the Occupational Therapist for safe discharge planning  AM-PAC Daily Activity Inpatient   Lower Body Dressing 3   Bathing 3   Toileting 3   Upper Body Dressing 4   Grooming 4   Eating 4   Daily Activity Raw Score 21   Daily Activity Standardized Score (Calc for Raw Score >=11) 44 27   AM-PAC Applied Cognition Inpatient   Following a Speech/Presentation 4   Understanding Ordinary Conversation 4   Taking Medications 4   Remembering Where Things Are Placed or Put Away 4   Remembering List of 4-5 Errands 4   Taking Care of Complicated Tasks 4   Applied Cognition Raw Score 24   Applied Cognition Standardized Score 62 21       GOALS    1  Pt will improve activity tolerance to G for min 30 min txment sessions for increase engagement in functional tasks    2  Pt will complete bed mobility at a Mod I level w/ G balance/safety demonstrated to decrease caregiver assistance required     3  Pt will complete UB dressing/self care w/ mod I using adaptive device and DME as needed     4  Pt will complete LB dressing/self care w/ mod I using adaptive device and DME as needed    5  Pt will complete toileting w/ mod I w/ G hygiene/thoroughness using DME as needed    6  Pt will improve functional transfers to Mod I on/off all surfaces using DME as needed w/ G balance/safety     7  Pt will improve functional mobility during ADL/IADL/leisure tasks to Mod I using DME as needed w/ G balance/safety     8   Pt will be attentive 100% of the time during ongoing cognitive assessment w/ G participation to assist w/ safe d/c planning/recommendations    9  Pt will demonstrate G carryover of pt/caregiver education and training as appropriate w/o cues w/ good tolerance to increase safety during functional tasks    10   Pt will increase standing tolerance to 8-10 mins with Fair+ dynamic standing balance to increase safety during participation in ADLs       Sandrine Chandler, OTR/L

## 2022-03-02 NOTE — PLAN OF CARE
Problem: OCCUPATIONAL THERAPY ADULT  Goal: Performs self-care activities at highest level of function for planned discharge setting  See evaluation for individualized goals  Description: Treatment Interventions: ADL retraining,Functional transfer training,UE strengthening/ROM,Patient/family training,Equipment evaluation/education,Compensatory technique education,Activityengagement          See flowsheet documentation for full assessment, interventions and recommendations  Note: Limitation: Decreased ADL status,Decreased endurance,Decreased high-level ADLs,Decreased self-care trans (increased pain)  Prognosis: Good  Assessment: Pt is a 75 y o  female seen for OT evaluation s/p adm to [facility] on 3/1/2022 s/p R total knee arthroplasty by Dr Jw Colon  WBAT R LE  Comorbidities affecting pts functional performance include a significant PMH of Anemia, Anxiety, Arthritis, OA, and RA  Pt with active OT orders and activity orders for Activity beginning POD #0  Pt lives with spouse in a one level mobile home with 3 BENJI  Spouse is retired and able to assist as needed  At baseline, pt was I w/ ADLs and functional transfers/mobility w/ use of SPC PRN 2* pain  Spouse primarily performs IADLs  (+)   (+) falls PTA  Upon evaluation, pt currently requires Supervision for UB ADLs, Min A for LB ADLs, Min A for toileting, Supervision for transfers, and Min A for functional mobility 2* the following deficits impacting occupational performance: decreased balance, decreased tolerance and increased pain  These impairments, as well at pts Fall risk, steps to enter environment, difficulty performing ADLS and difficulty performing IADLS limit pts ability to safely engage in all baseline areas of occupation  Based on the aforementioned OT evaluation, functional performance deficits, and assessments, pt has been identified as a Moderate complexity evaluation   Pt to continue to benefit from continued acute OT services during hospital stay to address defined deficits and to maximize level of functional independence in the following Occupational Performance areas: grooming, bathing/shower, toilet hygiene, dressing, medication management, health maintenance, functional mobility, community mobility, clothing management and cleaning  From OT standpoint, recommend Home w/ social support and continued PT upon D/C   OT will continue to follow pt 3-5x/wk to address the following goals to  w/in 7-10 days:     OT Discharge Recommendation: No rehabilitation needs (Home w/ social support and continued PT)  OT - OK to Discharge: Yes (when medically cleared)

## 2022-03-02 NOTE — CONSULTS
[address] 1955, 75 y o  female MRN: [id number]  Unit/Bed#: E2 -01 Encounter: [id number]  Primary Care Provider: Mercy Benjamin MD   Date and time admitted to hospital: 3/1/2022  9:17 AM    Inpatient consult to Internal Medicine  Consult performed by: Nelson Brady DO  Consult ordered by: Parth Nash PA-C          * Arthritis of knee  Assessment & Plan  Patient is postoperative 1  From right knee arthroplasty  PT OT consult  Pain controlled  Likely home today- DVT prophylaxis at discharge per primary service  Currently on Lovenox    Rheumatoid arthritis [facility])  Assessment & Plan  Patient can resume her methotrexate as scheduled on Friday and Saturday  No contraindications to resumption  Continue folic acid as currently prescribed    Osteoporosis  Assessment & Plan  Patient with past medical history of osteoarthritis, with rheumatoid arthritis  She has history of osteoporosis for which he is on Boniva - arm resume as previously scheduled    Hyperlipidemia  Assessment & Plan  Not on medication prior to admission    Anxiety disorder  Assessment & Plan  Mood is stable, no homicidal or suicidal ideation        VTE Prophylaxis: Enoxaparin (Lovenox)  / sequential compression device     Recommendations for Discharge:  · Recommend DVT prophylaxis per primary service  · Outpatient echocardiogram- if remains in hospital can get here, no urgent indication    Counseling / Coordination of Care Time: 45 minutes  Greater than 50% of total time spent on patient counseling and coordination of care  Collaboration of Care: Were Recommendations Directly Discussed with Primary Treatment Team? - No     History of Present Illness: Abimael Judge is a 75 y o  female who is originally admitted to the orthopedic service due to failed conservative management of osteoarthritis of the right knee  She is postop day 1  From right total knee arthroscopy   We are consulted for medical management due to chronic comorbidities including rheumatoid arthritis  The patient is on methotrexate which he takes Friday and Saturday  She also has a past medical history of anxiety for which he is on Paxil  She denies any homicidal or suicidal ideation  She denies any cardiac history but does have a history of anterior lead have nonspecific ST depressions for which he is followed by [facility] impending echocardiogram   At the time my evaluation she denies any chest pain  She now has no syncope, she did report some dizziness this morning when getting up which resolved within a few seconds  No other medical issues include osteoarthritis  She denies any recent medication changes    Review of Systems:    Review of Systems   A complete and comprehensive 14 point organ system review has been performed and all other systems are negative other than stated above      Past Medical and Surgical History:     Past Medical History:   Diagnosis Date    Anemia     1995 - corrected with iron    Ankle fracture, right     last assessed: 3/9/15    Anxiety     Arthritis     1996    Chronic pain disorder     right side joint pain     Headache     Herpes zoster     2/2009    Moderate exercise     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    Osteoarthritis     PONV (postoperative nausea and vomiting)     per pt after ankle surgery    RA (rheumatoid arthritis) ([address] )     sees Rheumatologist q 6mths Dr Ruiz Montague Right knee pain     Wears dentures     full upper    Wears glasses     reading       Past Surgical History:   Procedure Laterality Date    ANKLE SURGERY      ORIF 5/16/plate and 4 screws    CYST REMOVAL      1969    ESOPHAGOGASTRODUODENOSCOPY      Diagnostic    FOOT SURGERY      plantar wart resection; resolved: 1969    GA TOTAL KNEE ARTHROPLASTY Right 3/1/2022    Procedure: ARTHROPLASTY KNEE TOTAL;  Surgeon: Fiordaliza Griggs DO;  Location: [facility];  Service: Orthopedics    TONSILLECTOMY AND ADENOIDECTOMY      1962    TUBAL LIGATION      WISDOM TOOTH EXTRACTION         Meds/Allergies:    PTA meds:   Prior to Admission Medications   Prescriptions Last Dose Informant Patient Reported? Taking? Ca Carbonate-Mag Hydroxide (ROLAIDS) 550-110 MG CHEW 3/1/2022 at Unknown time Self Yes Yes   Sig: Chew Twice daily   Multiple Vitamin (multivitamin) tablet 3/1/2022 at Unknown time Self No Yes   Sig: Take 1 tablet by mouth daily Start to take 30 days prior to surgery   PARoxetine (PAXIL) 10 mg tablet 3/1/2022 at Unknown time Self No Yes   Sig: Take 1 tablet (10 mg total) by mouth daily   ascorbic acid (VITAMIN C) 500 MG tablet 3/1/2022 at Unknown time Self No Yes   Sig: Take 1 tablet (500 mg total) by mouth daily Start to take 30 days prior to surgery   ferrous sulfate 324 (65 Fe) mg 3/1/2022 at Unknown time Self No Yes   Sig: Take 1 tablet (324 mg total) by mouth daily before breakfast Start to take 30 days prior to surgery   folic acid (FOLVITE) 1 mg tablet 3/1/2022 at Unknown time Self Yes Yes   Sig: Take by mouth   ibandronate (BONIVA) 150 MG tablet 3/1/2022 at Unknown time Self Yes Yes   Sig: Take 1 tablet by mouth as needed     methotrexate 2 5 mg tablet Past Month at Unknown time Self Yes Yes   Sig: Take by mouth 8 Pills weekly takes on Fri and Sat    naproxen (NAPROSYN) 500 mg tablet Past Month at Unknown time Self No Yes   Sig: Take 1 tablet up to twice daily as needed for moderate pain  predniSONE 5 mg tablet 3/1/2022 at Unknown time Self Yes Yes   Sig: Take 5 mg by mouth daily    solifenacin (VESICARE) 10 MG tablet 3/1/2022 at Unknown time Self No Yes   Sig: Take 1 tablet (10 mg total) by mouth daily      Facility-Administered Medications: None       Allergies:    Allergies   Allergen Reactions    Aspirin Other (See Comments)     Per pt avoids taking due to rheumatology Meds       Social History:     Marital Status: /Civil Union    Substance Use History:   Social History Substance and Sexual Activity   Alcohol Use Yes    Comment: rare, maybe on holidays     Social History     Tobacco Use   Smoking Status Former Smoker    Quit date:     Years since quittin 1   Smokeless Tobacco Never Used     Social History     Substance and Sexual Activity   Drug Use No       Family History:    Family History   Problem Relation Age of Onset    Seizures Mother         epilepsy    Bone cancer Father     No Known Problems Sister     No Known Problems Brother     No Known Problems Son     No Known Problems Maternal Grandmother     No Known Problems Maternal Grandfather     No Known Problems Paternal Grandmother     No Known Problems Paternal Grandfather     Thyroid disease Sister     No Known Problems Sister     No Known Problems Brother        Physical Exam:     Vitals:   Blood Pressure: 123/60 (22)  Pulse: 64 (22)  Temperature: (!) 97 3 °F (36 3 °C) (22)  Temp Source: Tympanic (22)  Respirations: 18 (22)  Height: 5' 4" (162 6 cm) (22)  Weight - Scale: 52 kg (114 lb 10 2 oz) (22 102)  SpO2: 100 % (22)    Physical Exam      General: well appearing, no acute distress  HEENT: atraumatic, PERRLA, moist mucosa, normal pharynx, normal tonsils and adenoids, normal tongue, no fluid in sinuses  Neck: Trachea midline, no carotid bruit, no masses  Respiratory: normal chest wall expansion, CTA B, no r/r/w, no rubs  Cardiovascular: RRR, no m/r/g, Normal S1 and S2  Abdomen: Soft, non-tender, non-distended, normal bowel sounds in all quadrants, no hepatosplenomegaly, no tympany  Rectal: deferred  Musculoskeletal:  Moves all extremities  Integumentary: warm, dry, and pink, with no rash, purpura, or petechia  Heme/Lymph: no lymphadenopathy, no bruises  Neurological: Cranial Nerves II-XII grossly  Psychiatric: cooperative with normal mood, affect, and cognition    Additional Data:     Lab Results: I have personally reviewed pertinent reports  Results from last 7 days   Lab Units 03/02/22  0433   WBC Thousand/uL 16 40*   HEMOGLOBIN g/dL 10 8*   HEMATOCRIT % 32 4*   PLATELETS Thousands/uL 228     Results from last 7 days   Lab Units 03/02/22  0433   SODIUM mmol/L 136   POTASSIUM mmol/L 4 4   CHLORIDE mmol/L 101   CO2 mmol/L 30   BUN mg/dL 11   CREATININE mg/dL 0 67   ANION GAP mmol/L 5   CALCIUM mg/dL 8 2*   GLUCOSE RANDOM mg/dL 112             Lab Results   Component Value Date/Time    HGBA1C 5 2 02/16/2022 04:05 PM    HGBA1C 5 2 02/16/2022 12:00 AM               Imaging: I have personally reviewed pertinent reports  No orders to display       EKG, Pathology, and Other Studies Reviewed on Admission:   · Reviewed previous imaging    ** Please Note: This note has been constructed using a voice recognition system   **

## 2022-03-02 NOTE — PLAN OF CARE
Problem: PHYSICAL THERAPY ADULT  Goal: Performs mobility at highest level of function for planned discharge setting  See evaluation for individualized goals  Description: Treatment/Interventions: Functional transfer training,LE strengthening/ROM,Elevations,Therapeutic exercise,Endurance training,Patient/family training,Bed mobility,Gait training,Spoke to nursing,OT,Family,Spoke to case management  Equipment Recommended: [place] Bridges       See flowsheet documentation for full assessment, interventions and recommendations  3/2/2022 1031 by Brenda Hernandez, PT  Note: Prognosis: Good  Problem List: Decreased strength,Decreased range of motion,Decreased endurance,Impaired balance,Decreased mobility,Pain  Assessment: Pt  75 y  o female s/p R TKR on 3/1/22 2* to severe Arthritis of right knee M17 11  Pt referred to PT for mobility assessment & D/C planning  Please see above for information re: home set-up & PLOF as well as objective findings during PT assessment  PTA, pt reports being I w/ occasional use of SPC  On eval, pt functioning below baseline hence will continue skilled PT to improve function & safety  Pt require S for transfers & minAx1 for amb w/ RW  Dec amb tolerance 2* to pain  The patient's AM-PAC Basic Mobility Inpatient Short Form Raw Score is 18  A Raw score of greater than 16 suggests the patient may benefit from discharge to home  Please also refer to the recommendation of the Physical Therapist for safe discharge planning  From PT standpoint, will anticipate good progress in PT for safe D/C to home  Will recommend OPPT, RW & family support at D/C  Pt may D/C home today after PT pm session, pending progress  (+) dizziness reported upon my arrival  /64  Nsg staff most recent vital signs as follows: /60 (BP Location: Left arm)   Pulse 64   Temp (!) 97 3 °F (36 3 °C) (Tympanic)   Resp 18   Ht 5' 4" (1 626 m)   Wt 52 kg (114 lb 10 2 oz)   SpO2 100%   Breastfeeding No   BMI 19 68 kg/m²    At end of session, pt remain OOB in chair in stable condition, call bell & phone in reach, all lines intact  Fall precautions reinforced w/ good understanding  CM to follow  Nsg staff to continue to mobilized pt (OOB in chair for all meals & ambulate in room/unit) as tolerated to prevent further decline in function  Nsg notified  Co-eval was necessary to complete this PT eval for the pt's best interest given pt's medical acuity & complexity  PT Discharge Recommendation: Home with outpatient rehabilitation          See flowsheet documentation for full assessment

## 2022-03-03 ENCOUNTER — APPOINTMENT (OUTPATIENT)
Dept: CT IMAGING | Facility: HOSPITAL | Age: 67
End: 2022-03-03
Payer: COMMERCIAL

## 2022-03-03 VITALS
TEMPERATURE: 97.5 F | HEART RATE: 64 BPM | RESPIRATION RATE: 18 BRPM | SYSTOLIC BLOOD PRESSURE: 143 MMHG | BODY MASS INDEX: 19.46 KG/M2 | DIASTOLIC BLOOD PRESSURE: 69 MMHG | WEIGHT: 114 LBS | OXYGEN SATURATION: 97 % | HEIGHT: 64 IN

## 2022-03-03 PROBLEM — R42 DIZZINESS: Status: ACTIVE | Noted: 2022-03-03

## 2022-03-03 LAB
ANION GAP SERPL CALCULATED.3IONS-SCNC: 4 MMOL/L (ref 4–13)
BUN SERPL-MCNC: 10 MG/DL (ref 5–25)
CALCIUM SERPL-MCNC: 8.6 MG/DL (ref 8.3–10.1)
CHLORIDE SERPL-SCNC: 105 MMOL/L (ref 100–108)
CO2 SERPL-SCNC: 32 MMOL/L (ref 21–32)
CREAT SERPL-MCNC: 0.77 MG/DL (ref 0.6–1.3)
DME PARACHUTE DELIVERY DATE ACTUAL: NORMAL
DME PARACHUTE DELIVERY DATE REQUESTED: NORMAL
DME PARACHUTE ITEM DESCRIPTION: NORMAL
DME PARACHUTE ORDER STATUS: NORMAL
DME PARACHUTE SUPPLIER NAME: NORMAL
DME PARACHUTE SUPPLIER PHONE: NORMAL
ERYTHROCYTE [DISTWIDTH] IN BLOOD BY AUTOMATED COUNT: 13.2 % (ref 11.6–15.1)
GFR SERPL CREATININE-BSD FRML MDRD: 79 ML/MIN/1.73SQ M
GLUCOSE SERPL-MCNC: 92 MG/DL (ref 65–140)
HCT VFR BLD AUTO: 31.4 % (ref 34.8–46.1)
HGB BLD-MCNC: 10.2 G/DL (ref 11.5–15.4)
MCH RBC QN AUTO: 31.5 PG (ref 26.8–34.3)
MCHC RBC AUTO-ENTMCNC: 32.5 G/DL (ref 31.4–37.4)
MCV RBC AUTO: 97 FL (ref 82–98)
PLATELET # BLD AUTO: 213 THOUSANDS/UL (ref 149–390)
PMV BLD AUTO: 9.9 FL (ref 8.9–12.7)
POTASSIUM SERPL-SCNC: 3.7 MMOL/L (ref 3.5–5.3)
RBC # BLD AUTO: 3.24 MILLION/UL (ref 3.81–5.12)
SODIUM SERPL-SCNC: 141 MMOL/L (ref 136–145)
WBC # BLD AUTO: 12.57 THOUSAND/UL (ref 4.31–10.16)

## 2022-03-03 PROCEDURE — 80048 BASIC METABOLIC PNL TOTAL CA: CPT | Performed by: PHYSICIAN ASSISTANT

## 2022-03-03 PROCEDURE — 97116 GAIT TRAINING THERAPY: CPT

## 2022-03-03 PROCEDURE — 70450 CT HEAD/BRAIN W/O DYE: CPT

## 2022-03-03 PROCEDURE — NC001 PR NO CHARGE: Performed by: PHYSICIAN ASSISTANT

## 2022-03-03 PROCEDURE — 85027 COMPLETE CBC AUTOMATED: CPT | Performed by: PHYSICIAN ASSISTANT

## 2022-03-03 PROCEDURE — 99225 PR SBSQ OBSERVATION CARE/DAY 25 MINUTES: CPT | Performed by: INTERNAL MEDICINE

## 2022-03-03 PROCEDURE — 97530 THERAPEUTIC ACTIVITIES: CPT

## 2022-03-03 PROCEDURE — 99024 POSTOP FOLLOW-UP VISIT: CPT | Performed by: PHYSICIAN ASSISTANT

## 2022-03-03 RX ORDER — ASPIRIN 325 MG
325 TABLET, DELAYED RELEASE (ENTERIC COATED) ORAL 2 TIMES DAILY
Qty: 84 TABLET | Refills: 0 | Status: SHIPPED | OUTPATIENT
Start: 2022-03-03 | End: 2022-06-30

## 2022-03-03 RX ORDER — PROMETHAZINE HYDROCHLORIDE 12.5 MG/1
12.5 TABLET ORAL EVERY 6 HOURS PRN
Qty: 4 TABLET | Refills: 0 | Status: SHIPPED | OUTPATIENT
Start: 2022-03-03 | End: 2022-06-30

## 2022-03-03 RX ORDER — MECLIZINE HYDROCHLORIDE 25 MG/1
25 TABLET ORAL EVERY 8 HOURS PRN
Qty: 30 TABLET | Refills: 0 | Status: SHIPPED | OUTPATIENT
Start: 2022-03-03 | End: 2022-06-30

## 2022-03-03 RX ORDER — DOCUSATE SODIUM 100 MG/1
100 CAPSULE, LIQUID FILLED ORAL 2 TIMES DAILY
Qty: 20 CAPSULE | Refills: 0 | Status: SHIPPED | OUTPATIENT
Start: 2022-03-03 | End: 2022-06-30

## 2022-03-03 RX ORDER — ACETAMINOPHEN 325 MG/1
975 TABLET ORAL EVERY 8 HOURS SCHEDULED
Refills: 0
Start: 2022-03-03

## 2022-03-03 RX ORDER — CEPHALEXIN 500 MG/1
500 CAPSULE ORAL EVERY 12 HOURS SCHEDULED
Qty: 20 CAPSULE | Refills: 0 | Status: SHIPPED | OUTPATIENT
Start: 2022-03-03 | End: 2022-03-13

## 2022-03-03 RX ORDER — GABAPENTIN 100 MG/1
100 CAPSULE ORAL EVERY 8 HOURS
Qty: 90 CAPSULE | Refills: 0 | Status: SHIPPED | OUTPATIENT
Start: 2022-03-03

## 2022-03-03 RX ORDER — OXYCODONE HYDROCHLORIDE 5 MG/1
5 TABLET ORAL EVERY 4 HOURS PRN
Qty: 30 TABLET | Refills: 0 | Status: SHIPPED | OUTPATIENT
Start: 2022-03-03 | End: 2022-05-16 | Stop reason: ALTCHOICE

## 2022-03-03 RX ADMIN — ENOXAPARIN SODIUM 40 MG: 40 INJECTION SUBCUTANEOUS at 02:30

## 2022-03-03 RX ADMIN — OXYBUTYNIN CHLORIDE 10 MG: 10 TABLET, EXTENDED RELEASE ORAL at 08:53

## 2022-03-03 RX ADMIN — ACETAMINOPHEN 975 MG: 325 TABLET, FILM COATED ORAL at 05:56

## 2022-03-03 RX ADMIN — PREDNISONE 5 MG: 5 TABLET ORAL at 08:53

## 2022-03-03 RX ADMIN — MECLIZINE 25 MG: 12.5 TABLET ORAL at 14:13

## 2022-03-03 RX ADMIN — ONDANSETRON 4 MG: 2 INJECTION INTRAMUSCULAR; INTRAVENOUS at 09:31

## 2022-03-03 RX ADMIN — OXYCODONE HYDROCHLORIDE 5 MG: 5 TABLET ORAL at 09:03

## 2022-03-03 RX ADMIN — GABAPENTIN 100 MG: 100 CAPSULE ORAL at 02:30

## 2022-03-03 RX ADMIN — GABAPENTIN 100 MG: 100 CAPSULE ORAL at 08:53

## 2022-03-03 RX ADMIN — ACETAMINOPHEN 975 MG: 325 TABLET, FILM COATED ORAL at 14:13

## 2022-03-03 RX ADMIN — IRON SUCROSE 300 MG: 20 INJECTION, SOLUTION INTRAVENOUS at 08:57

## 2022-03-03 RX ADMIN — PAROXETINE 10 MG: 20 TABLET, FILM COATED ORAL at 08:53

## 2022-03-03 RX ADMIN — FOLIC ACID 1 MG: 1 TABLET ORAL at 08:53

## 2022-03-03 RX ADMIN — MECLIZINE 25 MG: 12.5 TABLET ORAL at 05:56

## 2022-03-03 RX ADMIN — DOCUSATE SODIUM 100 MG: 100 CAPSULE ORAL at 08:53

## 2022-03-03 RX ADMIN — OXYCODONE HYDROCHLORIDE AND ACETAMINOPHEN 500 MG: 500 TABLET ORAL at 08:53

## 2022-03-03 NOTE — DISCHARGE SUMMARY
Admission Diagnosis: Primary osteoarthritis of the right knee  D/C Diagnosis:  Primary osteoarthritis of the right knee  Procedure: Right total knee arthroplasty   Date of sugery: 3/1/2022  Admission Dates: 3/1/2022- 3/3/2022      Fabiana Michael presented to the hospital on 3/1/2022 with Right knee arthritis for a total knee arthroplasty  She underwent the procedure that same day and the details of the procedure can be found in the operative note  Post-operatively she was placed on a pain and bowel regimen  She was made WBAT of the Right lower extremity and consults were placed to physical therapy and occupational therapy as well as internal medicine who all saw her throughout her admission  She was started on Lovenox and sequential compression as well as SUNG stockings were used for DVT prophylaxis  She received 24 hours of antibiotic prophylaxis  On POD 1 labs were checked  Her dressing was found to be clean, dry, and intact  She worked with physical therapy  She experienced some lightheadedness with PT  On POD 2 labs were checked  Her incision was found to be clean, dry, and intact with steri strips  She continued to work with physical therapy and make progress  Later that day she was found to be safe and stable for discharge to Home  Discharge instructions were provided as well as needed prescriptions        Results from last 7 days   Lab Units 03/03/22  0450 03/02/22  0433   WBC Thousand/uL 12 57* 16 40*   HEMOGLOBIN g/dL 10 2* 10 8*   HEMATOCRIT % 31 4* 32 4*   PLATELETS Thousands/uL 213 228         Results from last 7 days   Lab Units 03/03/22  0450 03/02/22  0433   POTASSIUM mmol/L 3 7 4 4   CHLORIDE mmol/L 105 101   CO2 mmol/L 32 30   BUN mg/dL 10 11   CREATININE mg/dL 0 77 0 67   CALCIUM mg/dL 8 6 8 2*

## 2022-03-03 NOTE — PLAN OF CARE
Problem: MOBILITY - ADULT  Goal: Maintain or return to baseline ADL function  Description: INTERVENTIONS:  -  Assess patient's ability to carry out ADLs; assess patient's baseline for ADL function and identify physical deficits which impact ability to perform ADLs (bathing, care of mouth/teeth, toileting, grooming, dressing, etc )  - Assess/evaluate cause of self-care deficits   - Assess range of motion  - Assess patient's mobility; develop plan if impaired  - Assess patient's need for assistive devices and provide as appropriate  - Encourage maximum independence but intervene and supervise when necessary  - Involve family in performance of ADLs  - Assess for home care needs following discharge   - Consider OT consult to assist with ADL evaluation and planning for discharge  - Provide patient education as appropriate  Outcome: Progressing  Goal: Maintains/Returns to pre admission functional level  Description: INTERVENTIONS:  - Perform BMAT or MOVE assessment daily    - Set and communicate daily mobility goal to care team and patient/family/caregiver     - Collaborate with rehabilitation services on mobility goals if consulted  - Out of bed for toileting  - Record patient progress and toleration of activity level   Outcome: Progressing     Problem: INFECTION - ADULT  Goal: Absence or prevention of progression during hospitalization  Description: INTERVENTIONS:  - Assess and monitor for signs and symptoms of infection  - Monitor lab/diagnostic results  - Monitor all insertion sites, i e  indwelling lines, tubes, and drains  - Monitor endotracheal if appropriate and nasal secretions for changes in amount and color  - [place] appropriate cooling/warming therapies per order  - Administer medications as ordered  - Instruct and encourage patient and family to use good hand hygiene technique  - Identify and instruct in appropriate isolation precautions for identified infection/condition  Outcome: Progressing  Goal: Absence of fever/infection during neutropenic period  Description: INTERVENTIONS:  - Monitor WBC    Outcome: Progressing     Problem: PAIN - ADULT  Goal: Verbalizes/displays adequate comfort level or baseline comfort level  Description: Interventions:  - Encourage patient to monitor pain and request assistance  - Assess pain using appropriate pain scale  - Administer analgesics based on type and severity of pain and evaluate response  - Implement non-pharmacological measures as appropriate and evaluate response  - Consider cultural and social influences on pain and pain management  - Notify physician/advanced practitioner if interventions unsuccessful or patient reports new pain  Outcome: Progressing     Problem: DISCHARGE PLANNING  Goal: Discharge to home or other facility with appropriate resources  Description: INTERVENTIONS:  - Identify barriers to discharge w/patient and caregiver  - Arrange for needed discharge resources and transportation as appropriate  - Identify discharge learning needs (meds, wound care, etc )  - Arrange for interpretive services to assist at discharge as needed  - Refer to Case Management Department for coordinating discharge planning if the patient needs post-hospital services based on physician/advanced practitioner order or complex needs related to functional status, cognitive ability, or social support system  Outcome: Progressing     Problem: Knowledge Deficit  Goal: Patient/family/caregiver demonstrates understanding of disease process, treatment plan, medications, and discharge instructions  Description: Complete learning assessment and assess knowledge base    Interventions:  - Provide teaching at level of understanding  - Provide teaching via preferred learning methods  Outcome: Progressing

## 2022-03-03 NOTE — ASSESSMENT & PLAN NOTE
Status post right total knee arthroplasty  PT/OT, pain control, DVT prophylaxis and disposition per primary team

## 2022-03-03 NOTE — PROGRESS NOTES
Progress Note - Orthopedics   Jose Prery 75 y o  female MRN: [id number]  Unit/Bed#: E2 -01 Encounter: [id number]    Assessment:  75 y o  female s/p right total knee arthroplasty POD 2    Plan:  · Elevated BP pre op- work up per [facility]  Appreciate recommendations  Discussed with patient that her BP has improved since pre op  · Dressing change completed at bedside  Incision healing well  Continue daily dry dressing changes  · Pain control prn  · PT/OT- WBAT right LE   · Lovenox/TEDs/SCDs for DVT prophylaxis  Patient will be on ASA x 6 weeks  · D/c planning- likely dc home today once cleared by PT and SLIM  Subjective: Pt S&E   at bedside  Patient states she is doing well  She was able to sleep over night  Her pain is controlled with her current regimen  She is hoping everything is ok with her heart since her BP was high  She is passing gas  No other complaints  Denies fever, chills, abd pain, distal numbness, or tingling  Vitals: Blood pressure 143/69, pulse 64, temperature 97 5 °F (36 4 °C), temperature source Tympanic, resp  rate 18, height 5' 4" (1 626 m), weight 51 7 kg (114 lb), SpO2 97 %, not currently breastfeeding  ,Body mass index is 19 57 kg/m²      No intake or output data in the 24 hours ending 03/03/22 1328    Invasive Devices  Report    None                 Ortho Exam: rightLE:  incision:  C/D/I with steri strips, sensation grossly intact L4, L5, S1, palpable pedal pulse, EHL/AT/GS intact    Lab, Imaging and other studies:   CBC:   Lab Results   Component Value Date    WBC 12 57 (H) 03/03/2022    HGB 10 2 (L) 03/03/2022    HCT 31 4 (L) 03/03/2022    MCV 97 03/03/2022     03/03/2022    MCH 31 5 03/03/2022    MCHC 32 5 03/03/2022    RDW 13 2 03/03/2022    MPV 9 9 03/03/2022     CMP:   Lab Results   Component Value Date    SODIUM 141 03/03/2022     03/03/2022    CO2 32 03/03/2022    BUN 10 03/03/2022    CREATININE 0 77 03/03/2022    CALCIUM 8 6 03/03/2022    EGFR 79 03/03/2022

## 2022-03-03 NOTE — PROGRESS NOTES
[facility]  Progress Note - Fabiana Michael 1955, 75 y o  female MRN: [id number]  Unit/Bed#: E2 -01 Encounter: [id number]  Primary Care Provider: Anniece Skiff, MD   Date and time admitted to hospital: 3/1/2022  9:17 AM    Dizziness  Assessment & Plan  Likely peripheral in nature related to BPPV, uncontrolled hypertension  Patient did note improvement with meclizine  She is ambulating well without difficulty, she is nonfocal on exam  CT head normal  Blood pressure has been elevated during hospital stay in setting of pain and increased during physical therapy but improved at rest  Recommended initiating low-dose amlodipine  Patient wishes to follow-up with PCP before starting any new medication  Recommended checking blood pressure twice a day and recording values     Discharged on meclizine 25 mg p r n  dizziness  Outpatient follow-up with PCP    Abnormal ECG  Assessment & Plan  Sinus bradycardia with nonspecific ST/T-wave abnormalities  Echo unremarkable    Rheumatoid arthritis [facility])  Assessment & Plan  Patient can resume her methotrexate as scheduled on Friday and Saturday  No contraindications to resumption  Continue folic acid as currently prescribed    Hyperlipidemia  Assessment & Plan  Not on medication prior to admission  Patient can follow-up with PCP    Anxiety disorder  Assessment & Plan  Mood is stable, no homicidal or suicidal ideation    * Arthritis of knee  Assessment & Plan  Status post right total knee arthroplasty  PT/OT, pain control, DVT prophylaxis and disposition per primary team      Okay for discharge from Internal Medicine perspective    VTE Pharmacologic Prophylaxis:  Lovenox    Patient Centered Rounds:  Patient care rounds were performed with nursing    Discussions with Specialists or Other Care Team Provider:  Orthopedics    Education and Discussions with Family / Patient:  Family at the bedside    Time Spent for Care: 30  More than 50% of total time spent on counseling and coordination of care as described above  Current Length of Stay: 0 day(s)    Current Patient Status: Outpatient Surgery   Certification Statement: The patient will continue to require additional inpatient hospital stay due to postoperative care    Discharge Plan:  Per primary team    Code Status: Level 1 - Full Code      Subjective:   Patient seen evaluated at bedside prior to and after working with physical therapy  She does note some dizziness when working with physical therapy which resolved at rest   No further events today  We discussed findings of imaging which were normal   We discussed plan to initiate antihypertensive but patient wishes to follow-up with PCP who she has a good relationship with  Objective:     Vitals:   Temp (24hrs), Av 5 °F (36 9 °C), Min:97 5 °F (36 4 °C), Max:98 9 °F (37 2 °C)    Temp:  [97 5 °F (36 4 °C)-98 9 °F (37 2 °C)] 97 5 °F (36 4 °C)  HR:  [59-67] 64  Resp:  [14-18] 18  BP: (132-156)/(60-72) 143/69  SpO2:  [92 %-97 %] 97 %  Body mass index is 19 57 kg/m²  Input and Output Summary (last 24 hours):     No intake or output data in the 24 hours ending 22 1352    Physical Exam:     Physical Exam  Vitals reviewed  Constitutional:       General: She is not in acute distress  Appearance: She is well-developed  She is not ill-appearing, toxic-appearing or diaphoretic  HENT:      Head: Normocephalic and atraumatic  Mouth/Throat:      Mouth: Mucous membranes are moist       Pharynx: No oropharyngeal exudate  Eyes:      General: No scleral icterus  Extraocular Movements: Extraocular movements intact  Conjunctiva/sclera: Conjunctivae normal    Cardiovascular:      Rate and Rhythm: Normal rate and regular rhythm  Heart sounds: Normal heart sounds  Pulmonary:      Effort: Pulmonary effort is normal  No respiratory distress  Breath sounds: Normal breath sounds  No wheezing or rales     Abdominal:      General: There is no distension  Palpations: Abdomen is soft  Tenderness: There is no abdominal tenderness  There is no guarding or rebound  Musculoskeletal:         General: No swelling, tenderness or deformity  Skin:     General: Skin is warm and dry  Neurological:      General: No focal deficit present  Mental Status: She is alert  Mental status is at baseline  Psychiatric:         Mood and Affect: Mood normal          Behavior: Behavior normal          Thought Content: Thought content normal          Judgment: Judgment normal          Additional Data:     Labs:  I have reviewed pertinent results     Results from last 7 days   Lab Units 03/03/22  0450   WBC Thousand/uL 12 57*   HEMOGLOBIN g/dL 10 2*   HEMATOCRIT % 31 4*   PLATELETS Thousands/uL 213     Results from last 7 days   Lab Units 03/03/22  0450   SODIUM mmol/L 141   POTASSIUM mmol/L 3 7   CHLORIDE mmol/L 105   CO2 mmol/L 32   BUN mg/dL 10   CREATININE mg/dL 0 77   ANION GAP mmol/L 4   CALCIUM mg/dL 8 6   GLUCOSE RANDOM mg/dL 92                         Imaging: I have reviewed pertinent imaging       Recent Cultures (last 7 days):           Last 24 Hours Medication List:   Current Facility-Administered Medications   Medication Dose Route Frequency Provider Last Rate    acetaminophen  975 mg Oral Q8H [address] Shira Hernandez PA-C      aluminum-magnesium hydroxide-simethicone  5 mL Oral Q4H PRN Hanrea Romero PA-C      ascorbic acid  500 mg Oral Daily Rossana Hernandez PA-C      calcium carbonate  1,000 mg Oral Daily PRN Rossana Vega PA-C      docusate sodium  100 mg Oral BID Rossana Hernandez PA-C      enoxaparin  40 mg Subcutaneous Daily Susi Howard      folic acid  1 mg Oral Daily Rossana Hernandez PA-C      gabapentin  100 mg Oral [address] [place], [place]      HYDROmorphone  0 2 mg Intravenous Q4H PRN Rossana Vega PA-C      lactated ringers  100 mL/hr Intravenous Continuous Rossana Hernandez PA-C Stopped (03/02/22 2251)    meclizine 25 mg Oral Q8H [address] Nathan Jones DO      ondansetron  4 mg Intravenous Q6H PRN Estephania Ceja PA-C      oxybutynin  10 mg Oral Daily [place], [place]      oxyCODONE  2 5 mg Oral Q4H PRN [facility] VIRGIE Hernnadez      oxyCODONE  5 mg Oral Q4H PRN [facility]      PARoxetine  10 mg Oral Daily [place], [place]      polyethylene glycol  17 g Oral Daily PRN Estephania Ceja PA-C      predniSONE  5 mg Oral Daily [place], [place]      promethazine  12 5 mg Intramuscular Q6H PRN Estephania Ceja PA-C      senna  1 tablet Oral HS Estephania Ceja PA-C          Today, Patient Was Seen By: Sharyle Denise, DO    ** Please Note: Dictation voice to text software may have been used in the creation of this document   **

## 2022-03-03 NOTE — PLAN OF CARE
Problem: PHYSICAL THERAPY ADULT  Goal: Performs mobility at highest level of function for planned discharge setting  See evaluation for individualized goals  Description: Treatment/Interventions: Functional transfer training,LE strengthening/ROM,Elevations,Therapeutic exercise,Endurance training,Patient/family training,Bed mobility,Gait training,Spoke to nursing,OT,Family,Spoke to case management  Equipment Recommended: Jesús Platt       See flowsheet documentation for full assessment, interventions and recommendations  Outcome: Progressing  Note: Prognosis: Good  Problem List: Decreased range of motion,Decreased strength,Decreased endurance,Impaired balance,Decreased mobility,Decreased cognition,Impaired judgement,Pain,Orthopedic restrictions  Assessment: Pt  able to moi  all activities however patient reported dizziness and nausea with ambulation and stair negotiation  Repeated max cues given for LE sequencing to both patient and  for stair negotiation however patient ascended the step with her surgical RLE the first time  However all futher steps negtoiated with correct LE sequencing  Pt  reported increased dizziness and nausea  BP read in sitting on /104 mmHg and 75 bpm  Pt  was wheeled back to the room and BP checked again after she rested sitting 137/70 mmHg and 64 bpm  Pt  reported she gets very anxious about everything  Pt  reported she only has L handrail and a wall on the R side for her stairs at home  Pt  is improving well with her mobility however increased BP is a concern as patient reported normally her BP is low  and never had any BP issues in the past  Will continue to follow during the stay to maximize functional mobility  pt  reported her OPPT is set for friday 03/04/22  Barriers to Discharge: None        PT Discharge Recommendation: Home with outpatient rehabilitation          See flowsheet documentation for full assessment

## 2022-03-03 NOTE — PHYSICAL THERAPY NOTE
Physical Therapy Treatment Note     03/03/22 0946   PT Last Visit   PT Visit Date 03/03/22   Note Type   Note Type Treatment   Pain Assessment   Pain Assessment Tool 0-10   Pain Score 3   Pain Location/Orientation Orientation: Right;Location: Knee   Restrictions/Precautions   Weight Bearing Precautions Per Order Yes   RLE Weight Bearing Per Order WBAT   Other Precautions WBS; Fall Risk;Pain  (Increased BP with mobility)   General   Chart Reviewed Yes   Family/Caregiver Present Yes   Cognition   Overall Cognitive Status WFL   Subjective   Subjective Pt  agreeable to PT  Pt  in bed supine upon entry  Bed Mobility   Supine to Sit 5  Supervision   Additional items Increased time required; Bedrails;HOB elevated;Assist x 1   Transfers   Sit to Stand 5  Supervision   Additional items Armrests; Increased time required;Assist x 1   Stand to Sit 5  Supervision   Additional items Assist x 1; Increased time required;Armrests   Stand pivot 5  Supervision   Additional items Assist x 1; Increased time required  (with RW)   Ambulation/Elevation   Gait pattern Antalgic; Improper Weight shift;Decreased foot clearance;Decreased R stance; Inconsistent carlos enrique; Foward flexed; Excessively slow;Decreased heel strike   Gait Assistance 5  Supervision   Additional items Assist x 1;Verbal cues   Assistive Device Rolling walker   Distance 80ft   Stair Management Assistance 4  Minimal assist   Additional items Assist x 1;Verbal cues; Increased time required   Stair Management Technique One rail L;Step to pattern; Foreward; With cane;Nonreciprocal   Number of Stairs 7   Ambulation/Elevation Additional Comments repepated max cues for LE sequencing   Balance   Static Sitting Good   Dynamic Sitting Fair +   Static Standing Fair   Dynamic Standing Fair -   Endurance Deficit   Endurance Deficit Yes   Endurance Deficit Description Lightheaded/dizzy, nausea   Activity Tolerance   Activity Tolerance Patient tolerated treatment well; Other (Comment)  (limited due to dizziness and nausea)   Nurse Made Aware Yes   Assessment   Prognosis Good   Problem List Decreased range of motion;Decreased strength;Decreased endurance; Impaired balance;Decreased mobility; Decreased cognition; Impaired judgement;Pain;Orthopedic restrictions   Assessment Pt  able to moi  all activities however patient reported dizziness and nausea with ambulation and stair negotiation  Repeated max cues given for LE sequencing to both patient and  for stair negotiation however patient ascended the step with her surgical RLE the first time  However all futher steps negtoiated with correct LE sequencing  Pt  reported increased dizziness and nausea  BP read in sitting on /104 mmHg and 75 bpm  Pt  was wheeled back to the room and BP checked again after she rested sitting 137/70 mmHg and 64 bpm  Pt  reported she gets very anxious about everything  Pt  reported she only has L handrail and a wall on the R side for her stairs at home  Pt  is improving well with her mobility however increased BP is a concern as patient reported normally her BP is low  and never had any BP issues in the past  Will continue to follow during the stay to maximize functional mobility  pt  reported her OPPT is set for friday 03/04/22  Barriers to Discharge None   Goals   Patient Goals Go home   STG Expiration Date 03/09/22   PT Treatment Day 2   Plan   Treatment/Interventions Functional transfer training;Elevations;Gait training;Bed mobility; Equipment eval/education;Patient/family training;Spoke to nursing;Spoke to advanced practitioner   Progress Progressing toward goals   PT Frequency Twice a day   Recommendation   PT Discharge Recommendation Home with outpatient rehabilitation   Equipment Recommended Walker   AM-PAC Basic Mobility Inpatient   Turning in Bed Without Bedrails 4   Lying on Back to Sitting on Edge of Flat Bed 4   Moving Bed to Chair 4   Standing Up From Chair 4   Walk in Room 3   Climb 3-5 Stairs 3   Basic Mobility Inpatient Raw Score 22   Basic Mobility Standardized Score 47 4   Highest Level Of Mobility   JH-HLM Goal 7: Walk 25 feet or more   JH-HLM Highest Level of Mobility 8: Walk 250 feet ot more   JH-HLM Goal Achieved Yes   End of Consult   Patient Position at End of Consult All needs within reach; Seated edge of bed         [place] PTA    An AM-PAC basic mobility standardized score less than 42 9 suggest the patient may benefit from discharge to post-acute rehab services

## 2022-03-03 NOTE — ASSESSMENT & PLAN NOTE
Likely peripheral in nature related to BPPV, uncontrolled hypertension  Patient did note improvement with meclizine  She is ambulating well without difficulty, she is nonfocal on exam  CT head normal  Blood pressure has been elevated during hospital stay in setting of pain and increased during physical therapy but improved at rest  Recommended initiating low-dose amlodipine  Patient wishes to follow-up with PCP before starting any new medication  Recommended checking blood pressure twice a day and recording values     Discharged on meclizine 25 mg p r n  dizziness  Outpatient follow-up with PCP

## 2022-03-03 NOTE — DISCHARGE INSTRUCTIONS
TOTAL KNEE REPLACEMENT DISCHARGE INSTRUCTIONS    Surgical Dressing:  Change your dressing daily until drainage stops  Do not put any lotions or creams on your incision  If there are any signs of infection such as drainage persisting beyond 7 days, unusual looking drainage (yellow, green), increased redness around the incision, or fever/chills let your doctor know  Do not get your incision wet  Medications:  Upon discharge you will be given a prescription for an anticoagulant (i e  Ecotrin (Aspirin), Coumadin (Warfarin), Lovenox (Enoxaparin))  Take as prescribed  Do not take any over the counter NSAIDs (i e  Ibuprofen, Motrin, Advil, Naprosyn, Aleve) while on your anticoagulation medication unless otherwise specified by your surgeon  You will also be given Gabapentin if appropriate and a narcotic pain reliever for pain  Please be mindful of the number of pills you have left  You can only get a refill Monday-Friday during business hours from your surgeon or the physician assistant  You will not be given any refills on nights and weekends  Call ahead if needed  Please include tylenol in your pain regimen as well  You will also be given Colace to prevent constipation  Please include other over- the- counter medications for constipation if needed  If you are on Coumadin (Warfarin) you will need your blood drawn every Monday and Thursday once you are home  Initially your visiting nurse will draw your blood  Later you will go to an outpatient lab  You will receive a phone call the next day and your Coumadin (warfarin) will be dosed based on these results  Take this dosage daily until you hear from us next  Walking:  Use two crutches or a walker for EVERY step  Gradually increase your walking daily  You can progress to a cane as tolerated once advised by your physical therapist   Be sure to work on advancing your range of motion daily        Bathing:  Do not get your incision wet until follow up in the office  No tub baths  Do not submerge your incision  You may shower but you must cover your incision so it does NOT get wet  Gauze and Tegaderm dressing can be used to cover your incision  These can be found at the drug store  Use your crutches/walker to get in and out of the shower  Use a chair if needed  Sexual Relations:  Resume according to your comfort  Swimming:  No swimming or hot tubs until approved by your physician  Swimming will be allowed once your incision is well healed  Driving: You may ride as a passenger now  No driving until your follow-up appointment  To get into the car use the front passenger seat with the seat pushed back as far as possible  Scoot yourself back in the seat  Use your hands to assist your legs into the car  Physical therapy:  Continue with physical therapy  If you are doing home physical therapy please contact the office for a prescription for outpatient physical therapy when you are ready or at your first postoperative appointment  Special considerations: To minimize swelling, stiffness, and decrease pain use cold as needed, but not heat  Ice 20 minutes at a time with a cloth between your skin and the ice  Ice after walking, when you have pain, or after you have completed your exercises  Limit your sitting to 60 minutes at one time  Continue to wear your SUNG stockings 2 weeks after surgery  You can remove at night them to wash and reapply in the morning after sponge bath or shower (do not get your incision wet)  You can wear them as needed after that  If the stockings are too tight at the top you can cut the elastic  Follow up:  Call [phone] to make an appointment to see your surgeon within 2 weeks of your surgery if you havent done so already  If you have any questions during business hours please call the direct office phone number [phone]  Otherwise please call [phone] for any concerns        For the future:  Prior to any dental work, including routine cleanings, call the office for a prescription for antibiotics to be taken prior to your dental appointment  For any invasive procedures (i e  endoscopy, colonoscopy, etc ) inform the doctor performing the procedure that you have a total knee replacement and will antibiotics just prior to the procedure to protect it  Internal medicine instructions    Please check her blood pressure twice a day and bring recorded values to your PCP visit  Recommend that you see your PCP within 1 week to discuss blood pressure management  If your systolic blood pressure is consistently greater than 042 or diastolic blood pressure consistently greater than 100, please call your PCP ASAP for advice on management  You may take meclizine p r n  Episodes of dizziness    Hypertension   WHAT YOU NEED TO KNOW:   Hypertension is high blood pressure  Your blood pressure is the force of your blood moving against the walls of your arteries  Hypertension causes your blood pressure to get so high that your heart has to work much harder than normal  This can damage your heart  The cause of hypertension may not be known  This is called essential or primary hypertension  Hypertension caused by another medical condition, such as kidney disease, is called secondary hypertension  DISCHARGE INSTRUCTIONS:   Call your local emergency number (911 in the [address] [place] [address],[address]) or have someone call if:   · You have chest pain  · You have any of the following signs of a heart attack:      ? Squeezing, pressure, or pain in your chest    ? You may  also have any of the following:     § Discomfort or pain in your back, neck, jaw, stomach, or arm    § Shortness of breath    § Nausea or vomiting    § Lightheadedness or a sudden cold sweat    · You become confused or have trouble speaking  · You suddenly feel lightheaded or have trouble breathing      Return to the emergency department if:   · You have a severe headache or vision loss  · You have weakness in an arm or leg  Call your doctor or cardiologist if:   · You feel faint, dizzy, confused, or drowsy  · You have been taking your blood pressure medicine but your pressure is higher than your provider says it should be  · You have questions or concerns about your condition or care  Medicines: You may  need any of the following:  · Antihypertensives  may be used to help lower your blood pressure  Several kinds of medicines are available  Your healthcare provider will choose medicines based on the kind of hypertension you have  You may need more than one type of medicine  Take the medicine exactly as directed  · Diuretics  help decrease extra fluid that collects in your body  This will help lower your blood pressure  You may urinate more often while you take this medicine  · Cholesterol medicine  helps lower your cholesterol level  A low cholesterol level helps prevent heart disease and makes it easier to control your blood pressure  · Take your medicine as directed  Contact your healthcare provider if you think your medicine is not helping or if you have side effects  Tell him or her if you are allergic to any medicine  Keep a list of the medicines, vitamins, and herbs you take  Include the amounts, and when and why you take them  Bring the list or the pill bottles to follow-up visits  Carry your medicine list with you in case of an emergency  Follow up with your doctor or cardiologist as directed: You will need to return to have your blood pressure checked and to have other lab tests done  Write down your questions so you remember to ask them during your visits  Stages of hypertension:       · Normal blood pressure is 119/79 or lower   Your healthcare provider may only check your blood pressure each year if it stays at a normal level  · Elevated blood pressure is 120/79 to 129/79   This is sometimes called prehypertension   Your healthcare provider may suggest lifestyle changes to help lower your blood pressure to a normal level  He or she may then check it again in 3 to 6 months  · Stage 1 hypertension is 130/80  to 139/89   Your provider may recommend lifestyle changes, medication, and checks every 3 to 6 months until your blood pressure is controlled  · Stage 2 hypertension is 140/90 or higher   Your provider will recommend lifestyle changes and have you take 2 kinds of hypertension medicines  You will also need to have your blood pressure checked monthly until it is controlled  Manage hypertension:   · Check your blood pressure at home  Avoid smoking, caffeine, and exercise at least 30 minutes before checking your blood pressure  Sit and rest for 5 minutes before you take your blood pressure  Extend your arm and support it on a flat surface  Your arm should be at the same level as your heart  Follow the directions that came with your blood pressure monitor  Check your blood pressure 2 times, 1 minute apart, before you take your medicine in the morning  Also check your blood pressure before your evening meal  Keep a record of your readings and bring it to your follow-up visits  Ask your healthcare provider what your blood pressure should be  · Manage any other health conditions you have  Health conditions such as diabetes can increase your risk for hypertension  Follow your healthcare provider's instructions and take all your medicines as directed  · Ask about all medicines  Certain medicines can increase your blood pressure  Examples include oral birth control pills, decongestants, herbal supplements, and NSAIDs, such as ibuprofen  Your healthcare provider can tell you which medicines are safe for you to take  This includes prescription and over-the-counter medicines  Lifestyle changes you can make to manage hypertension:  Your healthcare provider may recommend you work with a team to manage hypertension   The team may include medical experts such as a dietitian, an exercise or physical therapist, and a behavior therapist  Your family members may be included in helping you create lifestyle changes  · Limit sodium (salt) as directed  Too much sodium can affect your fluid balance  Check labels to find low-sodium or no-salt-added foods  Some low-sodium foods use potassium salts for flavor  Too much potassium can also cause health problems  Your healthcare provider will tell you how much sodium and potassium are safe for you to have in a day  He or she may recommend that you limit sodium to 2,300 mg a day  · Follow the meal plan recommended by your healthcare provider  A dietitian or your provider can give you more information on low-sodium plans or the DASH (Dietary Approaches to Stop Hypertension) eating plan  The DASH plan is low in sodium, processed sugar, unhealthy fats, and total fat  It is high in potassium, calcium, and fiber  These can be found in vegetables, fruit, and whole-grain foods  · Be physically active throughout the day  Physical activity, such as exercise, can help control your blood pressure and your weight  Be physically active for at least 30 minutes per day, on most days of the week  Include aerobic activity, such as walking or riding a bicycle  Also include strength training at least 2 times each week  Your healthcare providers can help you create a physical activity plan  · Decrease stress  This may help lower your blood pressure  Learn ways to relax, such as deep breathing or listening to music  · Limit alcohol as directed  Alcohol can increase your blood pressure  A drink of alcohol is 12 ounces of beer, 5 ounces of wine, or 1½ ounces of liquor  · Do not smoke  Nicotine and other chemicals in cigarettes and cigars can increase your blood pressure and also cause lung damage   Ask your healthcare provider for information if you currently smoke and need help to quit  E-cigarettes or smokeless tobacco still contain nicotine  Talk to your healthcare provider before you use these products  © Copyright [employer] 2022 Information is for End User's use only and may not be sold, redistributed or otherwise used for commercial purposes  All illustrations and images included in CareNotes® are the copyrighted property of [employer]  or Clifton Mckeon   The above information is an  only  It is not intended as medical advice for individual conditions or treatments  Talk to your doctor, nurse or pharmacist before following any medical regimen to see if it is safe and effective for you

## 2022-03-04 ENCOUNTER — TELEPHONE (OUTPATIENT)
Dept: OBGYN CLINIC | Facility: HOSPITAL | Age: 67
End: 2022-03-04

## 2022-03-04 ENCOUNTER — DOCUMENTATION (OUTPATIENT)
Dept: PHYSICAL THERAPY | Facility: CLINIC | Age: 67
End: 2022-03-04

## 2022-03-04 ENCOUNTER — TELEPHONE (OUTPATIENT)
Dept: OBGYN CLINIC | Facility: MEDICAL CENTER | Age: 67
End: 2022-03-04

## 2022-03-04 ENCOUNTER — HOSPITAL ENCOUNTER (EMERGENCY)
Facility: HOSPITAL | Age: 67
Discharge: HOME | End: 2022-03-04
Attending: EMERGENCY MEDICINE | Admitting: EMERGENCY MEDICINE
Payer: COMMERCIAL

## 2022-03-04 ENCOUNTER — APPOINTMENT (OUTPATIENT)
Dept: PHYSICAL THERAPY | Facility: CLINIC | Age: 67
End: 2022-03-04
Payer: COMMERCIAL

## 2022-03-04 VITALS
WEIGHT: 113.98 LBS | SYSTOLIC BLOOD PRESSURE: 153 MMHG | BODY MASS INDEX: 19.46 KG/M2 | OXYGEN SATURATION: 98 % | TEMPERATURE: 98.2 F | HEART RATE: 70 BPM | DIASTOLIC BLOOD PRESSURE: 85 MMHG | HEIGHT: 64 IN | RESPIRATION RATE: 21 BRPM

## 2022-03-04 DIAGNOSIS — Z98.890 POST-OPERATIVE STATE: Primary | ICD-10-CM

## 2022-03-04 DIAGNOSIS — R26.2 AMBULATORY DYSFUNCTION: ICD-10-CM

## 2022-03-04 DIAGNOSIS — R53.1 WEAKNESS: ICD-10-CM

## 2022-03-04 LAB
2HR DELTA HS TROPONIN: -1 NG/L
ALBUMIN SERPL BCP-MCNC: 2.7 G/DL (ref 3.5–5)
ALP SERPL-CCNC: 77 U/L (ref 46–116)
ALT SERPL W P-5'-P-CCNC: 20 U/L (ref 12–78)
ANION GAP SERPL CALCULATED.3IONS-SCNC: 6 MMOL/L (ref 4–13)
AST SERPL W P-5'-P-CCNC: 43 U/L (ref 5–45)
BASOPHILS # BLD AUTO: 0.02 THOUSANDS/ΜL (ref 0–0.1)
BASOPHILS NFR BLD AUTO: 0 % (ref 0–1)
BILIRUB DIRECT SERPL-MCNC: 0.12 MG/DL (ref 0–0.2)
BILIRUB SERPL-MCNC: 0.98 MG/DL (ref 0.2–1)
BUN SERPL-MCNC: 10 MG/DL (ref 5–25)
CALCIUM SERPL-MCNC: 8.8 MG/DL (ref 8.3–10.1)
CARDIAC TROPONIN I PNL SERPL HS: 10 NG/L
CARDIAC TROPONIN I PNL SERPL HS: 11 NG/L
CHLORIDE SERPL-SCNC: 103 MMOL/L (ref 100–108)
CO2 SERPL-SCNC: 31 MMOL/L (ref 21–32)
CREAT SERPL-MCNC: 0.57 MG/DL (ref 0.6–1.3)
EOSINOPHIL # BLD AUTO: 0.03 THOUSAND/ΜL (ref 0–0.61)
EOSINOPHIL NFR BLD AUTO: 0 % (ref 0–6)
ERYTHROCYTE [DISTWIDTH] IN BLOOD BY AUTOMATED COUNT: 13.5 % (ref 11.6–15.1)
GFR SERPL CREATININE-BSD FRML MDRD: 95 ML/MIN/1.73SQ M
GLUCOSE SERPL-MCNC: 157 MG/DL (ref 65–140)
HCT VFR BLD AUTO: 31.3 % (ref 34.8–46.1)
HGB BLD-MCNC: 9.8 G/DL (ref 11.5–15.4)
IMM GRANULOCYTES # BLD AUTO: 0.08 THOUSAND/UL (ref 0–0.2)
IMM GRANULOCYTES NFR BLD AUTO: 1 % (ref 0–2)
LYMPHOCYTES # BLD AUTO: 0.86 THOUSANDS/ΜL (ref 0.6–4.47)
LYMPHOCYTES NFR BLD AUTO: 9 % (ref 14–44)
MCH RBC QN AUTO: 30.6 PG (ref 26.8–34.3)
MCHC RBC AUTO-ENTMCNC: 31.3 G/DL (ref 31.4–37.4)
MCV RBC AUTO: 98 FL (ref 82–98)
MONOCYTES # BLD AUTO: 0.81 THOUSAND/ΜL (ref 0.17–1.22)
MONOCYTES NFR BLD AUTO: 8 % (ref 4–12)
NEUTROPHILS # BLD AUTO: 8.04 THOUSANDS/ΜL (ref 1.85–7.62)
NEUTS SEG NFR BLD AUTO: 82 % (ref 43–75)
NRBC BLD AUTO-RTO: 0 /100 WBCS
PLATELET # BLD AUTO: 217 THOUSANDS/UL (ref 149–390)
PMV BLD AUTO: 9.8 FL (ref 8.9–12.7)
POTASSIUM SERPL-SCNC: 4.4 MMOL/L (ref 3.5–5.3)
PROT SERPL-MCNC: 6.4 G/DL (ref 6.4–8.2)
RBC # BLD AUTO: 3.2 MILLION/UL (ref 3.81–5.12)
SODIUM SERPL-SCNC: 140 MMOL/L (ref 136–145)
WBC # BLD AUTO: 9.84 THOUSAND/UL (ref 4.31–10.16)

## 2022-03-04 PROCEDURE — 93005 ELECTROCARDIOGRAM TRACING: CPT

## 2022-03-04 PROCEDURE — 96361 HYDRATE IV INFUSION ADD-ON: CPT

## 2022-03-04 PROCEDURE — 80048 BASIC METABOLIC PNL TOTAL CA: CPT | Performed by: EMERGENCY MEDICINE

## 2022-03-04 PROCEDURE — 97163 PT EVAL HIGH COMPLEX 45 MIN: CPT

## 2022-03-04 PROCEDURE — 99285 EMERGENCY DEPT VISIT HI MDM: CPT | Performed by: EMERGENCY MEDICINE

## 2022-03-04 PROCEDURE — 84484 ASSAY OF TROPONIN QUANT: CPT | Performed by: EMERGENCY MEDICINE

## 2022-03-04 PROCEDURE — 36415 COLL VENOUS BLD VENIPUNCTURE: CPT | Performed by: EMERGENCY MEDICINE

## 2022-03-04 PROCEDURE — 85025 COMPLETE CBC W/AUTO DIFF WBC: CPT | Performed by: EMERGENCY MEDICINE

## 2022-03-04 PROCEDURE — 99284 EMERGENCY DEPT VISIT MOD MDM: CPT

## 2022-03-04 PROCEDURE — 80076 HEPATIC FUNCTION PANEL: CPT | Performed by: EMERGENCY MEDICINE

## 2022-03-04 PROCEDURE — 97167 OT EVAL HIGH COMPLEX 60 MIN: CPT

## 2022-03-04 PROCEDURE — 96360 HYDRATION IV INFUSION INIT: CPT

## 2022-03-04 RX ADMIN — SODIUM CHLORIDE 1000 ML: 0.9 INJECTION, SOLUTION INTRAVENOUS at 14:32

## 2022-03-04 NOTE — CASE MANAGEMENT
Case Management ED Discharge Planning Note    Patient name Makayla Ramos  Location ED 18/ED 18 MRN [id number]  : 1955 Date 3/4/2022        OBJECTIVE:  Predictive Model Details         9% Factor Value    Risk of Hospital Admission or ED Visit Model Is in Relationship Yes     Number of ED Visits 1     Number of Hospitalizations 1     Has Anemia Yes     Has PCP Yes            Chief Complaint: Knee pain     Patient Class: Emergency  Preferred Pharmacy:   [address] [id number]  Phone: [phone] [facility] [place] - [address] ,  [address] ,  [address] [address]  Phone: [phone] Fax: [phone]    Primary Care Provider: Aneta Mckinney MD    Primary Insurance: BLUE CROSS  Secondary Insurance:     ED Discharge Details:    Discharge planning discussed with[de-identified] Patient  Freedom of Choice: Yes     CM contacted family/caregiver?: Yes  Were Treatment Team discharge recommendations reviewed with patient/caregiver?: Yes  Did patient/caregiver verbalize understanding of patient care needs?: Yes  Were patient/caregiver advised of the risks associated with not following Treatment Team discharge recommendations?: Yes         [address]         Is the patient interested in [address] at discharge?: Yes  [address] requested[de-identified] [facility] Name[de-identified] [facility] Provider[de-identified] PCP  Home Health Services Needed[de-identified] Evaluate Functional Status and Safety,Gait/ADL Training,Restore Joint Function Post Joint Replacement,Strengthening/Theraputic Exercises to Improve Function  Homebound Criteria Met[de-identified] Uses an Assist Device (i e  cane, walker, etc),Requires the Assistance of Another Person for Safe Ambulation or to Leave the Home  Supporting Clincal Findings[de-identified] Fatigues Eas[place] in [place] Distances,Limited Endurance    DME Referral Provided  Referral made for DME?: Yes  DME referral completed for the following items[de-identified] Wheelchair  DME Supplier Name[de-identified] [employer]      Treatment Team Recommendation: Home with [address]  Discharge Destination Plan[de-identified] Home with [address]     CM received a call from OT  PT and OT are recommending Home Health and a WC  CM called the pt, discussed [place] [facility] Choice  Pt is agreeable to [facility]  Referral made via [place]  [facility]  WC will be delivered to pt's home  Pt and spouse notified

## 2022-03-04 NOTE — PROGRESS NOTES
PT Evaluation     Today's date: 3/4/2022  Patient name: Sharon Pace  : 1955  MRN: [id number]  Referring provider: Cezar Dominguez,*  Dx: No diagnosis found  Assessment/Plan  Sharon Pace is a 75 y o  female presenting to outpatient physical therapy with R knee swelling, pain, range of motion loss, weakness, proprioceptive loss s/p R TKA on 3/1/22 with Dr Gavino Zarco  PMH is sig for osteoporosis, anxiety, HLD, incontinence, anemia, and RA  Patient has c/o of pain and decreased ROM in flexion  Functional limitations are result of the above impairments, which limit his ability to drive, exercise or recreation, get off the toilet, get out of a chair, perform household chores, perform yard work, sleep, squat to  objects from the floor and walk  No further referral appears necessary at this time based upon examination results    The patients's greatest concerns are fear of not being able to keep active and future ill health (and wanting to prevent it)  Patient was given the opportunity to ask questions, and all questions were answered to the patient's satisfaction  Patient appears motivated, agrees with the POC, and is a good candidate for skilled physical therapy at this time  Patient was provided with handout of HEP consisting of quad sets, SLR, SAQ, heel slides, and gastroc stretching    Symptom irritability: {jflowmodhigh:59390} Understanding of Dx/Px/POC: {jfe/g/f/p:13219}  Prognosis: {jfe/g/f/p:74737}  Prognosis details: Positive prognostic indicators include: ***  Negative prognostic indicators include: ***    Goals  Short Term Goals:  1) Pain : Decrease R knee  pain to 4/10 at worst x 1 continuous week within 4 weeks  2) AROM: Improve R knee AROM to at least 120 degrees for flexion,  to have 0 degrees extension within 4 weeks  3) Strength:R  LE strength to be at least 4+/5 for all hip and knee within 4 weeks    4) Function: Improved FOTO score from IE within 4 weeks (*** at IE), patient to note greater ease of ambulation subjectively  Transition to not using AD for community ambulation within 3-4 weeks  LongTerm Goals:  1) Pain : Eliminate R knee pain  x 1 continuous week within 8 weeks  2) Swelling: Eliminate R knee swelling within 8-10 weeks  3) AROM: Improve R knee AROM to  0-130 degrees within 8 weeks  4) Strength: Improved R LE strength to 5/5 within 8 weeks  5) Function: Improved FOTO score to at least ***; Normal gait without AD, no reported difficulty with ADLs as they pertain to L knee within 8-10 weeks  6) (I) with HEP within 8 weeks  Plan  Plan details: Prognosis above is given PT services 2-3x/week tapering to 1x/week over the next 2 months and home program adherence  Patient would benefit from: skilled physical therapy  Referral necessary: no  Planned modality interventions: Hydrocollator packs, Cryotherapy, TENS and Low level laser  Planned therapy interventions: joint mobilization, manual therapy, massage, neuromuscular re-education, patient education, stretching, strengthening, therapeutic activities, therapeutic exercise, home exercise program, functional ROM exercises, gait training, flexibility, balance, abdominal trunk stabilization, motor coordination training, coordination and behavior modification  Frequency: 2-3x/week for 8 weeks  Duration in visits: 16-24  Plan of Care beginning date: 3/4/2022   Plan of Care expiration date: 4/29/2022  Treatment plan discussed with: patient    Subjective  IE (3/4/2022): Patient is a 75 y o  female who presents for an initial outpatient physical therapy consultation s/p R TKA on 3/1/22  Pain:   Meds:  Sleep:  AD:  Ambulation  Exercises:    Pain  Best:   Worst:   Irritability:   Location:   Quality:   Aggravating factors:    Alleviating factors:   Progression:     Social Support  Lives with:   Home setup: Single level  Steps in house: 3 steps to enter house  Employment status: working, 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  Hobbies/Recreational Activities: reading, puzzles, painting    Diagnostic Tests  Xray:       Treatments:  Previous treatments:   Current treatments: physical therapy      Patient Goals for Therapy  "***"    Objective           Precautions: ***      Manuals                                                                 Neuro Re-Ed                                                                                                        Ther Ex                                                                                                                     Ther Activity                                       Gait Training                                       Modalities

## 2022-03-04 NOTE — OCCUPATIONAL THERAPY NOTE
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    Osteoarthritis     PONV (postoperative nausea and vomiting)     per pt after ankle surgery    RA (rheumatoid arthritis) ([address] )     sees Rheumatologist q 6mths Dr Ruiz Montague Right knee pain     Wears dentures     full upper    Wears glasses     reading     Past Surgical History  Past Surgical History:   Procedure Laterality Date    ANKLE SURGERY      ORIF 5/16/plate and 4 screws    CYST REMOVAL      1969    ESOPHAGOGASTRODUODENOSCOPY      Diagnostic    FOOT SURGERY      plantar wart resection; resolved: 1969    [facility] TOTAL KNEE ARTHROPLASTY Right 3/1/2022    Procedure: ARTHROPLASTY KNEE TOTAL;  Surgeon: Fiordaliza Griggs DO;  Location: [facility];  Service: Orthopedics    TONSILLECTOMY AND [address]    TUBAL LIGATION      WISDOM TOOTH EXTRACTION               03/04/22 1422   Note Type   Note type Evaluation   Restrictions/Precautions   Weight Bearing Precautions Per Order Yes   RLE Weight Bearing Per Order WBAT   Other Precautions Fall Risk;Pain;Multiple lines   Pain Assessment   Pain Assessment Tool 0-10   Pain Score 5   Pain Location/Orientation Orientation: Right;Location: Knee   Home Living   Type of Home Mobile home  (3 casey with railing)   Home Layout One level   Bathroom Shower/Tub Walk-in shower   [address];Walker   Prior Function   Lives With [address] in the last 6 months 0   Lifestyle Autonomy PTA pt states independence with her ADLs, transfers, ambulation--with RW; neg falls, +   Reciprocal Relationships supportive    Service 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   Psychosocial   Psychosocial (WDL) WDL   Subjective   Subjective "Both my knees just locked up "   ADL   Where Assessed Edge of bed   Eating Assistance 6  Modified independent   Grooming Assistance 6  [address]  Supervision/Setup   LB Bathing Assistance 4  [address]  Supervision/Setup   LB Dressing Assistance 4  [address]   Additional items Verbal cues   Rolling L 5  Supervision   Additional items Increased time required;Verbal cues   Supine to Sit 5  Supervision   Additional items Assist x 1; Increased time required;Verbal cues   Transfers   Sit to Stand 4  Minimal assistance   Additional items Assist x 1; Increased time required;Verbal cues   Stand to Sit 4  Minimal assistance   Additional items Assist x 1; Increased time required;Verbal cues   Additional Comments   (bp's=156/76(EOB), 153/85(after ambulation))   Functional Mobility   Functional Mobility 4  Minimal assistance   Additional Comments x1   Additional items Rolling walker   Balance   Static Sitting Fair +   Dynamic Sitting Fair   Static Standing Fair   Dynamic Standing Fair -   Activity Tolerance   Activity Tolerance Patient limited by fatigue;Patient limited by pain   Medical Staff Made Aware nsg, P T     RUE Assessment   RUE Assessment X  (shr/forearm/hand=grossly WFLs, limited elbow(i e ext=40*))   RUE Strength   RUE Overall Strength Within Functional Limits - able to perform ADL tasks with strength  (4/5 throughout)   LUE Assessment   LUE Assessment WFL   LUE Strength   LUE Overall Strength Within Functional Limits - able to perform ADL tasks with strength  (4/5 throughout)   Hand Function Gross Motor Coordination Functional   Fine Motor Coordination Functional  (arthric changes noted with b/l hands)   Sensation   Light Touch No apparent deficits   Proprioception   Proprioception No apparent deficits   Vision-Basic Assessment   Current Vision   (glasses)   Vision - Complex Assessment   Acuity Able to read clock/calendar on wall without difficulty   Perception   Inattention/Neglect Appears intact   Cognition   Overall Cognitive Status WFL   Arousal/Participation Alert   Attention Within functional limits   Orientation Level Oriented X4   Memory Within functional limits   Following Commands Follows all commands and directions without difficulty   Assessment   Limitation Decreased ADL status; Decreased UE ROM; Decreased UE strength;Decreased Safe judgement during ADL;Decreased endurance;Decreased high-level ADLs   Prognosis Good   Assessment Pt is a 66y/o female admitted to the hospital 2* symptoms of lightheadness, LE weakness; neg trauma  Pt noted with ambulatory dysfunction  Pt with PMH anemia, R ankle fx, chronic pain, OA, RA, and recent R TKR(3/1/22)  Pt is currently allowed WBAT with her R LE  PTA pt states independence with her ADLs, transfers, ambulation--with RW; neg falls, +  During initial eval, pt demonstrated deficits with her functional balance, functional mobility, ADL status, transfer safety, b/l UE strength, and activity tolerance(currently fair=15-20mins)  Pt would benefit from continued OT tx for the above deficits  3-5xwk/1-2wks  Goals   Patient Goals "to go home"   STG Time Frame   (1-7 days)   Short Term Goal #1 Pt will demonstrate mod I with their sit-stand transfers to assist with completion of their LE dressing  Short Term Goal #2 Pt will demonstrate improved activity tolerance to good(20-30mins) and standing tolerance to 3-5mins to assist with ADLs  Short Term Goal  Pt will demonstrate proper walker/transfer safety 100% of the time      LTG Time Frame   (7-14 days) Long Term Goal #1 Pt will demonstrate g/g- balance with all functional activities  Long Term Goal #2 Pt will demonstrate mod I with their UE and LE bathing/dresssing  Long Term Goal Pt will independently demonstrate knowledge and application of proper energy conservation techniques 100% of the time  Plan   Treatment Interventions ADL retraining;Functional transfer training;UE strengthening/ROM; Endurance training;Patient/family training;Equipment evaluation/education; Compensatory technique education   Goal Expiration Date 03/18/22   OT Treatment Day 0   OT Frequency 3-5x/wk   Recommendation   OT Discharge Recommendation Home with home health rehabilitation  (home PT/OT)   Equipment Recommended   (w/c--16", foot rest, ligthweight)   OT - OK to Discharge Yes   AM-PAC Daily Activity Inpatient   Lower Body Dressing 3   Bathing 3   Toileting 3   Upper Body Dressing 4   Grooming 4   Eating 4   Daily Activity Raw Score 21   Daily Activity Standardized Score (Calc for Raw Score >=11) 44 27   AM-PAC Applied Cognition Inpatient   Following a Speech/Presentation 4   Understanding Ordinary Conversation 4   Taking Medications 4   Remembering Where Things Are Placed or Put Away 4   Remembering List of 4-5 Errands 4   Taking Care of Complicated Tasks 4   Applied Cognition Raw Score 24   Applied Cognition Standardized Score 62 21   Christina Lung, OT

## 2022-03-04 NOTE — ED NOTES
Patient states she had knee replacement on Tuesday, home on Wednesday, and has been able to ambulate with walker with no issues  Today she is unable to straighten leg and walking with difficulty    Scheduled for PT later today     Sarah Roach RN  03/04/22 0943

## 2022-03-04 NOTE — TELEPHONE ENCOUNTER
Patient sees Dr Britt Fuentes at [facility] PT calling, patient showed up for Physical Therapy after surgery on 3/1/2022, the  cannot get her out of the car, she is dizzy and everytime he tries, she collapses  They are going to take her to the emergency room and wanted to make the doctor aware of this       [phone]

## 2022-03-04 NOTE — ED PROVIDER NOTES
History  Chief Complaint   Patient presents with    Difficulty Walking     right knee replacement tuesday, discharged yesterday, reports trouble walking with walker today     74 YO female presents with inability to ambulate today  Pt states she was discharged from the hospital yesterday after having a Right knee replacement  She had been walking this morning  She noted going to stand after lunch today and having some lightheadedness, at that time she was unable to get up, states feeling weak  She has not been able to ambulate since, even with assistance of her walker  Pt states she does notice some residual lightheadedness but this has improved  She denies any associated chest pain, shortness of breath, palpitations  Pt denies CP/SOB/F/C/N/V/D/C, no dysuria, burning on urination or blood in urine  History provided by:  Patient and significant other   used: No    Fatigue  Severity:  Moderate  Onset quality:  Sudden  Duration:  2 hours  Timing:  Constant  Progression:  Unchanged  Chronicity:  New  Relieved by:  Nothing  Worsened by:  Standing  Ineffective treatments:  None tried  Associated symptoms: dizziness    Associated symptoms: no abdominal pain, no arthralgias, no chest pain, no diarrhea, no dysuria, no numbness in extremities, no fever, no frequency, no nausea, no shortness of breath, no syncope and no vomiting        Prior to Admission Medications   Prescriptions Last Dose Informant Patient Reported? Taking?    Ca Carbonate-Mag Hydroxide (ROLAIDS) 550-110 MG CHEW  Self Yes No   Sig: Chew Twice daily   Multiple Vitamin (multivitamin) tablet  Self No No   Sig: Take 1 tablet by mouth daily Start to take 30 days prior to surgery   PARoxetine (PAXIL) 10 mg tablet  Self No No   Sig: Take 1 tablet (10 mg total) by mouth daily   acetaminophen (TYLENOL) 325 mg tablet   No No   Sig: Take 3 tablets (975 mg total) by mouth every 8 (eight) hours   ascorbic acid (VITAMIN C) 500 MG tablet  Self No No   Sig: Take 1 tablet (500 mg total) by mouth daily Start to take 30 days prior to surgery   aspirin (ECOTRIN) 325 mg EC tablet   No No   Sig: Take 1 tablet (325 mg total) by mouth 2 (two) times a day Take with food  cephalexin (KEFLEX) 500 mg capsule   No No   Sig: Take 1 capsule (500 mg total) by mouth every 12 (twelve) hours for 10 days   docusate sodium (COLACE) 100 mg capsule   No No   Sig: Take 1 capsule (100 mg total) by mouth 2 (two) times a day   ferrous sulfate 324 (65 Fe) mg  Self No No   Sig: Take 1 tablet (324 mg total) by mouth daily before breakfast Start to take 30 days prior to surgery   folic acid (FOLVITE) 1 mg tablet  Self Yes No   Sig: Take by mouth   gabapentin (NEURONTIN) 100 mg capsule   No No   Sig: Take 1 capsule (100 mg total) by mouth every 8 (eight) hours   ibandronate (BONIVA) 150 MG tablet  Self Yes No   Sig: Take 1 tablet by mouth as needed     meclizine (ANTIVERT) 25 mg tablet   No No   Sig: Take 1 tablet (25 mg total) by mouth every 8 (eight) hours as needed for dizziness   oxyCODONE (ROXICODONE) 5 immediate release tablet   No No   Sig: Take 1 tablet (5 mg total) by mouth every 4 (four) hours as needed for severe pain Or take 1/2 tablet (2 5 mg total) by mouth every 4 (four) hours as needed for moderate pain   Max Daily Amount: 30 mg   predniSONE 5 mg tablet  Self Yes No   Sig: Take 5 mg by mouth daily    promethazine (PHENERGAN) 12 5 MG tablet   No No   Sig: Take 1 tablet (12 5 mg total) by mouth every 6 (six) hours as needed for nausea or vomiting   solifenacin (VESICARE) 10 MG tablet  Self No No   Sig: Take 1 tablet (10 mg total) by mouth daily      Facility-Administered Medications: None       Past Medical History:   Diagnosis Date    Anemia     1995 - corrected with iron    Ankle fracture, right     last assessed: 3/9/15    Anxiety     Arthritis     1996    Chronic pain disorder     right side joint pain     Headache     Herpes zoster     2/2009    Moderate exercise 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    Osteoarthritis     PONV (postoperative nausea and vomiting)     per pt after ankle surgery    RA (rheumatoid arthritis) ([address] )     sees Rheumatologist q 6mths Dr Gerson Barragan Right knee pain     Wears dentures     full upper    Wears glasses     reading       Past Surgical History:   Procedure Laterality Date    ANKLE SURGERY      ORIF /plate and 4 screws    CYST REMOVAL          ESOPHAGOGASTRODUODENOSCOPY      Diagnostic    FOOT SURGERY      plantar wart resection; resolved:     HI TOTAL KNEE ARTHROPLASTY Right 3/1/2022    Procedure: ARTHROPLASTY KNEE TOTAL;  Surgeon: Kayla Mack DO;  Location: [facility];  Service: Orthopedics    TONSILLECTOMY AND ADENOIDECTOMY          TUBAL LIGATION      WISDOM TOOTH EXTRACTION         Family History   Problem Relation Age of Onset    Seizures Mother         epilepsy    Bone cancer Father     No Known Problems Sister     No Known Problems Brother     No Known Problems Son     No Known Problems Maternal Grandmother     No Known Problems Maternal Grandfather     No Known Problems Paternal Grandmother     No Known Problems Paternal Grandfather     Thyroid disease Sister     No Known Problems Sister     No Known Problems Brother      I have reviewed and agree with the history as documented  E-Cigarette/Vaping    E-Cigarette Use Never User      E-Cigarette/Vaping Substances    Nicotine No     THC No     CBD No     Flavoring No     Other No     Unknown No      Social History     Tobacco Use    Smoking status: Former Smoker     Quit date:      Years since quittin 1    Smokeless tobacco: Never Used   Vaping Use    Vaping Use: Never used   Substance Use Topics    Alcohol use: Yes     Comment: rare, maybe on holidays    Drug use: No       Review of Systems   Constitutional: Positive for fatigue  Negative for chills and fever  HENT: Negative for dental problem      Eyes: Negative for visual disturbance  Respiratory: Negative for shortness of breath  Cardiovascular: Negative for chest pain and syncope  Gastrointestinal: Negative for abdominal pain, diarrhea, nausea and vomiting  Genitourinary: Negative for dysuria and frequency  Musculoskeletal: Negative for arthralgias  Skin: Negative for rash  Neurological: Positive for dizziness  Negative for weakness and light-headedness  Psychiatric/Behavioral: Negative for agitation, behavioral problems and confusion  All other systems reviewed and are negative  Physical Exam  Physical Exam  Vitals and nursing note reviewed  Constitutional:       Appearance: Normal appearance  HENT:      Head: Normocephalic and atraumatic  Eyes:      Extraocular Movements: Extraocular movements intact  Conjunctiva/sclera: Conjunctivae normal    Cardiovascular:      Rate and Rhythm: Normal rate  Pulmonary:      Effort: Pulmonary effort is normal    Abdominal:      General: There is no distension  Musculoskeletal:         General: Normal range of motion  Cervical back: Normal range of motion  Comments: Right knee with dressing, no erythema, induration  Skin:     Findings: No rash  Neurological:      General: No focal deficit present  Mental Status: She is alert  Cranial Nerves: No cranial nerve deficit     Psychiatric:         Mood and Affect: Mood normal          Vital Signs  ED Triage Vitals   Temperature Pulse Respirations Blood Pressure SpO2   03/04/22 1332 03/04/22 1332 03/04/22 1332 03/04/22 1332 03/04/22 1332   98 2 °F (36 8 °C) 69 19 (!) 182/77 99 %      Temp Source Heart Rate Source Patient Position - Orthostatic VS BP Location FiO2 (%)   03/04/22 1332 03/04/22 1332 03/04/22 1400 03/04/22 1332 --   Oral Monitor Lying Right arm       Pain Score       03/04/22 1332       5           Vitals:    03/04/22 1332 03/04/22 1400 03/04/22 1445   BP: (!) 182/77 167/71 153/85   Pulse: 69 68 70   Patient Position - Orthostatic VS:  Lying Lying         Visual Acuity      ED Medications  Medications   sodium chloride 0 9 % bolus 1,000 mL (0 mL Intravenous Stopped 3/4/22 1709)       Diagnostic Studies  Results Reviewed     Procedure Component Value Units Date/Time    HS Troponin I 2hr [[id number]]  (Normal) Collected: 03/04/22 1633    Lab Status: Final result Specimen: Blood from Arm, Right Updated: 03/04/22 1702     hs TnI 2hr 10 ng/L      Delta 2hr hsTnI -1 ng/L     HS Troponin I 4hr [[id number]]     Lab Status: No result Specimen: Blood     Hepatic function panel [[id number]]  (Abnormal) Collected: 03/04/22 1424    Lab Status: Final result Specimen: Blood from Arm, Right Updated: 03/04/22 1504     Total Bilirubin 0 98 mg/dL      Bilirubin, Direct 0 12 mg/dL      Alkaline Phosphatase 77 U/L      AST 43 U/L      ALT 20 U/L      Total Protein 6 4 g/dL      Albumin 2 7 g/dL     Basic metabolic panel [[id number]]  (Abnormal) Collected: 03/04/22 1424    Lab Status: Final result Specimen: Blood from Arm, Right Updated: 03/04/22 1500     Sodium 140 mmol/L      Potassium 4 4 mmol/L      Chloride 103 mmol/L      CO2 31 mmol/L      ANION GAP 6 mmol/L      BUN 10 mg/dL      Creatinine 0 57 mg/dL      Glucose 157 mg/dL      Calcium 8 8 mg/dL      eGFR 95 ml/min/1 73sq m     Narrative:      Padmini guidelines for Chronic Kidney Disease (CKD):     Stage 1 with normal or high GFR (GFR > 90 mL/min/1 73 square meters)    Stage 2 Mild CKD (GFR = 60-89 mL/min/1 73 square meters)    Stage 3A Moderate CKD (GFR = 45-59 mL/min/1 73 square meters)    Stage 3B Moderate CKD (GFR = 30-44 mL/min/1 73 square meters)    Stage 4 Severe CKD (GFR = 15-29 mL/min/1 73 square meters)    Stage 5 End Stage CKD (GFR <15 mL/min/1 73 square meters)  Note: GFR calculation is accurate only with a steady state creatinine    HS Troponin 0hr (reflex protocol) [[id number]]  (Normal) Collected: 03/04/22 1424    Lab Status: Final result Specimen: Blood from Arm, Right Updated: 03/04/22 1459     hs TnI 0hr 11 ng/L     CBC and differential [[id number]]  (Abnormal) Collected: 03/04/22 1424    Lab Status: Final result Specimen: Blood from Arm, Right Updated: 03/04/22 1437     WBC 9 84 Thousand/uL      RBC 3 20 Million/uL      Hemoglobin 9 8 g/dL      Hematocrit 31 3 %      MCV 98 fL      MCH 30 6 pg      MCHC 31 3 g/dL      RDW 13 5 %      MPV 9 8 fL      Platelets 833 Thousands/uL      nRBC 0 /100 WBCs      Neutrophils Relative 82 %      Immat GRANS % 1 %      Lymphocytes Relative 9 %      Monocytes Relative 8 %      Eosinophils Relative 0 %      Basophils Relative 0 %      Neutrophils Absolute 8 04 Thousands/µL      Immature Grans Absolute 0 08 Thousand/uL      Lymphocytes Absolute 0 86 Thousands/µL      Monocytes Absolute 0 81 Thousand/µL      Eosinophils Absolute 0 03 Thousand/µL      Basophils Absolute 0 02 Thousands/µL                  No orders to display              Procedures  Procedures         ED Course  ED Course as of 03/04/22 1723   Fri Mar 04, 2022   1358 Spoke with physical therapy for evaluation  Latisha 85 with PT/OT after evaluation  State pt may benefit from wheelchair around the house, feel she may be overexerting herself  She has been able to ambulate with walker and stated desire for home PT  SBIRT 20yo+      Most Recent Value   SBIRT (24 yo +)    In order to provide better care to our patients, we are screening all of our patients for alcohol and drug use  Would it be okay to ask you these screening questions? Yes Filed at: 03/04/2022 1352   Initial Alcohol Screen: US AUDIT-C     1  How often do you have a drink containing alcohol? 0 Filed at: 03/04/2022 1352   2  How many drinks containing alcohol do you have on a typical day you are drinking? 0 Filed at: 03/04/2022 1352   3b  FEMALE Any Age, or MALE 65+: How often do you have 4 or more drinks on one occassion?  0 Filed at: 03/04/2022 1352 Audit-C Score 0 Filed at: 03/04/2022 1352   GAYE: How many times in the past year have you    Used an illegal drug or used a prescription medication for non-medical reasons? Never Filed at: 03/04/2022 1352                    [facility]  Number of Diagnoses or Management Options  Ambulatory dysfunction: new and requires workup  Post-operative state: new and requires workup  Weakness: new and requires workup  Diagnosis management comments: 1  Weakness - Pt states doing well this morning, then with sudden weakness and inability to ambulate, no focal deficits  She did have associated lightheadedness which has improved, no other symptoms  Will check ECG, troponin, CBC for anemia, metabolic panel for electrolyte abnormalities and dehydration, urine for infection  Will give fluids  Will speak with PT/OT for evaluation to determine if Pt would be appropriate for an inpatient rehab  Amount and/or Complexity of Data Reviewed  Clinical lab tests: ordered and reviewed  Tests in the radiology section of CPT®: ordered and reviewed  Obtain history from someone other than the patient: yes  Review and summarize past medical records: yes  Independent visualization of images, tracings, or specimens: yes    Patient Progress  Patient progress: improved      Disposition  Final diagnoses:   Post-operative state   Weakness   Ambulatory dysfunction     Time reflects when diagnosis was documented in both MDM as applicable and the Disposition within this note     Time User Action Codes Description Comment    3/4/2022  3:55 PM Maryuri Lambert Add [Z98 890] Post-operative state     3/4/2022  3:55 PM Prudencio Santos Add [R53 1] Weakness     3/4/2022  5:07 PM Harry NGUYEN Add [R26 2] Ambulatory dysfunction       ED Disposition     ED Disposition Condition Date/Time Comment    Discharge Stable Fri Mar 4, 2022  5:07 PM Areta Cogan discharge to home/self care              Follow-up Information     Follow up With Specialties Details Why Contact [facility]  Follow up  [address] [facility] [address]  [phone]      [facility]  Follow up  [address]    [address] [facility] Lola Mejia MD Family Medicine   [address]  [address] [facility] [address] [phone]            Discharge Medication List as of 3/4/2022  5:08 PM      CONTINUE these medications which have NOT CHANGED    Details   acetaminophen (TYLENOL) 325 mg tablet Take 3 tablets (975 mg total) by mouth every 8 (eight) hours, Starting Thu 3/3/2022, No Print      ascorbic acid (VITAMIN C) 500 MG tablet Take 1 tablet (500 mg total) by mouth daily Start to take 30 days prior to surgery, Starting Mon 12/20/2021, Normal      aspirin (ECOTRIN) 325 mg EC tablet Take 1 tablet (325 mg total) by mouth 2 (two) times a day Take with food , Starting Thu 3/3/2022, Normal      Ca Carbonate-Mag Hydroxide (ROLAIDS) 550-110 MG CHEW Chew Twice daily, Historical Med      cephalexin (KEFLEX) 500 mg capsule Take 1 capsule (500 mg total) by mouth every 12 (twelve) hours for 10 days, Starting u 3/3/2022, Until Sun 3/13/2022, Normal      docusate sodium (COLACE) 100 mg capsule Take 1 capsule (100 mg total) by mouth 2 (two) times a day, Starting Thu 3/3/2022, Normal      ferrous sulfate 324 (65 Fe) mg Take 1 tablet (324 mg total) by mouth daily before breakfast Start to take 30 days prior to surgery, Starting Mon 65/35/2254, Normal      folic acid (FOLVITE) 1 mg tablet Take by mouth, Historical Med      gabapentin (NEURONTIN) 100 mg capsule Take 1 capsule (100 mg total) by mouth every 8 (eight) hours, Starting u 3/3/2022, Normal      ibandronate (BONIVA) 150 MG tablet Take 1 tablet by mouth as needed  , Starting Thu 2/18/2016, Historical Med      meclizine (ANTIVERT) 25 mg tablet Take 1 tablet (25 mg total) by mouth every 8 (eight) hours as needed for dizziness, Starting Thu 3/3/2022, Normal      Multiple Vitamin (multivitamin) tablet Take 1 tablet by mouth daily Start to take 30 days prior to surgery, Starting Mon 12/20/2021, Normal      oxyCODONE (ROXICODONE) 5 immediate release tablet Take 1 tablet (5 mg total) by mouth every 4 (four) hours as needed for severe pain Or take 1/2 tablet (2 5 mg total) by mouth every 4 (four) hours as needed for moderate pain   Max Daily Amount: 30 mg, Starting u 3/3/2022, Normal      PARoxetine (PAXIL) 10 mg tablet Take 1 tablet (10 mg total) by mouth daily, Starting Thu 3/4/2021, Normal      predniSONE 5 mg tablet Take 5 mg by mouth daily , Historical Med      promethazine (PHENERGAN) 12 5 MG tablet Take 1 tablet (12 5 mg total) by mouth every 6 (six) hours as needed for nausea or vomiting, Starting Thu 3/3/2022, Normal      solifenacin (VESICARE) 10 MG tablet Take 1 tablet (10 mg total) by mouth daily, Starting Mon 11/15/2021, Normal                 PDMP Review       Value Time User    PDMP Reviewed  Yes 3/1/2022  4:00 PM Bernabe Rodriguez PA-C          ED Provider  Electronically Signed by           Cris Simmons MD  03/04/22 9641

## 2022-03-04 NOTE — PHYSICAL THERAPY NOTE
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    Osteoarthritis     PONV (postoperative nausea and vomiting)     per pt after ankle surgery    RA (rheumatoid arthritis) ([address] )     sees Rheumatologist q 6mths Dr Gini Betancourt Right knee pain     Wears dentures     full upper    Wears glasses     reading       Past Surgical History:   Procedure Laterality Date    ANKLE SURGERY      ORIF 5/16/plate and 4 screws    CYST REMOVAL      1969    ESOPHAGOGASTRODUODENOSCOPY      Diagnostic    FOOT SURGERY      plantar wart resection; resolved: 1969    MT TOTAL KNEE ARTHROPLASTY Right 3/1/2022    Procedure: ARTHROPLASTY KNEE TOTAL;  Surgeon: Gregory Montenegro DO;  Location: [facility];  Service: Orthopedics    TONSILLECTOMY AND ADENOIDECTOMY      1962    TUBAL LIGATION      WISDOM TOOTH EXTRACTION         Imaging Studies:  No orders to display        03/04/22 1448   PT Last Visit   PT Visit Date 03/04/22   Note Type   Note type Evaluation   Pain Assessment   Pain Score 5   Pain Location/Orientation Orientation: Right;Location: Knee   Hospital Pain Intervention(s) Repositioned; Ambulation/increased activity; Emotional support; Rest   Restrictions/Precautions   RLE Weight Bearing Per Order WBAT   Other Precautions Fall Risk;Pain;Telemetry   Home Living   Type of [address] [place] [address] One level;Stairs to enter with rails  (3STE)   Bathroom Shower/Tub Walk-in shower     [address] Walker;Cane   Prior Function   Lives With [facility] ADL Assistance Needs assistance   Falls in the last 6 months 0   Vocational Full time employment   Comments Pt recently D/C home from [facility] on 3/3/22 following R TKR on 3/1/22  General   Family/Caregiver Present Yes  ()   Cognition   Overall Cognitive Status WFL   Arousal/Participation Alert   Orientation Level Oriented X4   Following Commands Follows all commands and directions without difficulty   Comments pt pleasant & cooperative   Subjective   Subjective Pt agreeable to PT/OT evals   RUE Assessment   RUE Assessment   (refer to OT)   LUE Assessment   LUE Assessment   (refer to OT)   RLE Assessment   RLE Assessment X  (2+/5 grossly)   LLE Assessment   LLE Assessment WFL  (4/5 grossly)   Coordination   Movements are Fluid and Coordinated 1   Sensation WFL   Bed Mobility   Supine to Sit 5  Supervision   Additional items HOB elevated; Increased time required;Verbal cues   Additional Comments cues for techniques & safety   Transfers   Sit to Stand 4  Minimal assistance   Additional items Assist x 1; Increased time required;Verbal cues   Stand to Sit 4  Minimal assistance   Additional items Assist x 1; Increased time required;Verbal cues   Additional Comments cues for techniques & safety   Ambulation/Elevation   Gait pattern Antalgic;Decreased foot clearance;Decreased R stance; Step to;Excessively slow   Gait Assistance 4  Minimal assist   Additional items Assist x 1;Verbal cues; Tactile cues   Assistive Device Rolling walker   Distance 40'x1   Balance   Static Sitting Fair +   Dynamic Sitting Fair   Static Standing Fair -   Dynamic Standing Poor +   Ambulatory Poor +   Endurance Deficit   Endurance Deficit Yes   Endurance Deficit Description pain; fatigue; lightheadedness   Activity Tolerance   Activity Tolerance Patient limited by fatigue;Patient limited by pain   Medical Staff Made Aware OT Pauly Martinez MD   Nurse Made Aware NIKKI Lee   Assessment   Prognosis Good   Problem List Decreased strength;Decreased range of motion;Decreased endurance; Impaired balance;Decreased mobility;Pain   Assessment  Pt  75 y  o female s/p R TKR on 3/1/22, presented w/ inability to stand & ambulate today  Pt referred to PT for mobility assessment & D/C planning  Please see above for information re: home set-up & PLOF as well as objective findings during PT assessment  On eval, pt functioning below baseline hence will continue skilled PT to improve function & safety  Pt require S for bed mobility however require minAx1 for transfers & amb w/ RW + cues for techniques & safety  Gait slow & antalgic but no gross LOB noted  Inc difficulty w/ amb towards end of amb & unable to ambulate farther 2* pain, fatigue & lightheadedness  Dec amb tolerance compared to last PT tx    BP during session as follows: 154/79 EOB; 153/85 after amb  The patient's AM-PAC Basic Mobility Inpatient Short Form Raw Score is 19  A Raw score of greater than 16 suggests the patient may benefit from discharge to home  Please also refer to the recommendation of the Physical Therapist for safe discharge planning  From PT standpoint, will anticipate good progress in PT for safe D/C to home  Will recommend HHPT, w/c & family support at D/C  Pt instructed on activity pacing & to avoid over exertion w/ good understanding  No SOB reported t/o session  Nsg staff most recent vital signs as follows: /85 (BP Location: Left arm)   Pulse 70   Temp 98 2 °F (36 8 °C) (Oral)   Resp 21   Ht 5' 4" (1 626 m)   Wt 51 7 kg (113 lb 15 7 oz)   SpO2 98%   BMI 19 56 kg/m²   At end of session, pt OOB in chair in stable condition, call bell & phone in reach  Fall precautions reinforced w/ good understanding  CM to follow  Nsg staff to continue to mobilized pt (OOB in chair for all meals & ambulate in room/unit) as tolerated to prevent further decline in function  Nsg notified  Co-eval was necessary to complete this PT eval for the pt's best interest given pt's medical acuity & complexity      Goals Patient Goals to go home   STG Expiration Date 03/11/22   Short Term Goal #1 Goals to be met in 7 days; pt will be able to: 1) inc strength & balance by 1/2 grade to improve overall functional mobility & dec fall risk; 2) inc bed mobility to modified I for pt to be able to get in/OOB safely w/ proper techniques 100% of the time, to dec caregiver burden & safely function at home; 3) inc transfers to modified I for pt to transition safely from one surface to another w/o % of the time, to dec caregiver burden & safely function at home; 4) inc amb w/ RW approx  >80' w/ S for pt to ambulate household distances w/o any % of the time, to dec caregiver burden & safely function at home; 5) negotiate stairs w/ modified I for inc safety during stair mgt inside/outside of home & dec caregiver burden; 6) pt/caregiver ed   PT Treatment Day 0   Plan   Treatment/Interventions Functional transfer training;LE strengthening/ROM; Elevations; Therapeutic exercise; Endurance training;Patient/family training;Bed mobility;Gait training;Spoke to nursing;Spoke to MD;OT   PT Frequency 3-5x/wk   Recommendation   PT Discharge Recommendation Home with home health rehabilitation   Equipment Recommended Wheelchair   Wheelchair Package Recommended Standard   Change or Add item to Wheelchair Package? No   AM-PAC Basic Mobility Inpatient   Turning in Bed Without Bedrails 4   Lying on Back to Sitting on Edge of Flat Bed 3   Moving Bed to Chair 3   Standing Up From Chair 3   Walk in Room 3   Climb 3-5 Stairs 3   Basic Mobility Inpatient Raw Score 19   Basic Mobility Standardized Score 42 48   Highest Level Of Mobility   JH-HLM Goal 6: Walk 10 steps or more   JH-HLM Highest Level of Mobility 7: Walk 25 feet or more   JH-HLM Goal Achieved Yes   End of Consult   Patient Position at End of Consult Bedside chair; All needs within reach   End of Consult Comments Pt in stable condition at end of session      Hx/personal factors: co-morbidities, inaccessible home, telemetry, use of AD, pain, fall risk and assist w/ ADL's  Examination: dec mobility, dec balance, dec endurance, dec amb, risk for falls, pain  Clinical: unpredictable (ongoing medical status, risk for falls and pain mgt)  Complexity: high     Hallie Sidhu, PT

## 2022-03-04 NOTE — ED NOTES
RN contacted case management about wheelchair order at this time        Andrea Alston RN  03/04/22 3931

## 2022-03-04 NOTE — PLAN OF CARE
Problem: OCCUPATIONAL THERAPY ADULT  Goal: Performs self-care activities at highest level of function for planned discharge setting  See evaluation for individualized goals  Description: Treatment Interventions: ADL retraining,Functional transfer training,UE strengthening/ROM,Endurance training,Patient/family training,Equipment evaluation/education,Compensatory technique education  Equipment Recommended:  (w/c--16", foot rest, ligthweight)       See flowsheet documentation for full assessment, interventions and recommendations  Note: Limitation: Decreased ADL status,Decreased UE ROM,Decreased UE strength,Decreased Safe judgement during ADL,Decreased endurance,Decreased high-level ADLs  Prognosis: Good  Assessment: Pt is a 66y/o female admitted to the hospital 2* symptoms of lightheadness, LE weakness; neg trauma  Pt noted with ambulatory dysfunction  Pt with PMH anemia, R ankle fx, chronic pain, OA, RA, and recent R TKR(3/1/22)  Pt is currently allowed WBAT with her R LE  PTA pt states independence with her ADLs, transfers, ambulation--with RW; neg falls, +  During initial eval, pt demonstrated deficits with her functional balance, functional mobility, ADL status, transfer safety, b/l UE strength, and activity tolerance(currently fair=15-20mins)  Pt would benefit from continued OT tx for the above deficits  3-5xwk/1-2wks  OT Discharge Recommendation: Home with home health rehabilitation (home PT/OT)  OT - OK to Discharge:  Yes - - -

## 2022-03-04 NOTE — TELEPHONE ENCOUNTER
Pt contacted this AM to complete a postoperative follow up call assessment  She reports doing "ok" and "was able to sleep " She reports current 5/10 pain, and swelling is "the same " We discussed her icing, 20 minutes on and off, and she reports her dressing is dry and denies any drainage  She is ambulating with the RW, and has PT later today  We reviewed her AVS medication list  She reports taking Tylenol 975mg TID, Oxycodone 5mg every 4 hours, and gabapentin 100mg TID for pain  She confirmed also taking ASA 325mg BID, keflex 500mg BID, and Colace 100mg BID (denies BM but is passing gas)  She denies any chest pain, SOB, fever or calf pain  Reports "wooziness" when standing on occasion, and we discussed increasing her fluid intake, and get up from a sitting or lying position slowly  She denies any questions at this time  pt encouraged to call me with questions, concerns or issues

## 2022-03-04 NOTE — PROGRESS NOTES
Pt's  came into clinic around 12:30 PM  Said he wife was in the car feeling lightheaded and she was unable to stand up  Had fallen earlier today because she was light headed and R knee had given out on her (s/p TKA 3/1/22)  I went out to their car and spoke with Patricia while Adriel Ruiz called Dr Omero Solano office  Patricia did not seem short of breath or in any acute distress  I asked her if she could stand and she told me she could not  BP was 150/80  Pulse 80  They were advised to go to ER for work up and they headed that way

## 2022-03-04 NOTE — DISCHARGE INSTRUCTIONS
An order has been placed for a wheelchair, this should be delivered to your residence  Return to the ER if your weakness worsens, or if you develop chest pain, worsening shortness of breath or palpitations

## 2022-03-04 NOTE — PLAN OF CARE
Problem: PHYSICAL THERAPY ADULT  Goal: Performs mobility at highest level of function for planned discharge setting  See evaluation for individualized goals  Description: Treatment/Interventions: Functional transfer training,LE strengthening/ROM,Elevations,Therapeutic exercise,Endurance training,Patient/family training,Bed mobility,Gait training,Spoke to nursing,Spoke to MD,OT  Equipment Recommended: Wheelchair       See flowsheet documentation for full assessment, interventions and recommendations  Note: Prognosis: Good  Problem List: Decreased strength,Decreased range of motion,Decreased endurance,Impaired balance,Decreased mobility,Pain  Assessment:  Pt  75 y  o female s/p R TKR on 3/1/22, presented w/ inability to stand & ambulate today  Pt referred to PT for mobility assessment & D/C planning  Please see above for information re: home set-up & PLOF as well as objective findings during PT assessment  On eval, pt functioning below baseline hence will continue skilled PT to improve function & safety  Pt require S for bed mobility however require minAx1 for transfers & amb w/ RW + cues for techniques & safety  Gait slow & antalgic but no gross LOB noted  Inc difficulty w/ amb towards end of amb & unable to ambulate farther 2* pain, fatigue & lightheadedness  Dec amb tolerance compared to last PT tx    BP during session as follows: 154/79 EOB; 153/85 after amb  The patient's AM-PAC Basic Mobility Inpatient Short Form Raw Score is 19  A Raw score of greater than 16 suggests the patient may benefit from discharge to home  Please also refer to the recommendation of the Physical Therapist for safe discharge planning  From PT standpoint, will anticipate good progress in PT for safe D/C to home  Will recommend HHPT, w/c & family support at D/C  Pt instructed on activity pacing & to avoid over exertion w/ good understanding  No SOB reported t/o session   Nsg staff most recent vital signs as follows: /85 (BP Location: Left arm)   Pulse 70   Temp 98 2 °F (36 8 °C) (Oral)   Resp 21   Ht 5' 4" (1 626 m)   Wt 51 7 kg (113 lb 15 7 oz)   SpO2 98%   BMI 19 56 kg/m²   At end of session, pt OOB in chair in stable condition, call bell & phone in reach  Fall precautions reinforced w/ good understanding  CM to follow  Nsg staff to continue to mobilized pt (OOB in chair for all meals & ambulate in room/unit) as tolerated to prevent further decline in function  Nsg notified  Co-eval was necessary to complete this PT eval for the pt's best interest given pt's medical acuity & complexity  PT Discharge Recommendation: Home with home health rehabilitation; wheelchair          See flowsheet documentation for full assessment

## 2022-03-05 LAB
ATRIAL RATE: 75 BPM
ATRIAL RATE: 76 BPM
P AXIS: 52 DEGREES
P AXIS: 66 DEGREES
PR INTERVAL: 144 MS
PR INTERVAL: 156 MS
QRS AXIS: -17 DEGREES
QRS AXIS: 7 DEGREES
QRSD INTERVAL: 76 MS
QRSD INTERVAL: 82 MS
QT INTERVAL: 360 MS
QT INTERVAL: 400 MS
QTC INTERVAL: 402 MS
QTC INTERVAL: 450 MS
T WAVE AXIS: 204 DEGREES
T WAVE AXIS: 41 DEGREES
VENTRICULAR RATE: 75 BPM
VENTRICULAR RATE: 76 BPM

## 2022-03-05 PROCEDURE — 93010 ELECTROCARDIOGRAM REPORT: CPT | Performed by: INTERNAL MEDICINE

## 2022-03-07 LAB

## 2022-03-09 DIAGNOSIS — F41.9 ANXIETY DISORDER, UNSPECIFIED TYPE: ICD-10-CM

## 2022-03-09 RX ORDER — PAROXETINE 10 MG/1
10 TABLET, FILM COATED ORAL DAILY
Qty: 90 TABLET | Refills: 3 | Status: SHIPPED | OUTPATIENT
Start: 2022-03-09

## 2022-03-09 NOTE — TELEPHONE ENCOUNTER
Patient L/m on Rx line requesting:    PARoxetine (PAXIL) 10 mg tablet  #90 / R-3    Last OV 2 21 22  Future OV 5 16 22

## 2022-03-14 ENCOUNTER — APPOINTMENT (OUTPATIENT)
Dept: RADIOLOGY | Facility: MEDICAL CENTER | Age: 67
End: 2022-03-14
Payer: COMMERCIAL

## 2022-03-14 ENCOUNTER — OFFICE VISIT (OUTPATIENT)
Dept: OBGYN CLINIC | Facility: MEDICAL CENTER | Age: 67
End: 2022-03-14

## 2022-03-14 VITALS
HEIGHT: 64 IN | BODY MASS INDEX: 19.29 KG/M2 | WEIGHT: 113 LBS | DIASTOLIC BLOOD PRESSURE: 74 MMHG | SYSTOLIC BLOOD PRESSURE: 114 MMHG | HEART RATE: 76 BPM

## 2022-03-14 DIAGNOSIS — Z96.651 S/P TOTAL KNEE ARTHROPLASTY, RIGHT: Primary | ICD-10-CM

## 2022-03-14 DIAGNOSIS — Z96.651 S/P TOTAL KNEE ARTHROPLASTY, RIGHT: ICD-10-CM

## 2022-03-14 PROCEDURE — 3008F BODY MASS INDEX DOCD: CPT

## 2022-03-14 PROCEDURE — 73562 X-RAY EXAM OF KNEE 3: CPT

## 2022-03-14 PROCEDURE — 99024 POSTOP FOLLOW-UP VISIT: CPT | Performed by: ORTHOPAEDIC SURGERY

## 2022-03-14 NOTE — PROGRESS NOTES
Assessment/Plan:  1  S/P total knee arthroplasty, right      Orders Placed This Encounter   Procedures    XR knee 3 vw right non injury    Ambulatory referral to Physical Therapy    Ambulatory Referral to Physical Therapy       · Transition to outpatient PT  Script provided  · Pain control prn- oxycodone, celebrex, and tylenol  Patient aware to wean away from opioids  · Patient will discontinue gabapentin due to visual hallucinations  If these persist, we will discontinue the oxycodone and try tramadol instead if needed  · Continue with  BID  for DVT prophylaxis  Aware to not take the celebrex and ASA at the same time  · She may resume her methotrexate  Aware to monitor incision for any wound healing issues  She will complete her keflex script  · SUNG stockings as needed for swelling  · May shower and let water run over incision, do not submerge incision  · Patient should call ahead for abx prior to dental appts  Return in about 4 weeks (around 4/11/2022) for post op R TKA  I answered all of the patient's questions during the visit and provided education of the patient's condition during the visit  The patient verbalized understanding of the information given and agrees with the plan  This note was dictated using [employer] software  It may contain errors including improperly dictated words  Please contact physician directly for any questions  Subjective   Chief Complaint:   Chief Complaint   Patient presents with    Right Knee - Irvin Jones is a 75 y o  female who presents for 2 week  follow up s/p right TKA, DOS 3/1/2022  She states she is doing well  She was seen on the ED on 3/4/22 due to having trouble walking after surgery  She was unable to stand up after sitting in a dinner  She notes that she has not had any lightheadedness or weakness since this episode  She is currently doing home physical therapy    She is on oxycodone one tablet a day , gabapentin 100 mg one tablet a day and Tylenol 1000 mg as needed for pain  Patient notes she has been having visual hallucinations since surgery where she sees things that aren't there  Notes this only happens occasionally  She is on aspirin 325 b i d  for DVT prophylaxis  She is on Keflex 500 mg daily  She is using a cane for assistance when ambulating  She denies any chills or fevers  Review of Systems  ROS:    See HPI for musculoskeletal review     All other systems reviewed are negative     History:  Past Medical History:   Diagnosis Date    Anemia      - corrected with iron    Ankle fracture, right     last assessed: 3/9/15    Anxiety     Arthritis     1996    Chronic pain disorder     right side joint pain     Headache     Herpes zoster     2009    Moderate exercise     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    Osteoarthritis     PONV (postoperative nausea and vomiting)     per pt after ankle surgery    RA (rheumatoid arthritis) ([address] )     sees Rheumatologist q 6mths Dr Alcira Chambers Right knee pain     Wears dentures     full upper    Wears glasses     reading     Past Surgical History:   Procedure Laterality Date    ANKLE SURGERY      ORIF /plate and 4 screws    CYST REMOVAL          ESOPHAGOGASTRODUODENOSCOPY      Diagnostic    FOOT SURGERY      plantar wart resection; resolved:     IN TOTAL KNEE ARTHROPLASTY Right 3/1/2022    Procedure: ARTHROPLASTY KNEE TOTAL;  Surgeon: Shalonda Beckwith DO;  Location: [facility];  Service: Orthopedics    TONSILLECTOMY AND ADENOIDECTOMY          TUBAL LIGATION      WISDOM TOOTH EXTRACTION       Social History   Social History     Substance and Sexual Activity   Alcohol Use Yes    Comment: rare, maybe on holidays     Social History     Substance and Sexual Activity   Drug Use No     Social History     Tobacco Use   Smoking Status Former Smoker    Quit date:     Years since quittin 2   Smokeless Tobacco Never Used     Family History:   Family History   Problem Relation Age of Onset    Seizures Mother         epilepsy    Bone cancer Father     No Known Problems Sister     No Known Problems Brother     No Known Problems Son     No Known Problems Maternal Grandmother     No Known Problems Maternal Grandfather     No Known Problems Paternal Grandmother     No Known Problems Paternal Grandfather     Thyroid disease Sister     No Known Problems Sister     No Known Problems Brother        Current Outpatient Medications on File Prior to Visit   Medication Sig Dispense Refill    acetaminophen (TYLENOL) 325 mg tablet Take 3 tablets (975 mg total) by mouth every 8 (eight) hours  0    ascorbic acid (VITAMIN C) 500 MG tablet Take 1 tablet (500 mg total) by mouth daily Start to take 30 days prior to surgery 30 tablet 1    aspirin (ECOTRIN) 325 mg EC tablet Take 1 tablet (325 mg total) by mouth 2 (two) times a day Take with food   84 tablet 0    Ca Carbonate-Mag Hydroxide (ROLAIDS) 550-110 MG CHEW Chew Twice daily      [] cephalexin (KEFLEX) 500 mg capsule Take 1 capsule (500 mg total) by mouth every 12 (twelve) hours for 10 days 20 capsule 0    docusate sodium (COLACE) 100 mg capsule Take 1 capsule (100 mg total) by mouth 2 (two) times a day 20 capsule 0    ferrous sulfate 324 (65 Fe) mg Take 1 tablet (324 mg total) by mouth daily before breakfast Start to take 30 days prior to surgery 30 tablet 1    folic acid (FOLVITE) 1 mg tablet Take by mouth      gabapentin (NEURONTIN) 100 mg capsule Take 1 capsule (100 mg total) by mouth every 8 (eight) hours 90 capsule 0    ibandronate (BONIVA) 150 MG tablet Take 1 tablet by mouth as needed        meclizine (ANTIVERT) 25 mg tablet Take 1 tablet (25 mg total) by mouth every 8 (eight) hours as needed for dizziness 30 tablet 0    Multiple Vitamin (multivitamin) tablet Take 1 tablet by mouth daily Start to take 30 days prior to surgery 30 tablet 1    oxyCODONE (ROXICODONE) 5 immediate release tablet Take 1 tablet (5 mg total) by mouth every 4 (four) hours as needed for severe pain Or take 1/2 tablet (2 5 mg total) by mouth every 4 (four) hours as needed for moderate pain  Max Daily Amount: 30 mg 30 tablet 0    PARoxetine (PAXIL) 10 mg tablet Take 1 tablet (10 mg total) by mouth daily 90 tablet 3    predniSONE 5 mg tablet Take 5 mg by mouth daily       promethazine (PHENERGAN) 12 5 MG tablet Take 1 tablet (12 5 mg total) by mouth every 6 (six) hours as needed for nausea or vomiting 4 tablet 0    solifenacin (VESICARE) 10 MG tablet Take 1 tablet (10 mg total) by mouth daily 90 tablet 3     No current facility-administered medications on file prior to visit       Allergies   Allergen Reactions    Aspirin Other (See Comments)     Per pt avoids taking due to rheumatology Meds        Objective     /74   Pulse 76   Ht 5' 4" (1 626 m)   Wt 51 3 kg (113 lb)   BMI 19 40 kg/m²      PE:  AAOx 3  WDWN  Hearing intact, no drainage from eyes  no audible wheezing  no abdominal distension  LE compartments soft, AT/GS intact    Ortho Exam:  right Knee:   INC: C/D/I, No erythema, mild swelling  AROM: 5 - 100 degrees    Imaging Studies: I have personally reviewed pertinent films in PACS  XR right knee:  S/p TKA, adequate alignment

## 2022-03-17 ENCOUNTER — EVALUATION (OUTPATIENT)
Dept: PHYSICAL THERAPY | Facility: CLINIC | Age: 67
End: 2022-03-17
Payer: COMMERCIAL

## 2022-03-17 DIAGNOSIS — Z96.651 S/P TOTAL KNEE ARTHROPLASTY, RIGHT: ICD-10-CM

## 2022-03-17 DIAGNOSIS — M17.11 ARTHRITIS OF RIGHT KNEE: Primary | ICD-10-CM

## 2022-03-17 PROCEDURE — 97164 PT RE-EVAL EST PLAN CARE: CPT | Performed by: PHYSICAL THERAPIST

## 2022-03-17 NOTE — PROGRESS NOTES
PT Evaluation     Today's date: 3/17/2022  Patient name: Tatianna Linton  : 1955  MRN: [id number]  Referring provider: Annel Crain PA-C  Dx:   Encounter Diagnosis     ICD-10-CM    1  Arthritis of right knee  M17 11    2  S/P total knee arthroplasty, right  Z96 651 Ambulatory Referral to Physical Therapy     PT plan of care cert/re-cert       Start Time: 945  Stop Time: 1030  Total time in clinic (min): 45 minutes    Assessment/Plan  Tatianna Linton is a 75 y o  female presenting to outpatient physical therapy with R knee swelling, pain, range of motion loss, weakness, proprioceptive loss s/p R TKA on 3/1/22 with Dr Jay De La Garza  PMH is sig for osteoporosis, anxiety, HLD, incontinence, anemia, and RA   Patient has c/o of pain and decreased ROM in flexion  Functional limitations are result of the above impairments, which limit his ability to drive, exercise or recreation, get off the toilet, get out of a chair, perform household chores, perform yard work, sleep, squat to  objects from the floor and walk  No further referral appears necessary at this time based upon examination results    The patients's greatest concerns are fear of not being able to keep active and future ill health (and wanting to prevent it)  Patient was given the opportunity to ask questions, and all questions were answered to the patient's satisfaction  Patient appears motivated, agrees with the POC, and is a good candidate for skilled physical therapy at this time  Patient was provided with handout of HEP consisting of mini-squats, stanidng hip ABD, and extension stretching    Symptom irritability: Low Understanding of Dx/Px/POC: good  Prognosis: good  Prognosis details: Positive prognostic indicators include: motivated to improve  Negative prognostic indicators include: lack of knee extension at 2 weeks    Goals  Short Term Goals:  1) Pain : Decrease R knee  pain to 4/10 at worst x 1 continuous week within 4 weeks    2) AROM: Improve R knee AROM to at least 120 degrees for flexion,  to have 0 degrees extension within 4 weeks  3) Strength:R  LE strength to be at least 4+/5 for all hip and knee within 4 weeks  4) Function: Improved FOTO score from IE within 4 weeks (51 at IE), patient to note greater ease of ambulation subjectively  Transition to not using AD for community ambulation within 3-4 weeks  LongTerm Goals:  1) Pain : Eliminate R knee pain  x 1 continuous week within 8 weeks  2) Swelling: Eliminate R knee swelling within 8-10 weeks  3) AROM: Improve R knee AROM to  0-130 degrees within 8 weeks  4) Strength: Improved R LE strength to 5/5 within 8 weeks  5) Function: Improved FOTO score to at least 71; Normal gait without AD, no reported difficulty with ADLs as they pertain to L knee within 8-10 weeks  6) (I) with HEP within 8 weeks  Plan  Plan details: Prognosis above is given PT services 2-3x/week tapering to 1x/week over the next 2 months and home program adherence  Patient would benefit from: skilled physical therapy  Referral necessary: no  Planned modality interventions: Hydrocollator packs, Cryotherapy, TENS and Low level laser  Planned therapy interventions: joint mobilization, manual therapy, massage, neuromuscular re-education, patient education, stretching, strengthening, therapeutic activities, therapeutic exercise, home exercise program, functional ROM exercises, gait training, flexibility, balance, abdominal trunk stabilization, motor coordination training, coordination and behavior modification  Frequency: 2x/week for 8 weeks  Duration in visits: 16  Plan of Care beginning date: 3/17/2022   Plan of Care expiration date: 5/12/2022  Treatment plan discussed wit/h: patient    Subjective  IE (3/17/2022): Patient is a 75 y o  female who presents for an initial outpatient physical therapy consultation s/p R TKA by Dr Shayna Tellez  Patient states that she has been getting around pretty well at home   Has transitioned to [facility] from   Home PT went well  She states that she has been compliant with HEP  She continues to take pain medication 1x/day  Sleeping is normal for her  Patient to have f/u with surgeon in two weeks    Pain  Best: 0/10  Average: 5/10  Worst: 8/10  Irritability: hours  Location: anterior knee  Quality: Achy  Aggravating factors: too much weight-bearing  Alleviating factors: OTC  Progression: improving  Social Support  Lives with: - has been very supportive  Home setup: Single level  Steps in house: 3 steps to enter house  Employment status: working, 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  Hobbies/Recreational Activities: reading, puzzles, painting    Treatments:  Previous treatments: PT, meds, surgery  Current treatments: physical therapy      Patient Goals for Therapy  "get back to my normal"      Objective     Observations     Right Knee   Positive for edema  Negative for deformity, drainage and effusion       Palpation     Additional Palpation Details  Patient TTP as expect in quads and hamstrings    Neurological Testing     Sensation     Knee     Right Knee   Intact: light touch     Additional Neurological Details  Patient denies neuro symptoms    Active Range of Motion   Left Knee   Flexion: 130 degrees   Extension: 0 degrees     Right Knee   Flexion: 102 degrees with pain  Extension: -15 degrees     Passive Range of Motion   Left Knee   Flexion: 135 degrees   Extension: 0 degrees     Right Knee   Flexion: 116 degrees   Extension: -10 degrees     Additional Passive Range of Motion Details  Capsular tightness noted    Quad lag noted    Mobility   Patellar Mobility:   Left Knee   WFL: medial and lateral    Hypomobile: left superior and left inferior    Right Knee   WFL: medial and lateral  Hypomobile: superior and inferior     Patellar Static Positioning   Left Knee: WFL  Right Knee: [facility]    Strength/Myotome Testing     Left Hip   Planes of Motion   Flexion: 5  Abduction: 4+    Right Hip Planes of Motion   Flexion: 4+  Abduction: 4-    Left Knee   Flexion: 5  Extension: 5  Quadriceps contraction: good    Right Knee   Flexion: 4+  Extension: 4  Quadriceps contraction: fair             Precautions: RA, osteoporosis      Manuals 3/17            R knee PROM             Ext mob             Patellar mob                          Neuro Re-Ed                                                                                                        Ther Ex             bike 5 min Inc NV           Seated extension self mob HEP            Prone extension stretch HEP            clamshells             SLR             TKE             Mini squats HEP            Standing hip ABD HEP            Step ups             Leg press             Ther Activity                                       Gait Training                                       Modalities                          IE/HEP JF

## 2022-03-22 ENCOUNTER — OFFICE VISIT (OUTPATIENT)
Dept: PHYSICAL THERAPY | Facility: CLINIC | Age: 67
End: 2022-03-22
Payer: COMMERCIAL

## 2022-03-22 DIAGNOSIS — Z96.651 S/P TOTAL KNEE ARTHROPLASTY, RIGHT: Primary | ICD-10-CM

## 2022-03-22 DIAGNOSIS — M17.11 ARTHRITIS OF RIGHT KNEE: ICD-10-CM

## 2022-03-22 PROCEDURE — 97110 THERAPEUTIC EXERCISES: CPT

## 2022-03-22 PROCEDURE — 97140 MANUAL THERAPY 1/> REGIONS: CPT

## 2022-03-22 NOTE — PROGRESS NOTES
Daily Note     Today's date: 3/22/2022  Patient name: Magalie Whitaker  : 1955  MRN: [id number]  Referring provider: Twan Berg PA-C  Dx:   Encounter Diagnosis     ICD-10-CM    1  S/P total knee arthroplasty, right  Z96 651    2  Arthritis of right knee  M17 11                   Subjective: Patient reported pain in R knee rated 5/10 which is typical  Admits to walking more without the walker and being diligent with her HEP twice daily  She has most difficulty first thing in the morning when getting out of bed with initial weight bearing  Objective: See treatment diary below  Progressed program as documented below  Adonis and rico issued as updated HEP  Assessment: 3 weeks post-op today  Manual focused on increasing ext with more available range post manual  Focus on progressing quad and glute strength, along with promoting active ext to help reduce compensatory gait without AD  Encourage to reduce UE support during mini squats and steps to focus more on LE  Will continue to work on addressing above noted deficits to work towards improving gait and functional abilities  Plan: Continue per plan of care  Progress treatment as tolerated              Precautions: RA, osteoporosis    Manuals 3/17 3/22           R knee PROM  EH           Ext mob  PROM - EH           Patellar mob  PROM - EH                        Neuro Re-Ed                                                                                                        Ther Ex             bike 5 min 7 min  timer           Seated extension self mob HEP            Prone extension stretch HEP 3 min           clamshells  2x10           SLR  2x10           TKE  GTB  5" hold, 2x10           Mini squats HEP 2x10           Standing hip ABD HEP OTB  2x10  bilat           Step ups  4", 6"  x10 ea           Leg press -   seat 6   55#  3x10                        Ther Activity                                       Gait Training Modalities                          IE/HEP JF

## 2022-03-24 ENCOUNTER — OFFICE VISIT (OUTPATIENT)
Dept: PHYSICAL THERAPY | Facility: CLINIC | Age: 67
End: 2022-03-24
Payer: COMMERCIAL

## 2022-03-24 DIAGNOSIS — M17.11 ARTHRITIS OF RIGHT KNEE: Primary | ICD-10-CM

## 2022-03-24 DIAGNOSIS — Z96.651 S/P TOTAL KNEE ARTHROPLASTY, RIGHT: ICD-10-CM

## 2022-03-24 PROCEDURE — 97140 MANUAL THERAPY 1/> REGIONS: CPT | Performed by: PHYSICAL THERAPIST

## 2022-03-24 PROCEDURE — 97110 THERAPEUTIC EXERCISES: CPT | Performed by: PHYSICAL THERAPIST

## 2022-03-24 NOTE — PROGRESS NOTES
Daily Note     Today's date: 3/24/2022  Patient name: Ada Carrion  : 1955  MRN: [id number]  Referring provider: Hannah Jeffery PA-C  Dx:   Encounter Diagnosis     ICD-10-CM    1  Arthritis of right knee  M17 11    2  S/P total knee arthroplasty, right  Z96 651                   Subjective: Patient reports that her knee is feeling pretty good today  Objective: See treatment diary below  Progressed treatment as indicated below  R knee flexion: 120 degrees  R knee extension: -15 degrees  R knee extension post treatment: -10 degrees      Assessment: Patient was able to tolerate progression of resistance exercises without an increase in pain  They demonstrated muscular fatigue as expected with progression  Patient's flexion ROM improving well  Extension ROM lagging behind  Refocused treatment to work on quad activation, knee extension, and hip strength  Continue to progress as tolerated  Patient will continue to benefit from skilled PT in order to address impairments and improve function  Plan: Continue per plan of care  Progress treatment as tolerated              Precautions: RA, osteoporosis    Manuals 3/17 3/22 3/24          R knee PROM  EH JF          Ext mob  PROM - EH JF  Gr 3          Patellar mob  PROM - EH JF                       Neuro Re-Ed                                                                                                        Ther Ex             bike 5 min 7 min  timer 7 min          Seated extension self mob HEP            Prone extension stretch HEP 3 min 3 min   1#            clamshells  2x10 2x10  Pink          SLR  2x10 2x10          TKE  GTB  5" hold, 2x10 GTB  5" hold          Mini squats HEP 2x10 2x10          Standing hip ABD HEP OTB  2x10  bilat OTB  2x10 B/L          Step ups  4", 6"  x10 ea nv          Leg press -   seat 6   55#  3x10 55#  3x10                       Ther Activity                                       Gait Training Modalities                          IE/HEP JF

## 2022-03-29 ENCOUNTER — OFFICE VISIT (OUTPATIENT)
Dept: PHYSICAL THERAPY | Facility: CLINIC | Age: 67
End: 2022-03-29
Payer: COMMERCIAL

## 2022-03-29 DIAGNOSIS — M17.11 ARTHRITIS OF RIGHT KNEE: Primary | ICD-10-CM

## 2022-03-29 DIAGNOSIS — Z96.651 S/P TOTAL KNEE ARTHROPLASTY, RIGHT: ICD-10-CM

## 2022-03-29 PROCEDURE — 97110 THERAPEUTIC EXERCISES: CPT | Performed by: PHYSICAL THERAPIST

## 2022-03-29 PROCEDURE — 97140 MANUAL THERAPY 1/> REGIONS: CPT | Performed by: PHYSICAL THERAPIST

## 2022-03-29 NOTE — PROGRESS NOTES
Daily Note     Today's date: 3/29/2022  Patient name: Chandan Conner  : 1955  MRN: [id number]  Referring provider: Matilde Hammond PA-C  Dx:   Encounter Diagnosis     ICD-10-CM    1  Arthritis of right knee  M17 11    2  S/P total knee arthroplasty, right  Z96 651                   Subjective: Patient reports that her knee is feeling pretty good today  Walking more at home without her cane  Objective: See treatment diary below  Progressed treatment as indicated below  R knee flexion: 120 degrees  R knee extension AROM: -10 degrees  R knee extension PROM: -6 degrees    Assessment: Patient is 4 weeks post-op, progressing well  Patient was able to tolerate progression of resistance exercises without an increase in pain  They demonstrated muscular fatigue as expected with progression  Patient's flexion ROM improving well  Extension ROM continues to lag behind, but is improving  Treatment continues to address quad activation, knee extension, and hip strength  Continue to progress as tolerated  Patient will continue to benefit from skilled PT in order to address impairments and improve function  Plan: Continue per plan of care  Progress treatment as tolerated              Precautions: RA, osteoporosis  DOS: 3/1/22    Manuals 3/17 3/22 3/24 3/29         R knee PROM  EH JF JF         Ext mob  PROM - EH JF  Gr 3 JF  Gr 3         Patellar mob  PROM - EH JF JF                      Neuro Re-Ed                                                                                                        Ther Ex             bike 5 min 7 min  timer 7 min 10 min         Seated extension self mob HEP            Prone extension stretch HEP 3 min 3 min   1#   3 min 1 5#         clamshells  2x10 2x10  Pink 2x10  GTB         SLR  2x10 2x10 2x10         TKE  GTB  5" hold, 2x10 GTB  5" hold GTB  5"  hold         Mini squats HEP 2x10 2x10 2x10         Standing hip ABD HEP OTB  2x10  bilat OTB  2x10 B/L OTB  2x10  B/L         Step ups  4"  x10 ea nv 4"  x10 ea         Leg press -   seat 6   55#  3x10 55#  3x10 55#  3x10                      Ther Activity                                       Gait Training                                       Modalities                          IE/HEP JF

## 2022-03-31 ENCOUNTER — OFFICE VISIT (OUTPATIENT)
Dept: PHYSICAL THERAPY | Facility: CLINIC | Age: 67
End: 2022-03-31
Payer: COMMERCIAL

## 2022-03-31 DIAGNOSIS — Z96.651 S/P TOTAL KNEE ARTHROPLASTY, RIGHT: ICD-10-CM

## 2022-03-31 DIAGNOSIS — M17.11 ARTHRITIS OF RIGHT KNEE: Primary | ICD-10-CM

## 2022-03-31 PROCEDURE — 97140 MANUAL THERAPY 1/> REGIONS: CPT | Performed by: PHYSICAL THERAPIST

## 2022-03-31 PROCEDURE — 97110 THERAPEUTIC EXERCISES: CPT | Performed by: PHYSICAL THERAPIST

## 2022-03-31 NOTE — PROGRESS NOTES
Daily Note     Today's date: 3/31/2022  Patient name: Jose Perry  : 1955  MRN: [id number]  Referring provider: Laura Velazquez PA-C  Dx:   Encounter Diagnosis     ICD-10-CM    1  Arthritis of right knee  M17 11    2  S/P total knee arthroplasty, right  Z96 651                   Subjective: Patient reports that her knee is sore, which she contributes to the weather  No longer using the cane at home  Feels like she is walking better  Objective: See treatment diary below  Progressed treatment as indicated below  R knee flexion: 122 degrees  R knee extension AROM: -5 degrees      Assessment: Patient continues to meet flexion goals, with extension improving slowly  Patient was able to tolerate progression of resistance exercises without an increase in pain  They demonstrated muscular fatigue as expected with progression  Continue to progress as tolerated  Patient will continue to benefit from skilled PT in order to address impairments and improve function  Plan: Continue per plan of care  Progress treatment as tolerated              Precautions: RA, osteoporosis  DOS: 3/1/22    Manuals 3/17 3/22 3/24 3/29 3/31        R knee PROM  EH JF JF JF        Ext mob  PROM - EH JF  Gr 3 JF  Gr 3 JF        Patellar mob  PROM - EH JF JF JF                     Neuro Re-Ed                                                                                                        Ther Ex             bike 5 min 7 min  timer 7 min 10 min 7 min        Seated extension self mob HEP            Prone extension stretch HEP 3 min 3 min   1#   3 min 1 5# 4 min  1 5#        clamshells  2x10 2x10  Pink 2x10  GTB GTB  2x10        SLR  2x10 2x10 2x10 2x10        TKE  GTB  5" hold, 2x10 GTB  5" hold GTB  5"  hold BTB  5"  2x10        Mini squats HEP 2x10 2x10 2x10 2x10        Standing hip ABD HEP OTB  2x10  bilat OTB  2x10 B/L OTB  2x10  B/L         Step ups  4"  x10 ea nv 4"  x10 ea 4"  2x10 ea        Lateral step ups     4"  2x10 ea        Leg press -   seat 6      35#  3x10                     Ther Activity                                       Gait Training                                       Modalities                          IE/HEP JF

## 2022-04-05 ENCOUNTER — OFFICE VISIT (OUTPATIENT)
Dept: PHYSICAL THERAPY | Facility: CLINIC | Age: 67
End: 2022-04-05
Payer: COMMERCIAL

## 2022-04-05 DIAGNOSIS — M17.11 ARTHRITIS OF RIGHT KNEE: Primary | ICD-10-CM

## 2022-04-05 DIAGNOSIS — Z96.651 S/P TOTAL KNEE ARTHROPLASTY, RIGHT: ICD-10-CM

## 2022-04-05 PROCEDURE — 97140 MANUAL THERAPY 1/> REGIONS: CPT | Performed by: PHYSICAL THERAPIST

## 2022-04-05 PROCEDURE — 97110 THERAPEUTIC EXERCISES: CPT | Performed by: PHYSICAL THERAPIST

## 2022-04-05 NOTE — PROGRESS NOTES
Daily Note     Today's date: 2022  Patient name: Ana Cintron  : 1955  MRN: [id number]  Referring provider: Patrick Bates PA-C  Dx:   Encounter Diagnosis     ICD-10-CM    1  Arthritis of right knee  M17 11    2  S/P total knee arthroplasty, right  Z96 651                   Subjective: Patient reports that her knee is sore, which she contributes to the weather  No longer using the cane at home  Feels like she is walking better  Objective: See treatment diary below  Progressed treatment as indicated below  R knee flexion: 123 degrees  R knee extension AROM pretreatment: -7 degrees  R knee extension AROM post-treatment: -4 degrees    Assessment: 5 weeks post op  Patient's gait pattern improving, though she continues to demonstrate a trendelenberg shift with ambulation and S/L exercises    Patient's extension improving slowly  Patient was able to tolerate progression of resistance exercises without an increase in pain  They demonstrated muscular fatigue as expected with progression  Continue to progress as tolerated  Patient will continue to benefit from skilled PT in order to address impairments and improve function  Plan: Continue per plan of care  Progress treatment as tolerated              Precautions: RA, osteoporosis  DOS: 3/1/22    Manuals 3/17 3/22 3/24 3/29 3/31 4/5       R knee PROM  EH JF JF JF JF       Ext mob  PROM - EH JF  Gr 3 JF  Gr 3 JF JF       Patellar mob  PROM - EH JF JF JF JF                    Neuro Re-Ed                                                                                                        Ther Ex             bike 5 min 7 min  timer 7 min 10 min 7 min 8 min       Seated extension self mob HEP            Prone extension stretch HEP 3 min 3 min   1#   3 min 1 5# 4 min  1 5# 4 min   2#       clamshells  2x10 2x10  Pink 2x10  GTB GTB  2x10 BTB  2x10       SLR  2x10 2x10 2x10 2x10 2x10  1#       TKE  GTB  5" hold, 2x10 GTB  5" hold GTB  5"  hold BTB  5"  2x10 BTB  5"  2x10       Mini squats HEP 2x10 2x10 2x10 2x10 2x10       Standing hip ABD HEP OTB  2x10  bilat OTB  2x10 B/L OTB  2x10  B/L         Step ups  4"  x10 ea nv 4"  x10 ea 4"  2x10 ea 4"  2x10 ea       Lateral step ups     4"  2x10 ea 4"  2x10 ea       Leg press -   seat 6      35#  3x10 35#  3x10                    Ther Activity                                       Gait Training                                       Modalities                          IE/HEP JF

## 2022-04-07 ENCOUNTER — OFFICE VISIT (OUTPATIENT)
Dept: PHYSICAL THERAPY | Facility: CLINIC | Age: 67
End: 2022-04-07
Payer: COMMERCIAL

## 2022-04-07 DIAGNOSIS — M17.11 ARTHRITIS OF RIGHT KNEE: Primary | ICD-10-CM

## 2022-04-07 DIAGNOSIS — Z96.651 S/P TOTAL KNEE ARTHROPLASTY, RIGHT: ICD-10-CM

## 2022-04-07 PROCEDURE — 97110 THERAPEUTIC EXERCISES: CPT | Performed by: PHYSICAL THERAPIST

## 2022-04-07 PROCEDURE — 97140 MANUAL THERAPY 1/> REGIONS: CPT | Performed by: PHYSICAL THERAPIST

## 2022-04-07 NOTE — PROGRESS NOTES
Daily Note     Today's date: 2022  Patient name: Karlie Saez  : 1955  MRN: [id number]  Referring provider: Stevie Pierre PA-C  Dx:   Encounter Diagnosis     ICD-10-CM    1  Arthritis of right knee  M17 11    2  S/P total knee arthroplasty, right  Z96 651                   Subjective: Patient reports that her knee is sore due to the weather, but otherwise she feels good  Objective: See treatment diary below  Progressed treatment as indicated below  Assessment: Patient was able to tolerate progression of resistance exercises without an increase in pain  They demonstrated muscular fatigue as expected with progression  Patient progressing well  Continue to progress as tolerated  Patient will continue to benefit from skilled PT in order to address impairments and improve function  Plan: Continue per plan of care  Progress treatment as tolerated              Precautions: RA, osteoporosis  DOS: 3/1/22    Manuals 3/17 3/22 3/24 3/29 3/31 4/5 4/7      R knee PROM  EH JF JF JF JF JF      Ext mob  PROM - EH JF  Gr 3 JF  Gr 3 JF JF JF      Patellar mob  PROM - EH JF JF JF JF JF                   Neuro Re-Ed                                                                                                        Ther Ex             bike 5 min 7 min  timer 7 min 10 min 7 min 8 min 6 min      Seated extension self mob HEP            Prone extension stretch HEP 3 min 3 min   1#   3 min 1 5# 4 min  1 5# 4 min   2# 4 min 2#      clamshells  2x10 2x10  Pink 2x10  GTB GTB  2x10 BTB  2x10 BTB  2x10      SLR  2x10 2x10 2x10 2x10 2x10  1# 2x10 1#      TKE  GTB  5" hold, 2x10 GTB  5" hold GTB  5"  hold BTB  5"  2x10 BTB  5"  2x10 BTB  5"  2x10      Mini squats HEP 2x10 2x10 2x10 2x10 2x10 2x10      Standing hip ABD HEP OTB  2x10  bilat OTB  2x10 B/L OTB  2x10  B/L         Step ups  4"  x10 ea nv 4"  x10 ea 4"  2x10 ea 4"  2x10 ea 4"  2x10 ea      Lateral step ups     4"  2x10 ea 4"  2x10 ea 4"  2x10ea      Leg press -   seat 6      35#  3x10 35#  3x10 45#  3x10      MH ABD       10#  3x10      Ther Activity                                       Gait Training                                       Modalities                          IE/HEP JF

## 2022-04-11 ENCOUNTER — OFFICE VISIT (OUTPATIENT)
Dept: OBGYN CLINIC | Facility: MEDICAL CENTER | Age: 67
End: 2022-04-11

## 2022-04-11 VITALS
HEART RATE: 69 BPM | DIASTOLIC BLOOD PRESSURE: 74 MMHG | BODY MASS INDEX: 20.38 KG/M2 | WEIGHT: 119.4 LBS | HEIGHT: 64 IN | SYSTOLIC BLOOD PRESSURE: 132 MMHG

## 2022-04-11 DIAGNOSIS — Z96.651 STATUS POST TOTAL KNEE REPLACEMENT, RIGHT: Primary | ICD-10-CM

## 2022-04-11 PROCEDURE — 99024 POSTOP FOLLOW-UP VISIT: CPT | Performed by: ORTHOPAEDIC SURGERY

## 2022-04-11 NOTE — PROGRESS NOTES
Assessment/Plan:  1  Status post total knee replacement, right      No orders of the defined types were placed in this encounter  · Continue PT  Will continue to work on knee extension  · Pain control prn- celebrex and tylenol  · Patient should call ahead for abx prior to dental appts  Return in about 4 weeks (around 5/9/2022) for right TKA post op 3  I answered all of the patient's questions during the visit and provided education of the patient's condition during the visit  The patient verbalized understanding of the information given and agrees with the plan  This note was dictated using [employer] software  It may contain errors including improperly dictated words  Please contact physician directly for any questions  Subjective   Chief Complaint:   Chief Complaint   Patient presents with    Right Knee - Post-op, Follow-up       Callie Blackburn is a 75 y o  female who presents for 6 week follow up s/p right TKA on 3/1/22  Patient states she is doing well and is very happy so far  She states she has no pain in her knee except after she has worked with PT  Patient is attending outpatient PT twice per week and working on her extension  She is not using any assistive device  She is taking celebrex and tylenol for pain  Patient notes resolution of hallucinations since stopping gabapentin  She resumed her pre op dosing of methotrexate  She completed ASA DVT prophylaxis  Overall patient is very happy  Review of Systems  ROS:    See HPI for musculoskeletal review     All other systems reviewed are negative     History:  Past Medical History:   Diagnosis Date    Anemia     1995 - corrected with iron    Ankle fracture, right     last assessed: 3/9/15    Anxiety     Arthritis     1996    Chronic pain disorder     right side joint pain     Headache     Herpes zoster     2/2009    Moderate exercise     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    Osteoarthritis     PONV (postoperative nausea and vomiting)     per pt after ankle surgery    RA (rheumatoid arthritis) ([address] )     sees Rheumatologist q 6mths Dr Bunch Corporal Right knee pain     Wears dentures     full upper    Wears glasses     reading     Past Surgical History:   Procedure Laterality Date    ANKLE SURGERY      ORIF /plate and 4 screws    CYST REMOVAL          ESOPHAGOGASTRODUODENOSCOPY      Diagnostic    FOOT SURGERY      plantar wart resection; resolved:     [place] TOTAL KNEE ARTHROPLASTY Right 3/1/2022    Procedure: ARTHROPLASTY KNEE TOTAL;  Surgeon: Tammie Duggan DO;  Location: [facility];  Service: Orthopedics    TONSILLECTOMY AND ADENOIDECTOMY          TUBAL LIGATION      WISDOM TOOTH EXTRACTION       Social History   Social History     Substance and Sexual Activity   Alcohol Use Yes    Comment: rare, maybe on holidays     Social History     Substance and Sexual Activity   Drug Use No     Social History     Tobacco Use   Smoking Status Former Smoker    Quit date:     Years since quittin 3   Smokeless Tobacco Never Used     Family History:   Family History   Problem Relation Age of Onset    Seizures Mother         epilepsy    Bone cancer Father     No Known Problems Sister     No Known Problems Brother     No Known Problems Son     No Known Problems Maternal Grandmother     No Known Problems Maternal Grandfather     No Known Problems Paternal Grandmother     No Known Problems Paternal Grandfather     Thyroid disease Sister     No Known Problems Sister     No Known Problems Brother        Current Outpatient Medications on File Prior to Visit   Medication Sig Dispense Refill    acetaminophen (TYLENOL) 325 mg tablet Take 3 tablets (975 mg total) by mouth every 8 (eight) hours  0    ascorbic acid (VITAMIN C) 500 MG tablet Take 1 tablet (500 mg total) by mouth daily Start to take 30 days prior to surgery 30 tablet 1    aspirin (ECOTRIN) 325 mg EC tablet Take 1 tablet (325 mg total) by mouth 2 (two) times a day Take with food  84 tablet 0    Ca Carbonate-Mag Hydroxide (ROLAIDS) 550-110 MG CHEW Chew Twice daily      docusate sodium (COLACE) 100 mg capsule Take 1 capsule (100 mg total) by mouth 2 (two) times a day 20 capsule 0    ferrous sulfate 324 (65 Fe) mg Take 1 tablet (324 mg total) by mouth daily before breakfast Start to take 30 days prior to surgery 30 tablet 1    folic acid (FOLVITE) 1 mg tablet Take by mouth      gabapentin (NEURONTIN) 100 mg capsule Take 1 capsule (100 mg total) by mouth every 8 (eight) hours 90 capsule 0    ibandronate (BONIVA) 150 MG tablet Take 1 tablet by mouth as needed        meclizine (ANTIVERT) 25 mg tablet Take 1 tablet (25 mg total) by mouth every 8 (eight) hours as needed for dizziness 30 tablet 0    Multiple Vitamin (multivitamin) tablet Take 1 tablet by mouth daily Start to take 30 days prior to surgery 30 tablet 1    oxyCODONE (ROXICODONE) 5 immediate release tablet Take 1 tablet (5 mg total) by mouth every 4 (four) hours as needed for severe pain Or take 1/2 tablet (2 5 mg total) by mouth every 4 (four) hours as needed for moderate pain  Max Daily Amount: 30 mg 30 tablet 0    PARoxetine (PAXIL) 10 mg tablet Take 1 tablet (10 mg total) by mouth daily 90 tablet 3    predniSONE 5 mg tablet Take 5 mg by mouth daily       promethazine (PHENERGAN) 12 5 MG tablet Take 1 tablet (12 5 mg total) by mouth every 6 (six) hours as needed for nausea or vomiting 4 tablet 0    solifenacin (VESICARE) 10 MG tablet Take 1 tablet (10 mg total) by mouth daily 90 tablet 3     No current facility-administered medications on file prior to visit       Allergies   Allergen Reactions    Aspirin Other (See Comments)     Per pt avoids taking due to rheumatology Meds        Objective     /74   Pulse 69   Ht 5' 4" (1 626 m)   Wt 54 2 kg (119 lb 6 4 oz)   BMI 20 49 kg/m²      PE:  AAOx 3  WDWN  Hearing intact, no drainage from eyes  no audible wheezing  no abdominal distension  LE compartments soft, AT/GS intact    Ortho Exam:  right Knee:   INC: healed, No erythema, mild swelling  AROM:3 - 115 degrees  PROM: 2- 120 degrees

## 2022-04-12 ENCOUNTER — OFFICE VISIT (OUTPATIENT)
Dept: PHYSICAL THERAPY | Facility: CLINIC | Age: 67
End: 2022-04-12
Payer: COMMERCIAL

## 2022-04-12 DIAGNOSIS — Z96.651 S/P TOTAL KNEE ARTHROPLASTY, RIGHT: ICD-10-CM

## 2022-04-12 DIAGNOSIS — M17.11 ARTHRITIS OF RIGHT KNEE: Primary | ICD-10-CM

## 2022-04-12 PROCEDURE — 97110 THERAPEUTIC EXERCISES: CPT | Performed by: PHYSICAL THERAPIST

## 2022-04-12 PROCEDURE — 97140 MANUAL THERAPY 1/> REGIONS: CPT | Performed by: PHYSICAL THERAPIST

## 2022-04-12 NOTE — PROGRESS NOTES
Daily Note     Today's date: 2022  Patient name: Zully Hawkins  : 1955  MRN: [id number]  Referring provider: Krystal Franco PA-C  Dx:   Encounter Diagnosis     ICD-10-CM    1  Arthritis of right knee  M17 11    2  S/P total knee arthroplasty, right  Z96 651                   Subjective: Patient reports that she had a good visit with the surgeon yesterday  Happy with progress thus far  Objective: See treatment diary below  Progressed treatment as indicated below  Assessment: Patient was able to tolerate progression of resistance exercises without an increase in pain  They demonstrated muscular fatigue as expected with progression  Patient progressing well  Knee extension improving slowly with manual therapy  Continue to progress as tolerated  Patient will continue to benefit from skilled PT in order to address impairments and improve function  Plan: Continue per plan of care  Progress treatment as tolerated              Precautions: RA, osteoporosis  DOS: 3/1/22    Manuals 3/17 3/22 3/24 3/29 3/31 4/5 4/7 4/12     R knee PROM  EH JF JF JF JF JF JF     Ext mob  PROM - EH JF  Gr 3 JF  Gr 3 JF JF JF JF     Patellar mob  PROM - EH JF JF JF JF JF JF                  Neuro Re-Ed                                                                                                        Ther Ex             bike 5 min 7 min  timer 7 min 10 min 7 min 8 min 6 min 10 min     Seated extension self mob HEP            Prone extension stretch HEP 3 min 3 min   1#   3 min 1 5# 4 min  1 5# 4 min   2# 4 min 2# 4 min 2#     clamshells  2x10 2x10  Pink 2x10  GTB GTB  2x10 BTB  2x10 BTB  2x10 BTB  3x10     SLR  2x10 2x10 2x10 2x10 2x10  1# 2x10 1# 3x10  2#     TKE  GTB  5" hold, 2x10 GTB  5" hold GTB  5"  hold BTB  5"  2x10 BTB  5"  2x10 BTB  5"  2x10 Purple  5"  2x10     Mini squats HEP 2x10 2x10 2x10 2x10 2x10 2x10 2x10     Standing hip ABD HEP OTB  2x10  bilat OTB  2x10 B/L OTB  2x10  B/L         Step ups 4"  x10 ea nv 4"  x10 ea 4"  2x10 ea 4"  2x10 ea 4"  2x10 ea 6"  2x10 ea     Lateral step ups     4"  2x10 ea 4"  2x10 ea 4"  2x10ea 4"  2x10ea     Leg press -   seat 6      35#  3x10 35#  3x10 45#  3x10 45#  3x10     MH ABD       10#  3x10 10#  3x10     Ther Activity                                       Gait Training                                       Modalities                          IE/HEP JF

## 2022-04-14 ENCOUNTER — OFFICE VISIT (OUTPATIENT)
Dept: PHYSICAL THERAPY | Facility: CLINIC | Age: 67
End: 2022-04-14
Payer: COMMERCIAL

## 2022-04-14 DIAGNOSIS — M17.11 ARTHRITIS OF RIGHT KNEE: Primary | ICD-10-CM

## 2022-04-14 DIAGNOSIS — Z96.651 S/P TOTAL KNEE ARTHROPLASTY, RIGHT: ICD-10-CM

## 2022-04-14 PROCEDURE — 97140 MANUAL THERAPY 1/> REGIONS: CPT | Performed by: PHYSICAL THERAPIST

## 2022-04-14 PROCEDURE — 97110 THERAPEUTIC EXERCISES: CPT | Performed by: PHYSICAL THERAPIST

## 2022-04-14 NOTE — PROGRESS NOTES
Daily Note     Today's date: 2022  Patient name: Leonarda Roach  : 1955  MRN: [id number]  Referring provider: Wenceslao Odom PA-C  Dx:   Encounter Diagnosis     ICD-10-CM    1  Arthritis of right knee  M17 11    2  S/P total knee arthroplasty, right  Z96 651                   Subjective: Patient reports that her knee continues to been doing well  A little sore today "from the weather"       Objective: See treatment diary below  Progressed treatment as indicated below  FOTO 73 (was 58)    Assessment: Patient was able to tolerate progression of resistance exercises without an increase in pain  They demonstrated muscular fatigue as expected with progression  Patient's knee extension improving slowly  Functional strength improving, and patient did not require use of hands during STS  Continue to progress as tolerated  Patient will continue to benefit from skilled PT in order to address impairments and improve function  Plan: Continue per plan of care  Progress treatment as tolerated              Precautions: RA, osteoporosis  DOS: 3/1/22    Manuals 3/17 3/22 3/24 3/29 3/31 4/5 4/7 4/12 4/14    R knee PROM  EH JF JF JF JF JF JF JF    Ext mob  PROM - EH JF  Gr 3 JF  Gr 3 JF JF JF JF JF    Patellar mob  PROM - EH JF JF JF JF JF JF JF                 Neuro Re-Ed                                                                                                        Ther Ex             bike 5 min 7 min  timer 7 min 10 min 7 min 8 min 6 min 10 min 10 min    Seated extension self mob HEP            Prone extension stretch HEP 3 min 3 min   1#   3 min 1 5# 4 min  1 5# 4 min   2# 4 min 2# 4 min 2# 4 min  2 5#    clamshells  2x10 2x10  Pink 2x10  GTB GTB  2x10 BTB  2x10 BTB  2x10 BTB  3x10 BTB  3x10    SLR  2x10 2x10 2x10 2x10 2x10  1# 2x10 1# 3x10  2# 2x10  2 5#    TKE  GTB  5" hold, 2x10 GTB  5" hold GTB  5"  hold BTB  5"  2x10 BTB  5"  2x10 BTB  5"  2x10 Purple  5"  2x10 Purple  5"  2x10    Mini squats HEP 2x10 2x10 2x10 2x10 2x10 2x10 2x10 STS  3x5    Standing hip ABD HEP OTB  2x10  bilat OTB  2x10 B/L OTB  2x10  B/L         Step ups  4"  x10 ea nv 4"  x10 ea 4"  2x10 ea 4"  2x10 ea 4"  2x10 ea 6"  2x10 ea 6"  2x10 ea    Lateral step ups     4"  2x10 ea 4"  2x10 ea 4"  2x10ea 4"  2x10ea 4" 2x10ea    Leg press -   seat 6      35#  3x10 35#  3x10 45#  3x10 45#  3x10 55#  3x10    MH ABD       10#  3x10 10#  3x10 10#  3x10    Ther Activity                                       Gait Training                                       Modalities                          IE/HEP JF

## 2022-04-19 ENCOUNTER — OFFICE VISIT (OUTPATIENT)
Dept: PHYSICAL THERAPY | Facility: CLINIC | Age: 67
End: 2022-04-19
Payer: COMMERCIAL

## 2022-04-19 DIAGNOSIS — Z96.651 S/P TOTAL KNEE ARTHROPLASTY, RIGHT: ICD-10-CM

## 2022-04-19 DIAGNOSIS — M17.11 ARTHRITIS OF RIGHT KNEE: Primary | ICD-10-CM

## 2022-04-19 PROCEDURE — 97140 MANUAL THERAPY 1/> REGIONS: CPT

## 2022-04-19 PROCEDURE — 97530 THERAPEUTIC ACTIVITIES: CPT

## 2022-04-19 PROCEDURE — 97110 THERAPEUTIC EXERCISES: CPT

## 2022-04-21 ENCOUNTER — OFFICE VISIT (OUTPATIENT)
Dept: PHYSICAL THERAPY | Facility: CLINIC | Age: 67
End: 2022-04-21
Payer: COMMERCIAL

## 2022-04-21 DIAGNOSIS — M17.11 ARTHRITIS OF RIGHT KNEE: Primary | ICD-10-CM

## 2022-04-21 DIAGNOSIS — Z96.651 S/P TOTAL KNEE ARTHROPLASTY, RIGHT: ICD-10-CM

## 2022-04-21 PROCEDURE — 97110 THERAPEUTIC EXERCISES: CPT | Performed by: PHYSICAL THERAPIST

## 2022-04-21 PROCEDURE — 97140 MANUAL THERAPY 1/> REGIONS: CPT | Performed by: PHYSICAL THERAPIST

## 2022-04-21 NOTE — PROGRESS NOTES
Daily Note     Today's date: 2022  Patient name: Rufus Guzman  : 1955  MRN: [id number]  Referring provider: Yeni Salinas PA-C  Dx:   No diagnosis found  Subjective: Patient is very pleased thus far with her overall progress  Continues to work diligently on obtaining ext with overpressure and prop at home  Denies having any pain in R knee  Objective: See treatment diary below  Progressed exercises as indicated below  Assessment: Patient was able to tolerate progression of resistance exercises without an increase in pain  They demonstrated muscular fatigue as expected with progression  Patient reported no pain with exercise again this session  Knee PROM continues to improve  Continue to progress as tolerated  Patient will continue to benefit from skilled PT in order to address impairments and improve function  Plan: Continue per plan of care  Progress treatment as tolerated              Precautions: RA, osteoporosis  DOS: 3/1/22    Manuals 3/29 3/31 4/5 4/7 4/12 4/14 4/19 4/21   R knee PROM JF JF JF JF JF JF EH JF   Ext mob JF  Gr 3 JF JF JF JF JF PROM - EH JF   Patellar mob JF JF JF JF JF JF PROM - EH JF              Neuro Re-Ed                                                                                        Ther Ex           bike 10 min 7 min 8 min 6 min 10 min 10 min 10 min 10 min   Seated extension self mob           Prone extension stretch 3 min 1 5# 4 min  1 5# 4 min   2# 4 min 2# 4 min 2# 4 min  2 5# 2 5#  4 min    clamshells 2x10  GTB GTB  2x10 BTB  2x10 BTB  2x10 BTB  3x10 BTB  3x10 BTB  3x10 Purple  3x10   SLR 2x10 2x10 2x10  1# 2x10 1# 3x10  2# 2x10  2 5# 2 5#  2x10 2x5#  2x10   TKE GTB  5"  hold BTB  5"  2x10 BTB  5"  2x10 BTB  5"  2x10 Purple  5"  2x10 Purple  5"  2x10 Purple  5" hold, 3x10 Purple  5" hold  3x10   Mini squats 2x10 2x10 2x10 2x10 2x10 STS  3x5 STS  3x5 STS  3x5   Standing hip ABD OTB  2x10  B/L          Step ups 4"  x10 ea 4"  2x10 ea 4"  2x10 ea 4"  2x10 ea 6"  2x10 ea 6"  2x10 ea 6"  2x10 6"  2x10   Lateral step ups  4"  2x10 ea 4"  2x10 ea 4"  2x10ea 4"  2x10ea 4" 2x10ea 4"  2x10 4"  2x10   Leg press -   seat 6   35#  3x10 35#  3x10 45#  3x10 45#  3x10 55#  3x10 55#  3x10 55#  3x10   MH ABD    10#  3x10 10#  3x10 10#  3x10  NV 10#  3x10              Ther Activity                                 Gait Training                                 Modalities                      IE/HEP

## 2022-04-26 ENCOUNTER — OFFICE VISIT (OUTPATIENT)
Dept: PHYSICAL THERAPY | Facility: CLINIC | Age: 67
End: 2022-04-26
Payer: COMMERCIAL

## 2022-04-26 DIAGNOSIS — M17.11 ARTHRITIS OF RIGHT KNEE: Primary | ICD-10-CM

## 2022-04-26 DIAGNOSIS — Z96.651 S/P TOTAL KNEE ARTHROPLASTY, RIGHT: ICD-10-CM

## 2022-04-26 PROCEDURE — 97110 THERAPEUTIC EXERCISES: CPT

## 2022-04-26 PROCEDURE — 97140 MANUAL THERAPY 1/> REGIONS: CPT

## 2022-04-26 NOTE — PROGRESS NOTES
Daily Note     Today's date: 2022  Patient name: Gurjit Holly  : 1955  MRN: [id number]  Referring provider: David Daniel PA-C  Dx:   Encounter Diagnosis     ICD-10-CM    1  Arthritis of right knee  M17 11    2  S/P total knee arthroplasty, right  Z96 651                   Subjective: Patient reported R knee is making good progress and is happy with her overall improvement  Continues to be diligent with HEP and working on terminal ext  She has noticed more "crunching and popping" in lateral aspect of knee, no pain when this occurs and at times occasional nerve related pain but this is brief  Objective: See treatment diary below  Assessment: Able to achieve full TKE with significant overpressure  Still limitations with flex on R when compared to L despite obtaining functional mobility on R  No pain in R knee with deadlift rather increased kyphosis when completing  Will continue to work on functional strength and stability needed to increase quality of life and return to work duties without function  Plan: Continue per plan of care  Progress treatment as tolerated              Precautions: RA, osteoporosis  DOS: 3/1/22    Manuals 3/29 3/31 4   R knee PROM JF JF JF JF JF JF EH JF EH   Ext mob JF  Gr 3 JF JF JF JF JF PROM - EH JF PROM - EH   Patellar mob JF JF JF JF JF JF PROM - EH JF PROM - EH               Neuro Re-Ed                                                                                                Ther Ex            bike 10 min 7 min 8 min 6 min 10 min 10 min 10 min 10 min 10 min  L1   Seated extension self mob            Prone extension stretch 3 min 1 5# 4 min  1 5# 4 min   2# 4 min 2# 4 min 2# 4 min  2 5# 2 5#  4 min     clamshells 2x10  GTB GTB  2x10 BTB  2x10 BTB  2x10 BTB  3x10 BTB  3x10 BTB  3x10 Purple  3x10 Purple  3x10   SLR 2x10 2x10 2x10  1# 2x10 1# 3x10  2# 2x10  2 5# 2 5#  2x10 2x5#  2x10 2 5#  2x12   TKE GTB  5"  hold BTB  5"  2x10 BTB  5"  2x10 BTB  5"  2x10 Purple  5"  2x10 Purple  5"  2x10 Purple  5" hold, 3x10 Purple  5" hold  3x10 Purple  5" hold, 3x10   Mini squats 2x10 2x10 2x10 2x10 2x10 STS  3x5 STS  3x5 STS  3x5 STS  3x5   Standing hip ABD OTB  2x10  B/L           Step ups 4"  x10 ea 4"  2x10 ea 4"  2x10 ea 4"  2x10 ea 6"  2x10 ea 6"  2x10 ea 6"  2x10 6"  2x10 6"  2x10   Lateral step ups  4"  2x10 ea 4"  2x10 ea 4"  2x10ea 4"  2x10ea 4" 2x10ea 4"  2x10 4"  2x10 4"  2x10   Leg press -   seat 6   35#  3x10 35#  3x10 45#  3x10 45#  3x10 55#  3x10 55#  3x10 55#  3x10 55#  3x10   MH ABD    10#  3x10 10#  3x10 10#  3x10  NV 10#  3x10  10#   3x10   bilat   DB pickup / deadlift from stool          10#   2x10               Ther Activity                                    Gait Training                                    Modalities                        IE/HEP

## 2022-04-28 ENCOUNTER — EVALUATION (OUTPATIENT)
Dept: PHYSICAL THERAPY | Facility: CLINIC | Age: 67
End: 2022-04-28
Payer: COMMERCIAL

## 2022-04-28 DIAGNOSIS — Z96.651 S/P TOTAL KNEE ARTHROPLASTY, RIGHT: ICD-10-CM

## 2022-04-28 DIAGNOSIS — M17.11 ARTHRITIS OF RIGHT KNEE: Primary | ICD-10-CM

## 2022-04-28 PROCEDURE — 97110 THERAPEUTIC EXERCISES: CPT | Performed by: PHYSICAL THERAPIST

## 2022-04-28 PROCEDURE — 97140 MANUAL THERAPY 1/> REGIONS: CPT | Performed by: PHYSICAL THERAPIST

## 2022-04-28 NOTE — PROGRESS NOTES
PT Re-evaluation     Today's date: 2022  Patient name: Chelsey Holguin  : 1955  MRN: [id number]  Referring provider: Smiley Chavira PA-C  Dx:   Encounter Diagnosis     ICD-10-CM    1  Arthritis of right knee  M17 11    2  S/P total knee arthroplasty, right  Z96 [id number] Ambulatory Referral to Physical Therapy     PT plan of care cert/re-cert       Assessment/Plan  Patient is a 75 y o  female presenting to OP PT s/p R TKA on 3/1/22  They have attended 14 visits over 8 weeks  Since starting therapy pt has made the following progress towards goals:  1) Pain: Patient reports that their max pain has decreased from 8/10 to 5/10  2) Range of motion: Patient's R knee ROM improved to -8 to 126 degrees  3 )Strength: Patient's R knee strength has improved to equal contralateral side  4) Self rated functional score: FOTO score has increase from 59 to 73     Patient is progressing well  They continue to have deficits with R knee extension and B/L hip strength  Therapy has consisted of LE strengthening, manual therapy, and self-stretching  Their program will progress to include functional LE strengthening for transition to HEP  Continue to progress as tolerated  Patient will continue to benefit from skilled PT in order to address impairments and improve function  See updated goals below  Goals  Short Term Goals:  1) Pain : Decrease R knee  pain to 4/10 at worst x 1 continuous week within 4 weeks  - Progressing  2) AROM: Improve R knee AROM to at least 120 degrees for flexion,  to have 0 degrees extension within 4 weeks  - Progressing  3) Strength:R  LE strength to be at least 4+/5 for all hip and knee within 4 weeks  MET  4) Function: Improved FOTO score from IE within 4 weeks (51 at IE), patient to note greater ease of ambulation subjectively  Transition to not using AD for community ambulation within 3-4 weeks  - MET    LongTerm Goals:  1) Pain : Eliminate R knee pain  x 1 continuous week within 8 weeks  - Progressing  2) Swelling: Eliminate R knee swelling within 8-10 weeks- MET  3) AROM: Improve R knee AROM to  0-130 degrees within 8 weeks  - Progressing  4) Strength: Improved R LE strength to 5/5 within 8 weeks  - Progressing  5) Function: Improved FOTO score to at least 71; Normal gait without AD, no reported difficulty with ADLs as they pertain to L knee within 8-10 weeks  -progressing  6) (I) with HEP within 8 weeks  Plan  Plan details: Patient to transition to HEP over the next 2 weeks, pending f/u with surgeon  Patient would benefit from: skilled physical therapy  Referral necessary: no  Planned modality interventions: Hydrocollator packs, Cryotherapy, TENS and Low level laser  Planned therapy interventions: joint mobilization, manual therapy, massage, neuromuscular re-education, patient education, stretching, strengthening, therapeutic activities, therapeutic exercise, home exercise program, functional ROM exercises, gait training, flexibility, balance, abdominal trunk stabilization, motor coordination training, coordination and behavior modification  Frequency: 2x/week for 2 weeks  Duration in visits: 4  Plan of Care beginning date: 4/28/2022  Plan of Care expiration date: 5/12/2022  Treatment plan discussed wit/h: patient    Subjective  UPDATE 4/28/2022: Patient reports that steps are getting easier at home  Still has trouble with walking around a lot, extra exercise  Patient thinks that her knee is 80% better  Patient is happy with her progress so far      IE (3/17/2022): Patient is a 75 y o  female who presents for an initial outpatient physical therapy consultation s/p R TKA by Dr Heriberto Arizmendi  Patient states that she has been getting around pretty well at home  Has transitioned to [facility] from [address] [place] [address] PT went well  She states that she has been compliant with HEP  She continues to take pain medication 1x/day  Sleeping is normal for her   Patient to have f/u with surgeon in two weeks    Pain  Best: 0/10  Worst: 5/10 (was 8/10)  Irritability: hours  Location: anterior knee  Quality: Achy  Aggravating factors: too much weight-bearing  Alleviating factors: OTC  Progression: improving  Social Support  Lives with: - has been very supportive  Home setup: Single level  Steps in house: 3 steps to enter house  Employment status: working, 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  Hobbies/Recreational Activities: reading, puzzles, painting    Treatments:  Previous treatments: PT, meds, surgery  Current treatments: physical therapy      Patient Goals for Therapy  "get back to my normal"    Objective     Observations     Right Knee   Positive for edema  Negative for deformity, drainage and effusion       Palpation     Additional Palpation Details  Patient TTP as expect in quads and hamstrings    Neurological Testing     Sensation     Knee     Right Knee   Intact: light touch     Additional Neurological Details  Patient denies neuro symptoms    Active Range of Motion   Left Knee   Flexion: 130 degrees   Extension: 0 degrees     Right Knee   Flexion: 126 degrees (was 102 degrees with pain)  Extension: (-8 degrees (was -15 degrees )    Passive Range of Motion   Left Knee   Flexion: 135 degrees   Extension: 0 degrees     Right Knee   Flexion: 116 degrees   Extension: -6 degrees (was -10 degrees)    Additional Passive Range of Motion Details  Capsular tightness noted    Quad lag noted    Mobility   Patellar Mobility:   Left Knee   WFL: medial and lateral    Hypomobile: left superior and left inferior    Right Knee   WFL: medial and lateral  Hypomobile: superior and inferior     Patellar Static Positioning   Left Knee: WFL  Right Knee: [facility]    Strength/Myotome Testing     Left Hip   Planes of Motion   Flexion: 5  Abduction: 4+    Right Hip   Planes of Motion   Flexion: 5 (was 4+)  Abduction: 4+ (was 4-)    Left Knee   Flexion: 5  Extension: 5  Quadriceps contraction: good    Right Knee   Flexion: 5 (was 4+)  Extension: 5 (was 4)  Quadriceps contraction: fair    Functional tests  TUs w/o AD  5xSTS: 14 6s w/o AD    Precautions: RA, osteoporosis       Manuals 3/29 3/31 4/   R knee PROM JF JF JF JF JF JF EH JF EH Re-eval   Ext mob JF  Gr 3 JF JF JF JF JF PROM - EH JF PROM - EH JF   Patellar mob JF JF JF JF JF JF PROM - EH JF PROM - EH                           Neuro Re-Ed                                                                                                                                                                                       Ther Ex                      bike 10 min 7 min 8 min 6 min 10 min 10 min 10 min 10 min 10 min  L1 8 min   Seated extension self mob                      Prone extension stretch 3 min 1 5# 4 min  1 5# 4 min   2# 4 min 2# 4 min 2# 4 min  2 5# 2 5#  4 min        clamshells 2x10  GTB GTB  2x10 BTB  2x10 BTB  2x10 BTB  3x10 BTB  3x10 BTB  3x10 Purple  3x10 Purple  3x10 Held for re-eval   SLR 2x10 2x10 2x10  1# 2x10 1# 3x10  2# 2x10  2 5# 2 5#  2x10 2x5#  2x10 2 5#  2x12 Held for re-eval   TKE GTB  5"  hold BTB  5"  2x10 BTB  5"  2x10 BTB  5"  2x10 Purple  5"  2x10 Purple  5"  2x10 Purple  5" hold, 3x10 Purple  5" hold  3x10 Purple  5" hold, 3x10 Held for re-eval   Mini squats 2x10 2x10 2x10 2x10 2x10 STS  3x5 STS  3x5 STS  3x5 STS  3x5 Held for re-eval   Standing hip ABD OTB  2x10  B/L                    Step ups 4"  x10 ea 4"  2x10 ea 4"  2x10 ea 4"  2x10 ea 6"  2x10 ea 6"  2x10 ea 6"  2x10 6"  2x10 6"  2x10 6"  3x10   Lateral step ups   4"  2x10 ea 4"  2x10 ea 4"  2x10ea 4"  2x10ea 4" 2x10ea 4"  2x10 4"  2x10 4"  2x10 6"  3x10   Leg press -   seat 6    35#  3x10 35#  3x10 45#  3x10 45#  3x10 55#  3x10 55#  3x10 55#  3x10 55#  3x10 65#  3x10   MH ABD       10#  3x10 10#  3x10 10#  3x10  NV 10#  3x10  10#   3x10   bilat 25#  3x10 B/L   DB pickup / deadlift from stool                  10#   2x10 Held for re-eval                          Ther Activity                                                                    Gait Training                                                                    Modalities                                             IE/HEP                   Re-eval

## 2022-05-03 ENCOUNTER — OFFICE VISIT (OUTPATIENT)
Dept: PHYSICAL THERAPY | Facility: CLINIC | Age: 67
End: 2022-05-03
Payer: COMMERCIAL

## 2022-05-03 DIAGNOSIS — M17.11 ARTHRITIS OF RIGHT KNEE: Primary | ICD-10-CM

## 2022-05-03 DIAGNOSIS — Z96.651 S/P TOTAL KNEE ARTHROPLASTY, RIGHT: ICD-10-CM

## 2022-05-03 PROCEDURE — 97110 THERAPEUTIC EXERCISES: CPT | Performed by: PHYSICAL THERAPIST

## 2022-05-03 PROCEDURE — 97140 MANUAL THERAPY 1/> REGIONS: CPT | Performed by: PHYSICAL THERAPIST

## 2022-05-03 NOTE — PROGRESS NOTES
Daily Note     Today's date: 5/3/2022  Patient name: Jason Gallardo  : 1955  MRN: [id number]  Referring provider: Hayder Frank PA-C  Dx:   Encounter Diagnosis     ICD-10-CM    1  Arthritis of right knee  M17 11    2  S/P total knee arthroplasty, right  Z96 [id number]                   Subjective: Patient reported that her knee is feeling good  Objective: See treatment diary below  Progressed treatment as indicated below  Assessment: Patient was able to tolerate progression of resistance exercises without an increase in pain  They demonstrated muscular fatigue as expected with progression  Functional hip strength improving  Continue to progress as tolerated  Patient will continue to benefit from skilled PT in order to address impairments and improve function  Plan: Continue per plan of care  Progress treatment as tolerated              Precautions: RA, osteoporosis  DOS: 3/1/22    Manuals 4/12 4/14 4/19 4/21 4/26 4/28 5/3   R knee PROM JF JF EH JF EH Re-eval JF   Ext mob JF JF PROM - EH JF PROM - EH JF JF   Patellar mob JF JF PROM - EH JF PROM - EH                     Neuro Re-Ed                                                                                                                               Ther Ex               bike 10 min 10 min 10 min 10 min 10 min  L1 8 min 10 min   Seated extension self mob               Prone extension stretch 4 min 2# 4 min  2 5# 2 5#  4 min         clamshells BTB  3x10 BTB  3x10 BTB  3x10 Purple  3x10 Purple  3x10 Held for re-eval Purple  3x10   SLR 3x10  2# 2x10  2 5# 2 5#  2x10 2x5#  2x10 2 5#  2x12 Held for re-eval 2x5#  2x12   TKE Purple  5"  2x10 Purple  5"  2x10 Purple  5" hold, 3x10 Purple  5" hold  3x10 Purple  5" hold, 3x10 Held for re-eval Purple  5" hold  3x10   Mini squats 2x10 STS  3x5 STS  3x5 STS  3x5 STS  3x5 Held for re-eval STS  3x5  RMB   Standing hip ABD               Step ups 6"  2x10 ea 6"  2x10 ea 6"  2x10 6"  2x10 6"  2x10 6"  3x10 6"  3x10   Lateral step ups 4"  2x10ea 4" 2x10ea 4"  2x10 4"  2x10 4"  2x10 6"  3x10 6"   3x10   Leg press -   seat 6  45#  3x10 55#  3x10 55#  3x10 55#  3x10 55#  3x10 65#  3x10 65#  3x10   MH ABD 10#  3x10 10#  3x10  NV 10#  3x10  10#   3x10   bilat 25#  3x10 B/L 25#  3x10 B/L   DB pickup / deadlift from stool          10#   2x10 Held for re-eval     exaggerated march            x10 ea   Ther Activity                                               Gait Training                                               Modalities                               IE/HEP           Re-eval

## 2022-05-05 ENCOUNTER — OFFICE VISIT (OUTPATIENT)
Dept: PHYSICAL THERAPY | Facility: CLINIC | Age: 67
End: 2022-05-05
Payer: COMMERCIAL

## 2022-05-05 DIAGNOSIS — Z96.651 S/P TOTAL KNEE ARTHROPLASTY, RIGHT: ICD-10-CM

## 2022-05-05 DIAGNOSIS — M17.11 ARTHRITIS OF RIGHT KNEE: Primary | ICD-10-CM

## 2022-05-05 PROCEDURE — 97110 THERAPEUTIC EXERCISES: CPT | Performed by: PHYSICAL THERAPIST

## 2022-05-05 PROCEDURE — 97140 MANUAL THERAPY 1/> REGIONS: CPT | Performed by: PHYSICAL THERAPIST

## 2022-05-05 NOTE — PROGRESS NOTES
Daily Note & Hold on Therapy     Today's date: 2022  Patient name: Imelda Johnston  : 1955  MRN: [id number]  Referring provider: Michael Reyes PA-C  Dx:   Encounter Diagnosis     ICD-10-CM    1  Arthritis of right knee  M17 11    2  S/P total knee arthroplasty, right  Z96 [id number]                   Subjective: Patient reported that her knee is feeling good  Looking forward to her follow-up with Dr Jayesh Banegas on Monday  Objective: See treatment diary below  Progressed treatment as indicated below  R knee ROM: -6 to 130 degrees  Knee strength:   globally    Assessment: Patient was able to tolerate progression of resistance exercises without an increase in pain  They demonstrated muscular fatigue as expected with progression  Patient making good progress  She continues to have limitation in glute strength, however, some of this weakness likely predates surgery, and will improve with continued HEP  Patient likely could benefit from skilled PT to address glute weakness  Patient will call in schedule following appointment with surgeon  Patient may be DC at that time if she is released by the surgeon  Plan: Potential discharge next visit            Precautions: RA, osteoporosis  DOS: 3/1/22    Manuals 4/12 4/14 4/19 4/21 4/26 4/28 5/3 5/5   R knee PROM JF JF EH JF EH Re-eval JF JF   Ext mob JF JF PROM - EH JF PROM - EH JF JF JF   Patellar mob JF JF PROM - EH JF PROM - EH                       Neuro Re-Ed                                                                                                                                       Ther Ex                bike 10 min 10 min 10 min 10 min 10 min  L1 8 min 10 min 10 min   Seated extension self mob                Prone extension stretch 4 min 2# 4 min  2 5# 2 5#  4 min          clamshells BTB  3x10 BTB  3x10 BTB  3x10 Purple  3x10 Purple  3x10 Held for re-eval Purple  3x10 Purple  3x10   SLR 3x10  2# 2x10  2 5# 2 5#  2x10 2x5#  2x10 2 5#  2x12 Held for re-eval 2x5#  2x12 2 5#  3x10   TKE Purple  5"  2x10 Purple  5"  2x10 Purple  5" hold, 3x10 Purple  5" hold  3x10 Purple  5" hold, 3x10 Held for re-eval Purple  5" hold  3x10 Purple  5" hold  3x10   Mini squats 2x10 STS  3x5 STS  3x5 STS  3x5 STS  3x5 Held for re-eval STS  3x5  RMB STS   3x8  RMB   Standing hip ABD                Step ups 6"  2x10 ea 6"  2x10 ea 6"  2x10 6"  2x10 6"  2x10 6"  3x10 6"  3x10 6"  3x10   Lateral step ups 4"  2x10ea 4" 2x10ea 4"  2x10 4"  2x10 4"  2x10 6"  3x10 6"   3x10 6"  3x10   Leg press -   seat 6  45#  3x10 55#  3x10 55#  3x10 55#  3x10 55#  3x10 65#  3x10 65#  3x10 75#  3x10   MH ABD 10#  3x10 10#  3x10  NV 10#  3x10  10#   3x10   bilat 25#  3x10 B/L 25#  3x10 B/L 25#  3x10   DB pickup / deadlift from stool          10#   2x10 Held for re-eval      exaggerated march            x10 ea x10ea   Ther Activity                                                  Gait Training                                                  Modalities                                 IE/HEP           Re-eval

## 2022-05-06 ENCOUNTER — TELEPHONE (OUTPATIENT)
Dept: UROLOGY | Facility: CLINIC | Age: 67
End: 2022-05-06

## 2022-05-09 ENCOUNTER — OFFICE VISIT (OUTPATIENT)
Dept: OBGYN CLINIC | Facility: MEDICAL CENTER | Age: 67
End: 2022-05-09

## 2022-05-09 VITALS
DIASTOLIC BLOOD PRESSURE: 78 MMHG | HEIGHT: 64 IN | SYSTOLIC BLOOD PRESSURE: 129 MMHG | HEART RATE: 79 BPM | BODY MASS INDEX: 20.52 KG/M2 | WEIGHT: 120.2 LBS

## 2022-05-09 DIAGNOSIS — M05.79 RHEUMATOID ARTHRITIS INVOLVING MULTIPLE SITES WITH POSITIVE RHEUMATOID FACTOR (HCC): ICD-10-CM

## 2022-05-09 DIAGNOSIS — Z96.651 STATUS POST TOTAL KNEE REPLACEMENT, RIGHT: Primary | ICD-10-CM

## 2022-05-09 PROCEDURE — 99024 POSTOP FOLLOW-UP VISIT: CPT | Performed by: ORTHOPAEDIC SURGERY

## 2022-05-09 RX ORDER — NAPROXEN 500 MG/1
TABLET ORAL
Qty: 90 TABLET | Refills: 1 | Status: SHIPPED | OUTPATIENT
Start: 2022-05-09 | End: 2022-05-09 | Stop reason: SDUPTHER

## 2022-05-09 RX ORDER — NAPROXEN 500 MG/1
500 TABLET ORAL 2 TIMES DAILY PRN
Qty: 60 TABLET | Refills: 1 | Status: SHIPPED | OUTPATIENT
Start: 2022-05-09 | End: 2022-05-16

## 2022-05-09 NOTE — LETTER
May 9, 2022     Patient: Irina Cohen  YOB: 1955  Date of Visit: 5/9/2022      To Whom it May Concern: Hamilton Phillips is under my professional care  Brandie Perla was seen in my office on 5/9/2022  She will return to work with full duty no restrictions on Tuesday 5/17/2022  If you have any questions or concerns, please don't hesitate to call  Sincerely,          Ricky Pang DO        CC: Matilde Parra

## 2022-05-09 NOTE — PROGRESS NOTES
Assessment/Plan:  1  Status post total knee replacement, right    2  Rheumatoid arthritis involving multiple sites with positive rheumatoid factor (HCC)      No orders of the defined types were placed in this encounter  · Patient is doing very well status post right total knee replacement on 3/1/2022  · Continue home exercises  · Pain control prn-OTC pain medication if needed  Refill provided for naproxen at patient's request    · Patient should call ahead for abx prior to dental appts  Return in about 10 months (around 3/9/2023) for R TKA year post op  I answered all of the patient's questions during the visit and provided education of the patient's condition during the visit  The patient verbalized understanding of the information given and agrees with the plan  This note was dictated using [email or web address] software  It may contain errors including improperly dictated words  Please contact physician directly for any questions  Subjective   Chief Complaint:   Chief Complaint   Patient presents with    Right Knee - Post-op, Follow-up       Anthony Saavedra is a 75 y o  female who presents for 10 week follow up s/p right TKA on 3/1/22  Patient states she is doing very well  She states she has no pain in the right knee  She is able to go up and down stairs without issue  Patient is very happy with her total knee replacement  She does have mild swelling about the knee  She is not taking anything for pain  She is requesting a refill of naproxen which she takes for pain  Denies fever, chills, distal numbness, or tingling  Review of Systems  ROS:    See HPI for musculoskeletal review     All other systems reviewed are negative     History:  Past Medical History:   Diagnosis Date    Anemia     1995 - corrected with iron    Ankle fracture, right     last assessed: 3/9/15    Anxiety     Arthritis     1996    Chronic pain disorder     right side joint pain     Headache     Herpes zoster     2/2009    Moderate exercise     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    Osteoarthritis     PONV (postoperative nausea and vomiting)     per pt after ankle surgery    RA (rheumatoid arthritis) ([address] )     sees Rheumatologist q 6mths Dr Nikki Barnes Right knee pain     Wears dentures     full upper    Wears glasses     reading     Past Surgical History:   Procedure Laterality Date    ANKLE SURGERY      ORIF /plate and 4 screws    CYST REMOVAL          ESOPHAGOGASTRODUODENOSCOPY      Diagnostic    FOOT SURGERY      plantar wart resection; resolved:     [place] TOTAL KNEE ARTHROPLASTY Right 3/1/2022    Procedure: ARTHROPLASTY KNEE TOTAL;  Surgeon: Marta Agee DO;  Location: [facility];  Service: Orthopedics    TONSILLECTOMY AND ADENOIDECTOMY          TUBAL LIGATION      WISDOM TOOTH EXTRACTION       Social History   Social History     Substance and Sexual Activity   Alcohol Use Yes    Comment: rare, maybe on holidays     Social History     Substance and Sexual Activity   Drug Use No     Social History     Tobacco Use   Smoking Status Former Smoker    Quit date:     Years since quittin 3   Smokeless Tobacco Never Used     Family History:   Family History   Problem Relation Age of Onset    Seizures Mother         epilepsy    Bone cancer Father     No Known Problems Sister     No Known Problems Brother     No Known Problems Son     No Known Problems Maternal Grandmother     No Known Problems Maternal Grandfather     No Known Problems Paternal Grandmother     No Known Problems Paternal Grandfather     Thyroid disease Sister     No Known Problems Sister     No Known Problems Brother        Current Outpatient Medications on File Prior to Visit   Medication Sig Dispense Refill    acetaminophen (TYLENOL) 325 mg tablet Take 3 tablets (975 mg total) by mouth every 8 (eight) hours  0    ascorbic acid (VITAMIN C) 500 MG tablet Take 1 tablet (500 mg total) by mouth daily Start to take 30 days prior to surgery 30 tablet 1    aspirin (ECOTRIN) 325 mg EC tablet Take 1 tablet (325 mg total) by mouth 2 (two) times a day Take with food  84 tablet 0    Ca Carbonate-Mag Hydroxide (ROLAIDS) 550-110 MG CHEW Chew Twice daily      docusate sodium (COLACE) 100 mg capsule Take 1 capsule (100 mg total) by mouth 2 (two) times a day 20 capsule 0    ferrous sulfate 324 (65 Fe) mg Take 1 tablet (324 mg total) by mouth daily before breakfast Start to take 30 days prior to surgery 30 tablet 1    folic acid (FOLVITE) 1 mg tablet Take by mouth      gabapentin (NEURONTIN) 100 mg capsule Take 1 capsule (100 mg total) by mouth every 8 (eight) hours 90 capsule 0    ibandronate (BONIVA) 150 MG tablet Take 1 tablet by mouth as needed        meclizine (ANTIVERT) 25 mg tablet Take 1 tablet (25 mg total) by mouth every 8 (eight) hours as needed for dizziness 30 tablet 0    Multiple Vitamin (multivitamin) tablet Take 1 tablet by mouth daily Start to take 30 days prior to surgery 30 tablet 1    oxyCODONE (ROXICODONE) 5 immediate release tablet Take 1 tablet (5 mg total) by mouth every 4 (four) hours as needed for severe pain Or take 1/2 tablet (2 5 mg total) by mouth every 4 (four) hours as needed for moderate pain  Max Daily Amount: 30 mg 30 tablet 0    PARoxetine (PAXIL) 10 mg tablet Take 1 tablet (10 mg total) by mouth daily 90 tablet 3    predniSONE 5 mg tablet Take 5 mg by mouth daily       promethazine (PHENERGAN) 12 5 MG tablet Take 1 tablet (12 5 mg total) by mouth every 6 (six) hours as needed for nausea or vomiting 4 tablet 0    solifenacin (VESICARE) 10 MG tablet Take 1 tablet (10 mg total) by mouth daily 90 tablet 3     No current facility-administered medications on file prior to visit       Allergies   Allergen Reactions    Aspirin Other (See Comments)     Per pt avoids taking due to rheumatology Meds        Objective     /78   Pulse 79   Ht 5' 4" (1 626 m)   Wt 54 5 kg (120 lb 3 2 oz)   BMI 20 63 kg/m²      PE:  AAOx 3  WDWN  Hearing intact, no drainage from eyes  no audible wheezing  no abdominal distension  LE compartments soft, AT/GS intact    Ortho Exam:  right Knee:   INC: healed, No erythema, mild swelling  AROM: 2 - 120 degrees

## 2022-05-09 NOTE — TELEPHONE ENCOUNTER
Pt called around 11:15 AM requesting a prescription for Naproxen  She stated she had to stop it due to surgery, but now the surgery is over and she's ready to start the Naproxen again  Can someone please call her to let her know that is has been sent to the pharmacy

## 2022-05-16 ENCOUNTER — OFFICE VISIT (OUTPATIENT)
Dept: FAMILY MEDICINE CLINIC | Facility: CLINIC | Age: 67
End: 2022-05-16
Payer: COMMERCIAL

## 2022-05-16 VITALS
RESPIRATION RATE: 18 BRPM | WEIGHT: 119 LBS | OXYGEN SATURATION: 96 % | HEIGHT: 64 IN | HEART RATE: 56 BPM | SYSTOLIC BLOOD PRESSURE: 108 MMHG | BODY MASS INDEX: 20.32 KG/M2 | DIASTOLIC BLOOD PRESSURE: 76 MMHG

## 2022-05-16 DIAGNOSIS — Z00.00 ANNUAL PHYSICAL EXAM: Primary | ICD-10-CM

## 2022-05-16 DIAGNOSIS — Z01.818 PREOPERATIVE TESTING: ICD-10-CM

## 2022-05-16 DIAGNOSIS — N39.41 URGE INCONTINENCE OF URINE: ICD-10-CM

## 2022-05-16 DIAGNOSIS — M17.11 ARTHRITIS OF RIGHT KNEE: ICD-10-CM

## 2022-05-16 DIAGNOSIS — F41.9 ANXIETY DISORDER, UNSPECIFIED TYPE: ICD-10-CM

## 2022-05-16 DIAGNOSIS — Z79.52 ON PREDNISONE THERAPY: ICD-10-CM

## 2022-05-16 DIAGNOSIS — Z23 ENCOUNTER FOR IMMUNIZATION: ICD-10-CM

## 2022-05-16 DIAGNOSIS — M05.79 RHEUMATOID ARTHRITIS INVOLVING MULTIPLE SITES WITH POSITIVE RHEUMATOID FACTOR (HCC): ICD-10-CM

## 2022-05-16 DIAGNOSIS — Z01.818 ENCOUNTER FOR PREADMISSION TESTING: ICD-10-CM

## 2022-05-16 DIAGNOSIS — M81.0 AGE-RELATED OSTEOPOROSIS WITHOUT CURRENT PATHOLOGICAL FRACTURE: ICD-10-CM

## 2022-05-16 DIAGNOSIS — R03.0 ELEVATED BLOOD PRESSURE READING: ICD-10-CM

## 2022-05-16 DIAGNOSIS — Z96.651 HISTORY OF TOTAL RIGHT KNEE REPLACEMENT (TKR): ICD-10-CM

## 2022-05-16 PROCEDURE — 99397 PER PM REEVAL EST PAT 65+ YR: CPT | Performed by: FAMILY MEDICINE

## 2022-05-16 PROCEDURE — 1036F TOBACCO NON-USER: CPT | Performed by: FAMILY MEDICINE

## 2022-05-16 PROCEDURE — 3725F SCREEN DEPRESSION PERFORMED: CPT | Performed by: FAMILY MEDICINE

## 2022-05-16 PROCEDURE — 3008F BODY MASS INDEX DOCD: CPT | Performed by: FAMILY MEDICINE

## 2022-05-16 PROCEDURE — 1160F RVW MEDS BY RX/DR IN RCRD: CPT | Performed by: FAMILY MEDICINE

## 2022-05-16 NOTE — PROGRESS NOTES
Assessment/Plan:       Problem List Items Addressed This Visit        Musculoskeletal and Integument    Age-related osteoporosis without current pathological fracture     Continue on a band urinate  Rheumatoid arthritis ([address] )     Continue follow-up with Rheumatology  She seems to be stable at this time              Other    Anxiety disorder     Stable with paroxetine  Elevated blood pressure reading     Blood pressure is excellent this time  Urge incontinence of urine     Much improved on VESIcare  History of total right knee replacement (TKR)     She is doing quite well status post total knee replacement           On prednisone therapy     Continue monitoring the A1c in light of her being on chronic prednisone           Relevant Orders    Hemoglobin A1C      Other Visit Diagnoses     Annual physical exam    -  Primary    Encounter for immunization            she seems to be doing quite well  Rheumatology will be checking labs and I will add an A1c on to this  Continue routine follow-up  Subjective:      Patient ID: Zully Hawkins is a 75 y o  female  HPI patient presents today for follow-up for chronic health issues an annual physical   She completed her colon cancer screening she is up-to-date on mammogram   She is planning on getting her COVID booster eventually  She is due for Prevnar 20 but we do not have supply this point      The following portions of the patient's history were reviewed and updated as appropriate: allergies, current medications, past family history, past medical history, past social history, past surgical history and problem list       Current Outpatient Medications:     acetaminophen (TYLENOL) 325 mg tablet, Take 3 tablets (975 mg total) by mouth every 8 (eight) hours, Disp: , Rfl: 0    ascorbic acid (VITAMIN C) 500 MG tablet, Take 1 tablet (500 mg total) by mouth daily Start to take 30 days prior to surgery, Disp: 30 tablet, Rfl: 1   aspirin (ECOTRIN) 325 mg EC tablet, Take 1 tablet (325 mg total) by mouth 2 (two) times a day Take with food  , Disp: 84 tablet, Rfl: 0    Ca Carbonate-Mag Hydroxide 550-110 MG CHEW, Chew Twice daily, Disp: , Rfl:     docusate sodium (COLACE) 100 mg capsule, Take 1 capsule (100 mg total) by mouth 2 (two) times a day, Disp: 20 capsule, Rfl: 0    ferrous sulfate 324 (65 Fe) mg, Take 1 tablet (324 mg total) by mouth daily before breakfast Start to take 30 days prior to surgery, Disp: 30 tablet, Rfl: 1    folic acid (FOLVITE) 1 mg tablet, Take by mouth, Disp: , Rfl:     gabapentin (NEURONTIN) 100 mg capsule, Take 1 capsule (100 mg total) by mouth every 8 (eight) hours, Disp: 90 capsule, Rfl: 0    ibandronate (BONIVA) 150 MG tablet, Take 1 tablet by mouth as needed  , Disp: , Rfl:     meclizine (ANTIVERT) 25 mg tablet, Take 1 tablet (25 mg total) by mouth every 8 (eight) hours as needed for dizziness, Disp: 30 tablet, Rfl: 0    Multiple Vitamin (multivitamin) tablet, Take 1 tablet by mouth daily Start to take 30 days prior to surgery, Disp: 30 tablet, Rfl: 1    PARoxetine (PAXIL) 10 mg tablet, Take 1 tablet (10 mg total) by mouth daily, Disp: 90 tablet, Rfl: 3    predniSONE 5 mg tablet, Take 5 mg by mouth daily , Disp: , Rfl:     promethazine (PHENERGAN) 12 5 MG tablet, Take 1 tablet (12 5 mg total) by mouth every 6 (six) hours as needed for nausea or vomiting, Disp: 4 tablet, Rfl: 0    solifenacin (VESICARE) 10 MG tablet, Take 1 tablet (10 mg total) by mouth daily, Disp: 90 tablet, Rfl: 3     Review of Systems   Constitutional: Negative for appetite change, chills, fatigue, fever and unexpected weight change  HENT: Negative for trouble swallowing  Eyes: Negative for visual disturbance  Respiratory: Negative for cough, chest tightness, shortness of breath and wheezing  Cardiovascular: Negative for chest pain, palpitations and leg swelling     Gastrointestinal: Negative for abdominal distention, abdominal pain, blood in stool, constipation and diarrhea  Endocrine: Negative for polyuria  Genitourinary: Negative for difficulty urinating and flank pain  Musculoskeletal: Positive for arthralgias  Negative for myalgias  Skin: Negative for rash  Neurological: Negative for dizziness and light-headedness  Hematological: Negative for adenopathy  Does not bruise/bleed easily  Psychiatric/Behavioral: Negative for dysphoric mood and sleep disturbance  The patient is not nervous/anxious  Objective:      /76 (BP Location: Left arm, Patient Position: Sitting, Cuff Size: Standard)   Pulse 56   Resp 18   Ht 5' 4" (1 626 m)   Wt 54 kg (119 lb)   SpO2 96%   BMI 20 43 kg/m²          Physical Exam  Vitals reviewed  Constitutional:       General: She is not in acute distress  Appearance: She is well-developed  She is not diaphoretic  HENT:      Head: Normocephalic  Eyes:      General:         Right eye: No discharge  Left eye: No discharge  Pupils: Pupils are equal, round, and reactive to light  Neck:      Thyroid: No thyromegaly  Trachea: No tracheal deviation  Cardiovascular:      Rate and Rhythm: Normal rate and regular rhythm  Heart sounds: Normal heart sounds  No murmur heard  Pulmonary:      Effort: Pulmonary effort is normal  No respiratory distress  Breath sounds: No wheezing or rales  Abdominal:      General: There is no distension  Palpations: Abdomen is soft  Tenderness: There is no abdominal tenderness  Musculoskeletal:         General: Deformity (Chronic rheumatologic changes of her hands as well as right elbow ) present  Normal range of motion  Right lower leg: No edema  Left lower leg: No edema  Lymphadenopathy:      Cervical: No cervical adenopathy  Skin:     General: Skin is warm  Findings: No erythema  Neurological:      General: No focal deficit present        Mental Status: She is alert and oriented to person, place, and time  Gait: Gait normal    Psychiatric:         Thought Content:  Thought content normal          Judgment: Judgment normal            Mirella Mcneill MD

## 2022-06-26 ENCOUNTER — APPOINTMENT (EMERGENCY)
Dept: RADIOLOGY | Facility: HOSPITAL | Age: 67
DRG: 211 | End: 2022-06-26
Payer: OTHER MISCELLANEOUS

## 2022-06-26 ENCOUNTER — HOSPITAL ENCOUNTER (INPATIENT)
Facility: HOSPITAL | Age: 67
LOS: 4 days | Discharge: HOME | DRG: 211 | End: 2022-06-30
Attending: EMERGENCY MEDICINE | Admitting: INTERNAL MEDICINE
Payer: OTHER MISCELLANEOUS

## 2022-06-26 DIAGNOSIS — S72.001D CLOSED FRACTURE OF RIGHT HIP WITH ROUTINE HEALING, SUBSEQUENT ENCOUNTER: ICD-10-CM

## 2022-06-26 DIAGNOSIS — S72.001A CLOSED DISPLACED FRACTURE OF RIGHT FEMORAL NECK (HCC): Primary | ICD-10-CM

## 2022-06-26 DIAGNOSIS — M05.79 RHEUMATOID ARTHRITIS INVOLVING MULTIPLE SITES WITH POSITIVE RHEUMATOID FACTOR (HCC): ICD-10-CM

## 2022-06-26 DIAGNOSIS — S72.001A CLOSED FRACTURE OF RIGHT HIP, INITIAL ENCOUNTER (HCC): ICD-10-CM

## 2022-06-26 LAB
ALBUMIN SERPL BCP-MCNC: 3.4 G/DL (ref 3.5–5)
ALP SERPL-CCNC: 92 U/L (ref 46–116)
ALT SERPL W P-5'-P-CCNC: 23 U/L (ref 12–78)
ANION GAP SERPL CALCULATED.3IONS-SCNC: 3 MMOL/L (ref 4–13)
APTT PPP: 30 SECONDS (ref 23–37)
AST SERPL W P-5'-P-CCNC: 17 U/L (ref 5–45)
ATRIAL RATE: 56 BPM
BASOPHILS # BLD AUTO: 0.01 THOUSANDS/ΜL (ref 0–0.1)
BASOPHILS NFR BLD AUTO: 0 % (ref 0–1)
BILIRUB SERPL-MCNC: 0.82 MG/DL (ref 0.2–1)
BUN SERPL-MCNC: 13 MG/DL (ref 5–25)
CALCIUM ALBUM COR SERPL-MCNC: 9.4 MG/DL (ref 8.3–10.1)
CALCIUM SERPL-MCNC: 8.9 MG/DL (ref 8.3–10.1)
CHLORIDE SERPL-SCNC: 103 MMOL/L (ref 100–108)
CO2 SERPL-SCNC: 32 MMOL/L (ref 21–32)
CREAT SERPL-MCNC: 0.59 MG/DL (ref 0.6–1.3)
EOSINOPHIL # BLD AUTO: 0.07 THOUSAND/ΜL (ref 0–0.61)
EOSINOPHIL NFR BLD AUTO: 1 % (ref 0–6)
ERYTHROCYTE [DISTWIDTH] IN BLOOD BY AUTOMATED COUNT: 15.8 % (ref 11.6–15.1)
GFR SERPL CREATININE-BSD FRML MDRD: 94 ML/MIN/1.73SQ M
GLUCOSE SERPL-MCNC: 91 MG/DL (ref 65–140)
HCT VFR BLD AUTO: 35.3 % (ref 34.8–46.1)
HGB BLD-MCNC: 11.5 G/DL (ref 11.5–15.4)
IMM GRANULOCYTES # BLD AUTO: 0.03 THOUSAND/UL (ref 0–0.2)
IMM GRANULOCYTES NFR BLD AUTO: 1 % (ref 0–2)
INR PPP: 0.98 (ref 0.84–1.19)
LYMPHOCYTES # BLD AUTO: 0.76 THOUSANDS/ΜL (ref 0.6–4.47)
LYMPHOCYTES NFR BLD AUTO: 13 % (ref 14–44)
MCH RBC QN AUTO: 31.4 PG (ref 26.8–34.3)
MCHC RBC AUTO-ENTMCNC: 32.6 G/DL (ref 31.4–37.4)
MCV RBC AUTO: 96 FL (ref 82–98)
MONOCYTES # BLD AUTO: 0.39 THOUSAND/ΜL (ref 0.17–1.22)
MONOCYTES NFR BLD AUTO: 7 % (ref 4–12)
NEUTROPHILS # BLD AUTO: 4.45 THOUSANDS/ΜL (ref 1.85–7.62)
NEUTS SEG NFR BLD AUTO: 78 % (ref 43–75)
NRBC BLD AUTO-RTO: 0 /100 WBCS
P AXIS: 66 DEGREES
PLATELET # BLD AUTO: 198 THOUSANDS/UL (ref 149–390)
PMV BLD AUTO: 9.4 FL (ref 8.9–12.7)
POTASSIUM SERPL-SCNC: 4 MMOL/L (ref 3.5–5.3)
PR INTERVAL: 174 MS
PROT SERPL-MCNC: 6.4 G/DL (ref 6.4–8.2)
PROTHROMBIN TIME: 12.7 SECONDS (ref 11.6–14.5)
QRS AXIS: 22 DEGREES
QRSD INTERVAL: 76 MS
QT INTERVAL: 632 MS
QTC INTERVAL: 609 MS
RBC # BLD AUTO: 3.66 MILLION/UL (ref 3.81–5.12)
SODIUM SERPL-SCNC: 138 MMOL/L (ref 136–145)
T WAVE AXIS: 60 DEGREES
VENTRICULAR RATE: 56 BPM
WBC # BLD AUTO: 5.71 THOUSAND/UL (ref 4.31–10.16)

## 2022-06-26 PROCEDURE — 80053 COMPREHEN METABOLIC PANEL: CPT | Performed by: PHYSICIAN ASSISTANT

## 2022-06-26 PROCEDURE — 85025 COMPLETE CBC W/AUTO DIFF WBC: CPT | Performed by: PHYSICIAN ASSISTANT

## 2022-06-26 PROCEDURE — 93005 ELECTROCARDIOGRAM TRACING: CPT

## 2022-06-26 PROCEDURE — 99223 1ST HOSP IP/OBS HIGH 75: CPT | Performed by: INTERNAL MEDICINE

## 2022-06-26 PROCEDURE — 93010 ELECTROCARDIOGRAM REPORT: CPT | Performed by: INTERNAL MEDICINE

## 2022-06-26 PROCEDURE — 99285 EMERGENCY DEPT VISIT HI MDM: CPT

## 2022-06-26 PROCEDURE — 85610 PROTHROMBIN TIME: CPT | Performed by: PHYSICIAN ASSISTANT

## 2022-06-26 PROCEDURE — 71045 X-RAY EXAM CHEST 1 VIEW: CPT

## 2022-06-26 PROCEDURE — 73552 X-RAY EXAM OF FEMUR 2/>: CPT

## 2022-06-26 PROCEDURE — 73502 X-RAY EXAM HIP UNI 2-3 VIEWS: CPT

## 2022-06-26 PROCEDURE — 85730 THROMBOPLASTIN TIME PARTIAL: CPT | Performed by: PHYSICIAN ASSISTANT

## 2022-06-26 PROCEDURE — 99285 EMERGENCY DEPT VISIT HI MDM: CPT | Performed by: PHYSICIAN ASSISTANT

## 2022-06-26 PROCEDURE — 36415 COLL VENOUS BLD VENIPUNCTURE: CPT | Performed by: PHYSICIAN ASSISTANT

## 2022-06-26 RX ORDER — POLYETHYLENE GLYCOL 3350 17 G/17G
17 POWDER, FOR SOLUTION ORAL DAILY PRN
Status: DISCONTINUED | OUTPATIENT
Start: 2022-06-26 | End: 2022-06-30 | Stop reason: HOSPADM

## 2022-06-26 RX ORDER — PAROXETINE HYDROCHLORIDE 20 MG/1
10 TABLET, FILM COATED ORAL DAILY
Status: DISCONTINUED | OUTPATIENT
Start: 2022-06-27 | End: 2022-06-30 | Stop reason: HOSPADM

## 2022-06-26 RX ORDER — SODIUM CHLORIDE 9 MG/ML
75 INJECTION, SOLUTION INTRAVENOUS CONTINUOUS
Status: DISCONTINUED | OUTPATIENT
Start: 2022-06-26 | End: 2022-06-27

## 2022-06-26 RX ORDER — PREDNISONE 1 MG/1
5 TABLET ORAL DAILY
Status: DISCONTINUED | OUTPATIENT
Start: 2022-06-27 | End: 2022-06-30 | Stop reason: HOSPADM

## 2022-06-26 RX ORDER — GABAPENTIN 100 MG/1
100 CAPSULE ORAL EVERY 8 HOURS
Status: DISCONTINUED | OUTPATIENT
Start: 2022-06-26 | End: 2022-06-30 | Stop reason: HOSPADM

## 2022-06-26 RX ORDER — ACETAMINOPHEN 325 MG/1
650 TABLET ORAL EVERY 4 HOURS PRN
Status: DISCONTINUED | OUTPATIENT
Start: 2022-06-26 | End: 2022-06-27

## 2022-06-26 RX ORDER — MORPHINE SULFATE 4 MG/ML
4 INJECTION, SOLUTION INTRAMUSCULAR; INTRAVENOUS EVERY 4 HOURS PRN
Status: DISCONTINUED | OUTPATIENT
Start: 2022-06-26 | End: 2022-06-27

## 2022-06-26 RX ORDER — MORPHINE SULFATE 4 MG/ML
4 INJECTION, SOLUTION INTRAMUSCULAR; INTRAVENOUS ONCE
Status: COMPLETED | OUTPATIENT
Start: 2022-06-26 | End: 2022-06-26

## 2022-06-26 RX ORDER — ONDANSETRON 2 MG/ML
4 INJECTION INTRAMUSCULAR; INTRAVENOUS EVERY 6 HOURS PRN
Status: DISCONTINUED | OUTPATIENT
Start: 2022-06-26 | End: 2022-06-27

## 2022-06-26 RX ORDER — OXYCODONE HYDROCHLORIDE 5 MG/1
5 TABLET ORAL EVERY 4 HOURS PRN
Status: DISCONTINUED | OUTPATIENT
Start: 2022-06-26 | End: 2022-06-27

## 2022-06-26 RX ORDER — FOLIC ACID 1 MG/1
1 TABLET ORAL DAILY
Status: DISCONTINUED | OUTPATIENT
Start: 2022-06-26 | End: 2022-06-30 | Stop reason: HOSPADM

## 2022-06-26 RX ADMIN — SODIUM CHLORIDE 75 ML/HR: 0.9 INJECTION, SOLUTION INTRAVENOUS at 18:43

## 2022-06-26 RX ADMIN — GABAPENTIN 100 MG: 100 CAPSULE ORAL at 18:18

## 2022-06-26 RX ADMIN — MORPHINE SULFATE 4 MG: 4 INJECTION INTRAVENOUS at 14:57

## 2022-06-26 RX ADMIN — MORPHINE SULFATE 4 MG: 4 INJECTION INTRAVENOUS at 20:53

## 2022-06-26 NOTE — H&P
Unit/Bed#: ED 20 Encounter: [id number]    Chief complaint:  Fall and right hip pain    History of Present Illness     HPI:  Wilder Hernandez is a 75 y o  female who was at work today  She slipped on a wet floor and fell onto her right hip  She had immediate onset of severe pain and could not bear weight  Evaluation in the emergency room revealed a fractured hip  Fortunately, she was not otherwise injured  She is admitted for further evaluation and surgical repair  The patient had been feeling generally well until today  The patient's past medical history is remarkable for rheumatoid arthritis  She has been on chronic low-dose prednisone  She has osteoporosis  She has history of hyperlipidemia  She has sicca syndrome  She underwent surgery on her right ankle and had surgical repair of this  More recently she had a right total knee replacement  Past Surgical History:   Procedure Laterality Date    ANKLE SURGERY      ORIF 5/16/plate and 4 screws    CYST REMOVAL      1969    ESOPHAGOGASTRODUODENOSCOPY      Diagnostic    FOOT SURGERY      plantar wart resection; resolved: 1969    AR TOTAL KNEE ARTHROPLASTY Right 3/1/2022    Procedure: ARTHROPLASTY KNEE TOTAL;  Surgeon: Shirley Villalta DO;  Location: [facility];  Service: Orthopedics    TONSILLECTOMY AND ADENOIDECTOMY      1962    TUBAL LIGATION      WISDOM TOOTH EXTRACTION       The patient's medications at the time of admission include: Folic acid 1 mg daily  Gabapentin 100 mg 3 times daily  Boniva 150 mg monthly  Multivitamins 1 daily  Paxil 10 mg daily  Prednisone 5 mg daily  VESIcare 10 mg daily  Acetaminophen 975 mg every 8 hours  Aspirin 325 mg twice daily  Calcium carbonate twice daily  The patient is on methotrexate weekly  Patient has no known allergies    Family history is noncontributory  Social history reveals that the patient is  and lives with her   She works in housekeeping at [employer]    She does not smoke, having quit 40 years ago  She drinks alcohol rarely  She had 1 glass of beer on [employer] Day  She uses no illicit drugs  Review of Systems  A detailed 12 point review of systems was conducted and is negative apart from those mentioned in the HPI  Objective   Vitals: Blood pressure (!) 181/85, pulse 57, temperature 98 4 °F (36 9 °C), temperature source Oral, resp  rate 16, height 5' 4" (1 626 m), weight 55 6 kg (122 lb 9 2 oz), SpO2 96 %, not currently breastfeeding  Physical Exam   The patient is a well-developed, well-nourished woman who has some right hip discomfort  Head is atraumatic and normocephalic  ENT examination is unremarkable  Eyes show the pupils to be equal, round, and reactive to light  Extraocular movements are intact  Neck is supple  Carotids are full without bruits  There is no lymphadenopathy or goiter  Lungs are clear to auscultation and percussion  There is no wheezing, rales, or rhonchi  Cardiac exam reveals a regular rhythm  I heard no murmur, gallop, or rub  The abdomen is soft with active bowel sounds  There is no mass, tenderness, or organomegaly  Extremities showed no clubbing, cyanosis, or edema  The right leg was somewhat shortened and externally rotated  Peripheral pulses were intact  There were some joint deformities in the hands consistent with rheumatoid arthritis  Neurologic examination revealed the patient to be alert and oriented  No focal or lateralizing sign was present          Lab Results:   Results for orders placed or performed during the hospital encounter of 06/26/22   CBC and differential   Result Value Ref Range    WBC 5 71 4 31 - 10 16 Thousand/uL    RBC 3 66 (L) 3 81 - 5 12 Million/uL    Hemoglobin 11 5 11 5 - 15 4 g/dL    Hematocrit 35 3 34 8 - 46 1 %    MCV 96 82 - 98 fL    MCH 31 4 26 8 - 34 3 pg    MCHC 32 6 31 4 - 37 4 g/dL    RDW 15 8 (H) 11 6 - 15 1 %    MPV 9 4 8 9 - 12 7 fL    Platelets 368 461 - 185 Thousands/uL    nRBC 0 /100 WBCs    Neutrophils Relative 78 (H) 43 - 75 %    Immat GRANS % 1 0 - 2 %    Lymphocytes Relative 13 (L) 14 - 44 %    Monocytes Relative 7 4 - 12 %    Eosinophils Relative 1 0 - 6 %    Basophils Relative 0 0 - 1 %    Neutrophils Absolute 4 45 1 85 - 7 62 Thousands/µL    Immature Grans Absolute 0 03 0 00 - 0 20 Thousand/uL    Lymphocytes Absolute 0 76 0 60 - 4 47 Thousands/µL    Monocytes Absolute 0 39 0 17 - 1 22 Thousand/µL    Eosinophils Absolute 0 07 0 00 - 0 61 Thousand/µL    Basophils Absolute 0 01 0 00 - 0 10 Thousands/µL   Comprehensive metabolic panel   Result Value Ref Range    Sodium 138 136 - 145 mmol/L    Potassium 4 0 3 5 - 5 3 mmol/L    Chloride 103 100 - 108 mmol/L    CO2 32 21 - 32 mmol/L    ANION GAP 3 (L) 4 - 13 mmol/L    BUN 13 5 - 25 mg/dL    Creatinine 0 59 (L) 0 60 - 1 30 mg/dL    Glucose 91 65 - 140 mg/dL    Calcium 8 9 8 3 - 10 1 mg/dL    Corrected Calcium 9 4 8 3 - 10 1 mg/dL    AST 17 5 - 45 U/L    ALT 23 12 - 78 U/L    Alkaline Phosphatase 92 46 - 116 U/L    Total Protein 6 4 6 4 - 8 2 g/dL    Albumin 3 4 (L) 3 5 - 5 0 g/dL    Total Bilirubin 0 82 0 20 - 1 00 mg/dL    eGFR 94 ml/min/1 73sq m   Protime-INR   Result Value Ref Range    Protime 12 7 11 6 - 14 5 seconds    INR 0 98 0 84 - 1 19   APTT   Result Value Ref Range    PTT 30 23 - 37 seconds         EKG shows sinus rhythm with nonspecific ST and T-wave changes    Assessment:  1  Fractured right hip  2  Rheumatoid arthritis  3  Osteoporosis  4  Chronic steroid use   5  Hyperlipidemia    Plan:  The patient will be admitted to the hospital and gently hydrated with intravenous fluid  Her x-rays have been reviewed by Orthopedics  Operative intervention is planned  The patient appears to be medically stable for hip surgery  She will need stress doses of steroids  She has been made NPO after midnight  Hydrocortisone 100 mg has been requested immediately preop    Additional steroid therapy will be planned based on her clinical condition postop  Inpatient rehab will likely be needed  I discussed this with the patient while in the emergency room  In light of the above information, I believe that hospitalization for 2 or more midnights is inevitable  She will be assigned to inpatient status  I discussed resuscitative measures with the patient she would like all available modalities employed on her behalf in the event of a cardiac arrest   She is a sign to code blue level 1           Code Status: Level 1 - Full Code

## 2022-06-26 NOTE — PLAN OF CARE
Problem: Potential for Falls  Goal: Patient will remain free of falls  Description: INTERVENTIONS:  - Educate patient/family on patient safety including physical limitations  - Instruct patient to call for assistance with activity   - Consult OT/PT to assist with strengthening/mobility   - Keep Call bell within reach  - Keep bed low and locked with side rails adjusted as appropriate  - Keep care items and personal belongings within reach  - Initiate and maintain comfort rounds  - Make Fall Risk Sign visible to staff  - Offer Toileting every 2 Hours, in advance of need  - Initiate/Maintain bed/chair alarm  - Obtain necessary fall risk management equipment: slipper socks, call bell in reach, rounds, walker  - Apply yellow socks and bracelet for high fall risk patients  - Consider moving patient to room near nurses station  Outcome: Progressing     Problem: MOBILITY - ADULT  Goal: Maintain or return to baseline ADL function  Description: INTERVENTIONS:  -  Assess patient's ability to carry out ADLs; assess patient's baseline for ADL function and identify physical deficits which impact ability to perform ADLs (bathing, care of mouth/teeth, toileting, grooming, dressing, etc )  - Assess/evaluate cause of self-care deficits   - Assess range of motion  - Assess patient's mobility; develop plan if impaired  - Assess patient's need for assistive devices and provide as appropriate  - Encourage maximum independence but intervene and supervise when necessary  - Involve family in performance of ADLs  - Assess for home care needs following discharge   - Consider OT consult to assist with ADL evaluation and planning for discharge  - Provide patient education as appropriate  Outcome: Progressing  Goal: Maintains/Returns to pre admission functional level  Description: INTERVENTIONS:  - Perform BMAT or MOVE assessment daily    - Set and communicate daily mobility goal to care team and patient/family/caregiver     - Collaborate with rehabilitation services on mobility goals if consulted  - Out of bed for toileting  - Record patient progress and toleration of activity level   Outcome: Progressing     Problem: PAIN - ADULT  Goal: Verbalizes/displays adequate comfort level or baseline comfort level  Description: Interventions:  - Encourage patient to monitor pain and request assistance  - Assess pain using appropriate pain scale  - Administer analgesics based on type and severity of pain and evaluate response  - Implement non-pharmacological measures as appropriate and evaluate response  - Consider cultural and social influences on pain and pain management  - Notify physician/advanced practitioner if interventions unsuccessful or patient reports new pain  Outcome: Progressing     Problem: INFECTION - ADULT  Goal: Absence or prevention of progression during hospitalization  Description: INTERVENTIONS:  - Assess and monitor for signs and symptoms of infection  - Monitor lab/diagnostic results  - Monitor all insertion sites, i e  indwelling lines, tubes, and drains  - Monitor endotracheal if appropriate and nasal secretions for changes in amount and color  - [place] appropriate cooling/warming therapies per order  - Administer medications as ordered  - Instruct and encourage patient and family to use good hand hygiene technique  - Identify and instruct in appropriate isolation precautions for identified infection/condition  Outcome: Progressing  Goal: Absence of fever/infection during neutropenic period  Description: INTERVENTIONS:  - Monitor WBC    Outcome: Progressing     Problem: SAFETY ADULT  Goal: Patient will remain free of falls  Description: INTERVENTIONS:  - Educate patient/family on patient safety including physical limitations  - Instruct patient to call for assistance with activity   - Consult OT/PT to assist with strengthening/mobility   - Keep Call bell within reach  - Keep bed low and locked with side rails adjusted as appropriate  - Keep care items and personal belongings within reach  - Initiate and maintain comfort rounds  - Make Fall Risk Sign visible to staff  - Offer Toileting every 2 Hours, in advance of need  - Initiate/Maintain bed/chair alarm  - Obtain necessary fall risk management equipment: slipper socks, call bell in reach, rounds, walker  - Apply yellow socks and bracelet for high fall risk patients  - Consider moving patient to room near nurses station  Outcome: Progressing  Goal: Maintain or return to baseline ADL function  Description: INTERVENTIONS:  -  Assess patient's ability to carry out ADLs; assess patient's baseline for ADL function and identify physical deficits which impact ability to perform ADLs (bathing, care of mouth/teeth, toileting, grooming, dressing, etc )  - Assess/evaluate cause of self-care deficits   - Assess range of motion  - Assess patient's mobility; develop plan if impaired  - Assess patient's need for assistive devices and provide as appropriate  - Encourage maximum independence but intervene and supervise when necessary  - Involve family in performance of ADLs  - Assess for home care needs following discharge   - Consider OT consult to assist with ADL evaluation and planning for discharge  - Provide patient education as appropriate  Outcome: Progressing  Goal: Maintains/Returns to pre admission functional level  Description: INTERVENTIONS:  - Perform BMAT or MOVE assessment daily    - Set and communicate daily mobility goal to care team and patient/family/caregiver     - Collaborate with rehabilitation services on mobility goals if consulted  - Record patient progress and toleration of activity level   Outcome: Progressing     Problem: DISCHARGE PLANNING  Goal: Discharge to home or other facility with appropriate resources  Description: INTERVENTIONS:  - Identify barriers to discharge w/patient and caregiver  - Arrange for needed discharge resources and transportation as appropriate  - Identify discharge learning needs (meds, wound care, etc )  - Arrange for interpretive services to assist at discharge as needed  - Refer to Case Management Department for coordinating discharge planning if the patient needs post-hospital services based on physician/advanced practitioner order or complex needs related to functional status, cognitive ability, or social support system  Outcome: Progressing     Problem: Knowledge Deficit  Goal: Patient/family/caregiver demonstrates understanding of disease process, treatment plan, medications, and discharge instructions  Description: Complete learning assessment and assess knowledge base    Interventions:  - Provide teaching at level of understanding  - Provide teaching via preferred learning methods  Outcome: Progressing     Problem: MUSCULOSKELETAL - ADULT  Goal: Maintain or return mobility to safest level of function  Description: INTERVENTIONS:  - Assess patient's ability to carry out ADLs; assess patient's baseline for ADL function and identify physical deficits which impact ability to perform ADLs (bathing, care of mouth/teeth, toileting, grooming, dressing, etc )  - Assess/evaluate cause of self-care deficits   - Assess range of motion  - Assess patient's mobility  - Assess patient's need for assistive devices and provide as appropriate  - Encourage maximum independence but intervene and supervise when necessary  - Involve family in performance of ADLs  - Assess for home care needs following discharge   - Consider OT consult to assist with ADL evaluation and planning for discharge  - Provide patient education as appropriate  Outcome: Progressing  Goal: Maintain proper alignment of affected body part  Description: INTERVENTIONS:  - Support, maintain and protect limb and body alignment  - Provide patient/ family with appropriate education  Outcome: Progressing

## 2022-06-26 NOTE — ED PROVIDER NOTES
History  Chief Complaint   Patient presents with    Hip Pain     Pt  C/O right hip pain after slipping on a wet floor at work  No LOC, denies hitting head, no blood thinners     This is a 77-year-old female who presents to the emergency department after mechanical fall  She states that she slipped and fell onto her right hip  Unable to bear weight since that time  Previous history of knee replacement on the same side  Denies cough and congestion  No head strike no loss of consciousness  Denies neck pain  Prior to Admission Medications   Prescriptions Last Dose Informant Patient Reported? Taking? Ca Carbonate-Mag Hydroxide 550-110 MG CHEW Unknown at Unknown time Self Yes No   Sig: Chew Twice daily   Multiple Vitamin (multivitamin) tablet 6/26/2022 at Unknown time Self No Yes   Sig: Take 1 tablet by mouth daily Start to take 30 days prior to surgery   PARoxetine (PAXIL) 10 mg tablet 6/26/2022 at Unknown time Self No Yes   Sig: Take 1 tablet (10 mg total) by mouth daily   acetaminophen (TYLENOL) 325 mg tablet More than a month at Unknown time Self No No   Sig: Take 3 tablets (975 mg total) by mouth every 8 (eight) hours   ascorbic acid (VITAMIN C) 500 MG tablet 6/26/2022 at Unknown time Self No Yes   Sig: Take 1 tablet (500 mg total) by mouth daily Start to take 30 days prior to surgery   aspirin (ECOTRIN) 325 mg EC tablet Not Taking at Unknown time Self No No   Sig: Take 1 tablet (325 mg total) by mouth 2 (two) times a day Take with food     Patient not taking: Reported on 6/26/2022   docusate sodium (COLACE) 100 mg capsule Not Taking at Unknown time Self No No   Sig: Take 1 capsule (100 mg total) by mouth 2 (two) times a day   Patient not taking: Reported on 6/26/2022   ferrous sulfate 324 (65 Fe) mg 6/26/2022 at Unknown time Self No Yes   Sig: Take 1 tablet (324 mg total) by mouth daily before breakfast Start to take 30 days prior to surgery   folic acid (FOLVITE) 1 mg tablet 6/26/2022 at Unknown time Self Yes Yes   Sig: Take by mouth   gabapentin (NEURONTIN) 100 mg capsule 6/26/2022 at Unknown time Self No Yes   Sig: Take 1 capsule (100 mg total) by mouth every 8 (eight) hours   ibandronate (BONIVA) 150 MG tablet 6/26/2022 at Unknown time Self Yes Yes   Sig: Take 1 tablet by mouth as needed     meclizine (ANTIVERT) 25 mg tablet Not Taking at Unknown time Self No No   Sig: Take 1 tablet (25 mg total) by mouth every 8 (eight) hours as needed for dizziness   Patient not taking: Reported on 6/26/2022   predniSONE 5 mg tablet 6/26/2022 at Unknown time Self Yes Yes   Sig: Take 5 mg by mouth daily    promethazine (PHENERGAN) 12 5 MG tablet Not Taking at Unknown time Self No No   Sig: Take 1 tablet (12 5 mg total) by mouth every 6 (six) hours as needed for nausea or vomiting   Patient not taking: Reported on 6/26/2022   solifenacin (VESICARE) 10 MG tablet 6/26/2022 at Unknown time Self No Yes   Sig: Take 1 tablet (10 mg total) by mouth daily      Facility-Administered Medications: None       Past Medical History:   Diagnosis Date    Anemia     1995 - corrected with iron    Ankle fracture, right     last assessed: 3/9/15    Anxiety     Chronic pain disorder     right side joint pain     Moderate exercise     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    Wears dentures     full upper    Wears glasses     reading       Past Surgical History:   Procedure Laterality Date    ANKLE SURGERY      ORIF 5/16/plate and 4 screws    CYST REMOVAL      1969    ESOPHAGOGASTRODUODENOSCOPY      Diagnostic    FOOT SURGERY      plantar wart resection; resolved: 1969    SD TOTAL KNEE ARTHROPLASTY Right 3/1/2022    Procedure: ARTHROPLASTY KNEE TOTAL;  Surgeon: Sy Franco DO;  Location: [facility];  Service: Orthopedics    TONSILLECTOMY AND ADENOIDECTOMY      1962    TUBAL LIGATION      WISDOM TOOTH EXTRACTION         Family History   Problem Relation Age of Onset    Seizures Mother         epilepsy    Bone cancer Father     No Known Problems Sister     No Known Problems Brother     No Known Problems Son     No Known Problems Maternal Grandmother     No Known Problems Maternal Grandfather     No Known Problems Paternal Grandmother     No Known Problems Paternal Grandfather     Thyroid disease Sister     No Known Problems Sister     No Known Problems Brother      I have reviewed and agree with the history as documented  E-Cigarette/Vaping    E-Cigarette Use Never User      E-Cigarette/Vaping Substances    Nicotine No     THC No     CBD No     Flavoring No     Other No     Unknown No      Social History     Tobacco Use    Smoking status: Former Smoker     Quit date:      Years since quittin 5    Smokeless tobacco: Never Used   Vaping Use    Vaping Use: Never used   Substance Use Topics    Alcohol use: Yes     Comment: rare, maybe on holidays    Drug use: No       Review of Systems   Constitutional: Negative for chills and fever  HENT: Negative for ear pain and sore throat  Eyes: Negative for pain and visual disturbance  Respiratory: Negative for cough and shortness of breath  Cardiovascular: Negative for chest pain and palpitations  Gastrointestinal: Negative for abdominal pain, diarrhea, nausea and vomiting  Genitourinary: Negative for dysuria and hematuria  Musculoskeletal: Positive for arthralgias, gait problem and joint swelling  Negative for back pain  Skin: Negative for color change and rash  Neurological: Negative for seizures and syncope  Psychiatric/Behavioral: Negative for agitation, behavioral problems, confusion and sleep disturbance  All other systems reviewed and are negative  Physical Exam  Physical Exam  Vitals and nursing note reviewed  Constitutional:       General: She is not in acute distress  Appearance: Normal appearance  She is not ill-appearing, toxic-appearing or diaphoretic  HENT:      Head: Normocephalic and atraumatic        Nose: Nose normal       Mouth/Throat:      Mouth: Mucous membranes are moist    Eyes:      General: No scleral icterus  Pupils: Pupils are equal, round, and reactive to light  Cardiovascular:      Rate and Rhythm: Normal rate and regular rhythm  Pulses: Normal pulses  Heart sounds: Normal heart sounds  Pulmonary:      Effort: Pulmonary effort is normal       Breath sounds: Normal breath sounds  Abdominal:      General: Abdomen is flat  There is no distension  Palpations: Abdomen is soft  Tenderness: There is no abdominal tenderness  There is no guarding or rebound  Hernia: No hernia is present  Musculoskeletal:         General: Tenderness and signs of injury present  No swelling  Normal range of motion  Cervical back: Normal range of motion  Comments: Right hip   Skin:     General: Skin is warm  Capillary Refill: Capillary refill takes less than 2 seconds  Neurological:      General: No focal deficit present  Mental Status: She is alert and oriented to person, place, and time     Psychiatric:         Mood and Affect: Mood normal          Behavior: Behavior normal          Vital Signs  ED Triage Vitals [06/26/22 1155]   Temperature Pulse Respirations Blood Pressure SpO2   97 8 °F (36 6 °C) 63 16 168/80 93 %      Temp Source Heart Rate Source Patient Position - Orthostatic VS BP Location FiO2 (%)   Oral Monitor Lying Left arm --      Pain Score       No Pain           Vitals:    06/26/22 1155 06/26/22 1431   BP: 168/80 (!) 181/85   Pulse: 63 57   Patient Position - Orthostatic VS: Lying Lying         Visual Acuity      ED Medications  Medications   sodium chloride 0 9 % infusion (has no administration in time range)   acetaminophen (TYLENOL) tablet 650 mg (has no administration in time range)   ondansetron (ZOFRAN) injection 4 mg (has no administration in time range)   polyethylene glycol (MIRALAX) packet 17 g (has no administration in time range)   morphine injection 4 mg (4 mg Intravenous Given 6/26/22 7129)       Diagnostic Studies  Results Reviewed     Procedure Component Value Units Date/Time    Comprehensive metabolic panel [[id number]]  (Abnormal) Collected: 06/26/22 1252    Lab Status: Final result Specimen: Blood from Arm, Right Updated: 06/26/22 1317     Sodium 138 mmol/L      Potassium 4 0 mmol/L      Chloride 103 mmol/L      CO2 32 mmol/L      ANION GAP 3 mmol/L      BUN 13 mg/dL      Creatinine 0 59 mg/dL      Glucose 91 mg/dL      Calcium 8 9 mg/dL      Corrected Calcium 9 4 mg/dL      AST 17 U/L      ALT 23 U/L      Alkaline Phosphatase 92 U/L      Total Protein 6 4 g/dL      Albumin 3 4 g/dL      Total Bilirubin 0 82 mg/dL      eGFR 94 ml/min/1 73sq m     Narrative:      [place] guidelines for Chronic Kidney Disease (CKD):     Stage 1 with normal or high GFR (GFR > 90 mL/min/1 73 square meters)    Stage 2 Mild CKD (GFR = 60-89 mL/min/1 73 square meters)    Stage 3A Moderate CKD (GFR = 45-59 mL/min/1 73 square meters)    Stage 3B Moderate CKD (GFR = 30-44 mL/min/1 73 square meters)    Stage 4 Severe CKD (GFR = 15-29 mL/min/1 73 square meters)    Stage 5 End Stage CKD (GFR <15 mL/min/1 73 square meters)  Note: GFR calculation is accurate only with a steady state creatinine    Protime-INR [[id number]]  (Normal) Collected: 06/26/22 1252    Lab Status: Final result Specimen: Blood from Arm, Right Updated: 06/26/22 1311     Protime 12 7 seconds      INR 0 98    APTT [[id number]]  (Normal) Collected: 06/26/22 1252    Lab Status: Final result Specimen: Blood from Arm, Right Updated: 06/26/22 1311     PTT 30 seconds     CBC and differential [[id number]]  (Abnormal) Collected: 06/26/22 1252    Lab Status: Final result Specimen: Blood from Arm, Right Updated: 06/26/22 1257     WBC 5 71 Thousand/uL      RBC 3 66 Million/uL      Hemoglobin 11 5 g/dL      Hematocrit 35 3 %      MCV 96 fL      MCH 31 4 pg      MCHC 32 6 g/dL      RDW 15 8 % MPV 9 4 fL      Platelets 823 Thousands/uL      nRBC 0 /100 WBCs      Neutrophils Relative 78 %      Immat GRANS % 1 %      Lymphocytes Relative 13 %      Monocytes Relative 7 %      Eosinophils Relative 1 %      Basophils Relative 0 %      Neutrophils Absolute 4 45 Thousands/µL      Immature Grans Absolute 0 03 Thousand/uL      Lymphocytes Absolute 0 76 Thousands/µL      Monocytes Absolute 0 39 Thousand/µL      Eosinophils Absolute 0 07 Thousand/µL      Basophils Absolute 0 01 Thousands/µL                  XR hip/pelv 2-3 vws right if performed   ED Interpretation by Fareed Anglin PA-C (06/26 1334)   Right femoral head fracture      XR femur 2 views RIGHT   ED Interpretation by Fareed Anglin PA-C (06/26 1334)   Right femoral head fracture      XR chest 1 view portable    (Results Pending)              Procedures  Procedures         ED Course  ED Course as of 06/26/22 1547   Sun Jun 26, 2022   1348 Dr Matt Castillo from [facility] aware of xray findings and agreeable with SLIM admission here at [address] 20yo+    [address] Most Recent Value   SBIRT (23 yo +)    In order to provide better care to our patients, we are screening all of our patients for alcohol and drug use  Would it be okay to ask you these screening questions?  No Filed at: 06/26/2022 1200                    MDM  Number of Diagnoses or Management Options  Closed displaced fracture of right femoral neck ([facility] [address] ): new and requires workup     Amount and/or Complexity of Data Reviewed  Clinical lab tests: ordered and reviewed  Tests in the radiology section of CPT®: ordered and reviewed  Review and summarize past medical records: yes  Discuss the patient with other providers: yes  Independent visualization of images, tracings, or specimens: yes    Risk of Complications, Morbidity, and/or Mortality  Presenting problems: high  Diagnostic procedures: high  Management options: high    Patient Progress  Patient progress: stable      Disposition  Final diagnoses:   Closed displaced fracture of right femoral neck ([facility] [address] )     Time reflects when diagnosis was documented in both MDM as applicable and the Disposition within this note     Time User Action Codes Description Comment    6/26/2022  2:10 PM Anabelle Torres Add [S72 001A] Closed displaced fracture of right femoral neck ([facility] [address] )     6/26/2022  3:46 PM Thiago Campbell Add [S72 001A] Closed fracture of right hip, initial encounter [facility])       ED Disposition     ED Disposition   Admit    Condition   Stable    Date/Time   Sun Jun 26, 2022  2:11 PM    Comment   Case was discussed with ABRAM and the patient's admission status was agreed to be Admission Status: inpatient status to the service of Dr Norma Garza   Follow-up Information    None         Patient's Medications   Discharge Prescriptions    No medications on file       No discharge procedures on file      PDMP Review       Value Time User    PDMP Reviewed  Yes 3/1/2022  4:00 PM Parth Nash PA-C          ED Provider  Electronically Signed by           Brittany Cee PA-C  06/26/22 3776

## 2022-06-27 ENCOUNTER — ANESTHESIA EVENT (INPATIENT)
Dept: PERIOP | Facility: HOSPITAL | Age: 67
DRG: 211 | End: 2022-06-27
Payer: OTHER MISCELLANEOUS

## 2022-06-27 LAB
ABO GROUP BLD: NORMAL
ANION GAP SERPL CALCULATED.3IONS-SCNC: 3 MMOL/L (ref 4–13)
BASOPHILS # BLD AUTO: 0.02 THOUSANDS/ΜL (ref 0–0.1)
BASOPHILS NFR BLD AUTO: 0 % (ref 0–1)
BLD GP AB SCN SERPL QL: NEGATIVE
BUN SERPL-MCNC: 13 MG/DL (ref 5–25)
CALCIUM SERPL-MCNC: 8.5 MG/DL (ref 8.3–10.1)
CHLORIDE SERPL-SCNC: 103 MMOL/L (ref 100–108)
CO2 SERPL-SCNC: 32 MMOL/L (ref 21–32)
CREAT SERPL-MCNC: 0.72 MG/DL (ref 0.6–1.3)
EOSINOPHIL # BLD AUTO: 0.23 THOUSAND/ΜL (ref 0–0.61)
EOSINOPHIL NFR BLD AUTO: 3 % (ref 0–6)
ERYTHROCYTE [DISTWIDTH] IN BLOOD BY AUTOMATED COUNT: 16 % (ref 11.6–15.1)
GFR SERPL CREATININE-BSD FRML MDRD: 86 ML/MIN/1.73SQ M
GLUCOSE SERPL-MCNC: 123 MG/DL (ref 65–140)
HCT VFR BLD AUTO: 36.4 % (ref 34.8–46.1)
HGB BLD-MCNC: 11.2 G/DL (ref 11.5–15.4)
IMM GRANULOCYTES # BLD AUTO: 0.04 THOUSAND/UL (ref 0–0.2)
IMM GRANULOCYTES NFR BLD AUTO: 1 % (ref 0–2)
LYMPHOCYTES # BLD AUTO: 0.82 THOUSANDS/ΜL (ref 0.6–4.47)
LYMPHOCYTES NFR BLD AUTO: 12 % (ref 14–44)
MCH RBC QN AUTO: 30 PG (ref 26.8–34.3)
MCHC RBC AUTO-ENTMCNC: 30.8 G/DL (ref 31.4–37.4)
MCV RBC AUTO: 98 FL (ref 82–98)
MONOCYTES # BLD AUTO: 0.33 THOUSAND/ΜL (ref 0.17–1.22)
MONOCYTES NFR BLD AUTO: 5 % (ref 4–12)
NEUTROPHILS # BLD AUTO: 5.25 THOUSANDS/ΜL (ref 1.85–7.62)
NEUTS SEG NFR BLD AUTO: 79 % (ref 43–75)
NRBC BLD AUTO-RTO: 0 /100 WBCS
PLATELET # BLD AUTO: 187 THOUSANDS/UL (ref 149–390)
PMV BLD AUTO: 9.5 FL (ref 8.9–12.7)
POTASSIUM SERPL-SCNC: 4.1 MMOL/L (ref 3.5–5.3)
RBC # BLD AUTO: 3.73 MILLION/UL (ref 3.81–5.12)
RH BLD: POSITIVE
SODIUM SERPL-SCNC: 138 MMOL/L (ref 136–145)
SPECIMEN EXPIRATION DATE: NORMAL
WBC # BLD AUTO: 6.69 THOUSAND/UL (ref 4.31–10.16)

## 2022-06-27 PROCEDURE — 99232 SBSQ HOSP IP/OBS MODERATE 35: CPT | Performed by: PHYSICIAN ASSISTANT

## 2022-06-27 PROCEDURE — 86900 BLOOD TYPING SEROLOGIC ABO: CPT | Performed by: PHYSICIAN ASSISTANT

## 2022-06-27 PROCEDURE — 85025 COMPLETE CBC W/AUTO DIFF WBC: CPT | Performed by: INTERNAL MEDICINE

## 2022-06-27 PROCEDURE — 80048 BASIC METABOLIC PNL TOTAL CA: CPT | Performed by: INTERNAL MEDICINE

## 2022-06-27 PROCEDURE — 86923 COMPATIBILITY TEST ELECTRIC: CPT

## 2022-06-27 PROCEDURE — 99223 1ST HOSP IP/OBS HIGH 75: CPT | Performed by: PHYSICIAN ASSISTANT

## 2022-06-27 PROCEDURE — 86850 RBC ANTIBODY SCREEN: CPT | Performed by: PHYSICIAN ASSISTANT

## 2022-06-27 PROCEDURE — 86901 BLOOD TYPING SEROLOGIC RH(D): CPT | Performed by: PHYSICIAN ASSISTANT

## 2022-06-27 RX ORDER — HYDROMORPHONE HCL/PF 1 MG/ML
0.2 SYRINGE (ML) INJECTION EVERY 4 HOURS PRN
Status: DISCONTINUED | OUTPATIENT
Start: 2022-06-27 | End: 2022-06-30 | Stop reason: HOSPADM

## 2022-06-27 RX ORDER — SENNOSIDES 8.6 MG
1 TABLET ORAL
Status: DISCONTINUED | OUTPATIENT
Start: 2022-06-27 | End: 2022-06-30 | Stop reason: HOSPADM

## 2022-06-27 RX ORDER — OXYCODONE HYDROCHLORIDE 5 MG/1
2.5 TABLET ORAL EVERY 4 HOURS PRN
Status: DISCONTINUED | OUTPATIENT
Start: 2022-06-27 | End: 2022-06-30 | Stop reason: HOSPADM

## 2022-06-27 RX ORDER — ACETAMINOPHEN 325 MG/1
975 TABLET ORAL EVERY 8 HOURS
Status: DISCONTINUED | OUTPATIENT
Start: 2022-06-27 | End: 2022-06-30 | Stop reason: HOSPADM

## 2022-06-27 RX ORDER — ACETAMINOPHEN 160 MG/5ML
975 SUSPENSION, ORAL (FINAL DOSE FORM) ORAL EVERY 8 HOURS SCHEDULED
Status: DISCONTINUED | OUTPATIENT
Start: 2022-06-27 | End: 2022-06-27

## 2022-06-27 RX ORDER — OXYCODONE HYDROCHLORIDE 5 MG/1
5 TABLET ORAL EVERY 4 HOURS PRN
Status: DISCONTINUED | OUTPATIENT
Start: 2022-06-27 | End: 2022-06-30 | Stop reason: HOSPADM

## 2022-06-27 RX ORDER — DOCUSATE SODIUM 100 MG/1
100 CAPSULE, LIQUID FILLED ORAL 2 TIMES DAILY
Status: DISCONTINUED | OUTPATIENT
Start: 2022-06-27 | End: 2022-06-30 | Stop reason: HOSPADM

## 2022-06-27 RX ADMIN — FOLIC ACID 1 MG: 1 TABLET ORAL at 08:16

## 2022-06-27 RX ADMIN — ACETAMINOPHEN 325MG 975 MG: 325 TABLET ORAL at 21:50

## 2022-06-27 RX ADMIN — GABAPENTIN 100 MG: 100 CAPSULE ORAL at 17:22

## 2022-06-27 RX ADMIN — PREDNISONE 5 MG: 5 TABLET ORAL at 08:16

## 2022-06-27 RX ADMIN — ACETAMINOPHEN 975 MG: 650 SUSPENSION ORAL at 17:22

## 2022-06-27 RX ADMIN — DOCUSATE SODIUM 100 MG: 100 CAPSULE, LIQUID FILLED ORAL at 17:22

## 2022-06-27 RX ADMIN — DOCUSATE SODIUM 100 MG: 100 CAPSULE, LIQUID FILLED ORAL at 08:16

## 2022-06-27 RX ADMIN — PAROXETINE 10 MG: 20 TABLET, FILM COATED ORAL at 08:16

## 2022-06-27 RX ADMIN — ACETAMINOPHEN 975 MG: 650 SUSPENSION ORAL at 07:53

## 2022-06-27 RX ADMIN — GABAPENTIN 100 MG: 100 CAPSULE ORAL at 07:56

## 2022-06-27 RX ADMIN — SENNOSIDES 8.6 MG: 8.6 TABLET ORAL at 22:00

## 2022-06-27 NOTE — CONSULTS
Consultation - Maria G Face 75 y o  female MRN: [id number]  Unit/Bed#: E2 -01 Encounter: [id number]      Assessment/Plan     Assessment:  76 yo female with nondisplaced right femoral neck fracture     Plan:  · Plan for OR tomorrow 6/28/22 for [facility] due to OR availability  · Diet resumed  NPO at midnight  · Bedrest    · Hold chemical DVT prophylaxis as of midnight tonight  · Pain control prn  · Medical management and surgical clearance per primary team    · Ortho will follow  History of Present Illness   Physician Requesting Consult: Matt Faulkner DO  Reason for Consult / Principal Problem: right hip fracture   HPI: Annel Aggarwal is a 75y o  year old female who presents with right hip pain x 1 day  Patient states she had a fall at work when she slipped on a wet floor  Patent landed directly on the right side and had pain in the right hip  Patient presented to the ED and was found to have right hip fracture  Patient states she was doing well prior to the fall  She underwent right TKA in March and did well with that  She had been back to work full duty and walking without any assistive device  Patient states her pain is well controlled currently  Denies distal numbness or tingling  Inpatient consult to Orthopedic Surgery  Consult performed by: Niya Kruger PA-C  Consult ordered by: Radames Reza MD          ROS: see HPI, all other systems negative      Historical Information   Past Medical History:   Diagnosis Date    Anemia     1995 - corrected with iron    Ankle fracture, right     last assessed: 3/9/15    Anxiety     Chronic pain disorder     right side joint pain     Moderate exercise     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 [address] Wears dentures     full upper    Wears glasses     reading     Past Surgical History:   Procedure Laterality Date    ANKLE SURGERY      ORIF 5/16/plate and 4 screws    CYST REMOVAL      1969    ESOPHAGOGASTRODUODENOSCOPY      Diagnostic    FOOT SURGERY      plantar wart resection; resolved:     MI TOTAL KNEE ARTHROPLASTY Right 3/1/2022    Procedure: ARTHROPLASTY KNEE TOTAL;  Surgeon: Patric Douglas DO;  Location: [facility];  Service: Orthopedics    TONSILLECTOMY AND ADENOIDECTOMY          TUBAL LIGATION      WISDOM TOOTH EXTRACTION       Social History   Social History     Substance and Sexual Activity   Alcohol Use Yes    Comment: rare, maybe on holidays     Social History     Substance and Sexual Activity   Drug Use No     Social History     Tobacco Use   Smoking Status Former Smoker    Quit date:     Years since quittin 5   Smokeless Tobacco Never Used     Family History:   Family History   Problem Relation Age of Onset    Seizures Mother         epilepsy    Bone cancer Father     No Known Problems Sister     No Known Problems Brother     No Known Problems Son     No Known Problems Maternal Grandmother     No Known Problems Maternal Grandfather     No Known Problems Paternal Grandmother     No Known Problems Paternal Grandfather     Thyroid disease Sister     No Known Problems Sister     No Known Problems Brother        Meds/Allergies   Medications Prior to Admission   Medication    ascorbic acid (VITAMIN C) 500 MG tablet    ferrous sulfate 324 (65 Fe) mg    folic acid (FOLVITE) 1 mg tablet    gabapentin (NEURONTIN) 100 mg capsule    ibandronate (BONIVA) 150 MG tablet    Multiple Vitamin (multivitamin) tablet    PARoxetine (PAXIL) 10 mg tablet    predniSONE 5 mg tablet    solifenacin (VESICARE) 10 MG tablet    acetaminophen (TYLENOL) 325 mg tablet    aspirin (ECOTRIN) 325 mg EC tablet    Ca Carbonate-Mag Hydroxide 550-110 MG CHEW    docusate sodium (COLACE) 100 mg capsule    meclizine (ANTIVERT) 25 mg tablet    promethazine (PHENERGAN) 12 5 MG tablet     Allergies   Allergen Reactions    Aspirin Other (See Comments)     Per pt avoids taking due to rheumatology Meds       Objective Vitals: Blood pressure 135/83, pulse 68, temperature 97 8 °F (36 6 °C), temperature source Temporal, resp  rate 16, height 5' 4" (1 626 m), weight 55 6 kg (122 lb 9 2 oz), SpO2 91 %, not currently breastfeeding  ,Body mass index is 21 04 kg/m²  No intake or output data in the 24 hours ending 06/27/22 0853  No intake/output data recorded  Invasive Devices  Report    Peripheral Intravenous Line  Duration           Peripheral IV 03/04/22 Right Antecubital 114 days    Peripheral IV 06/26/22 Proximal;Right;Ventral (anterior) Forearm <1 day                PE:  Gen:  Awake and alert  HEENT:  Hearing intact  Heart:  Regular rate  Lungs: no audible wheezing  GI: no abdominal distension    Ortho Exam   Right Lower Extremity:  Inspection- no ecchymosis, erythema, or abrasions  Palpation- no TTP  No significant swelling  Thigh and calf soft and compressible  ROM- +pain with log roll  Able to plantar and dorsiflex ankles and toes  Neurovascular-  Sensation intact L4- S1  Palpable pedal pulse  AT/ GS/ EHL intact       Lab Results:   CBC:   Lab Results   Component Value Date    WBC 6 69 06/27/2022    HGB 11 2 (L) 06/27/2022    HCT 36 4 06/27/2022    MCV 98 06/27/2022     06/27/2022    MCH 30 0 06/27/2022    MCHC 30 8 (L) 06/27/2022    RDW 16 0 (H) 06/27/2022    MPV 9 5 06/27/2022    NRBC 0 06/27/2022     CMP:   Lab Results   Component Value Date    SODIUM 138 06/27/2022     06/27/2022    CO2 32 06/27/2022    BUN 13 06/27/2022    CREATININE 0 72 06/27/2022    CALCIUM 8 5 06/27/2022    AST 17 06/26/2022    ALT 23 06/26/2022    ALKPHOS 92 06/26/2022    EGFR 86 06/27/2022     Imaging Studies: I have personally reviewed pertinent films in PACS   X-rays right hip- nondisplaced femoral neck fracture     Code Status: Level 1 - Full Code  Advance Directive and Living Will:      Power of :    POLST:

## 2022-06-27 NOTE — ASSESSMENT & PLAN NOTE
· Patient previously seen by Cardiology for abnormal EKG   · Echo updated in March   · She denies chest pain, pressure, SOB, CLOUD   · She has high functional capacity prior to injury   · Check EKG prn and post operatively   · Attending for follow for clearance

## 2022-06-27 NOTE — UTILIZATION REVIEW
Initial Clinical Review    Admission: Date/Time/Statement:   Admission Orders (From admission, onward)     Ordered        06/26/22 1411  INPATIENT ADMISSION  Once                      Orders Placed This Encounter   Procedures    INPATIENT ADMISSION     Standing Status:   Standing     Number of Occurrences:   1     Order Specific Question:   Level of Care     Answer:   Med Surg [16]     Order Specific Question:   Estimated length of stay     Answer:   More than 2 Midnights     Order Specific Question:   Certification     Answer:   I certify that inpatient services are medically necessary for this patient for a duration of greater than two midnights  See H&P and MD Progress Notes for additional information about the patient's course of treatment  ED Arrival Information     Expected   -    Arrival   6/26/2022 11:47    Acuity   Urgent            Means of arrival   Ambulance    Escorted by   [facility]    Admission type   Urgent            Arrival complaint   hip pain fall           Chief Complaint   Patient presents with    Hip Pain     Pt  C/O right hip pain after slipping on a wet floor at work  No LOC, denies hitting head, no blood thinners       Initial Presentation: 75 y o  female with hx RAon prednisone, OA, HLD, sicca syndrome, R TKR 3/2022 who presents to ED from work via EMS with R hip pain and inability to walk s/p fall ar work on [address] floor   On exam, right leg was somewhat shortened and externally rotated  Peripheral pulses were intact  There were some joint deformities in the hands consistent with rheumatoid arthritis,  alert and oriented  Imaging shows nondisplaced R hip fracture  ECG-sinus withnonspecific ST and T-wave changes  Pt given IV analgesic in ED  Pt admitted as Inpatient to med surg with Fractured R hip  Plan - IVF, stress dose steroids, orthopedic consult,pain control   NPO after MN    Date: 10/27   Day 2:  Orthopedic consult-nondisplaced right femoral neck fracture   Plan for OR tomorrow due to OR availability  Resume diet for now   [facility]  [facility]  [facility]  Pain control  LLE- no TTP  No significant swelling  Thigh and calf soft and compressible  +pain with log roll  Able to plantar and dorsiflex ankles and toes   Sensation intact L4- S1  Palpable pedal pulse  Medicine- pt having pain R hip with med regime effective  Does not need any further cardiac workup prior to surgery   ED Triage Vitals [06/26/22 1155]   Temperature Pulse Respirations Blood Pressure SpO2   97 8 °F (36 6 °C) 63 16 168/80 93 %      Temp Source Heart Rate Source Patient Position - Orthostatic VS BP Location FiO2 (%)   Oral Monitor Lying Left arm --      Pain Score       No Pain          Wt Readings from Last 1 Encounters:   06/26/22 55 6 kg (122 lb 9 2 oz)     Additional Vital Signs:   Date/Time Temp Pulse Resp BP MAP (mmHg) SpO2   06/27/22 0806 97 8 °F (36 6 °C) 68 16 135/83 -- 91 %   06/26/22 2300 -- 54 Abnormal  18 160/67 -- 95 %   06/26/22 1722 97 1 °F (36 2 °C) Abnormal  61 18 164/89 135 90 %   06/26/22 1431 98 4 °F (36 9 °C) 57 16 181/85 Abnormal  -- 96 %     Date and Time R Radial Pulse L Radial Pulse R Pedal Pulse L Pedal Pulse   06/27/22 0753 +2 +2 +2 +2   06/26/22 1700 +2 +2 +2 +2       Pertinent Labs/Diagnostic Test Results:   XR chest 1 view portable   Final Result by Paxton Calvillo MD (06/27 9934)      No acute cardiopulmonary disease  Workstation performed: [id number]         XR hip/pelv 2-3 vws right if performed   ED Interpretation by Joseph Saul PA-C (06/26 1334)   Right femoral head fracture      Final Result by Paxton Calvillo MD (06/27 0601)      Acute nondisplaced fracture of the right femoral neck              Workstation performed: [id number]         XR femur 2 views RIGHT   ED Interpretation by Joseph Saul PA-C (06/26 1334)   Right femoral head fracture      Final Result by Riaz Glasgow MD (06/27 0602)      Acute nondisplaced fracture of the right femoral neck              Workstation performed: [id number]         6/26 ECG-Sinus bradycardia  ST & T wave abnormality, consider inferior ischemia  ST & T wave abnormality, consider anterolateral ischemia  Prolonged QT      Results from last 7 days   Lab Units 06/27/22  0501 06/26/22  1252   WBC Thousand/uL 6 69 5 71   HEMOGLOBIN g/dL 11 2* 11 5   HEMATOCRIT % 36 4 35 3   PLATELETS Thousands/uL 187 198   NEUTROS ABS Thousands/µL 5 25 4 45         Results from last 7 days   Lab Units 06/27/22  0501 06/26/22  1252   SODIUM mmol/L 138 138   POTASSIUM mmol/L 4 1 4 0   CHLORIDE mmol/L 103 103   CO2 mmol/L 32 32   ANION GAP mmol/L 3* 3*   BUN mg/dL 13 13   CREATININE mg/dL 0 72 0 59*   EGFR ml/min/1 73sq m 86 94   CALCIUM mg/dL 8 5 8 9     Results from last 7 days   Lab Units 06/26/22  1252   AST U/L 17   ALT U/L 23   ALK PHOS U/L 92   TOTAL PROTEIN g/dL 6 4   ALBUMIN g/dL 3 4*   TOTAL BILIRUBIN mg/dL 0 82         Results from last 7 days   Lab Units 06/27/22  0501 06/26/22  1252   GLUCOSE RANDOM mg/dL 123 91                   Results from last 7 days   Lab Units 06/26/22  1252   PROTIME seconds 12 7   INR  0 98   PTT seconds 30                 ED Treatment:   Medication Administration from 06/26/2022 1147 to 06/26/2022 1652       Date/Time Order Dose Route Action     06/26/2022 1457 morphine injection 4 mg 4 mg Intravenous Given        Past Medical History:   Diagnosis Date    Anemia     1995 - corrected with iron    Ankle fracture, right     last assessed: 3/9/15    Anxiety     Chronic pain disorder     right side joint pain     Moderate exercise     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 [address] Wears dentures     full upper    Wears glasses     reading     Present on Admission:   Closed fracture of right hip ([address] )   Rheumatoid arthritis ([address] )   Age-related osteoporosis with current pathological fracture   Hyperlipidemia   Anxiety disorder   Abnormal ECG      Admitting Diagnosis: Hip pain [M25 559]  Closed fracture of right hip, initial encounter ([facility] ) [S72 001A]  Closed displaced fracture of right femoral neck ([facility] ) [S72 001A]  Age/Sex: 75 y o  female  Admission Orders:  Scheduled Medications:  acetaminophen, 975 mg, Oral, Q8H [address]  docusate sodium, 100 mg, Oral, BID  folic acid, 1 mg, Oral, Daily  gabapentin, 100 mg, Oral, Q8H  hydrocortisone sodium succinate, 100 mg, Intravenous, Once  PARoxetine, 10 mg, Oral, Daily  predniSONE, 5 mg, Oral, Daily  senna, 1 tablet, Oral, HS      Continuous IV Infusions:  sodium chloride, 75 mL/hr, Intravenous, Continuous      PRN Meds:  HYDROmorphone, 0 2 mg, Intravenous, Q4H PRN  oxyCODONE, 2 5 mg, Oral, Q4H PRN  oxyCODONE, 5 mg, Oral, Q4H PRN  polyethylene glycol, 17 g, Oral, Daily PRN  morphine injection 4 mg  Dose: 4 mg  Freq: Every 4 hours PRN Route: IV  PRN Reason: severe pain  Start: 06/26/22 1548 End: 06/27/22 0719 x1 6/26      Reg diet   NPO effective 6/28 @0001  SCD    IP CONSULT TO ORTHOPEDIC SURGERY  IP CONSULT TO CASE MANAGEMENT    Network Utilization Review Department  ATTENTION: Please call with any questions or concerns to [phone] and carefully listen to the prompts so that you are directed to the right person  All voicemails are confidential   Susanna Delgado all requests for admission clinical reviews, approved or denied determinations and any other requests to dedicated fax number below belonging to the campus where the patient is receiving treatment   List of dedicated fax numbers for the Facilities:  [address] [place] [address] DENIALS (Administrative/Medical Necessity) [phone]   [address] (Maternity/NICU/Pediatrics) [phone]   [address] [place] [address] [address] [place] [address] - Will Henderson Rayo Chris 2027 [id number] [place] [address] [address] [phone]   [place] [address] [phone]

## 2022-06-27 NOTE — PLAN OF CARE
Problem: Potential for Falls  Goal: Patient will remain free of falls  Description: INTERVENTIONS:  - Educate patient/family on patient safety including physical limitations  - Instruct patient to call for assistance with activity   - Consult OT/PT to assist with strengthening/mobility   - Keep Call bell within reach  - Keep bed low and locked with side rails adjusted as appropriate  - Keep care items and personal belongings within reach  - Initiate and maintain comfort rounds  - Make Fall Risk Sign visible to staff  - Offer Toileting every 2 Hours, in advance of need  - Initiate/Maintain bedalarm  - Obtain necessary fall risk management equipment: at bedside  - Apply yellow socks and bracelet for high fall risk patients  - Consider moving patient to room near nurses station  Outcome: Progressing     Problem: MOBILITY - ADULT  Goal: Maintain or return to baseline ADL function  Description: INTERVENTIONS:  -  Assess patient's ability to carry out ADLs; assess patient's baseline for ADL function and identify physical deficits which impact ability to perform ADLs (bathing, care of mouth/teeth, toileting, grooming, dressing, etc )  - Assess/evaluate cause of self-care deficits   - Assess range of motion  - Assess patient's mobility; develop plan if impaired  - Assess patient's need for assistive devices and provide as appropriate  - Encourage maximum independence but intervene and supervise when necessary  - Involve family in performance of ADLs  - Assess for home care needs following discharge   - Consider OT consult to assist with ADL evaluation and planning for discharge  - Provide patient education as appropriate  Outcome: Progressing  Goal: Maintains/Returns to pre admission functional level  Description: INTERVENTIONS:  - Perform BMAT or MOVE assessment daily    - Set and communicate daily mobility goal to care team and patient/family/caregiver     - Collaborate with rehabilitation services on mobility goals if consulted  - Perform Range of Motion 2 times a day  - Reposition patient every 2 hours    - Dangle patient 2 times a day  - Stand patient 2 times a day  - Ambulate patient 2 times a day  - Out of bed to chair 2 times a day   - Out of bed for meals 2 times a day  - Out of bed for toileting  - Record patient progress and toleration of activity level   Outcome: Progressing     Problem: PAIN - ADULT  Goal: Verbalizes/displays adequate comfort level or baseline comfort level  Description: Interventions:  - Encourage patient to monitor pain and request assistance  - Assess pain using appropriate pain scale  - Administer analgesics based on type and severity of pain and evaluate response  - Implement non-pharmacological measures as appropriate and evaluate response  - Consider cultural and social influences on pain and pain management  - Notify physician/advanced practitioner if interventions unsuccessful or patient reports new pain  Outcome: Progressing     Problem: INFECTION - ADULT  Goal: Absence or prevention of progression during hospitalization  Description: INTERVENTIONS:  - Assess and monitor for signs and symptoms of infection  - Monitor lab/diagnostic results  - Monitor all insertion sites, i e  indwelling lines, tubes, and drains  - Monitor endotracheal if appropriate and nasal secretions for changes in amount and color  - [place] appropriate cooling/warming therapies per order  - Administer medications as ordered  - Instruct and encourage patient and family to use good hand hygiene technique  - Identify and instruct in appropriate isolation precautions for identified infection/condition  Outcome: Progressing  Goal: Absence of fever/infection during neutropenic period  Description: INTERVENTIONS:  - Monitor WBC    Outcome: Progressing     Problem: DISCHARGE PLANNING  Goal: Discharge to home or other facility with appropriate resources  Description: INTERVENTIONS:  - Identify barriers to discharge w/patient and caregiver  - Arrange for needed discharge resources and transportation as appropriate  - Identify discharge learning needs (meds, wound care, etc )  - Arrange for interpretive services to assist at discharge as needed  - Refer to Case Management Department for coordinating discharge planning if the patient needs post-hospital services based on physician/advanced practitioner order or complex needs related to functional status, cognitive ability, or social support system  Outcome: Progressing     Problem: Knowledge Deficit  Goal: Patient/family/caregiver demonstrates understanding of disease process, treatment plan, medications, and discharge instructions  Description: Complete learning assessment and assess knowledge base    Interventions:  - Provide teaching at level of understanding  - Provide teaching via preferred learning methods  Outcome: Progressing     Problem: MUSCULOSKELETAL - ADULT  Goal: Maintain or return mobility to safest level of function  Description: INTERVENTIONS:  - Assess patient's ability to carry out ADLs; assess patient's baseline for ADL function and identify physical deficits which impact ability to perform ADLs (bathing, care of mouth/teeth, toileting, grooming, dressing, etc )  - Assess/evaluate cause of self-care deficits   - Assess range of motion  - Assess patient's mobility  - Assess patient's need for assistive devices and provide as appropriate  - Encourage maximum independence but intervene and supervise when necessary  - Involve family in performance of ADLs  - Assess for home care needs following discharge   - Consider OT consult to assist with ADL evaluation and planning for discharge  - Provide patient education as appropriate  Outcome: Progressing  Goal: Maintain proper alignment of affected body part  Description: INTERVENTIONS:  - Support, maintain and protect limb and body alignment  - Provide patient/ family with appropriate education  Outcome: Progressing

## 2022-06-27 NOTE — ASSESSMENT & PLAN NOTE
· Mechanical fall slipping on a wet floor at work assisting acute right hip fracture  · Xray hip: Acute nondisplaced fracture of the right femoral neck  · NPO   · Ortho consulted for plan for surgical intervention   · Timing TBD   · Hold DVT prophylaxis for surgery until post-op   · Pain control   · Bedrest for now

## 2022-06-27 NOTE — ASSESSMENT & PLAN NOTE
· Maintained on prednisone 5 mg daily   · Given stress dose of hydrocortisone pre-operatively   · Follows with Rheum at [facility]   · Also on methotrexate and folic acid

## 2022-06-27 NOTE — OCCUPATIONAL THERAPY NOTE
Occupational Therapy         Patient Name: López Glover  [id number] Date: 6/27/2022      MD's orders received  Pt pending ortho consult 2* hip fx  Will continue when cleared by ortho   Krystal Blackwell, OT

## 2022-06-27 NOTE — PHYSICAL THERAPY NOTE
Physical Therapy Cancellation Note               06/27/22 0911   PT Last Visit   PT Visit Date 06/27/22   Note Type   Note type Cancelled Session   Cancel Reasons Medical status   Additional Comments PT consult recieved  Admitted w R hip fx pending ortho consult for management   Will defer evaluation until ortho recommendations       Excell Som, PT

## 2022-06-27 NOTE — PROGRESS NOTES
[facility]  Progress Note - Chay Wilkerson 1955, 75 y o  female MRN: [id number]  Unit/Bed#: E2 -01 Encounter: [id number]  Primary Care Provider: Emily Jimenez MD   Date and time admitted to hospital: 6/26/2022 11:47 AM    * Closed fracture of right hip [facility])  Assessment & Plan  · Mechanical fall slipping on a wet floor at work assisting acute right hip fracture  · Xray hip: Acute nondisplaced fracture of the right femoral neck  · NPO   · Ortho consulted for plan for surgical intervention   · Timing TBD   · Hold DVT prophylaxis for surgery until post-op   · Pain control   · Bedrest for now   · Attending to follow for formal clearance     On prednisone therapy  Assessment & Plan  · 2/2 RA on pred 5 mg daily     History of total right knee replacement (TKR)  Assessment & Plan  · Status post total knee replacement on 03/01/2022 by Dr Héctor Reddy     Abnormal Sherl Senate  · Patient previously seen by Cardiology for abnormal EKG   · Echo updated in March   · She denies chest pain, pressure, SOB, CLOUD   · She has high functional capacity prior to injury   · Check EKG prn and post operatively   · Attending for follow for clearance     Rheumatoid arthritis ([facility] [address] )  Assessment & Plan  · Maintained on prednisone 5 mg daily   · Given stress dose of hydrocortisone pre-operatively   · Follows with Rheum at [facility]   · Also on methotrexate and folic acid     Age-related osteoporosis with current pathological fracture  Assessment & Plan  · Maintained on Boniva     Anxiety disorder  Assessment & Plan  · Continue Paxil 10 mg daily       VTE Pharmacologic Prophylaxis:   hold for possible surgery today     Patient Centered Rounds: I performed bedside rounds with nursing staff today    Discussions with Specialists or Other Care Team Provider: attending, Dr Tiara Bull , awaiting ortho recs and vicki alejandra surgery     Education and Discussions with Family / Patient: Updated  () at bedside  Time Spent for Care: 20 minutes  More than 50% of total time spent on counseling and coordination of care as described above  Current Length of Stay: 1 day(s)  Current Patient Status: Inpatient   Certification Statement: The patient will continue to require additional inpatient hospital stay due to right hip fracture   Discharge Plan: Anticipate discharge in >72 hrs to discharge location to be determined pending rehab evaluations  Code Status: Level 1 - Full Code    Subjective:   Patient is resting in bed  She has a hard time using the bedpan  She admits to pain in her right hip no pain elsewhere  No abdominal pain nausea or vomiting  No chest pain, pressure, palpitations, shortness of breath, dyspnea on exertion  She states she had good exercise tolerance and could climb a flight of stairs without difficulty  She states she had good recovery from her recent knee replacement and was back to work and climbing stairs  She denies any lightheadedness or dizziness  No leg swelling or pain  Objective:     Vitals:   Temp (24hrs), Av 8 °F (36 6 °C), Min:97 1 °F (36 2 °C), Max:98 4 °F (36 9 °C)    Temp:  [97 1 °F (36 2 °C)-98 4 °F (36 9 °C)] 97 8 °F (36 6 °C)  HR:  [54-68] 68  Resp:  [16-18] 16  BP: (135-181)/(67-89) 135/83  SpO2:  [90 %-96 %] 91 %  Body mass index is 21 04 kg/m²  Input and Output Summary (last 24 hours):   No intake or output data in the 24 hours ending 22 0911    Physical Exam:   Physical Exam  Vitals and nursing note reviewed  Constitutional:       General: She is not in acute distress  Appearance: She is not ill-appearing or toxic-appearing  HENT:      Head: Normocephalic  Eyes:      Conjunctiva/sclera: Conjunctivae normal    Cardiovascular:      Rate and Rhythm: Normal rate and regular rhythm  Pulmonary:      Effort: Pulmonary effort is normal  No respiratory distress  Breath sounds: Normal breath sounds  No wheezing or rales     Abdominal: General: Bowel sounds are normal       Palpations: Abdomen is soft  Tenderness: There is no abdominal tenderness  There is no guarding  Musculoskeletal:         General: Tenderness (rigth hip) present  Skin:     General: Skin is warm  Neurological:      Mental Status: She is alert  Mental status is at baseline     Psychiatric:         Mood and Affect: Mood normal           Additional Data:     Labs:  Results from last 7 days   Lab Units 06/27/22  0501   WBC Thousand/uL 6 69   HEMOGLOBIN g/dL 11 2*   HEMATOCRIT % 36 4   PLATELETS Thousands/uL 187   NEUTROS PCT % 79*   LYMPHS PCT % 12*   MONOS PCT % 5   EOS PCT % 3     Results from last 7 days   Lab Units 06/27/22  0501 06/26/22  1252   SODIUM mmol/L 138 138   POTASSIUM mmol/L 4 1 4 0   CHLORIDE mmol/L 103 103   CO2 mmol/L 32 32   BUN mg/dL 13 13   CREATININE mg/dL 0 72 0 59*   ANION GAP mmol/L 3* 3*   CALCIUM mg/dL 8 5 8 9   ALBUMIN g/dL  --  3 4*   TOTAL BILIRUBIN mg/dL  --  0 82   ALK PHOS U/L  --  92   ALT U/L  --  23   AST U/L  --  17   GLUCOSE RANDOM mg/dL 123 91     Results from last 7 days   Lab Units 06/26/22  1252   INR  0 98                   Lines/Drains:  Invasive Devices  Report    Peripheral Intravenous Line  Duration           Peripheral IV 03/04/22 Right Antecubital 114 days    Peripheral IV 06/26/22 Proximal;Right;Ventral (anterior) Forearm <1 day                      Imaging: Reviewed radiology reports from this admission including: chest xray and xray(s)    Recent Cultures (last 7 days):         Last 24 Hours Medication List:   Current Facility-Administered Medications   Medication Dose Route Frequency Provider Last Rate    acetaminophen  975 mg Oral Q8H [address] Keira Rodarte PA-C      docusate sodium  100 mg Oral BID Eliz Chavira PA-C      folic acid  1 mg Oral Daily Mei Garsia MD      gabapentin  100 mg Oral Q8H Mei Garsia MD      hydrocortisone sodium succinate  100 mg Intravenous Once MD Rogelio Lund HYDROmorphone  0 2 mg Intravenous Q4H PRN Treri Noble PA-C      oxyCODONE  2 5 mg Oral Q4H PRN Terri Noble PA-C      oxyCODONE  5 mg Oral Q4H PRN Terri Noble PA-C      PARoxetine  10 mg Oral Daily Marrian Cabot, MD      polyethylene glycol  17 g Oral Daily PRN Marrian Cabot, MD      predniSONE  5 mg Oral Daily Marrian Cabot, MD      senna  1 tablet Oral HS Terri Noble PA-C      sodium chloride  75 mL/hr Intravenous Continuous Marrian Cabot, MD 75 mL/hr (06/26/22 4163)        Today, Patient Was Seen By: Terri Noble PA-C    **Please Note: This note may have been constructed using a voice recognition system  **

## 2022-06-28 ENCOUNTER — APPOINTMENT (INPATIENT)
Dept: RADIOLOGY | Facility: HOSPITAL | Age: 67
DRG: 211 | End: 2022-06-28
Payer: OTHER MISCELLANEOUS

## 2022-06-28 ENCOUNTER — ANESTHESIA (INPATIENT)
Dept: PERIOP | Facility: HOSPITAL | Age: 67
DRG: 211 | End: 2022-06-28
Payer: OTHER MISCELLANEOUS

## 2022-06-28 LAB
ANION GAP SERPL CALCULATED.3IONS-SCNC: 1 MMOL/L (ref 4–13)
APTT PPP: 32 SECONDS (ref 23–37)
BUN SERPL-MCNC: 9 MG/DL (ref 5–25)
CALCIUM SERPL-MCNC: 8.3 MG/DL (ref 8.3–10.1)
CHLORIDE SERPL-SCNC: 105 MMOL/L (ref 100–108)
CO2 SERPL-SCNC: 34 MMOL/L (ref 21–32)
CREAT SERPL-MCNC: 0.58 MG/DL (ref 0.6–1.3)
ERYTHROCYTE [DISTWIDTH] IN BLOOD BY AUTOMATED COUNT: 15.8 % (ref 11.6–15.1)
GFR SERPL CREATININE-BSD FRML MDRD: 95 ML/MIN/1.73SQ M
GLUCOSE SERPL-MCNC: 103 MG/DL (ref 65–140)
HCT VFR BLD AUTO: 36.5 % (ref 34.8–46.1)
HGB BLD-MCNC: 11.6 G/DL (ref 11.5–15.4)
INR PPP: 1.14 (ref 0.84–1.19)
MCH RBC QN AUTO: 31.1 PG (ref 26.8–34.3)
MCHC RBC AUTO-ENTMCNC: 31.8 G/DL (ref 31.4–37.4)
MCV RBC AUTO: 98 FL (ref 82–98)
PLATELET # BLD AUTO: 171 THOUSANDS/UL (ref 149–390)
PMV BLD AUTO: 9.8 FL (ref 8.9–12.7)
POTASSIUM SERPL-SCNC: 3.9 MMOL/L (ref 3.5–5.3)
PROTHROMBIN TIME: 14.2 SECONDS (ref 11.6–14.5)
RBC # BLD AUTO: 3.73 MILLION/UL (ref 3.81–5.12)
SODIUM SERPL-SCNC: 140 MMOL/L (ref 136–145)
WBC # BLD AUTO: 9.25 THOUSAND/UL (ref 4.31–10.16)

## 2022-06-28 PROCEDURE — C1713 ANCHOR/SCREW BN/BN,TIS/BN: HCPCS | Performed by: ORTHOPAEDIC SURGERY

## 2022-06-28 PROCEDURE — 99252 IP/OBS CONSLTJ NEW/EST SF 35: CPT | Performed by: INTERNAL MEDICINE

## 2022-06-28 PROCEDURE — 0QS604Z REPOSITION RIGHT UPPER FEMUR WITH INTERNAL FIXATION DEVICE, OPEN APPROACH: ICD-10-PCS | Performed by: ORTHOPAEDIC SURGERY

## 2022-06-28 PROCEDURE — 27235 TREAT THIGH FRACTURE: CPT | Performed by: PHYSICIAN ASSISTANT

## 2022-06-28 PROCEDURE — C1769 GUIDE WIRE: HCPCS | Performed by: ORTHOPAEDIC SURGERY

## 2022-06-28 PROCEDURE — 99232 SBSQ HOSP IP/OBS MODERATE 35: CPT | Performed by: HOSPITALIST

## 2022-06-28 PROCEDURE — 73502 X-RAY EXAM HIP UNI 2-3 VIEWS: CPT

## 2022-06-28 PROCEDURE — NC001 PR NO CHARGE: Performed by: ORTHOPAEDIC SURGERY

## 2022-06-28 PROCEDURE — 27235 TREAT THIGH FRACTURE: CPT | Performed by: ORTHOPAEDIC SURGERY

## 2022-06-28 PROCEDURE — 85027 COMPLETE CBC AUTOMATED: CPT | Performed by: PHYSICIAN ASSISTANT

## 2022-06-28 PROCEDURE — 85730 THROMBOPLASTIN TIME PARTIAL: CPT | Performed by: PHYSICIAN ASSISTANT

## 2022-06-28 PROCEDURE — 80048 BASIC METABOLIC PNL TOTAL CA: CPT | Performed by: PHYSICIAN ASSISTANT

## 2022-06-28 PROCEDURE — 85610 PROTHROMBIN TIME: CPT | Performed by: PHYSICIAN ASSISTANT

## 2022-06-28 DEVICE — 6.5MM CANNULATED SCREW 16MM THREAD 75MM: Type: IMPLANTABLE DEVICE | Site: HIP | Status: FUNCTIONAL

## 2022-06-28 DEVICE — 6.5MM CANNULATED SCREW 16MM THREAD 70MM: Type: IMPLANTABLE DEVICE | Site: HIP | Status: FUNCTIONAL

## 2022-06-28 DEVICE — WASHER 13.0MM: Type: IMPLANTABLE DEVICE | Site: HIP | Status: FUNCTIONAL

## 2022-06-28 DEVICE — 6.5MM CANNULATED SCREW 16MM THREAD 85MM: Type: IMPLANTABLE DEVICE | Site: HIP | Status: FUNCTIONAL

## 2022-06-28 RX ORDER — GLYCOPYRROLATE 0.2 MG/ML
INJECTION INTRAMUSCULAR; INTRAVENOUS AS NEEDED
Status: DISCONTINUED | OUTPATIENT
Start: 2022-06-28 | End: 2022-06-28

## 2022-06-28 RX ORDER — ENOXAPARIN SODIUM 100 MG/ML
40 INJECTION SUBCUTANEOUS DAILY
Status: DISCONTINUED | OUTPATIENT
Start: 2022-06-29 | End: 2022-06-30 | Stop reason: HOSPADM

## 2022-06-28 RX ORDER — ONDANSETRON 2 MG/ML
4 INJECTION INTRAMUSCULAR; INTRAVENOUS ONCE AS NEEDED
Status: DISCONTINUED | OUTPATIENT
Start: 2022-06-28 | End: 2022-06-28 | Stop reason: HOSPADM

## 2022-06-28 RX ORDER — ROCURONIUM BROMIDE 10 MG/ML
INJECTION, SOLUTION INTRAVENOUS AS NEEDED
Status: DISCONTINUED | OUTPATIENT
Start: 2022-06-28 | End: 2022-06-28

## 2022-06-28 RX ORDER — SODIUM CHLORIDE 9 MG/ML
INJECTION, SOLUTION INTRAVENOUS CONTINUOUS PRN
Status: DISCONTINUED | OUTPATIENT
Start: 2022-06-28 | End: 2022-06-28

## 2022-06-28 RX ORDER — PROMETHAZINE HYDROCHLORIDE 25 MG/ML
6.25 INJECTION, SOLUTION INTRAMUSCULAR; INTRAVENOUS ONCE AS NEEDED
Status: DISCONTINUED | OUTPATIENT
Start: 2022-06-28 | End: 2022-06-28 | Stop reason: HOSPADM

## 2022-06-28 RX ORDER — FENTANYL CITRATE/PF 50 MCG/ML
50 SYRINGE (ML) INJECTION
Status: DISCONTINUED | OUTPATIENT
Start: 2022-06-28 | End: 2022-06-28 | Stop reason: HOSPADM

## 2022-06-28 RX ORDER — CEFAZOLIN SODIUM 1 G/50ML
SOLUTION INTRAVENOUS AS NEEDED
Status: DISCONTINUED | OUTPATIENT
Start: 2022-06-28 | End: 2022-06-28

## 2022-06-28 RX ORDER — NEOSTIGMINE METHYLSULFATE 1 MG/ML
INJECTION INTRAVENOUS AS NEEDED
Status: DISCONTINUED | OUTPATIENT
Start: 2022-06-28 | End: 2022-06-28

## 2022-06-28 RX ORDER — PROPOFOL 10 MG/ML
INJECTION, EMULSION INTRAVENOUS AS NEEDED
Status: DISCONTINUED | OUTPATIENT
Start: 2022-06-28 | End: 2022-06-28

## 2022-06-28 RX ORDER — PROMETHAZINE HYDROCHLORIDE 25 MG/ML
12.5 INJECTION, SOLUTION INTRAMUSCULAR; INTRAVENOUS EVERY 6 HOURS PRN
Status: DISCONTINUED | OUTPATIENT
Start: 2022-06-28 | End: 2022-06-30 | Stop reason: HOSPADM

## 2022-06-28 RX ORDER — HYDROMORPHONE HCL/PF 1 MG/ML
0.5 SYRINGE (ML) INJECTION
Status: DISCONTINUED | OUTPATIENT
Start: 2022-06-28 | End: 2022-06-28 | Stop reason: HOSPADM

## 2022-06-28 RX ORDER — ONDANSETRON 2 MG/ML
INJECTION INTRAMUSCULAR; INTRAVENOUS AS NEEDED
Status: DISCONTINUED | OUTPATIENT
Start: 2022-06-28 | End: 2022-06-28

## 2022-06-28 RX ORDER — ONDANSETRON 2 MG/ML
4 INJECTION INTRAMUSCULAR; INTRAVENOUS EVERY 6 HOURS PRN
Status: DISCONTINUED | OUTPATIENT
Start: 2022-06-28 | End: 2022-06-30 | Stop reason: HOSPADM

## 2022-06-28 RX ORDER — EPHEDRINE SULFATE 50 MG/ML
INJECTION INTRAVENOUS AS NEEDED
Status: DISCONTINUED | OUTPATIENT
Start: 2022-06-28 | End: 2022-06-28

## 2022-06-28 RX ORDER — MAGNESIUM HYDROXIDE 1200 MG/15ML
LIQUID ORAL AS NEEDED
Status: DISCONTINUED | OUTPATIENT
Start: 2022-06-28 | End: 2022-06-28 | Stop reason: HOSPADM

## 2022-06-28 RX ORDER — MEPERIDINE HYDROCHLORIDE 25 MG/ML
12.5 INJECTION INTRAMUSCULAR; INTRAVENOUS; SUBCUTANEOUS ONCE AS NEEDED
Status: DISCONTINUED | OUTPATIENT
Start: 2022-06-28 | End: 2022-06-28 | Stop reason: HOSPADM

## 2022-06-28 RX ORDER — CEFAZOLIN SODIUM 1 G/50ML
1000 SOLUTION INTRAVENOUS EVERY 8 HOURS
Status: COMPLETED | OUTPATIENT
Start: 2022-06-28 | End: 2022-06-29

## 2022-06-28 RX ORDER — METOCLOPRAMIDE HYDROCHLORIDE 5 MG/ML
5 INJECTION INTRAMUSCULAR; INTRAVENOUS EVERY 6 HOURS PRN
Status: DISCONTINUED | OUTPATIENT
Start: 2022-06-28 | End: 2022-06-30 | Stop reason: HOSPADM

## 2022-06-28 RX ORDER — SODIUM CHLORIDE, SODIUM LACTATE, POTASSIUM CHLORIDE, CALCIUM CHLORIDE 600; 310; 30; 20 MG/100ML; MG/100ML; MG/100ML; MG/100ML
75 INJECTION, SOLUTION INTRAVENOUS CONTINUOUS
Status: DISCONTINUED | OUTPATIENT
Start: 2022-06-28 | End: 2022-06-30 | Stop reason: HOSPADM

## 2022-06-28 RX ORDER — CALCIUM CARBONATE 200(500)MG
1000 TABLET,CHEWABLE ORAL DAILY PRN
Status: DISCONTINUED | OUTPATIENT
Start: 2022-06-28 | End: 2022-06-30 | Stop reason: HOSPADM

## 2022-06-28 RX ORDER — FENTANYL CITRATE 50 UG/ML
INJECTION, SOLUTION INTRAMUSCULAR; INTRAVENOUS AS NEEDED
Status: DISCONTINUED | OUTPATIENT
Start: 2022-06-28 | End: 2022-06-28

## 2022-06-28 RX ORDER — HYDRALAZINE HYDROCHLORIDE 20 MG/ML
5 INJECTION INTRAMUSCULAR; INTRAVENOUS
Status: DISCONTINUED | OUTPATIENT
Start: 2022-06-28 | End: 2022-06-30 | Stop reason: HOSPADM

## 2022-06-28 RX ADMIN — ACETAMINOPHEN 325MG 975 MG: 325 TABLET ORAL at 21:23

## 2022-06-28 RX ADMIN — CEFAZOLIN SODIUM 1000 MG: 1 SOLUTION INTRAVENOUS at 21:32

## 2022-06-28 RX ADMIN — FENTANYL CITRATE 50 MCG: 50 INJECTION INTRAMUSCULAR; INTRAVENOUS at 14:05

## 2022-06-28 RX ADMIN — SODIUM CHLORIDE, SODIUM LACTATE, POTASSIUM CHLORIDE, AND CALCIUM CHLORIDE 75 ML/HR: .6; .31; .03; .02 INJECTION, SOLUTION INTRAVENOUS at 16:51

## 2022-06-28 RX ADMIN — HYDROMORPHONE HYDROCHLORIDE 0.2 MG: 1 INJECTION, SOLUTION INTRAMUSCULAR; INTRAVENOUS; SUBCUTANEOUS at 11:50

## 2022-06-28 RX ADMIN — PROPOFOL 170 MG: 10 INJECTION, EMULSION INTRAVENOUS at 14:11

## 2022-06-28 RX ADMIN — DOCUSATE SODIUM 100 MG: 100 CAPSULE, LIQUID FILLED ORAL at 17:45

## 2022-06-28 RX ADMIN — ACETAMINOPHEN 325MG 975 MG: 325 TABLET ORAL at 05:56

## 2022-06-28 RX ADMIN — ONDANSETRON 4 MG: 2 INJECTION INTRAMUSCULAR; INTRAVENOUS at 15:30

## 2022-06-28 RX ADMIN — SODIUM CHLORIDE: 0.9 INJECTION, SOLUTION INTRAVENOUS at 14:03

## 2022-06-28 RX ADMIN — EPHEDRINE SULFATE 10 MG: 50 INJECTION, SOLUTION INTRAVENOUS at 14:17

## 2022-06-28 RX ADMIN — HYDROMORPHONE HYDROCHLORIDE 0.25 MG: 1 INJECTION, SOLUTION INTRAMUSCULAR; INTRAVENOUS; SUBCUTANEOUS at 14:57

## 2022-06-28 RX ADMIN — LIDOCAINE HYDROCHLORIDE 60 MG: 20 INJECTION INTRAVENOUS at 14:11

## 2022-06-28 RX ADMIN — HYDRALAZINE HYDROCHLORIDE 5 MG: 20 INJECTION, SOLUTION INTRAMUSCULAR; INTRAVENOUS at 16:27

## 2022-06-28 RX ADMIN — NEOSTIGMINE METHYLSULFATE 3 MG: 1 INJECTION INTRAVENOUS at 15:27

## 2022-06-28 RX ADMIN — CEFAZOLIN SODIUM 1000 MG: 1 SOLUTION INTRAVENOUS at 14:35

## 2022-06-28 RX ADMIN — ROCURONIUM BROMIDE 40 MG: 50 INJECTION, SOLUTION INTRAVENOUS at 14:11

## 2022-06-28 RX ADMIN — SENNOSIDES 8.6 MG: 8.6 TABLET ORAL at 21:23

## 2022-06-28 RX ADMIN — GLYCOPYRROLATE 0.4 MG: 0.2 INJECTION, SOLUTION INTRAMUSCULAR; INTRAVENOUS at 15:27

## 2022-06-28 RX ADMIN — FENTANYL CITRATE 50 MCG: 50 INJECTION INTRAMUSCULAR; INTRAVENOUS at 14:11

## 2022-06-28 NOTE — CONSULTS
Consultation - Ana Arellano 75 y o  female MRN: [id number]    Unit/Bed#: E2 -01 Encounter: [id number]      Assessment/Plan     54-year-old female with severe osteoporosis on long-term bisphosphonates, chronic steroid use who presented after a fall resulting in right femoral neck fracture   -labs for vitamin-D 25 hydroxy, PTH pending for secondary workup for osteoporosis  -given that she has been on anti resorptive therapy for more than 5 years she may benefit from being switched to anabolic therapy with either teriparatide or romosozumab   She should follow-up with her rheumatologist to discuss  options    CC:  Osteoporosis Consult    History of Present Illness     HPI: Ana Arellano is a 75y o  year old female with  history of osteoporosis, rheumatoid arthritis on chronic steroids who presented to the hospital after a fall resulting in right femoral neck fracture for which she underwent a surgical repair-  She gives history of osteoporosis and has had prior ankle fracture about five years back which also required a surgical repair  She has been treated with oral bisphosphonate for more than 5 years and sees a rheumatologist for osteoporosis she takes calcium and vitamin-D supplementations, denies any ambulatory dysfunction or frequent falls  Does not use any assistive devices at home  She has history of rheumatoid arthritis and has been on chronic steroids for many years    Mother had osteoporosis as well as hip fracture      Consults    Review of Systems    Historical Information   Past Medical History:   Diagnosis Date    Anemia     1995 - corrected with iron    Ankle fracture, right     last assessed: 3/9/15    Anxiety     Chronic pain disorder     right side joint pain     Moderate exercise     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 [address] Wears dentures     full upper    Wears glasses     reading     Past Surgical History:   Procedure Laterality Date    ANKLE SURGERY      ORIF 5/16/plate and 4 screws  CYST REMOVAL          ESOPHAGOGASTRODUODENOSCOPY      Diagnostic    FOOT SURGERY      plantar wart resection; resolved: 1969    MI TOTAL KNEE ARTHROPLASTY Right 3/1/2022    Procedure: ARTHROPLASTY KNEE TOTAL;  Surgeon: Preeti Stroud DO;  Location: [facility];  Service: Orthopedics    TONSILLECTOMY AND ADENOIDECTOMY          TUBAL LIGATION      WISDOM TOOTH EXTRACTION       Social History   Social History     Substance and Sexual Activity   Alcohol Use Yes    Comment: rare, maybe on holidays     Social History     Substance and Sexual Activity   Drug Use No     Social History     Tobacco Use   Smoking Status Former Smoker    Quit date:     Years since quittin 5   Smokeless Tobacco Never Used     Family History:   Family History   Problem Relation Age of Onset    Seizures Mother         epilepsy    Bone cancer Father     No Known Problems Sister     No Known Problems Brother     No Known Problems Son     No Known Problems Maternal Grandmother     No Known Problems Maternal Grandfather     No Known Problems Paternal Grandmother     No Known Problems Paternal Grandfather     Thyroid disease Sister     No Known Problems Sister     No Known Problems Brother        Meds/Allergies   Current Facility-Administered Medications   Medication Dose Route Frequency Provider Last Rate Last Admin    acetaminophen (TYLENOL) tablet 975 mg  975 mg Oral Q8H Emeterio Small PA-C   975 mg at 22 0556    calcium carbonate (TUMS) chewable tablet 1,000 mg  1,000 mg Oral Daily PRN Emeterio Small PA-C        ceFAZolin (ANCEF) IVPB (premix in dextrose) 1,000 mg 50 mL  1,000 mg Intravenous Q8H Emeterio Small PA-C        docusate sodium (COLACE) capsule 100 mg  100 mg Oral BID Emani Vega PA-C   100 mg at 22 1745    [START ON 2022] enoxaparin (LOVENOX) subcutaneous injection 40 mg  40 mg Subcutaneous Daily Emeterio Small PA-C        folic acid (FOLVITE) tablet 1 mg  1 mg Oral Daily Romaine Cason PA-C   1 mg at 06/27/22 4973    gabapentin (NEURONTIN) capsule 100 mg  100 mg Oral Q8H Weston Hernandez PA-C   100 mg at 06/27/22 1722    hydrALAZINE (APRESOLINE) injection 5 mg  5 mg Intravenous Q10 Min PRN Janet Castroos, DO   5 mg at 06/28/22 1627    HYDROmorphone (DILAUDID) injection 0 2 mg  0 2 mg Intravenous Q4H PRN Romaine Cason PA-C   0 25 mg at 06/28/22 1457    lactated ringers infusion  75 mL/hr Intravenous Continuous Romaine Cason PA-C 75 mL/hr at 06/28/22 1651 75 mL/hr at 06/28/22 1651    metoclopramide (REGLAN) injection 5 mg  5 mg Intravenous Q6H PRN Romaine Cason PA-C        ondansetron [facility]) injection 4 mg  4 mg Intravenous Q6H PRN Romaine Cason PA-C        oxyCODONE (ROXICODONE) IR tablet 2 5 mg  2 5 mg Oral Q4H PRN Romaine Cason PA-C        oxyCODONE (ROXICODONE) IR tablet 5 mg  5 mg Oral Q4H PRN Romaine Cason PA-C        PARoxetine (PAXIL) tablet 10 mg  10 mg Oral Daily Weston Vega PA-C   10 mg at 06/27/22 0816    polyethylene glycol (MIRALAX) packet 17 g  17 g Oral Daily PRN Romaine Cason PA-C        predniSONE tablet 5 mg  5 mg Oral Daily Weston Vega PA-C   5 mg at 06/27/22 0816    promethazine (PHENERGAN) injection 12 5 mg  12 5 mg Intramuscular Q6H PRN Romaine Cason PA-C        senna (SENOKOT) tablet 8 6 mg  1 tablet Oral HS Weston Vega PA-C   8 6 mg at 06/27/22 2200     Allergies   Allergen Reactions    Aspirin Other (See Comments)     Per pt avoids taking due to rheumatology Meds       Objective   Vitals: Blood pressure 161/89, pulse 72, temperature 98 5 °F (36 9 °C), temperature source Temporal, resp  rate 20, height 5' 4" (1 626 m), weight 55 6 kg (122 lb 9 2 oz), SpO2 98 %, not currently breastfeeding      Intake/Output Summary (Last 24 hours) at 6/28/2022 1853  Last data filed at 6/28/2022 1650  Gross per 24 hour   Intake 200 ml   Output 950 ml   Net -750 ml     Invasive Devices  Report    Peripheral Intravenous Line  Duration           Peripheral IV 03/04/22 Right Antecubital 116 days    Peripheral IV 06/28/22 Right Antecubital <1 day          Drain  Duration           Urethral Catheter Non-latex 16 Fr  <1 day                Physical Exam  Vitals reviewed  Constitutional:       Appearance: Normal appearance  She is not ill-appearing or diaphoretic  HENT:      Head: Normocephalic and atraumatic  Eyes:      General: No scleral icterus  Extraocular Movements: Extraocular movements intact  Cardiovascular:      Rate and Rhythm: Normal rate and regular rhythm  Heart sounds: Normal heart sounds  No murmur heard  Pulmonary:      Effort: Pulmonary effort is normal  No respiratory distress  Breath sounds: Normal breath sounds  No wheezing  Abdominal:      General: There is no distension  Palpations: Abdomen is soft  Tenderness: There is no abdominal tenderness  Musculoskeletal:      Cervical back: Neck supple  Right lower leg: No edema  Left lower leg: No edema  Lymphadenopathy:      Cervical: No cervical adenopathy  Skin:     General: Skin is warm and dry  Neurological:      General: No focal deficit present  Mental Status: She is alert and oriented to person, place, and time  Psychiatric:         Mood and Affect: Mood normal          Behavior: Behavior normal          The history was obtained from the review of the chart, patient and family      Lab Results:       Lab Results   Component Value Date    WBC 9 25 06/28/2022    HGB 11 6 06/28/2022    HCT 36 5 06/28/2022    MCV 98 06/28/2022     06/28/2022     Lab Results   Component Value Date/Time    BUN 9 06/28/2022 04:54 AM    BUN 11 03/09/2015 03:00 PM     03/09/2015 03:00 PM    K 3 9 06/28/2022 04:54 AM    K 3 6 03/09/2015 03:00 PM     06/28/2022 04:54 AM     03/09/2015 03:00 PM    CO2 34 (H) 06/28/2022 04:54 AM    CO2 30 03/09/2015 03:00 PM    CREATININE 0 58 (L) 06/28/2022 04:54 AM    CREATININE 0 66 03/09/2015 03:00 PM    AST 17 06/26/2022 12:52 PM    ALT 23 06/26/2022 12:52 PM    ALB 3 4 (L) 06/26/2022 12:52 PM     No results for input(s): CHOL, HDL, LDL, TRIG, VLDL in the last 72 hours  No results found for: Kartik Altamirano  No results found for: POCGLU    Imaging Studies:     Portions of the record may have been created with voice recognition software

## 2022-06-28 NOTE — ASSESSMENT & PLAN NOTE
· Mechanical fall slipping on a wet floor at work assisting acute right hip fracture  · Xray hip: Acute nondisplaced fracture of the right femoral neck  · NPO   · For surgical intervention today   · Hold DVT prophylaxis for surgery until post-op   · Pain control   · PT/OT postoperatively

## 2022-06-28 NOTE — ASSESSMENT & PLAN NOTE
· Maintained on prednisone 5 mg daily   · Given stress dose of hydrocortisone pre-operatively   · Follows with Rheum at [facility]   · Also on methotrexate and folic acid   · Monitor need for any further adjustments in steroids

## 2022-06-28 NOTE — PLAN OF CARE
Problem: Potential for Falls  Goal: Patient will remain free of falls  Description: INTERVENTIONS:  - Educate patient/family on patient safety including physical limitations  - Instruct patient to call for assistance with activity   - Consult OT/PT to assist with strengthening/mobility   - Keep Call bell within reach  - Keep bed low and locked with side rails adjusted as appropriate  - Keep care items and personal belongings within reach  - Initiate and maintain comfort rounds  - Make Fall Risk Sign visible to staff  - Offer Toileting every 2 Hours, in advance of need  - Initiate/Maintain bedalarm  - Obtain necessary fall risk management equipment: at bedside  - Apply yellow socks and bracelet for high fall risk patients  - Consider moving patient to room near nurses station  Outcome: Progressing     Problem: MOBILITY - ADULT  Goal: Maintain or return to baseline ADL function  Description: INTERVENTIONS:  -  Assess patient's ability to carry out ADLs; assess patient's baseline for ADL function and identify physical deficits which impact ability to perform ADLs (bathing, care of mouth/teeth, toileting, grooming, dressing, etc )  - Assess/evaluate cause of self-care deficits   - Assess range of motion  - Assess patient's mobility; develop plan if impaired  - Assess patient's need for assistive devices and provide as appropriate  - Encourage maximum independence but intervene and supervise when necessary  - Involve family in performance of ADLs  - Assess for home care needs following discharge   - Consider OT consult to assist with ADL evaluation and planning for discharge  - Provide patient education as appropriate  Outcome: Progressing  Goal: Maintains/Returns to pre admission functional level  Description: INTERVENTIONS:  - Perform BMAT or MOVE assessment daily    - Set and communicate daily mobility goal to care team and patient/family/caregiver     - Collaborate with rehabilitation services on mobility goals if consulted  - Perform Range of Motion 2 times a day  - Reposition patient every 2 hours    - Dangle patient 2 times a day  - Stand patient 2 times a day  - Ambulate patient 2 times a day  - Out of bed to chair 2 times a day   - Out of bed for meals 2 times a day  - Out of bed for toileting  - Record patient progress and toleration of activity level   Outcome: Progressing     Problem: PAIN - ADULT  Goal: Verbalizes/displays adequate comfort level or baseline comfort level  Description: Interventions:  - Encourage patient to monitor pain and request assistance  - Assess pain using appropriate pain scale  - Administer analgesics based on type and severity of pain and evaluate response  - Implement non-pharmacological measures as appropriate and evaluate response  - Consider cultural and social influences on pain and pain management  - Notify physician/advanced practitioner if interventions unsuccessful or patient reports new pain  Outcome: Progressing     Problem: INFECTION - ADULT  Goal: Absence or prevention of progression during hospitalization  Description: INTERVENTIONS:  - Assess and monitor for signs and symptoms of infection  - Monitor lab/diagnostic results  - Monitor all insertion sites, i e  indwelling lines, tubes, and drains  - Monitor endotracheal if appropriate and nasal secretions for changes in amount and color  - [place] appropriate cooling/warming therapies per order  - Administer medications as ordered  - Instruct and encourage patient and family to use good hand hygiene technique  - Identify and instruct in appropriate isolation precautions for identified infection/condition  Outcome: Progressing  Goal: Absence of fever/infection during neutropenic period  Description: INTERVENTIONS:  - Monitor WBC    Outcome: Progressing     Problem: SAFETY ADULT  Goal: Patient will remain free of falls  Description: INTERVENTIONS:  - Educate patient/family on patient safety including physical limitations  - Instruct patient to call for assistance with activity   - Consult OT/PT to assist with strengthening/mobility   - Keep Call bell within reach  - Keep bed low and locked with side rails adjusted as appropriate  - Keep care items and personal belongings within reach  - Initiate and maintain comfort rounds  - Make Fall Risk Sign visible to staff  - Offer Toileting every 2 Hours, in advance of need  - Initiate/Maintain bedalarm  - Obtain necessary fall risk management equipment: at bedside  - Apply yellow socks and bracelet for high fall risk patients  - Consider moving patient to room near nurses station  Outcome: Progressing  Goal: Maintain or return to baseline ADL function  Description: INTERVENTIONS:  -  Assess patient's ability to carry out ADLs; assess patient's baseline for ADL function and identify physical deficits which impact ability to perform ADLs (bathing, care of mouth/teeth, toileting, grooming, dressing, etc )  - Assess/evaluate cause of self-care deficits   - Assess range of motion  - Assess patient's mobility; develop plan if impaired  - Assess patient's need for assistive devices and provide as appropriate  - Encourage maximum independence but intervene and supervise when necessary  - Involve family in performance of ADLs  - Assess for home care needs following discharge   - Consider OT consult to assist with ADL evaluation and planning for discharge  - Provide patient education as appropriate  Outcome: Progressing  Goal: Maintains/Returns to pre admission functional level  Description: INTERVENTIONS:  - Perform BMAT or MOVE assessment daily    - Set and communicate daily mobility goal to care team and patient/family/caregiver  - Collaborate with rehabilitation services on mobility goals if consulted  - Perform Range of Motion 2 times a day  - Reposition patient every 2 hours    - Dangle patient 2 times a day  - Stand patient 2 times a day  - Ambulate patient 2 times a day  - Out of bed to chair 2 times a day   - Out of bed for meals 2 times a day  - Out of bed for toileting  - Record patient progress and toleration of activity level   Outcome: Progressing     Problem: DISCHARGE PLANNING  Goal: Discharge to home or other facility with appropriate resources  Description: INTERVENTIONS:  - Identify barriers to discharge w/patient and caregiver  - Arrange for needed discharge resources and transportation as appropriate  - Identify discharge learning needs (meds, wound care, etc )  - Arrange for interpretive services to assist at discharge as needed  - Refer to Case Management Department for coordinating discharge planning if the patient needs post-hospital services based on physician/advanced practitioner order or complex needs related to functional status, cognitive ability, or social support system  Outcome: Progressing     Problem: MUSCULOSKELETAL - ADULT  Goal: Maintain or return mobility to safest level of function  Description: INTERVENTIONS:  - Assess patient's ability to carry out ADLs; assess patient's baseline for ADL function and identify physical deficits which impact ability to perform ADLs (bathing, care of mouth/teeth, toileting, grooming, dressing, etc )  - Assess/evaluate cause of self-care deficits   - Assess range of motion  - Assess patient's mobility  - Assess patient's need for assistive devices and provide as appropriate  - Encourage maximum independence but intervene and supervise when necessary  - Involve family in performance of ADLs  - Assess for home care needs following discharge   - Consider OT consult to assist with ADL evaluation and planning for discharge  - Provide patient education as appropriate  Outcome: Progressing  Goal: Maintain proper alignment of affected body part  Description: INTERVENTIONS:  - Support, maintain and protect limb and body alignment  - Provide patient/ family with appropriate education  Outcome: Progressing

## 2022-06-28 NOTE — ADDENDUM NOTE
Addendum  created 06/28/22 1623 by Ilana Lopez, DO    Order list changed, Pharmacy for encounter modified

## 2022-06-28 NOTE — OCCUPATIONAL THERAPY NOTE
Occupational Therapy         Patient Name: Stevie Anderson  [facility] Date: 6/28/2022        Pt schedule for ORIF R hip today  Will continue when cleared after procedure   Christina Everett OT

## 2022-06-28 NOTE — PROGRESS NOTES
Progress Note - Orthopedics   Fabiana Michael 75 y o  female MRN: [id number]  Unit/Bed#: E2 -01 Encounter: [id number]    Assessment:  74 yo female with right nondisplaced femoral neck fracture    Plan:  Plan for OR today  We discussed treatment options as well as risks and benefits of treatment options  The risks of surgery include, but are not limited to infection, blood clot, wound healing problems, blood loss, damage to blood vessels and nerves, persistent pain and stiffness, fracture, need for additional surgery, nonunion, malunion, failure of hardware, heart attack, stroke, death  The patient understood and agreed to by oral and written consent  NPO  · Bedrest  · Pain control prn  · Hold chemical DVT prophylaxis  · Medical management per primary team  · Ortho will follow       Subjective: Patient seen and examined  She notes she is doing well  Her pain is well controlled  No complaints currently  Denies distal numbness or tingling  Vitals: Blood pressure 154/64, pulse 60, temperature (!) 97 4 °F (36 3 °C), temperature source Temporal, resp  rate 18, height 5' 4" (1 626 m), weight 55 6 kg (122 lb 9 2 oz), SpO2 94 %, not currently breastfeeding  ,Body mass index is 21 04 kg/m²  No intake or output data in the 24 hours ending 06/28/22 0754    Invasive Devices  Report    Peripheral Intravenous Line  Duration           Peripheral IV 03/04/22 Right Antecubital 115 days    Peripheral IV 06/26/22 Proximal;Right;Ventral (anterior) Forearm 1 day                Ortho Exam:   Right lower extremity:  Inspection- no ecchymosis, erythema, abrasions  Palpation- no TTP  Thigh soft and compressible  ROM- hip deferred  Able to plantar and dorsiflex ankle and toes  Neurovascular- Sensation intact L4- S1  Palpable posterior tibial pulse  AT/ GS/ EHL intact       Lab, Imaging and other studies:   CBC:   Lab Results   Component Value Date    WBC 9 25 06/28/2022    HGB 11 6 06/28/2022    HCT 36 5 06/28/2022    MCV 98 06/28/2022     06/28/2022    MCH 31 1 06/28/2022    MCHC 31 8 06/28/2022    RDW 15 8 (H) 06/28/2022    MPV 9 8 06/28/2022     CMP:   Lab Results   Component Value Date    SODIUM 140 06/28/2022     06/28/2022    CO2 34 (H) 06/28/2022    BUN 9 06/28/2022    CREATININE 0 58 (L) 06/28/2022    CALCIUM 8 3 06/28/2022    EGFR 95 06/28/2022

## 2022-06-28 NOTE — ASSESSMENT & PLAN NOTE
· Patient previously seen by Cardiology for abnormal EKG   · Echo updated in March   · She denies chest pain, pressure, SOB, CLOUD   · She has high functional capacity prior to injury   · Check EKG prn and post operatively in AM

## 2022-06-28 NOTE — ADDENDUM NOTE
Addendum  created 06/28/22 1624 by Naun Yancey,     Order list changed, Order sets accessed, Pharmacy for encounter modified

## 2022-06-28 NOTE — DISCHARGE INSTRUCTIONS
HIP FRACTURE DISCHARGE INSTRUCTIONS    Surgical Dressing: You may remove your dressing 7 days from the date of your surgery then change your dressing daily until drainage stops  Maintain steri strips  Let them fall off on their own  Do not put any lotions or creams on your incision  If there are any signs of infection such as drainage persisting beyond a few days, unusual looking drainage (yellow, green), increased redness around the incision, or fever/chills let your doctor know  Medications:  Upon discharge you will be given a prescription for an anticoagulant (i e  Ecotrin (Aspirin), Coumadin (Warfarin), Lovenox (Enoxaparin)) and a narcotic pain reliever  Do not take any over the counter NSAIDs (i e  Ibuprofen, Motrin, Advil, Naprosyn, Aleve) while on your anticoagulation medication  Narcotic pain relievers cannot be refilled over the phone  Please be mindful of the number of pills you have left so you can  a prescription for a refill if needed during office hours  Walking: You may weight bear as tolerated  Use two crutches or a walker for EVERY step  Gradually increase your walking daily  You can progress to a cane as tolerated once advised by your physical therapist       Bathing:  No tub baths  Do not submerge your incision  You may shower and let water run over your incisions 1 week after surgery  You may sit on a shower chair or stand and shower briefly  Use your crutches/walker to get in and out of the shower  Sexual Relations:  Resume according to your comfort  Swimming:  No swimming or hot tubs until approved by your physician  Swimming will be allowed once your incision is well healed  Driving: You may ride as a passenger now  No driving until your follow-up appointment  To get into the car use the front passenger seat with the seat pushed back as far as possible  Scoot yourself back in the seat  Use your hands to assist your legs into the car  Physical therapy:  When you have finished with home visiting PT, you may begin outpatient PT  Call your surgeons office if you have not already received a prescription  A prescription can be faxed to the outpatient center of your choice  Special considerations: To minimize swelling, stiffness, and decrease pain use cold as needed, but not heat  Ice 20 minutes at a time with a cloth between your skin and the ice  Ice after walking, when you have pain, or after you have completed your exercises  Limit your sitting to 60 minutes at one time  Follow up:  Call [phone] to make an appointment to see your surgeon within 2 weeks of your surgery if you havent done so already  If you have any questions during business hours please call the direct office phone number [phone]  Otherwise please call [phone] for any concerns

## 2022-06-28 NOTE — ANESTHESIA PREPROCEDURE EVALUATION
Procedure:  ORIF HIP W/ CANNUALATED SCREWS (Right Leg Upper)    Relevant Problems   CARDIO   (+) Hyperlipidemia      HEMATOLOGY   (+) Iron deficiency anemia      MUSCULOSKELETAL   (+) Osteoarthritis of both hands   (+) Rheumatoid arthritis (HCC)      NEURO/PSYCH   (+) Anxiety disorder      (+) Sicca syndrome    (+) nondisplaced L femoral neck fx    3/2022 ECHO  Left Ventricle Left ventricular cavity size is normal  Wall thickness is normal  The left ventricular ejection fraction is 74%  Systolic function is hyperdynamic  Wall motion is normal  Diastolic function is moderately abnormal, consistent with grade II (pseudonormal) relaxation  Left atrial filling pressure is elevated  Right Ventricle Right ventricular cavity size is normal  Systolic function is normal  Wall thickness is normal    Left Atrium The atrium is normal in size  Right Atrium The atrium is normal in size  Aortic Valve The aortic valve is trileaflet  The leaflets are not thickened  The leaflets are not calcified  The leaflets exhibit normal mobility  There is no evidence of regurgitation  There is no evidence of stenosis  The aortic valve velocity is increased due to increased flow  Mitral Valve The mitral valve has normal structure and function  There is moderate thickening of the posterior leaflet  The posterior leaflet is prolapsed in late systole  There is mild regurgitation  There is no evidence of stenosis  Tricuspid Valve Tricuspid valve structure is normal  There is mild to moderate regurgitation  There is no evidence of stenosis  The right ventricular systolic pressure is mildly elevated  The estimated right ventricular systolic pressure is 18 32 mmHg  Pulmonic Valve Pulmonic valve structure is normal  There is trace regurgitation  There is no evidence of stenosis  Ascending Aorta The aortic root is normal in size  The ascending aorta is mildly dilated  The ascending aorta is 3 5 cm     IVC/SVC The right atrial pressure is estimated at 3 0 mmHg  The inferior vena cava is normal in size  Pericardium There is no pericardial effusion  The pericardium is normal in appearance  Physical Exam    Airway    Mallampati score: II  TM Distance: >3 FB  Neck ROM: full     Dental       Cardiovascular  Rhythm: regular, Rate: normal,     Pulmonary  Breath sounds clear to auscultation,     Other Findings        Anesthesia Plan  ASA Score- 2     Anesthesia Type- general with ASA Monitors  Additional Monitors:   Airway Plan:     Comment: Discussed general vs spinal with pt  Plan Factors-    Chart reviewed  Existing labs reviewed  Induction- intravenous  Postoperative Plan- Plan for postoperative opioid use  Planned trial extubation    Informed Consent- Anesthetic plan and risks discussed with patient

## 2022-06-28 NOTE — PROGRESS NOTES
[address] [place] [address]  Progress Note - Mark Olguin 1955, 75 y o  female MRN: [id number]  Unit/Bed#: E2 -01 Encounter: [id number]  Primary Care Provider: Shayy Modi MD   Date and time admitted to hospital: 6/26/2022 11:47 AM    * Closed fracture of right hip [facility])  Assessment & Plan  · Mechanical fall slipping on a wet floor at work assisting acute right hip fracture  · Xray hip: Acute nondisplaced fracture of the right femoral neck  · NPO   · For surgical intervention today   · Hold DVT prophylaxis for surgery until post-op   · Pain control   · PT/OT postoperatively     On prednisone therapy  Assessment & Plan  · 2/2 RA on pred 5 mg daily     History of total right knee replacement (TKR)  Assessment & Plan  · Status post total knee replacement on 03/01/2022 by Dr Jaskaran Ignacio     Abnormal Trivedi Jose  · Patient previously seen by Cardiology for abnormal EKG   · Echo updated in March   · She denies chest pain, pressure, SOB, CLOUD   · She has high functional capacity prior to injury   · Check EKG prn and post operatively in AM       Rheumatoid arthritis (HCC)  Assessment & Plan  · Maintained on prednisone 5 mg daily   · Given stress dose of hydrocortisone pre-operatively   · Follows with Rheum at [facility]   · Also on methotrexate and folic acid   · Monitor need for any further adjustments in steroids     Age-related osteoporosis with current pathological fracture  Assessment & Plan  · Maintained on Boniva     Anxiety disorder  Assessment & Plan  · Continue Paxil 10 mg daily         VTE Pharmacologic Prophylaxis:   DVT proph held for surgery     Patient Centered Rounds: I performed bedside rounds with nursing staff today  Discussions with Specialists or Other Care Team Provider:     Education and Discussions with Family / Patient: Updated  () at bedside  Time Spent for Care: 20 minutes   More than 50% of total time spent on counseling and coordination of care as described above  Current Length of Stay: 2 day(s)  Current Patient Status: Inpatient   Certification Statement: The patient will continue to require additional inpatient hospital stay due to right hip fracture   Discharge Plan: Anticipate discharge in 48-72 hrs to discharge location to be determined pending rehab evaluations  Code Status: Level 1 - Full Code    Subjective:   Patient is resting in bed  She has no acute complaints  She does have pain to right hip  She is primarily just anxious for surgery today  No chest pain or shortness of breath  No palpitations  No lightheadedness or dizziness  No abdominal pain nausea or vomiting  No other acute complaints at this time  Her  is with her at bedside  Objective:     Vitals:   Temp (24hrs), Av 4 °F (36 9 °C), Min:97 4 °F (36 3 °C), Max:99 8 °F (37 7 °C)    Temp:  [97 4 °F (36 3 °C)-99 8 °F (37 7 °C)] 97 4 °F (36 3 °C)  HR:  [60-67] 60  Resp:  [18-20] 18  BP: (116-154)/(55-70) 154/64  SpO2:  [92 %-94 %] 94 %  Body mass index is 21 04 kg/m²  Input and Output Summary (last 24 hours):   No intake or output data in the 24 hours ending 22 0900    Physical Exam:   Physical Exam  Vitals and nursing note reviewed  Chaperone present:  at bedside  HENT:      Head: Normocephalic  Eyes:      Conjunctiva/sclera: Conjunctivae normal    Cardiovascular:      Rate and Rhythm: Normal rate and regular rhythm  Pulmonary:      Effort: Pulmonary effort is normal  No respiratory distress  Breath sounds: Normal breath sounds  No wheezing or rales  Abdominal:      General: Bowel sounds are normal       Palpations: Abdomen is soft  Musculoskeletal:         General: Tenderness (Right hip) present  Right lower leg: No edema  Left lower leg: No edema  Skin:     General: Skin is warm  Neurological:      Mental Status: She is alert  Mental status is at baseline     Psychiatric:         Mood and Affect: Mood normal  Additional Data:     Labs:  Results from last 7 days   Lab Units 06/28/22  0454 06/27/22  0501   WBC Thousand/uL 9 25 6 69   HEMOGLOBIN g/dL 11 6 11 2*   HEMATOCRIT % 36 5 36 4   PLATELETS Thousands/uL 171 187   NEUTROS PCT %  --  79*   LYMPHS PCT %  --  12*   MONOS PCT %  --  5   EOS PCT %  --  3     Results from last 7 days   Lab Units 06/28/22  0454 06/27/22  0501 06/26/22  1252   SODIUM mmol/L 140   < > 138   POTASSIUM mmol/L 3 9   < > 4 0   CHLORIDE mmol/L 105   < > 103   CO2 mmol/L 34*   < > 32   BUN mg/dL 9   < > 13   CREATININE mg/dL 0 58*   < > 0 59*   ANION GAP mmol/L 1*   < > 3*   CALCIUM mg/dL 8 3   < > 8 9   ALBUMIN g/dL  --   --  3 4*   TOTAL BILIRUBIN mg/dL  --   --  0 82   ALK PHOS U/L  --   --  92   ALT U/L  --   --  23   AST U/L  --   --  17   GLUCOSE RANDOM mg/dL 103   < > 91    < > = values in this interval not displayed       Results from last 7 days   Lab Units 06/28/22  0454   INR  1 14                   Lines/Drains:  Invasive Devices  Report    Peripheral Intravenous Line  Duration           Peripheral IV 03/04/22 Right Antecubital 115 days    Peripheral IV 06/26/22 Proximal;Right;Ventral (anterior) Forearm 1 day                          Recent Cultures (last 7 days):         Last 24 Hours Medication List:   Current Facility-Administered Medications   Medication Dose Route Frequency Provider Last Rate    acetaminophen  975 mg Oral Q8H Ansley Bland PA-C      docusate sodium  100 mg Oral BID Sadie Yang PA-C      folic acid  1 mg Oral Daily Chandler Garsia MD      gabapentin  100 mg Oral Q8H Chandler Garsia MD      hydrocortisone sodium succinate  100 mg Intravenous Once Chandler Garsia MD      HYDROmorphone  0 2 mg Intravenous Q4H PRN Sadie Yang PA-C      oxyCODONE  2 5 mg Oral Q4H PRN Sadie Yang PA-C      oxyCODONE  5 mg Oral Q4H PRN Sadie Yang PA-C      PARoxetine  10 mg Oral Daily Chandler Garsia MD      polyethylene glycol  17 g Oral Daily PRN Kerri Elliott MD      predniSONE  5 mg Oral Daily Kerri Elliott MD      senna  1 tablet Oral HS Milly Yanes PA-C          Today, Patient Was Seen By: Milly Yanes PA-C    **Please Note: This note may have been constructed using a voice recognition system  **

## 2022-06-28 NOTE — OP NOTE
OPERATIVE REPORT  PATIENT NAME: Deondre Marsh    :  1955  MRN: [id number]  Pt Location: [facility] OR ROOM 01    SURGERY DATE: 2022    Surgeon(s) and Role:     * Carolyne Huddleston, DO - Primary     * Maira Mae PA-C - Assisting    Preop Diagnosis:  Closed displaced fracture of right femoral neck ([facility] ) [S72 001A]    Post-Op Diagnosis Codes:     * Closed displaced fracture of right femoral neck ([address] ) [S72 001A]    Procedure(s) (LRB):  ORIF HIP W/ CANNUALATED SCREWS (Right)    Specimen(s):  * No specimens in log *    Estimated Blood Loss:   Minimal    Drains:  Urethral Catheter Non-latex 16 Fr  (Active)   Number of days: 0       Anesthesia Type:   Choice    Operative Indications:  Closed displaced fracture of right femoral neck ([facility] [address] ) [S72 001A]      Operative Findings:  See below    Complications:   None    Implants:  [employer] 6 5 cannulated screws with short threads - 85mm, 75mm, 70mm, 2 washers on inferior and superior/posterior screw  No washer on anterior screw  Procedure and Technique:  INDICATIONS FOR PROCEDURE:  The patients is a 75 y o  female who presented with hip pain s/p fall  The patient and the XRs were evaluated  Given the nature of the fracture and the risks associated with nonoperative management, surgery was recommended  Extensive counseling in regards to the reasons for surgical intervention as well as the risks and benefits of surgery were reviewed  The risks include, but are not limited to infection, wound healing problems, blood loss, blood clots, malunion, nonunion, failure of hardware, persistent pain and stiffness, damage to blood vessels and nerves, need for additional surgery, heart attack, stroke, death  The patient understood and agreed to by oral and written consent      OPERATIVE PROCEDURE:  The patient was identified as Deondre Marsh by her ID bracelet by the surgical staff in the preoperative area at [address]   The patient was wheeled back to the surgical room  Anesthesia was administered without complications  Preoperative antibiotics were given  The pt was placed on the operative table  The patient remained in the supine position and all bony prominences were carefully protected  The right leg was then prepped and draped in the usual sterile fashion  A timeout was performed where the patients name and surgical site were once again identified  Using XR the incision was marked out  A small skin incision was made over the lateral aspect of the thigh  Dissection was made through the skin, subcutaneous tissue  The lateral aspect of the femur was palpated  Using XR the first guide wire was placed  A second guide wire was placed anterior and superior to the first   Finally a third wire was placed posterior and superior to the first wire to make an inverted triangle configuration  This was confirmed with XR and found to be satisfactory  Each guide wire was measured  The inferior screw was found to be 85 mm  The superior anterior screw was found to be 75mm  The superior posterior screw was found to be 70mm  An opening reamer was used to open the lateral cortex  The cannulated screws were placed and the guide wires were removed  Two washers were used - one on the inferior screw and one on the superior posterior screw  Final XRs were taken  The incision was copiously irrigated with saline solution  The subcutaneous tissue and skin were closed with vicryl suture and a #3-0 monocryl suture  Steri strips and a sterile dressing were placed  The patient was transferred to the hospital bed and then extubated  The patient was transferred to the recovery room in stable condition           I was present for the entire procedure    Patient Disposition:  hemodynamically stable      SIGNATURE: Liliya Fox DO  DATE: June 28, 2022  TIME: 3:43 PM

## 2022-06-29 LAB
25(OH)D3 SERPL-MCNC: 30.7 NG/ML (ref 30–100)
ALBUMIN SERPL BCP-MCNC: 2.3 G/DL (ref 3.5–5)
ALP SERPL-CCNC: 80 U/L (ref 46–116)
ALT SERPL W P-5'-P-CCNC: 13 U/L (ref 12–78)
ANION GAP SERPL CALCULATED.3IONS-SCNC: 3 MMOL/L (ref 4–13)
AST SERPL W P-5'-P-CCNC: 15 U/L (ref 5–45)
BILIRUB SERPL-MCNC: 0.87 MG/DL (ref 0.2–1)
BUN SERPL-MCNC: 8 MG/DL (ref 5–25)
CALCIUM ALBUM COR SERPL-MCNC: 9.4 MG/DL (ref 8.3–10.1)
CALCIUM SERPL-MCNC: 8 MG/DL (ref 8.3–10.1)
CHLORIDE SERPL-SCNC: 106 MMOL/L (ref 100–108)
CO2 SERPL-SCNC: 31 MMOL/L (ref 21–32)
CREAT SERPL-MCNC: 0.63 MG/DL (ref 0.6–1.3)
ERYTHROCYTE [DISTWIDTH] IN BLOOD BY AUTOMATED COUNT: 15.9 % (ref 11.6–15.1)
ERYTHROCYTE [SEDIMENTATION RATE] IN BLOOD: 13 MM/HOUR (ref 0–29)
GFR SERPL CREATININE-BSD FRML MDRD: 92 ML/MIN/1.73SQ M
GLUCOSE SERPL-MCNC: 80 MG/DL (ref 65–140)
HCT VFR BLD AUTO: 32.4 % (ref 34.8–46.1)
HGB BLD-MCNC: 10.1 G/DL (ref 11.5–15.4)
MCH RBC QN AUTO: 30.3 PG (ref 26.8–34.3)
MCHC RBC AUTO-ENTMCNC: 31.2 G/DL (ref 31.4–37.4)
MCV RBC AUTO: 97 FL (ref 82–98)
PLATELET # BLD AUTO: 162 THOUSANDS/UL (ref 149–390)
PMV BLD AUTO: 9.6 FL (ref 8.9–12.7)
POTASSIUM SERPL-SCNC: 3.8 MMOL/L (ref 3.5–5.3)
PROT SERPL-MCNC: 5.5 G/DL (ref 6.4–8.2)
PTH-INTACT SERPL-MCNC: 30.6 PG/ML (ref 18.4–80.1)
RBC # BLD AUTO: 3.33 MILLION/UL (ref 3.81–5.12)
SODIUM SERPL-SCNC: 140 MMOL/L (ref 136–145)
TSH SERPL DL<=0.05 MIU/L-ACNC: 1.35 UIU/ML (ref 0.45–4.5)
WBC # BLD AUTO: 7.43 THOUSAND/UL (ref 4.31–10.16)

## 2022-06-29 PROCEDURE — 82306 VITAMIN D 25 HYDROXY: CPT | Performed by: PHYSICIAN ASSISTANT

## 2022-06-29 PROCEDURE — 83970 ASSAY OF PARATHORMONE: CPT | Performed by: PHYSICIAN ASSISTANT

## 2022-06-29 PROCEDURE — 97530 THERAPEUTIC ACTIVITIES: CPT

## 2022-06-29 PROCEDURE — 97163 PT EVAL HIGH COMPLEX 45 MIN: CPT

## 2022-06-29 PROCEDURE — 85027 COMPLETE CBC AUTOMATED: CPT | Performed by: PHYSICIAN ASSISTANT

## 2022-06-29 PROCEDURE — 99232 SBSQ HOSP IP/OBS MODERATE 35: CPT | Performed by: HOSPITALIST

## 2022-06-29 PROCEDURE — 97167 OT EVAL HIGH COMPLEX 60 MIN: CPT

## 2022-06-29 PROCEDURE — 99024 POSTOP FOLLOW-UP VISIT: CPT | Performed by: ORTHOPAEDIC SURGERY

## 2022-06-29 PROCEDURE — 85652 RBC SED RATE AUTOMATED: CPT | Performed by: PHYSICIAN ASSISTANT

## 2022-06-29 PROCEDURE — 80053 COMPREHEN METABOLIC PANEL: CPT | Performed by: PHYSICIAN ASSISTANT

## 2022-06-29 PROCEDURE — 97110 THERAPEUTIC EXERCISES: CPT

## 2022-06-29 PROCEDURE — 84443 ASSAY THYROID STIM HORMONE: CPT | Performed by: PHYSICIAN ASSISTANT

## 2022-06-29 RX ADMIN — PREDNISONE 5 MG: 5 TABLET ORAL at 09:04

## 2022-06-29 RX ADMIN — PAROXETINE 10 MG: 20 TABLET, FILM COATED ORAL at 09:04

## 2022-06-29 RX ADMIN — ACETAMINOPHEN 325MG 975 MG: 325 TABLET ORAL at 21:27

## 2022-06-29 RX ADMIN — ACETAMINOPHEN 325MG 975 MG: 325 TABLET ORAL at 13:34

## 2022-06-29 RX ADMIN — GABAPENTIN 100 MG: 100 CAPSULE ORAL at 16:47

## 2022-06-29 RX ADMIN — FOLIC ACID 1 MG: 1 TABLET ORAL at 09:04

## 2022-06-29 RX ADMIN — OXYCODONE HYDROCHLORIDE 2.5 MG: 5 TABLET ORAL at 09:13

## 2022-06-29 RX ADMIN — ACETAMINOPHEN 325MG 975 MG: 325 TABLET ORAL at 05:17

## 2022-06-29 RX ADMIN — GABAPENTIN 100 MG: 100 CAPSULE ORAL at 09:00

## 2022-06-29 RX ADMIN — GABAPENTIN 100 MG: 100 CAPSULE ORAL at 00:36

## 2022-06-29 RX ADMIN — CEFAZOLIN SODIUM 1000 MG: 1 SOLUTION INTRAVENOUS at 05:31

## 2022-06-29 RX ADMIN — ENOXAPARIN SODIUM 40 MG: 40 INJECTION SUBCUTANEOUS at 05:17

## 2022-06-29 RX ADMIN — DOCUSATE SODIUM 100 MG: 100 CAPSULE, LIQUID FILLED ORAL at 09:04

## 2022-06-29 NOTE — PHYSICAL THERAPY NOTE
PHYSICAL THERAPY EVALUATION & [address] [address] NOTE          Patient Name: Ada Carrion  TZVCD'[id number] Date: 6/29/2022  PT EVALUATION    75 y o     [id number]    Hip pain [M25 559]  Closed fracture of right hip, initial encounter ([facility] [address] ) [S72 001A]  Closed displaced fracture of right femoral neck ([facility] [address] ) Pola Sharp    Past Medical History:   Diagnosis Date    Anemia     1995 - corrected with iron    Ankle fracture, right     last assessed: 3/9/15    Anxiety     Chronic pain disorder     right side joint pain     Moderate exercise     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    Wears dentures     full upper    Wears glasses     reading         Past Surgical History:   Procedure Laterality Date    ANKLE SURGERY      ORIF 5/16/plate and 4 screws    CYST REMOVAL      1969    ESOPHAGOGASTRODUODENOSCOPY      Diagnostic    FOOT SURGERY      plantar wart resection; resolved: 1969    ORIF HIP FRACTURE Right 6/28/2022    Procedure: ORIF HIP W/ CANNUALATED SCREWS;  Surgeon: Ronda Wren DO;  Location: [facility] OR;  Service: Orthopedics    OK TOTAL KNEE ARTHROPLASTY Right 3/1/2022    Procedure: ARTHROPLASTY KNEE TOTAL;  Surgeon: Ronda Wren DO;  Location: [facility] OR;  Service: [facility] EXTRACTION          06/29/22 0950   PT Last Visit   PT Visit Date 06/29/22   Note Type   Note type Evaluation   Additional Comments and tx note   Pain Assessment   Pain Assessment Tool 0-10   Pain Score 3   Pain Location/Orientation Orientation: Right;Location: Hip   Hospital Pain Intervention(s) Repositioned; Ambulation/increased activity; Emotional support;Elevated; Rest   Restrictions/Precautions   RLE Weight Bearing Per Order WBAT   Other Precautions Multiple lines; Fall Risk;Pain   Home Living   Type of [address] [place] [address] One level;Stairs to enter with rails   Bathroom Shower/Tub Walk-in shower   [address] [facility] Ms[employer] Equipment Walker;Cane   Additional Comments 3 BENJI bl HR   Prior Function   Level of [place] Independent with ADLs and functional mobility   Lives With Spouse   Falls in the last 6 months 1 to 4  (1 related to admission)   Vocational Full time employment   Comments indep at [facility] without an AD  spouse home and able to assist   General   Additional Pertinent History admitted 6/26/22 for R hip fx secondary to fall now POD#1 CRPP  wbat and activity as tolerated per ortho  hx R TKR 3/1/22, PMHx also significant for RA, anxiety, chronic pain   Family/Caregiver Present Yes   Cognition   Overall Cognitive Status WFL   Arousal/Participation Cooperative   Attention Within functional limits   Orientation Level Oriented X4   Memory Within functional limits   Following Commands Follows all commands and directions without difficulty   Subjective   Subjective "Im surprised it doesnt hurt"   RLE Assessment   RLE Assessment WFL  (pain limited 4-)   LLE Assessment   LLE Assessment WFL  (4/5)   Coordination   Sensation WFL   Transfers   Sit to Stand 5  Supervision   Additional items Increased time required;Verbal cues; Other  (RW)   Stand to Sit 5  Supervision   Additional items Armrests; Increased time required;Verbal cues; Other  (RW)   Ambulation/Elevation   Gait pattern Improper Weight shift;Decreased R stance; Short stride; Excessively slow   Gait Assistance 4  Minimal assist   Additional items Assist x 1;Verbal cues   Assistive Device Rolling walker   Distance 4'   Ambulation/Elevation Additional Comments vitals EOB pre amb 133/76, standing 133/81   Balance   Static Standing Fair -   Dynamic Standing Poor +   Ambulatory Poor +   Endurance Deficit   Endurance Deficit Yes   Endurance Deficit Description c/o lightheaded (consistent throughout session, worse w standing, ambulating)   Activity Tolerance   Activity Tolerance Treatment limited secondary to medical complications (Comment)  (lightheaded)   Medical Staff Made Aware OT Nurse Made Aware Ervin Frausto RN   Assessment   Prognosis Good   Problem List Decreased strength; Impaired balance;Decreased endurance;Decreased mobility; Decreased skin integrity;Pain   Assessment Ana Arellano is a 75 y o  female admitted to [facility] on 6/26/2022 for Closed fracture of right hip ([address] )  POD#1 CRPP  PT was consulted and pt was seen on 6/29/2022 for mobility assessment and d/c planning  Pt presents w PIV, acute pain RLE, wbat RLE per ortho  At baseline is indep without an AD and works manual job as CNA support staff  Pt is currently functioning at a supervision assistance x1 level for transfers and min A level for ambulation w RW bed>chair  Limited assessment of mobility secondary to patient reported lightheadedness, worse w standing  Vitals stable seated>standing  Pt demonstrated gait deviations as outlined in flowsheet including apprehension to WB on RLE resulting in decreased R stance, short stride and decreased foot clearance  Pt will benefit from continued skilled IP PT to address the above mentioned impairments  in order to maximize recovery and increase functional independence when completing mobility and ADLs  At this time PT recommendations for d/c are home w HHPT pending continued progress in therapy  Barriers to Discharge Inaccessible home environment   Barriers to Discharge Comments steps   Goals   Patient Goals be better by August to travel to [facility] Expiration Date 07/09/22   Short Term Goal #1 1)  Pt will perform bed mobility with Andres demonstrating appropriate technique 100% of the time in order to improve function  2)  Perform all transfers with Andres demonstrating safe and appropriate technique 100% of the time in order to improve ability to negotiate safely in home environment  3) Amb with least restrictive AD > 150'x1 with mod I in order to demonstrate ability to negotiate in home environment  4)  Improve overall strength and balance 1/2 grade in order to optimize ability to perform functional tasks and reduce fall risk  5) Increase activity tolerance to 45 minutes in order to improve endurance to functional tasks  6)  Negotiate stairs using most appropriate technique and S in order to be able to negotiate safely in home environment  7) PT for ongoing patient and family/caregiver education, DME needs and d/c planning in order to promote highest level of function in least restrictive environment  PT Treatment Day 1   Plan   Treatment/Interventions Functional transfer training;LE strengthening/ROM; Elevations; Endurance training; Therapeutic exercise;Patient/family training;Equipment eval/education; Bed mobility;Gait training; Compensatory technique education;Continued evaluation;Spoke to nursing;OT;Family   PT Frequency 4-6x/wk   Recommendation   PT Discharge Recommendation Home with home health rehabilitation  (pending progress**)   AM-PAC Basic Mobility Inpatient   Turning in Bed Without Bedrails 3   Lying on Back to Sitting on Edge of Flat Bed 3   Moving Bed to Chair 3   Standing Up From Chair 3   Walk in Room 3   Climb 3-5 Stairs 3   Basic Mobility Inpatient Raw Score 18   Basic Mobility Standardized Score 41 05   Highest Level Of Mobility   JH-HLM Goal 6: Walk 10 steps or more   JH-HLM Achieved 5: Stand (1 or more minutes)   Additional Treatment Session   Start Time 0950   End Time 1024   Treatment Assessment Pt agreeable to continued ambulation  Perform subsequent transfers from recliner and standard height chair w armrests at S level  Did require min A to transfer from toilet w use of grab bar and RW given lower height surface  Able to progress to CGA for ambulation w RW 75'  Trial 5  steps w bl HR; needed min A and verbal cues for sequencing/training  Pt apprehensive to WB on RLE to ascend/descend steps  Increased time to complete due to apprehension, pain, instability  Would recommend further stair training to ensure safe dc home   BP end of session 105/72; SCDs on and BLE elevated  RN aware     Equipment Use RW, grab bar   Additional Treatment Day 1   History: co - morbidities, social background, fall risk, multiple lines  Exam: impairments in systems including musculoskeletal (strength), neuromuscular (balance, gait, transfers, motor function and sensation), am-pac, cardiopulmonary, cognition  Clinical: unstable/unpredictable  Complexity:high      Julisa Driscoll, PT

## 2022-06-29 NOTE — PLAN OF CARE
Problem: OCCUPATIONAL THERAPY ADULT  Goal: Performs self-care activities at highest level of function for planned discharge setting  See evaluation for individualized goals  Description: Treatment Interventions: ADL retraining, Functional transfer training, UE strengthening/ROM, Endurance training, Patient/family training, Equipment evaluation/education    See flowsheet documentation for full assessment, interventions and recommendations  Note: Limitation: Decreased ADL status, Decreased UE strength, Decreased endurance, Decreased high-level ADLs  Prognosis: Good  Assessment: Pt is a 76 Y/O female [facility] the [facility] after a fall at work which resulted in closed fx of R hip  Pt is currently S/P R HIP ORIF W/ Cannulated screws and WBAT RLE (6/28)  PT PMH R ankle sx, chronic pain, R TKA, Foot sx  PTA pt states independence with ADL status, transfers and functional mobility recently w/o device  Pt lives at home with her spouse who can provide assistance  +Falls, +, -Home alone  During initial eval pt demonstrated deficits with funcitonal mobility, functional balance, ADL status, transfer status, activity tolerance (currently=[id number]) and BL UE strength  Pt would benefit from recieving OT TX 3-5x a week for 1-2 weeks       OT Discharge Recommendation: Home with outpatient rehabilitation (Home PT)  OT - OK to Discharge: Yes (When medically appropriate)

## 2022-06-29 NOTE — ASSESSMENT & PLAN NOTE
· Maintained on prednisone 5 mg daily   · Given stress dose of hydrocortisone pre-operatively   · Follows with Rheum at [facility]   · Also on methotrexate and folic acid   · Monitor need for any further adjustments in steroids

## 2022-06-29 NOTE — OCCUPATIONAL THERAPY NOTE
Occupational Therapy Evaluation ([id number]=[id number])     Patient Name: Danielle Ortez  [id number] Date: 6/29/2022  Problem List  Principal Problem:    Closed fracture of right hip ([facility] [address] )  Active Problems:    Anxiety disorder    Hyperlipidemia    Age-related osteoporosis with current pathological fracture    Rheumatoid arthritis ([facility] [address] )    Abnormal ECG    History of total right knee replacement (TKR)    On prednisone therapy    Past Medical History  Past Medical History:   Diagnosis Date    Anemia     1995 - corrected with iron    Ankle fracture, right     last assessed: 3/9/15    Anxiety     Chronic pain disorder     right side joint pain     Moderate exercise     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    Wears dentures     full upper    Wears glasses     reading     Past Surgical History  Past Surgical History:   Procedure Laterality Date    ANKLE SURGERY      ORIF 5/16/plate and 4 screws    CYST REMOVAL      1969    ESOPHAGOGASTRODUODENOSCOPY      Diagnostic    FOOT SURGERY      plantar wart resection; resolved: 1969    ORIF HIP FRACTURE Right 6/28/2022    Procedure: ORIF HIP W/ CANNUALATED SCREWS;  Surgeon: Trayn Cifuentes DO;  Location: [facility];  Service: Orthopedics    MN TOTAL KNEE ARTHROPLASTY Right 3/1/2022    Procedure: ARTHROPLASTY KNEE TOTAL;  Surgeon: Taryn Cifuentes DO;  Location[facility];  Service: Orthopedics    [address]TRA[address]           06/29/22 0920   OT Last Visit   OT Visit Date 06/29/22   Note Type   Note type Evaluation   Restrictions/Precautions   Weight Bearing Precautions Per Order Yes   RLE Weight Bearing Per Order WBAT   Other Precautions WBS; Fall Risk;Pain;Multiple lines; Chair Alarm; Bed Alarm   Pain Assessment   Pain Assessment Tool 0-10   Pain Score 2   Pain Location/Orientation Orientation: Right;Location: Hip   Hospital Pain Intervention(s) Ambulation/increased activity   Home Living   Type of Home Mobile home   [address] level;Stairs to enter with rails  (3ste)   Bathroom Shower/Tub Walk-in shower   Bathroom Toilet Raised   Bathroom Equipment   (Denies)   Home Equipment Walker;Cane   Prior Function   Level of [place] Independent with ADLs and functional mobility   Lives With Nagi Help From Family   ADL Assistance Independent   IADLs Independent   Falls in the last 6 months 1 to 4  (1 Leading to admission)   Vocational Full time employment   Lifestyle   Autonomy PTA pt states independence with ADL status, transfers and functional mobility recently w/o device  Pt lives at home with her spouse who can provide assistance  +Falls, +, -Home alone  Reciprocal Relationships Spouse   Service to Others CNA   Intrinsic Gratification Stitching/Sewing   Psychosocial   Psychosocial (WDL) WDL   Subjective   Subjective "I like this hospital but there's no place like home"   ADL   Where Assessed Edge of bed   Eating Assistance 6  Modified independent   Grooming Assistance 6  Modified Independent   UB Bathing Assistance 5  Supervision/Setup   LB Bathing Assistance 4  Minimal Assistance   UB Dressing Assistance 5  Supervision/Setup   LB Dressing Assistance 4  Minimal Assistance   Bed Mobility   Supine to Sit 6  Modified independent   Additional items HOB elevated; Bedrails   Transfers   Sit to Stand 5  Supervision   Additional items Verbal cues; Increased time required   Stand to Sit 4  Minimal assistance   Additional items Verbal cues; Increased time required;Armrests   Additional Comments Vc's for hand placement  (BP Supine = 138/71, EOB = 133/76, After activity = 133/81)   Functional Mobility   Functional Mobility 4  Minimal assistance   Additional Comments x1   Additional items Rolling walker   Balance   Static Sitting Fair +   Dynamic Sitting Fair +   Static Standing Fair -   Dynamic Standing Poor +   Activity Tolerance   Activity Tolerance Patient limited by fatigue;Treatment limited secondary to medical complications (Comment)  (Pt reports dizziness)   Medical Staff Made Aware Nsg, PT   Nurse Made Aware Yes   RUE Assessment   RUE Assessment WFL  (Elbow unable to achieve Full extension 2* OA  WFL)   RUE Strength   RUE Overall Strength   (4+/5 Throughout)   LUE Assessment   LUE Assessment WFL   LUE Strength   LUE Overall Strength   (4+/5 Throughout)   Hand Function   Gross Motor Coordination Functional   Fine Motor Coordination Functional   Sensation   Light Touch No apparent deficits   Proprioception   Proprioception No apparent deficits   Vision-Basic Assessment   Current Vision Wears glasses only for reading   Vision - Complex Assessment   Acuity Able to read clock/calendar on wall without difficulty   Perception   Inattention/Neglect Appears intact   Cognition   Overall Cognitive Status [facility]   Arousal/Participation Alert; Cooperative   Attention Within functional limits   Orientation Level Oriented X4   Memory Within functional limits   Following Commands Follows all commands and directions without difficulty   Assessment   Limitation Decreased ADL status; Decreased UE strength;Decreased endurance;Decreased high-level ADLs   Prognosis Good   Assessment Pt is a 76 Y/O female admitted to the [facility] after a fall at work which resulted in closed fx of R hip  Pt is currently S/P R HIP ORIF W/ Cannulated screws and WBAT RLE (6/28)  PT PMH R ankle sx, chronic pain, R TKA, Foot sx  PTA pt states independence with ADL status, transfers and functional mobility recently w/o device  Pt lives at home with her spouse who can provide assistance  +Falls, +, -Home alone  During initial eval pt demonstrated deficits with funcitonal mobility, functional balance, ADL status, transfer status, activity tolerance (currently=[id number]) and BL UE strength  Pt would benefit from recieving OT TX 3-5x a week for 1-2 weeks     Goals   Patient Goals To get better by August   [facility] Time Frame   (1-7 Days)   Short Term Goal #1 Pt will demonstrate Mod I with sit to stand transfers to assist with safety During ADL completion  Short Term Goal #2 Pt will demonstrate improved activity tolerance(20-30 mins) and standing tolerance (3-5mins) to assist with functional mobility and ADL'S  Short Term Goal  Pt will demonstrate mod I with UE and LE bathing/dressing while seated EOB  LTG Time Frame   (7-14 Days)   Long Term Goal #1 Pt will demonstrate improved functional balance by one grade to assist with safety during ADL completion  Long Term Goal #2 Pt will improve UE B/L strength by one grade to assist with UE dressing  Long Term Goal Pt will verbalize 2-3 fall risks in their home environment and appropriate compensatory strategies  Plan   Treatment Interventions ADL retraining;Functional transfer training;UE strengthening/ROM; Endurance training;Patient/family training;Equipment evaluation/education   Goal Expiration Date 07/13/22   OT Treatment Day 0   OT Frequency 3-5x/wk   Recommendation   OT Discharge Recommendation Home with outpatient rehabilitation  (Home PT)   OT - OK to Discharge Yes  (When medically appropriate)   AM-PAC Daily Activity Inpatient   Lower Body Dressing 3   Bathing 3   Toileting 3   Upper Body Dressing 4   Grooming 4   Eating 4   Daily Activity Raw Score 21   Daily Activity Standardized Score (Calc for Raw Score >=11) 44 27   AM-[facility] Applied Cognition Inpatient   Following a Speech/Presentation 4   Understanding Ordinary Conversation 4   Taking Medications 4   Remembering Where Things Are Placed or Put Away 4   Remembering List of 4-5 Errands 4   Taking Care of Complicated Tasks 4   Applied Cognition Raw Score 24   Applied Cognition Standardized Score 62 21   The patient's raw score on the AM-PAC Daily Activity inpatient short form is 21, standardized score is 44 27, greater than 39 4  Patients at this level are likely to benefit from discharge to home   Please refer to the recommendation of the Occupational Therapist for safe discharge planning    Niurka Crook

## 2022-06-29 NOTE — PROGRESS NOTES
Progress Note - Orthopedics   Payal Correia 75 y o  female MRN: [id number]  Unit/Bed#: E2 -01 Encounter: [id number]    Assessment:  75 y o  female s/p CRPP for right hip fracture  POD 1    Plan:  · Pain control prn  · PT/OT-WBAT right LE   · Lovenox/SCDs for DVT prophylaxis  Patient will require lovenox x 30 days post op  · Abx x 24h  · Ortho stable for dc  Patient will follow up with Dr Sari Wilkerson in the office in 2 weeks  Subjective: Pt S&E  Resting comfortably  Patient states she is doing well  She is anxious to get walking today  States pain is controlled  Has no complaints  Tolerated anesthesia without nausea  Denies fevers, chills, distal numbness, or tingling  Vitals: Blood pressure 138/66, pulse 66, temperature 97 8 °F (36 6 °C), temperature source Temporal, resp  rate 18, height 5' 4" (1 626 m), weight 55 6 kg (122 lb 9 2 oz), SpO2 92 %, not currently breastfeeding  ,Body mass index is 21 04 kg/m²        Intake/Output Summary (Last 24 hours) at 6/29/2022 0818  Last data filed at 6/29/2022 0601  Gross per 24 hour   Intake 200 ml   Output 2250 ml   Net -2050 ml       Invasive Devices  Report    Peripheral Intravenous Line  Duration           Peripheral IV 03/04/22 Right Antecubital 116 days    Peripheral IV 06/28/22 Right Antecubital 1 day                Ortho Exam: rightLE:  Drsg:  C/D/I, sensation grossly intact L4, L5, S1, palpable pedal pulse, EHL/AT/GS intact    Lab, Imaging and other studies:   CBC:   Lab Results   Component Value Date    WBC 7 43 06/29/2022    HGB 10 1 (L) 06/29/2022    HCT 32 4 (L) 06/29/2022    MCV 97 06/29/2022     06/29/2022    MCH 30 3 06/29/2022    MCHC 31 2 (L) 06/29/2022    RDW 15 9 (H) 06/29/2022    MPV 9 6 06/29/2022     CMP:   Lab Results   Component Value Date    SODIUM 140 06/29/2022     06/29/2022    CO2 31 06/29/2022    BUN 8 06/29/2022    CREATININE 0 63 06/29/2022    CALCIUM 8 0 (L) 06/29/2022    AST 15 06/29/2022    ALT 13 06/29/2022    ALKPHOS 80 06/29/2022    EGFR 92 06/29/2022

## 2022-06-29 NOTE — NURSING NOTE
Pt weaned off 02, currently on room air 02 sat 98% no resp distress noted at this time RN will continue to monitor

## 2022-06-29 NOTE — PROGRESS NOTES
[address] [facility]  Progress Note - Carlos Alberto Coles 1955, 75 y o  female MRN: [id number]  Unit/Bed#: E2 -01 Encounter: [id number]  Primary Care Provider: Atul Staley MD   Date and time admitted to hospital: 6/26/2022 11:47 AM    * Closed fracture of right hip [facility])  Assessment & Plan  · Mechanical fall slipping on a wet floor at work assisting acute right hip fracture  · Xray hip: Acute nondisplaced fracture of the right femoral neck  · S/p ORIF right hip with cannulated screws 6/28 , POD #1   · Continue Lovenox for DVT prophylaxis  · Monitor H&H tomorrow  · She is doing overall very well physical therapy, awaiting recommendations her goal is to go home upon discharge  · Possibly in 24-48 hours  · Pain control, controlled at this time    On prednisone therapy  Assessment & Plan  · 2/2 RA on pred 5 mg daily     History of total right knee replacement (TKR)  Assessment & Plan  · Status post total knee replacement on 03/01/2022 by Dr Gonzalez Angles     Rheumatoid arthritis [facility])  Assessment & Plan  · Maintained on prednisone 5 mg daily   · Given stress dose of hydrocortisone pre-operatively   · Follows [facility]um [facility]   · Also on methotrexate and folic acid   · Monitor need for any further adjustments in steroids     Anxiety disorder  Assessment & Plan  · Continue Paxil 10 mg daily         VTE Pharmacologic Prophylaxis: VTE Score: 9 High Risk (Score >/= 5) - Pharmacological DVT Prophylaxis Ordered: enoxaparin (Lovenox)  Sequential Compression Devices Ordered  Discussions with Specialists or Other Care Team Provider:  Case management    Education and Discussions with Family / Patient: Updated  () at bedside  Time Spent for Care: 20 minutes  More than 50% of total time spent on counseling and coordination of care as described above      Current Length of Stay: 3 day(s)  Current Patient Status: Inpatient   Certification Statement: The patient will continue to require additional inpatient hospital stay due to Status post right hip fracture repair  Discharge Plan: Anticipate discharge in 24-48 hrs to home with home services  Code Status: Level 1 - Full Code    Subjective:   Patient's goal is to go home possibly tomorrow or Friday  She denies any significant pain after surgery  For physical therapy this morning was walking the halls  Her  has been at the bedside with her throughout hospital course  She denies any other acute complaints including lightheadedness, chest pain or shortness of breath  Did get slightly dizzy when working with physical therapy but this quickly resolved  No presyncope  No fevers or chills  No abdominal pain nausea or vomiting  Objective:     Vitals:   Temp (24hrs), Av 2 °F (36 8 °C), Min:97 5 °F (36 4 °C), Max:99 4 °F (37 4 °C)    Temp:  [97 5 °F (36 4 °C)-99 4 °F (37 4 °C)] 97 8 °F (36 6 °C)  HR:  [66-78] 66  Resp:  [12-20] 18  BP: (136-181)/(63-89) 138/66  SpO2:  [91 %-100 %] 92 %  Body mass index is 21 04 kg/m²  Input and Output Summary (last 24 hours): Intake/Output Summary (Last 24 hours) at 2022 1046  Last data filed at 2022 0601  Gross per 24 hour   Intake 200 ml   Output 2250 ml   Net -2050 ml       Physical Exam:   Physical Exam  Vitals and nursing note reviewed  Chaperone present:  at bedside    Constitutional:       General: She is not in acute distress  Appearance: She is not ill-appearing, toxic-appearing or diaphoretic  HENT:      Head: Normocephalic  Eyes:      Conjunctiva/sclera: Conjunctivae normal    Cardiovascular:      Rate and Rhythm: Normal rate and regular rhythm  Pulmonary:      Effort: Pulmonary effort is normal  No respiratory distress  Breath sounds: Normal breath sounds  No wheezing or rales  Abdominal:      General: Bowel sounds are normal       Palpations: Abdomen is soft  Tenderness: There is no abdominal tenderness     Musculoskeletal: General: Tenderness (Right hip) present  Right lower leg: No edema  Left lower leg: No edema  Skin:     General: Skin is warm and dry  Comments: Dressing on right hip   Neurological:      Mental Status: She is alert  Mental status is at baseline  Psychiatric:         Mood and Affect: Mood normal       Comments: Very pleasant          Additional Data:     Labs:  Results from last 7 days   Lab Units 06/29/22  0514 06/28/22  0454 06/27/22  0501   WBC Thousand/uL 7 43   < > 6 69   HEMOGLOBIN g/dL 10 1*   < > 11 2*   HEMATOCRIT % 32 4*   < > 36 4   PLATELETS Thousands/uL 162   < > 187   NEUTROS PCT %  --   --  79*   LYMPHS PCT %  --   --  12*   MONOS PCT %  --   --  5   EOS PCT %  --   --  3    < > = values in this interval not displayed       Results from last 7 days   Lab Units 06/29/22  0514   SODIUM mmol/L 140   POTASSIUM mmol/L 3 8   CHLORIDE mmol/L 106   CO2 mmol/L 31   BUN mg/dL 8   CREATININE mg/dL 0 63   ANION GAP mmol/L 3*   CALCIUM mg/dL 8 0*   ALBUMIN g/dL 2 3*   TOTAL BILIRUBIN mg/dL 0 87   ALK PHOS U/L 80   ALT U/L 13   AST U/L 15   GLUCOSE RANDOM mg/dL 80     Results from last 7 days   Lab Units 06/28/22  0454   INR  1 14                   Lines/Drains:  Invasive Devices  Report    Peripheral Intravenous Line  Duration           Peripheral IV 03/04/22 Right Antecubital 116 days    Peripheral IV 06/28/22 Right Antecubital 1 day                        Recent Cultures (last 7 days):         Last 24 Hours Medication List:   Current Facility-Administered Medications   Medication Dose Route Frequency Provider Last Rate    acetaminophen  975 mg Oral [address] VIRGIE Hernandez      calcium carbonate  1,000 mg Oral Daily PRN Weston Lois Vega PA-C      docusate sodium  100 mg Oral BID Romaine Cason PA-C      enoxaparin  40 mg Subcutaneous Daily Susi Tejada      folic acid  1 mg Oral Daily Weston Lois Hernandez PA-C      gabapentin  100 mg Oral [address] Susi Hernandez      hydrALAZINE  5 mg Intravenous Q10 Min PRN Stanley Sarmiento,       HYDROmorphone  0 2 mg Intravenous Q4H PRN Arvie Solum, VIRGIE      lactated ringers  75 mL/hr Intravenous Continuous Ashley Brandt Hernandez PA-C Stopped (06/29/22 9317)    metoclopramide  5 mg Intravenous Q6H PRN Arvie SolumVIRGIE      ondansetron  4 mg Intravenous Q6H PRN Arvie Solum, VIRGIE      oxyCODONE  2 5 mg Oral Q4H PRN Ashley Lynchburgcurtis Hernandez PA-C      oxyCODONE  5 mg Oral Q4H PRN Ashley Lynchburgcurtis Hernandez PA-C      PARoxetine  10 mg Oral Daily Ashley Brandt Hernandez PA-C      polyethylene glycol  17 g Oral Daily PRN Arvie Solum, VIRGIE      predniSONE  5 mg Oral Daily Ashley Brandt Hernandez [place]      promethazine  12 5 mg Intramuscular Q6H PRN Arvie SolVIRGIE hernandez      senna  1 tablet Oral HS Taylor Harris PA-C          Today, Patient Was Seen By: Kevin Yañez PA-C    **Please Note: This note may have been constructed using a voice recognition system  ** [>50% of Time Spent on Counseling and Coordination of Care for  ___] : Greater than 50% of the encounter time was spent on counseling and coordination of care for [unfilled] [Time Spent: ___ minutes] : I have spent [unfilled] minutes of face to face time with the patient

## 2022-06-29 NOTE — UTILIZATION REVIEW
Continued Stay Review    Date:    6/29/22                          Current Patient Class:    Inpatient  Current Level of Care:    Med surg    HPI:67 y o  female initially admitted on     6/26  With   Fractured  Right hip    SURGERY DATE: 6/28/2022  Procedure(s) (LRB):  ORIF HIP W/ CANNUALATED SCREWS (Right)       Assessment/Plan:   6/29     POD   #  1  Continue   Post op care  Continue pain control as needed  Continue PT/OT/WBAT  RLE  Needs  30 days lovenox post op  Continue current meds/treatment plan  Goal is  Discharge to home with services      Vital Signs:    97 8-66-18      138/66     sats  92  %  2L     Pertinent Labs/Diagnostic Results:       Results from last 7 days   Lab Units 06/29/22  0514 06/28/22  0454 06/27/22  0501 06/26/22  1252   WBC Thousand/uL 7 43 9 25 6 69 5 71   HEMOGLOBIN g/dL 10 1* 11 6 11 2* 11 5   HEMATOCRIT % 32 4* 36 5 36 4 35 3   PLATELETS Thousands/uL 162 171 187 198   NEUTROS ABS Thousands/µL  --   --  5 25 4 45         Results from last 7 days   Lab Units 06/29/22  0514 06/28/22  0454 06/27/22  0501 06/26/22  1252   SODIUM mmol/L 140 140 138 138   POTASSIUM mmol/L 3 8 3 9 4 1 4 0   CHLORIDE mmol/L 106 105 103 103   CO2 mmol/L 31 34* 32 32   ANION GAP mmol/L 3* 1* 3* 3*   BUN mg/dL 8 9 13 13   CREATININE mg/dL 0 63 0 58* 0 72 0 59*   EGFR ml/min/1 73sq m 92 95 86 94   CALCIUM mg/dL 8 0* 8 3 8 5 8 9     Results from last 7 days   Lab Units 06/29/22  0514 06/26/22  1252   AST U/L 15 17   ALT U/L 13 23   ALK PHOS U/L 80 92   TOTAL PROTEIN g/dL 5 5* 6 4   ALBUMIN g/dL 2 3* 3 4*   TOTAL BILIRUBIN mg/dL 0 87 0 82         Results from last 7 days   Lab Units 06/29/22  0514 06/28/22  0454 06/27/22  0501 06/26/22  1252   GLUCOSE RANDOM mg/dL 80 103 123 91           Results from last 7 days   Lab Units 06/28/22  0454 06/26/22  1252   PROTIME seconds 14 2 12 7   INR  1 14 0 98   PTT seconds 32 30     Results from last 7 days   Lab Units 06/29/22  0514   TSH 3RD GENERATON uIU/mL 1 355 Results from last 7 days   Lab Units 06/29/22  0514   SED RATE mm/hour 13               Medications:   Scheduled Medications:  acetaminophen, 975 mg, Oral, Q8H  docusate sodium, 100 mg, Oral, BID  enoxaparin, 40 mg, Subcutaneous, Daily  folic acid, 1 mg, Oral, Daily  gabapentin, 100 mg, Oral, Q8H  PARoxetine, 10 mg, Oral, Daily  predniSONE, 5 mg, Oral, Daily  senna, 1 tablet, Oral, HS      Continuous IV Infusions:  lactated ringers, 75 mL/hr, Intravenous, Continuous      PRN Meds:  calcium carbonate, 1,000 mg, Oral, Daily PRN  hydrALAZINE, 5 mg, Intravenous, Q10 Min PRN  HYDROmorphone, 0 2 mg, Intravenous, Q4H PRN  metoclopramide, 5 mg, Intravenous, Q6H PRN  ondansetron, 4 mg, Intravenous, Q6H PRN  oxyCODONE, 2 5 mg, Oral, Q4H PRN  oxyCODONE, 5 mg, Oral, Q4H PRN  polyethylene glycol, 17 g, Oral, Daily PRN  promethazine, 12 5 mg, Intramuscular, Q6H PRN        Discharge Plan:    D    Network Utilization Review Department  ATTENTION: Please call with any questions or concerns to [phone] and carefully listen to the prompts so that you are directed to the right person  All voicemails are confidential   Cleotis Dirk all requests for admission clinical reviews, approved or denied determinations and any other requests to dedicated fax number below belonging to the campus where the patient is receiving treatment   List of dedicated fax numbers for the Facilities:  [address] DENIALS (Administrative/Medical Necessity) [phone]   [address] (Maternity/NICU/Pediatrics) [phone]   [address] [place] [address] [id number] [facility] [address] [place] [address] [phone]

## 2022-06-29 NOTE — ASSESSMENT & PLAN NOTE
· Mechanical fall slipping on a wet floor at work assisting acute right hip fracture  · Xray hip: Acute nondisplaced fracture of the right femoral neck  · S/p ORIF right hip with cannulated screws 6/28 , POD #1   · Continue Lovenox for DVT prophylaxis  · Monitor H&H tomorrow  · She is doing overall very well physical therapy, awaiting recommendations her goal is to go home upon discharge  · Possibly in 24-48 hours  · Pain control, controlled at this time

## 2022-06-29 NOTE — PLAN OF CARE
Problem: PHYSICAL THERAPY ADULT  Goal: Performs mobility at highest level of function for planned discharge setting  See evaluation for individualized goals  Description: Treatment/Interventions: Functional transfer training, LE strengthening/ROM, Elevations, Endurance training, Therapeutic exercise, Patient/family training, Equipment eval/education, Bed mobility, Gait training, Compensatory technique education, Continued evaluation, Spoke to nursing, OT, Family          See flowsheet documentation for full assessment, interventions and recommendations  6/29/2022 1437 by Gustavo Heller PT  Note: Prognosis: Good  Problem List: Decreased strength, Impaired balance, Decreased endurance, Decreased mobility, Decreased skin integrity, Pain  Assessment: Janey Black is a 75 y o  female admitted to [email or web address] on 6/26/2022 for Closed fracture of right hip ([address] )  POD#1 CRPP  PT was consulted and pt was seen on 6/29/2022 for mobility assessment and d/c planning  Pt presents w PIV, acute pain RLE, wbat RLE per ortho  At baseline is indep without an AD and works manual job as CNA support staff  Pt is currently functioning at a supervision assistance x1 level for transfers and min A level for ambulation w RW bed>chair  Limited assessment of mobility secondary to patient reported lightheadedness, worse w standing  Vitals stable seated>standing  Pt demonstrated gait deviations as outlined in flowsheet including apprehension to WB on RLE resulting in decreased R stance, short stride and decreased foot clearance  Pt will benefit from continued skilled IP PT to address the above mentioned impairments  in order to maximize recovery and increase functional independence when completing mobility and ADLs  At this time PT recommendations for d/c are home w HHPT pending continued progress in therapy    Barriers to Discharge: Inaccessible home environment  Barriers to Discharge Comments: steps     PT Discharge Recommendation: Home with home health rehabilitation (pending progress**)          See flowsheet documentation for full assessment  6/29/2022 1437 by Hardik Monte, PT  Note: Prognosis: Good  Problem List: Decreased strength, Impaired balance, Decreased endurance, Decreased mobility, Decreased skin integrity, Pain  Assessment: Danielle Ortez is a 75 y o  female admitted to [facility] on 6/26/2022 for Closed fracture of right hip ([address] )  POD#1 CRPP  PT was consulted and pt was seen on 6/29/2022 for mobility assessment and d/c planning  Pt presents w PIV, acute pain RLE, wbat RLE per ortho  At baseline is indep without an AD and works manual job as CNA support staff  Pt is currently functioning at a supervision assistance x1 level for transfers and min A level for ambulation w RW bed>chair  Limited assessment of mobility secondary to patient reported lightheadedness, worse w standing  Vitals stable seated>standing  Pt demonstrated gait deviations as outlined in flowsheet including apprehension to WB on RLE resulting in decreased R stance, short stride and decreased foot clearance  Pt will benefit from continued skilled IP PT to address the above mentioned impairments  in order to maximize recovery and increase functional independence when completing mobility and ADLs  At this time PT recommendations for d/c are home w HHPT pending continued progress in therapy  Barriers to Discharge: Inaccessible home environment  Barriers to Discharge Comments: steps     PT Discharge Recommendation: Home with home health rehabilitation (pending progress**)          See flowsheet documentation for full assessment

## 2022-06-29 NOTE — PLAN OF CARE
Problem: PAIN - ADULT  Goal: Verbalizes/displays adequate comfort level or baseline comfort level  Description: Interventions:  - Encourage patient to monitor pain and request assistance  - Assess pain using appropriate pain scale  - Administer analgesics based on type and severity of pain and evaluate response  - Implement non-pharmacological measures as appropriate and evaluate response  - Consider cultural and social influences on pain and pain management  - Notify physician/advanced practitioner if interventions unsuccessful or patient reports new pain  Outcome: Progressing     Problem: INFECTION - ADULT  Goal: Absence or prevention of progression during hospitalization  Description: INTERVENTIONS:  - Assess and monitor for signs and symptoms of infection  - Monitor lab/diagnostic results  - Monitor all insertion sites, i e  indwelling lines, tubes, and drains  - Monitor endotracheal if appropriate and nasal secretions for changes in amount and color  - [place] appropriate cooling/warming therapies per order  - Administer medications as ordered  - Instruct and encourage patient and family to use good hand hygiene technique  - Identify and instruct in appropriate isolation precautions for identified infection/condition  Outcome: Progressing     Problem: SAFETY ADULT  Goal: Patient will remain free of falls  Description: INTERVENTIONS:  - Educate patient/family on patient safety including physical limitations  - Instruct patient to call for assistance with activity   - Consult OT/PT to assist with strengthening/mobility   - Keep Call bell within reach  - Keep bed low and locked with side rails adjusted as appropriate  - Keep care items and personal belongings within reach  - Initiate and maintain comfort rounds  - Make Fall Risk Sign visible to staff  - Offer Toileting every 2 Hours, in advance of need  - Initiate/Maintain bed alarm  - Obtain necessary fall risk management equipment: walker  Problem: DISCHARGE PLANNING  Goal: Discharge to home or other facility with appropriate resources  Description: INTERVENTIONS:  - Identify barriers to discharge w/patient and caregiver  - Arrange for needed discharge resources and transportation as appropriate  - Identify discharge learning needs (meds, wound care, etc )  - Arrange for interpretive services to assist at discharge as needed  - Refer to Case Management Department for coordinating discharge planning if the patient needs post-hospital services based on physician/advanced practitioner order or complex needs related to functional status, cognitive ability, or social support system  Outcome: Progressing     Problem: Knowledge Deficit  Goal: Patient/family/caregiver demonstrates understanding of disease process, treatment plan, medications, and discharge instructions  Description: Complete learning assessment and assess knowledge base    Interventions:  - Provide teaching at level of understanding  - Provide teaching via preferred learning methods  Outcome: Progressing     Problem: METABOLIC, FLUID AND ELECTROLYTES - ADULT  Goal: Fluid balance maintained  Description: INTERVENTIONS:  - Monitor labs   - Monitor I/O and WT  - Instruct patient on fluid and nutrition as appropriate  - Assess for signs & symptoms of volume excess or deficit  Outcome: Progressing     - Apply yellow socks and bracelet for high fall risk patients  - Consider moving patient to room near nurses station  Outcome: Progressing

## 2022-06-30 ENCOUNTER — TRANSITIONAL CARE MANAGEMENT (OUTPATIENT)
Dept: FAMILY MEDICINE CLINIC | Facility: CLINIC | Age: 67
End: 2022-06-30

## 2022-06-30 VITALS
BODY MASS INDEX: 20.93 KG/M2 | RESPIRATION RATE: 18 BRPM | TEMPERATURE: 98.5 F | DIASTOLIC BLOOD PRESSURE: 59 MMHG | OXYGEN SATURATION: 96 % | SYSTOLIC BLOOD PRESSURE: 123 MMHG | HEART RATE: 62 BPM | WEIGHT: 122.58 LBS | HEIGHT: 64 IN

## 2022-06-30 LAB
ANION GAP SERPL CALCULATED.3IONS-SCNC: 4 MMOL/L (ref 4–13)
BUN SERPL-MCNC: 10 MG/DL (ref 5–25)
CALCIUM SERPL-MCNC: 8.5 MG/DL (ref 8.3–10.1)
CHLORIDE SERPL-SCNC: 106 MMOL/L (ref 100–108)
CO2 SERPL-SCNC: 34 MMOL/L (ref 21–32)
CREAT SERPL-MCNC: 0.61 MG/DL (ref 0.6–1.3)
ERYTHROCYTE [DISTWIDTH] IN BLOOD BY AUTOMATED COUNT: 15.9 % (ref 11.6–15.1)
GFR SERPL CREATININE-BSD FRML MDRD: 93 ML/MIN/1.73SQ M
GLUCOSE SERPL-MCNC: 77 MG/DL (ref 65–140)
HCT VFR BLD AUTO: 32.8 % (ref 34.8–46.1)
HGB BLD-MCNC: 10.3 G/DL (ref 11.5–15.4)
MCH RBC QN AUTO: 30.3 PG (ref 26.8–34.3)
MCHC RBC AUTO-ENTMCNC: 31.4 G/DL (ref 31.4–37.4)
MCV RBC AUTO: 97 FL (ref 82–98)
PLATELET # BLD AUTO: 213 THOUSANDS/UL (ref 149–390)
PMV BLD AUTO: 9.6 FL (ref 8.9–12.7)
POTASSIUM SERPL-SCNC: 3.7 MMOL/L (ref 3.5–5.3)
RBC # BLD AUTO: 3.4 MILLION/UL (ref 3.81–5.12)
SODIUM SERPL-SCNC: 144 MMOL/L (ref 136–145)
WBC # BLD AUTO: 6.75 THOUSAND/UL (ref 4.31–10.16)

## 2022-06-30 PROCEDURE — 85027 COMPLETE CBC AUTOMATED: CPT | Performed by: PHYSICIAN ASSISTANT

## 2022-06-30 PROCEDURE — 99024 POSTOP FOLLOW-UP VISIT: CPT | Performed by: PHYSICIAN ASSISTANT

## 2022-06-30 PROCEDURE — 80048 BASIC METABOLIC PNL TOTAL CA: CPT | Performed by: PHYSICIAN ASSISTANT

## 2022-06-30 PROCEDURE — 99239 HOSP IP/OBS DSCHRG MGMT >30: CPT | Performed by: HOSPITALIST

## 2022-06-30 RX ORDER — OXYCODONE HYDROCHLORIDE 5 MG/1
2.5 TABLET ORAL EVERY 4 HOURS PRN
Qty: 20 TABLET | Refills: 0 | Status: SHIPPED | OUTPATIENT
Start: 2022-06-30 | End: 2022-07-10

## 2022-06-30 RX ORDER — ENOXAPARIN SODIUM 100 MG/ML
40 INJECTION SUBCUTANEOUS DAILY
Qty: 11.2 ML | Refills: 0 | Status: SHIPPED | OUTPATIENT
Start: 2022-07-01 | End: 2022-09-21

## 2022-06-30 RX ADMIN — GABAPENTIN 100 MG: 100 CAPSULE ORAL at 00:50

## 2022-06-30 RX ADMIN — PREDNISONE 5 MG: 5 TABLET ORAL at 08:53

## 2022-06-30 RX ADMIN — PAROXETINE 10 MG: 20 TABLET, FILM COATED ORAL at 08:53

## 2022-06-30 RX ADMIN — ACETAMINOPHEN 325MG 975 MG: 325 TABLET ORAL at 05:16

## 2022-06-30 RX ADMIN — FOLIC ACID 1 MG: 1 TABLET ORAL at 08:53

## 2022-06-30 RX ADMIN — GABAPENTIN 100 MG: 100 CAPSULE ORAL at 08:53

## 2022-06-30 RX ADMIN — ENOXAPARIN SODIUM 40 MG: 40 INJECTION SUBCUTANEOUS at 08:53

## 2022-06-30 RX ADMIN — DOCUSATE SODIUM 100 MG: 100 CAPSULE, LIQUID FILLED ORAL at 08:53

## 2022-06-30 NOTE — PLAN OF CARE
Problem: MOBILITY - ADULT  Goal: Maintain or return to baseline ADL function  Description: INTERVENTIONS:  -  Assess patient's ability to carry out ADLs; assess patient's baseline for ADL function and identify physical deficits which impact ability to perform ADLs (bathing, care of mouth/teeth, toileting, grooming, dressing, etc )  - Assess/evaluate cause of self-care deficits   - Assess range of motion  - Assess patient's mobility; develop plan if impaired  - Assess patient's need for assistive devices and provide as appropriate  - Encourage maximum independence but intervene and supervise when necessary  - Involve family in performance of ADLs  - Assess for home care needs following discharge   - Consider OT consult to assist with ADL evaluation and planning for discharge  - Provide patient education as appropriate  Outcome: Progressing  Goal: Maintains/Returns to pre admission functional level  Description: INTERVENTIONS:  - Perform BMAT or MOVE assessment daily    - Set and communicate daily mobility goal to care team and patient/family/caregiver  - Collaborate with rehabilitation services on mobility goals if consulted  - Perform Range of Motion 3 times a day  - Reposition patient     patient independent able to reposition self   - Dangle patient 3 times a day  - Stand patient 3 times a day  - Ambulate patient 3 times a day  - Out of bed to chair 3 times a day   - Out of bed for meals 3 times a day  Problem: PAIN - ADULT  Goal: Verbalizes/displays adequate comfort level or baseline comfort level  Description: Interventions:  - Encourage patient to monitor pain and request assistance  - Assess pain using appropriate pain scale  - Administer analgesics based on type and severity of pain and evaluate response  - Implement non-pharmacological measures as appropriate and evaluate response  - Consider cultural and social influences on pain and pain management  - Notify physician/advanced practitioner if interventions unsuccessful or patient reports new pain  Outcome: Progressing     Problem: MUSCULOSKELETAL - ADULT  Goal: Maintain or return mobility to safest level of function  Description: INTERVENTIONS:  - Assess patient's ability to carry out ADLs; assess patient's baseline for ADL function and identify physical deficits which impact ability to perform ADLs (bathing, care of mouth/teeth, toileting, grooming, dressing, etc )  - Assess/evaluate cause of self-care deficits   - Assess range of motion  - Assess patient's mobility  - Assess patient's need for assistive devices and provide as appropriate  - Encourage maximum independence but intervene and supervise when necessary  - Involve family in performance of ADLs  - Assess for home care needs following discharge   - Consider OT consult to assist with ADL evaluation and planning for discharge  - Provide patient education as appropriate  Outcome: Progressing     Problem: SAFETY ADULT  Goal: Maintain or return to baseline ADL function  Description: INTERVENTIONS:  -  Assess patient's ability to carry out ADLs; assess patient's baseline for ADL function and identify physical deficits which impact ability to perform ADLs (bathing, care of mouth/teeth, toileting, grooming, dressing, etc )  - Assess/evaluate cause of self-care deficits   - Assess range of motion  - Assess patient's mobility; develop plan if impaired  - Assess patient's need for assistive devices and provide as appropriate  - Encourage maximum independence but intervene and supervise when necessary  - Involve family in performance of ADLs  - Assess for home care needs following discharge   - Consider OT consult to assist with ADL evaluation and planning for discharge  - Provide patient education as appropriate  Outcome: Progressing

## 2022-06-30 NOTE — DISCHARGE SUMMARY
[facility]  Discharge- Mike Bazan 1955, 75 y o  female MRN: [id number]  Unit/Bed#: E2 -01 Encounter: [id number]  Primary Care Provider: Matilde Diaz MD   Date and time admitted to hospital: 6/26/2022 11:47 AM    * Closed fracture of right hip [facility])  Assessment & Plan  mechnaical fall at work with acute right hip fracture    She underwent ORIF of the right hip    She is doing very well and will be going home with outpatient physical therapy  Rheumatoid arthritis (HCC)  Assessment & Plan  · Maintained on prednisone 5 mg daily   · Given stress dose of hydrocortisone pre-operatively   · Follows with Rheum at [facility]   · Also on methotrexate and folic acid           Discharging Physician / Practitioner: Josy Ramirez DO  PCP: Matilde Diaz MD  Admission Date:   Admission Orders (From admission, onward)     Ordered        06/26/22 1411  INPATIENT ADMISSION  Once                      Discharge Date: 06/30/22    Medical Problems             Resolved Problems  Date Reviewed: 6/29/2022   None                   Consultations During Hospital Stay:  · orthopedics      Procedures Performed:     · ORIF Right hip      Reason for Admission: fall with right hip pain      Hospital Course: Mike Bazan is a 75 y o  female patient who originally presented to the hospital on 6/26/2022 due to fall with right hip pain  Patient slipped on a wet floor at work and suffered a right hip fracture  She underwent ORIF  She is doing incredibly well  She is having minimal pain  She is actually independent enough to go home and will be getting outpatient physical therapy  Please see above list of diagnoses and related plan for additional information  Condition at Discharge: good       Discharge Day Visit / Exam:     Subjective:  Feels great  Has no hip pain right now  Ready to go home        Vitals: Blood Pressure: 123/59 (06/30/22 0900)  Pulse: 62 (06/30/22 0900)  Temperature: 98 5 °F (36 9 °C) (06/30/22 0900)  Temp Source: Temporal (06/30/22 0900)  Respirations: 18 (06/30/22 0900)  Height: 5' 4" (162 6 cm) (06/26/22 1155)  Weight - Scale: 55 6 kg (122 lb 9 2 oz) (06/26/22 1155)  SpO2: 96 % (06/30/22 0900)    Exam:     Physical Exam  Vitals and nursing note reviewed  HENT:      Head: Normocephalic and atraumatic  Eyes:      Pupils: Pupils are equal, round, and reactive to light  Cardiovascular:      Rate and Rhythm: Normal rate and regular rhythm  Heart sounds: No murmur heard  No friction rub  No gallop  Pulmonary:      Effort: Pulmonary effort is normal       Breath sounds: Normal breath sounds  No wheezing or rales  Abdominal:      General: Bowel sounds are normal       Palpations: Abdomen is soft  Tenderness: There is no abdominal tenderness  Musculoskeletal:      Right lower leg: No edema  Left lower leg: No edema            Discharge instructions/Information to patient and family:   See after visit summary for information provided to patient and family  Provisions for Follow-Up Care:  See after visit summary for information related to follow-up care and any pertinent home health orders  Disposition:     Home       Discharge Statement:  I spent 37 minutes discharging the patient  This time was spent on the day of discharge  I had direct contact with the patient on the day of discharge  Greater than 50% of the total time was spent examining patient, answering all patient questions, arranging and discussing plan of care with patient as well as directly providing post-discharge instructions  Additional time then spent on discharge activities  Discharge Medications:  See after visit summary for reconciled discharge medications provided to patient and family        ** Please Note: This note has been constructed using a voice recognition system **

## 2022-06-30 NOTE — PLAN OF CARE
Problem: Potential for Falls  Goal: Patient will remain free of falls  Description: INTERVENTIONS:  - Educate patient/family on patient safety including physical limitations  - Instruct patient to call for assistance with activity   - Consult OT/PT to assist with strengthening/mobility   - Keep Call bell within reach  - Keep bed low and locked with side rails adjusted as appropriate  - Keep care items and personal belongings within reach  - Initiate and maintain comfort rounds  - Make Fall Risk Sign visible to staff  - Offer Toileting every 2 Hours, in advance of need  - Initiate/Maintain bed alarm  - Obtain necessary fall risk management equipment: call bell in reach  - Apply yellow socks and bracelet for high fall risk patients  - Consider moving patient to room near nurses station  6/30/2022 0926 by [facility]  Outcome: Progressing  6/30/2022 0926 by [facility]  Outcome: Progressing     Problem: MOBILITY - ADULT  Goal: Maintain or return to baseline ADL function  Description: INTERVENTIONS:  -  Assess patient's ability to carry out ADLs; assess patient's baseline for ADL function and identify physical deficits which impact ability to perform ADLs (bathing, care of mouth/teeth, toileting, grooming, dressing, etc )  - Assess/evaluate cause of self-care deficits   - Assess range of motion  - Assess patient's mobility; develop plan if impaired  - Assess patient's need for assistive devices and provide as appropriate  - Encourage maximum independence but intervene and supervise when necessary  - Involve family in performance of ADLs  - Assess for home care needs following discharge   - Consider OT consult to assist with ADL evaluation and planning for discharge  - Provide patient education as appropriate  6/30/2022 0926 by [facility]  Outcome: Completed  6/30/2022 0926 by [facility]  Outcome: Progressing  Goal: Maintains/Returns to pre admission functional level  Description: INTERVENTIONS:  - Perform BMAT or MOVE assessment daily    - Set and communicate daily mobility goal to care team and patient/family/caregiver  - Collaborate with rehabilitation services on mobility goals if consulted  - Perform Range of Motion 3 times a day  - Reposition patient every 2 hours    - Dangle patient 3 times a day  - Stand patient 3 times a day  - Ambulate patient 3 times a day  - Out of bed to chair 3 times a day   - Out of bed for meals 3 times a day  - Out of bed for toileting  - Record patient progress and toleration of activity level   6/30/2022 0926 by Thony Campuzano  Outcome: Progressing  6/30/2022 0926 by Thony Campuzano  Outcome: Progressing     Problem: PAIN - ADULT  Goal: Verbalizes/displays adequate comfort level or baseline comfort level  Description: Interventions:  - Encourage patient to monitor pain and request assistance  - Assess pain using appropriate pain scale  - Administer analgesics based on type and severity of pain and evaluate response  - Implement non-pharmacological measures as appropriate and evaluate response  - Consider cultural and social influences on pain and pain management  - Notify physician/advanced practitioner if interventions unsuccessful or patient reports new pain  6/30/2022 0926 by Thony Campuzano  Outcome: Progressing  6/30/2022 0926 by Thony Campuzano  Outcome: Progressing     Problem: INFECTION - ADULT  Goal: Absence or prevention of progression during hospitalization  Description: INTERVENTIONS:  - Assess and monitor for signs and symptoms of infection  - Monitor lab/diagnostic results  - Monitor all insertion sites, i e  indwelling lines, tubes, and drains  - Monitor endotracheal if appropriate and nasal secretions for changes in amount and color  - [place] appropriate cooling/warming therapies per order  - Administer medications as ordered  - Instruct and encourage patient and family to use good hand hygiene technique  - Identify and instruct in appropriate isolation precautions for identified infection/condition  6/30/2022 0926 by Chano Cavanaugh  Outcome: Progressing  6/30/2022 0926 by Chano Cavanaugh  Outcome: Progressing  Goal: Absence of fever/infection during neutropenic period  Description: INTERVENTIONS:  - Monitor WBC    6/30/2022 0926 by Chano Cavanaugh  Outcome: Progressing  6/30/2022 0926 by Chano Cavanaugh  Outcome: Progressing     Problem: SAFETY ADULT  Goal: Patient will remain free of falls  Description: INTERVENTIONS:  - Educate patient/family on patient safety including physical limitations  - Instruct patient to call for assistance with activity   - Consult OT/PT to assist with strengthening/mobility   - Keep Call bell within reach  - Keep bed low and locked with side rails adjusted as appropriate  - Keep care items and personal belongings within reach  - Initiate and maintain comfort rounds  - Make Fall Risk Sign visible to staff  - Offer Toileting every 2 Hours, in advance of need  - Initiate/Maintain bed alarm  - Obtain necessary fall risk management equipment: alarms  - Apply yellow socks and bracelet for high fall risk patients  - Consider moving patient to room near nurses station  6/30/2022 0926 by Chano Cavanaugh  Outcome: Progressing  6/30/2022 0926 by Chano Cavanaugh  Outcome: Progressing  Goal: Maintain or return to baseline ADL function  Description: INTERVENTIONS:  -  Assess patient's ability to carry out ADLs; assess patient's baseline for ADL function and identify physical deficits which impact ability to perform ADLs (bathing, care of mouth/teeth, toileting, grooming, dressing, etc )  - Assess/evaluate cause of self-care deficits   - Assess range of motion  - Assess patient's mobility; develop plan if impaired  - Assess patient's need for assistive devices and provide as appropriate  - Encourage maximum independence but intervene and supervise when necessary  - Involve family in performance of ADLs  - Assess for home care needs following discharge   - Consider OT consult to assist with ADL evaluation and planning for discharge  - Provide patient education as appropriate  6/30/2022 0926 by Merlene West  Outcome: Completed  6/30/2022 0926 by Merlene West  Outcome: Progressing  Goal: Maintains/Returns to pre admission functional level  Description: INTERVENTIONS:  - Perform BMAT or MOVE assessment daily    - Set and communicate daily mobility goal to care team and patient/family/caregiver  - Collaborate with rehabilitation services on mobility goals if consulted  - Perform Range of Motion 3 times a day  - Reposition patient every 2 hours    - Dangle patient 3 times a day  - Stand patient 3 times a day  - Ambulate patient 3 times a day  - Out of bed to chair 3 times a day   - Out of bed for meals 3 times a day  - Out of bed for toileting  - Record patient progress and toleration of activity level   6/30/2022 0926 by Merlene West  Outcome: Progressing  6/30/2022 0926 by Merlene West  Outcome: Progressing     Problem: DISCHARGE PLANNING  Goal: Discharge to home or other facility with appropriate resources  Description: INTERVENTIONS:  - Identify barriers to discharge w/patient and caregiver  - Arrange for needed discharge resources and transportation as appropriate  - Identify discharge learning needs (meds, wound care, etc )  - Arrange for interpretive services to assist at discharge as needed  - Refer to Case Management Department for coordinating discharge planning if the patient needs post-hospital services based on physician/advanced practitioner order or complex needs related to functional status, cognitive ability, or social support system  6/30/2022 0926 by Merlene West  Outcome: Progressing  6/30/2022 0926 by Merlene West  Outcome: Progressing     Problem: Knowledge Deficit  Goal: Patient/family/caregiver demonstrates understanding of disease process, treatment plan, medications, and discharge instructions  Description: Complete learning assessment and assess knowledge base    Interventions:  - Provide teaching at level of understanding  - Provide teaching via preferred learning methods  6/30/2022 0926 by Damari Soto  Outcome: Progressing  6/30/2022 0926 by Damari Soto  Outcome: Progressing     Problem: MUSCULOSKELETAL - ADULT  Goal: Maintain or return mobility to safest level of function  Description: INTERVENTIONS:  - Assess patient's ability to carry out ADLs; assess patient's baseline for ADL function and identify physical deficits which impact ability to perform ADLs (bathing, care of mouth/teeth, toileting, grooming, dressing, etc )  - Assess/evaluate cause of self-care deficits   - Assess range of motion  - Assess patient's mobility  - Assess patient's need for assistive devices and provide as appropriate  - Encourage maximum independence but intervene and supervise when necessary  - Involve family in performance of ADLs  - Assess for home care needs following discharge   - Consider OT consult to assist with ADL evaluation and planning for discharge  - Provide patient education as appropriate  6/30/2022 0926 by Damari Soto  Outcome: Progressing  6/30/2022 0926 by Damari Soto  Outcome: Progressing  Goal: Maintain proper alignment of affected body part  Description: INTERVENTIONS:  - Support, maintain and protect limb and body alignment  - Provide patient/ family with appropriate education  6/30/2022 0926 by Damari Soto  Outcome: Progressing  6/30/2022 0926 by Damari Soto  Outcome: Progressing     Problem: METABOLIC, FLUID AND ELECTROLYTES - ADULT  Goal: Fluid balance maintained  Description: INTERVENTIONS:  - Monitor labs   - Monitor I/O and WT  - Instruct patient on fluid and nutrition as appropriate  - Assess for signs & symptoms of volume excess or deficit  6/30/2022 0926 by Damari Soto  Outcome: Progressing  6/30/2022 0926 by Damari Maple  Outcome: Progressing

## 2022-06-30 NOTE — PROGRESS NOTES
Progress Note - Orthopedics   Carlos Alberto Coles 75 y o  female MRN: [id number]  Unit/Bed#: E2 -01 Encounter: [id number]    Assessment:  75 y o  female s/p CRPP for right hip fracture  POD 2    Plan:  · Pain control prn  · PT/OT-WBAT right LE   · Lovenox/SCDs for DVT prophylaxis  Patient will require lovenox x 30 days post op  · Ortho stable for dc  Patient will follow up with Dr Carlos Vick in the office in 2 weeks  Subjective: Pt S&E  Resting comfortably  Patient is doing very well today  Denies pain in the right hip  States she was able to walk with PT yesterday and hopes to go home today  Denies fevers, chills, distal numbness, or tingling  Vitals: Blood pressure 133/76, pulse 67, temperature (!) 97 2 °F (36 2 °C), temperature source Temporal, resp  rate 18, height 5' 4" (1 626 m), weight 55 6 kg (122 lb 9 2 oz), SpO2 98 %, not currently breastfeeding  ,Body mass index is 21 04 kg/m²      No intake or output data in the 24 hours ending 06/30/22 0857    Invasive Devices  Report    Peripheral Intravenous Line  Duration           Peripheral IV 06/28/22 Right Antecubital 2 days                Ortho Exam: rightLE:  Drsg: mepilx C/D/I, sensation grossly intact L4, L5, S1, palpable pedal pulse, EHL/AT/GS intact    Lab, Imaging and other studies:   CBC:   Lab Results   Component Value Date    WBC 6 75 06/30/2022    HGB 10 3 (L) 06/30/2022    HCT 32 8 (L) 06/30/2022    MCV 97 06/30/2022     06/30/2022    MCH 30 3 06/30/2022    MCHC 31 4 06/30/2022    RDW 15 9 (H) 06/30/2022    MPV 9 6 06/30/2022     CMP:   Lab Results   Component Value Date    SODIUM 144 06/30/2022     06/30/2022    CO2 34 (H) 06/30/2022    BUN 10 06/30/2022    CREATININE 0 61 06/30/2022    CALCIUM 8 5 06/30/2022    EGFR 93 06/30/2022

## 2022-06-30 NOTE — ASSESSMENT & PLAN NOTE
mechnaical fall at work with acute right hip fracture    She underwent ORIF of the right hip    She is doing very well and will be going home with outpatient physical therapy

## 2022-06-30 NOTE — ASSESSMENT & PLAN NOTE
· Maintained on prednisone 5 mg daily   · Given stress dose of hydrocortisone pre-operatively   · Follows with Rheum at [facility]   · Also on methotrexate and folic acid

## 2022-06-30 NOTE — CASE MANAGEMENT
Case Management Discharge Planning Note    Patient name Diana Abraham  Location [facility] 2 /E2 -* MRN [id number]  : 1955 Date 2022       Current Admission Date: 2022  Current Admission Diagnosis:Closed fracture of right hip [facility])   Patient Active Problem List    Diagnosis Date Noted    Closed fracture of right hip ([facility] [address] ) 2022    History of total right knee replacement (TKR) 2022    On prednisone therapy 2022    Dizziness 2022    Abnormal ECG 2022    Chronic fatigue 11/15/2021    Iron deficiency anemia 11/15/2021    Urge incontinence of urine 10/15/2021    Elevated blood pressure reading 2018    Osteoarthritis of both hands 12/15/2016    Hyperlipidemia 2016    Age-related osteoporosis with current pathological fracture 2016    Anxiety disorder 2013    Keratitis 2012    Rheumatoid arthritis ([facility] [address] ) 2012    Salivary secretion disturbance 2012    Tear film insufficiency 2012      LOS (days): 4  Geometric Mean LOS (GMLOS) (days):   Days to GMLOS:     OBJECTIVE:  Risk of Unplanned Readmission Score: 16 81         Current admission status: Inpatient   Preferred Pharmacy:   [facility] [address] BRE[address]MENARES [address]  Phone: [phone] Fax: [address] ,  [facility] ,  [address] [place] [address]  Phone: [phone] Fax: [phone]    Primary Care Provider: Asher Garrett MD    Primary Insurance: WORKERS COMPENSATION  Secondary Insurance: BLUE CROSS    DISCHARGE DETAILS:    Discharge planning discussed with[de-identified] patient & spouse  Freedom of Choice: Yes  Comments - Freedom of Choice: Pt choosing to go home w/ [facility] OP PT/OT set up  CM contacted family/caregiver?: Yes  Were Treatment Team discharge recommendations reviewed with patient/caregiver?: Yes  Did patient/caregiver verbalize understanding of patient care needs?: Yes  Were patient/caregiver advised of the risks associated with not following Treatment Team discharge recommendations?: Yes    Contacts  Patient Contacts: Ely Ortiz (spouse) [phone]  Relationship to Patient[de-identified] Family  Contact Method: In Person  Reason/Outcome: Continuity of Care, Emergency Contact, Discharge [address]         Is the patient interested in [address] at discharge?: No (Pt has [facility] PT/OT set up)                   Treatment Team [facility]ation: [facility]  Discharge Destination Plan[de-identified] Home (w/ OP PT/OT)                                         Additional Comments: CM met w/ pt & spouse to go over d/c planning  Pt states she does not want home health because she already has PT/OT set up at [address] [place] [address]  She will be going 3x a week  Spouse was at bedside as well who stated he will be taking her home & has her walker from home w/ him for her to use  Pt & spouse confident to go home today  Pt is ambulating well & does not feel she needs anything  CM to continue to follow pt care & d/c planning

## 2022-07-01 LAB
ABO GROUP BLD BPU: NORMAL
BPU ID: NORMAL
CROSSMATCH: NORMAL
UNIT DISPENSE STATUS: NORMAL
UNIT PRODUCT CODE: NORMAL
UNIT PRODUCT VOLUME: 300 ML
UNIT RH: NORMAL

## 2022-07-08 ENCOUNTER — TELEPHONE (OUTPATIENT)
Dept: OBGYN CLINIC | Facility: HOSPITAL | Age: 67
End: 2022-07-08

## 2022-07-08 DIAGNOSIS — M25.551 PAIN IN RIGHT HIP: Primary | ICD-10-CM

## 2022-07-08 NOTE — TELEPHONE ENCOUNTER
DR Norma Dennis  RE: Refill for Naproxen  CB: [phone]    Patient called asking if she can get a refill for her Naproxen sent to pharmacy below        [address] [facility], [address] [place] [address]  [phone]

## 2022-07-09 NOTE — TELEPHONE ENCOUNTER
Attempted to call patient with no response  Left voicemail for return call  I would like to hold off on naproxen refill as she is currently supposed to be on Lovenox after hip fracture  She should not take Lovenox with naproxen or any other anti-inflammatory the same time

## 2022-07-11 ENCOUNTER — EVALUATION (OUTPATIENT)
Dept: PHYSICAL THERAPY | Facility: CLINIC | Age: 67
End: 2022-07-11
Payer: OTHER MISCELLANEOUS

## 2022-07-11 DIAGNOSIS — Z87.81 S/P ORIF (OPEN REDUCTION INTERNAL FIXATION) FRACTURE: ICD-10-CM

## 2022-07-11 DIAGNOSIS — S72.001D CLOSED FRACTURE OF RIGHT HIP WITH ROUTINE HEALING, SUBSEQUENT ENCOUNTER: Primary | ICD-10-CM

## 2022-07-11 DIAGNOSIS — Z98.890 S/P ORIF (OPEN REDUCTION INTERNAL FIXATION) FRACTURE: ICD-10-CM

## 2022-07-11 PROCEDURE — 97161 PT EVAL LOW COMPLEX 20 MIN: CPT | Performed by: PHYSICAL THERAPIST

## 2022-07-11 PROCEDURE — 97110 THERAPEUTIC EXERCISES: CPT | Performed by: PHYSICAL THERAPIST

## 2022-07-11 NOTE — PROGRESS NOTES
PT Evaluation     Today's date: 2022  Patient name: Manuel Lawson  : 1955  MRN: [id number]  Referring provider: Billy Hill DO  Dx:   Encounter Diagnosis     ICD-10-CM    1  Closed fracture of right hip with routine healing, subsequent encounter  S72 001D Ambulatory Referral to Physical Therapy   2  S/P ORIF (open reduction internal fixation) fracture  [id number]     Z87 81                   Assessment/Plan  Ms Abena Reed is a 75 y o  female presenting to outpatient physical therapy s/p R femoral neck ORIF on 22 secondary to fracture on 22 which restricts their ability to participate in ADLs, work, and recreational activities without pain  Functional limitations are result of the following impairments: decreased R hip ROM, decreased R hip strength, ambulation dysfunction, decreased proprioception, decreased functional power, and increased falls risk  PMH is sig for osteoporosis, RA, anxiety, HLD, and R TKA  No further referral appears necessary at this time based upon examination results    Patient was given the opportunity to ask questions, and all questions were answered to the patient's satisfaction  The patients's greatest concerns are fear of not being able to stay active, fear of not being able to care for self or others, getting back to recreational activity or sport and fear of pain getting worse  Patient appears motivated, agrees with the POC, and is a good candidate for skilled physical therapy at this time  Patient was provided with handout of HEP consisting of hip strengthening and stretching    Symptom irritability: Low Understanding of Dx/Px/POC: good  Prognosis: good  Prognosis details:   Negative prognostic indicators include: RA, osteoporosis, multiple orthopedic issues       Goals  STG (4 weeks)  Pain: Patient will subjectively report maximum pain decreased by 2/10  Strength: Patient's will demonstrate RLE strength improved by 1/2 grade  ROM: Patient will increase R hip ROM by 10 deg    LTG (8 weeks)  Pain: Patient will subjectively report less than 2/10 pain with functional activities  Strength: Patient's will demonstrate RLE strength improved to WNL  ROM: Patient will increase R hip ROM to at least equal contralateral side  Function: Patient will increase score on FOTO to predicted value or better  Patient will be ind with HEP by [facility] [address] details: Prognosis above is given PT services 2x/week tapering to 1x/week over the next 2 months and home program adherence  Patient would benefit from: skilled physical therapy  Referral necessary: no  Planned modality interventions: Hydrocollator packs, Cryotherapy, TENS and Low level laser  Planned therapy interventions: joint mobilization, manual therapy, massage, neuromuscular re-education, patient education, stretching, strengthening, therapeutic activities, therapeutic exercise, home exercise program, functional ROM exercises, gait training, flexibility, balance, abdominal trunk stabilization, motor coordination training, coordination and behavior modification  Frequency: 2x/week for 8 weeks  Duration in visits: [address] beginning date: 7/11/2022   Plan of Care expiration date: 9/5/2022  Treatment plan discussed with: patient    Subjective  IE (7/11/2022): Patient is a 75 y o  female who presents for an initial outpatient physical therapy consultation s/p R hip ORIF secondary to femoral neck fracture  DOI: 6/26/2022  DOS: 6/28/2022  Surgeon: Dr Jenn deleon  LAYO: Patient reports that her hip fx occurred when slipping on a wet floor at work  Pain: 5-6/10  Sleep: off and on, hurts at night  Meds: Oxy (1x/day), tylenol   Function: RW,   Tried walking the [place] yesterday  Pain afterward, STS, up the stairs       Pain  Best: 0/10  Worst: 8/10  Average: 6/10  Irritability: Hours  Location: R hip  Quality: achy, sharpy  Aggravating factors: up the stairs, walking, sitting,   Alleviating factors: Meds, rest, ice  Progression: improving slowly  Social Support  Lives with: - has been very supportive  Home setup: Single level  Steps in [address] [facility] enter house  Employment status: working, 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  Hobbies/Recreational Activities: reading, puzzles, painting    Diagnostic Tests  Xray: surgical x-rays demonstrate fixation in good anatomical position    Patient Goals for Therapy  "Get back to PLOF, work"    Objective     Observations     Right Hip  Positive for edema and incision  Negative for atrophy and deformity  Additional Observation Details  Unable to visualize surgical incision due to dressing    Palpation     Additional Palpation Details  Patient TTP as expected post-op    Neurological Testing     Additional Neurological Details  Patient reports Intermittent lateral knee numbness/tingling  Unable to replicate during evaluation    Passive Range of Motion   Left Hip   Flexion: 100 degrees   Extension: 0 degrees   Abduction: 45 degrees   External rotation (90/90): 45 degrees   Internal rotation (90/90): 15 degrees     Right Hip   Flexion: 85 degrees   Extension: -10 degrees   Abduction: 30 degrees   External rotation (90/90): 30 degrees   Internal rotation (90/90): 5 degrees     Additional Passive Range of Motion Details  PROM limited by pain and significant muscle guarding    Strength/Myotome Testing     Left Hip   Planes of Motion   Flexion: 4+  Abduction: 4    Right Hip   Planes of Motion   Flexion: 4-  Abduction: 3+    Left Knee   Flexion: 4+  Extension: 4+    Right Knee   Flexion: 4  Extension: 4    Additional Strength Details  Mild pain with strength testing    Ambulation   Weight-Bearing Status   Weight-Bearing Status (Right): weight-bearing as tolerated    Assistive device used: front-wheeled walker    Observational Gait   Gait: antalgic and asymmetric   Decreased walking speed, stride length, right stance time, left step length and right step length     Right foot contact pattern: forefoot  Base of support: increased    Additional Observational Gait Details  Early toe off with R    Functional tests:  TU 3s with RW  5xSTS: 16 2s with B/L UE push-off         Precautions: falls risk, RA, osteoporosis  DOS: 2022    Manuals             R hip ROM                                                    Neuro Re-Ed             SLR HEP            Glute iso             Quad iso             Hip 3 ways                                       Ther Ex             Bike             Piriformis stretch HEP            HS stretch             Supine hip flexor stretch             SAQ             LAQ             Clamshells HEP                         Ther Activity                                       Gait Training                                       Modalities                          IE/HEP JF

## 2022-07-14 ENCOUNTER — OFFICE VISIT (OUTPATIENT)
Dept: PHYSICAL THERAPY | Facility: CLINIC | Age: 67
End: 2022-07-14
Payer: OTHER MISCELLANEOUS

## 2022-07-14 DIAGNOSIS — Z87.81 S/P ORIF (OPEN REDUCTION INTERNAL FIXATION) FRACTURE: ICD-10-CM

## 2022-07-14 DIAGNOSIS — S72.001D CLOSED FRACTURE OF RIGHT HIP WITH ROUTINE HEALING, SUBSEQUENT ENCOUNTER: Primary | ICD-10-CM

## 2022-07-14 DIAGNOSIS — Z98.890 S/P ORIF (OPEN REDUCTION INTERNAL FIXATION) FRACTURE: ICD-10-CM

## 2022-07-14 PROCEDURE — 97110 THERAPEUTIC EXERCISES: CPT | Performed by: PHYSICAL THERAPIST

## 2022-07-14 PROCEDURE — 97112 NEUROMUSCULAR REEDUCATION: CPT | Performed by: PHYSICAL THERAPIST

## 2022-07-14 NOTE — PROGRESS NOTES
Daily Note     Today's date: 2022  Patient name: Zechariah Toledo  : 1955  MRN: [id number]  Referring provider: Fernando Kim DO  Dx:   Encounter Diagnosis     ICD-10-CM    1  Closed fracture of right hip with routine healing, subsequent encounter  S72 001D    2  S/P ORIF (open reduction internal fixation) fracture  Z98 890     Z87 81                   Subjective: Patient arrives to therapy with c/o of new onset calf pain  She states that she feels like there's something "squeezing" her calf at time  Denies chest pain      Objective: See treatment diary below  Wells Criteria  1  Cancer treatment within last 6 months?    (+0)  2  Paralysis OR Paresis OR casting of LE?     (+0)  3  Bedridden for 3 days OR major surgery in last 12 weeks  (+1)  4  TTP localized to deep venous system? (+1)  5  Swelling of entire leg?      (+0)  6  U/L calf swelling, >3cm? (+0)  7  U/L pitting edema? (+0)  8  Superficial collateral veins? (+0)  9  Prior DVT?         (+0)  10  Alternative Dx as likely or more likely? (-2)     Net score          0  Low probability of DVT (<3%)      Assessment: Assessment of patient's calf pain reveal tenderness localized between gastroc heads  Patient does not demonstrate any other s/s of DVT at this time  Will continue to monitor  Educated patient on s/s of DVT, such as U/L swelling, redness, or chest pain  Advised patient to go to the ER if she develops these symptoms over the weekend before she returns to PT  Patient was able to tolerate all exercises as prescribed, except warming up on the stationary bike  Continue to progress as tolerated  Patient will continue to benefit from skilled PT in order to address impairments and improve function  Plan: Continue per plan of care  Progress treatment as tolerated         Precautions: falls risk, RA, osteoporosis  DOS: 2022    Manuals            R hip ROM Neuro Re-Ed             SLR HEP 2x10           Glute iso  5" hold  2x10           Quad iso  5" hold  2x10           Hip 3 ways  x20                                     Ther Ex             Bike  Did not tolerate           Piriformis stretch HEP            HS stretch             Supine hip flexor stretch  5x20"           SAQ  3x10  5" hold           LAQ             Clamshells HEP 2x10                        Ther Activity                                       Gait Training                                       Modalities                          IE/HEP JF

## 2022-07-19 ENCOUNTER — OFFICE VISIT (OUTPATIENT)
Dept: PHYSICAL THERAPY | Facility: CLINIC | Age: 67
End: 2022-07-19
Payer: OTHER MISCELLANEOUS

## 2022-07-19 DIAGNOSIS — Z87.81 S/P ORIF (OPEN REDUCTION INTERNAL FIXATION) FRACTURE: ICD-10-CM

## 2022-07-19 DIAGNOSIS — S72.001D CLOSED FRACTURE OF RIGHT HIP WITH ROUTINE HEALING, SUBSEQUENT ENCOUNTER: Primary | ICD-10-CM

## 2022-07-19 DIAGNOSIS — Z98.890 S/P ORIF (OPEN REDUCTION INTERNAL FIXATION) FRACTURE: ICD-10-CM

## 2022-07-19 PROCEDURE — 97112 NEUROMUSCULAR REEDUCATION: CPT

## 2022-07-19 PROCEDURE — 97110 THERAPEUTIC EXERCISES: CPT

## 2022-07-19 NOTE — PROGRESS NOTES
Daily Note     Today's date: 2022  Patient name: Stevie Anderson  : 1955  MRN: [id number]  Referring provider: Vinay Jaime DO  Dx:   Encounter Diagnosis     ICD-10-CM    1  Closed fracture of right hip with routine healing, subsequent encounter  S72 001D    2  S/P ORIF (open reduction internal fixation) fracture  Z98 890     Z87 81        Start Time: 905  Stop Time: 0950  Total time in clinic (min): 45 minutes       Subjective: Patient reported having continued tightness in calf on R, feeling at times she has a compression sleeve on from knee to ankle  She did not notice any more redness or swelling since last visit  Has been using the cane with good tolerance  Does experience R hip pain, at worse rated 6/10  Objective: See treatment diary below  Added calf stretch to program       Assessment: 3 weeks post-op  Positive response to addition of gastroc stretching  She continues to demonstrate hip and quad weakness with difficulty performing SLR without ext lag  Did not attempt adding recumbent bike in secondary to hip pain  Encouraged continued HEP compliance and will work towards gaining more pain free range and functional strength needed to amb with less deviations and reliance on AD and increase quality of life  Plan: Continue per plan of care  Progress treatment as tolerated  Scheduled to see Dr Kylah Law tomorrow ()           Precautions: falls risk, RA, osteoporosis  DOS: 2022    Manuals           R hip ROM                                                    Neuro Re-Ed             SLR HEP 2x10 2x10          Glute iso  5" hold  2x10 5" hold, 2x10          Quad iso  5" hold  2x10 5" hold, 2x10          Hip 3 ways  x20 x20 ea                                    Ther Ex             Bike  Did not tolerate           Piriformis stretch HEP            HS stretch             Supine hip flexor stretch  5x20" 20"x5          SAQ  3x10  5" hold 5" hold, 3x10          LAQ Nima HEP 2x10 2x10          Towel stretch gastroc   30"x5                       Ther Activity                                       Gait Training                                       Modalities                          IE/HEP JF

## 2022-07-20 ENCOUNTER — APPOINTMENT (OUTPATIENT)
Dept: RADIOLOGY | Facility: MEDICAL CENTER | Age: 67
End: 2022-07-20
Payer: OTHER MISCELLANEOUS

## 2022-07-20 ENCOUNTER — OFFICE VISIT (OUTPATIENT)
Dept: OBGYN CLINIC | Facility: MEDICAL CENTER | Age: 67
End: 2022-07-20

## 2022-07-20 VITALS
SYSTOLIC BLOOD PRESSURE: 103 MMHG | BODY MASS INDEX: 19.67 KG/M2 | DIASTOLIC BLOOD PRESSURE: 61 MMHG | WEIGHT: 115.2 LBS | HEART RATE: 80 BPM | HEIGHT: 64 IN

## 2022-07-20 DIAGNOSIS — Z87.81 STATUS POST FRACTURE OF RIGHT HIP: ICD-10-CM

## 2022-07-20 DIAGNOSIS — Z87.81 STATUS POST FRACTURE OF RIGHT HIP: Primary | ICD-10-CM

## 2022-07-20 PROCEDURE — 73502 X-RAY EXAM HIP UNI 2-3 VIEWS: CPT

## 2022-07-20 PROCEDURE — 99024 POSTOP FOLLOW-UP VISIT: CPT | Performed by: PHYSICIAN ASSISTANT

## 2022-07-20 RX ORDER — OXYCODONE HYDROCHLORIDE 5 MG/1
5 TABLET ORAL DAILY PRN
Qty: 7 TABLET | Refills: 0 | Status: SHIPPED | OUTPATIENT
Start: 2022-07-20 | End: 2022-09-21

## 2022-07-20 NOTE — LETTER
July 20, 2022     Patient: Manuel Lawson  YOB: 1955  Date of Visit: 7/20/2022      To Whom it May Concern: Trino Gankatheryn is under my professional care  Marsandy Columbia was seen in my office on 7/20/2022  Polly Rodriguez will remain out of work until her next follow up visit in 4 weeks  If you have any questions or concerns, please don't hesitate to call  Sincerely,          Lurdes Bautista PA-C        CC: Seamus Reed

## 2022-07-20 NOTE — PROGRESS NOTES
Assessment/Plan:  1  Status post fracture of right hip      Orders Placed This Encounter   Procedures    XR hip/pelv 2-3 vws right if performed       · Continue PT  Patient able to advance her activity as tolerated but we discussed that she should let pain be her guide  If any increased pain, she should back off her activity as progress gradually  · Pain control prn- oxycodone and tylenol  She will wean off oxycodone this week  Patient aware she can resume naproxen once she completes lovenox course  · Continue with lovenox for DVT prophylaxis  · May shower and let water run over incision, do not submerge incision  · Work note provided to remain out of work until her next evaluation in 4 weeks  Return in about 4 weeks (around 8/17/2022) for R hip post op 2  I answered all of the patient's questions during the visit and provided education of the patient's condition during the visit  The patient verbalized understanding of the information given and agrees with the plan  This note was dictated using I and love and you software  It may contain errors including improperly dictated words  Please contact physician directly for any questions  Subjective   Chief Complaint:   Chief Complaint   Patient presents with    Left Hip - Anders Moritz is a 75 y o  female who presents for 3 week follow up s/p right hip cannulated screw fixation on 6/28/22  Patient is present with her  and nurse   Patient states she is doing well  Her hip pain varies from 4-6/10 depending on her activity  Patient does have intermittent tightness in her bilateral calf and feels as though the compression pumps are still on her at times  States this is worse when walking  She feels that her right leg is longer than her left leg  Patient notes that she stopped using the walker and is using a cane  She is attending outpatient PT  They are working on stretching her calves and her hip strength   Denies calf swelling, warmth, discoloration, distal numbness, or tingling  Review of Systems  ROS:    See HPI for musculoskeletal review     All other systems reviewed are negative     History:  Past Medical History:   Diagnosis Date    Anemia      - corrected with iron    Ankle fracture, right     last assessed: 3/9/15    Anxiety     Chronic pain disorder     right side joint pain     Moderate exercise     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    Wears dentures     full upper    Wears glasses     reading     Past Surgical History:   Procedure Laterality Date    ANKLE SURGERY      ORIF /plate and 4 screws    CYST REMOVAL          ESOPHAGOGASTRODUODENOSCOPY      Diagnostic    FOOT SURGERY      plantar wart resection; resolved:     ORIF HIP FRACTURE Right 2022    Procedure: ORIF HIP W/ CANNUALATED SCREWS;  Surgeon: Vanna Geller DO;  Location: [facility];  Service: Orthopedics    NE TOTAL KNEE ARTHROPLASTY Right 3/1/2022    Procedure: ARTHROPLASTY KNEE TOTAL;  Surgeon: Vanna Geller DO;  Location: [facility];  Service: Orthopedics    TONSILLECTOMY AND ADENOIDECTOMY          TUBAL LIGATION      WISDOM TOOTH EXTRACTION       Social History   Social History     Substance and Sexual Activity   Alcohol Use Yes    Comment: rare, maybe on holidays     Social History     Substance and Sexual Activity   Drug Use No     Social History     Tobacco Use   Smoking Status Former Smoker    Quit date:     Years since quittin 5   Smokeless Tobacco Never Used     Family History:   Family History   Problem Relation Age of Onset    Seizures Mother         epilepsy    Bone cancer Father     No Known Problems Sister     No Known Problems Brother     No Known Problems Son     No Known Problems Maternal Grandmother     No Known Problems Maternal Grandfather     No Known Problems Paternal Grandmother     No Known Problems Paternal Grandfather     Thyroid disease Sister     No Known Problems Sister     No Known Problems Brother        Current Outpatient Medications on File Prior to Visit   Medication Sig Dispense Refill    acetaminophen (TYLENOL) 325 mg tablet Take 3 tablets (975 mg total) by mouth every 8 (eight) hours  0    ascorbic acid (VITAMIN C) 500 MG tablet Take 1 tablet (500 mg total) by mouth daily Start to take 30 days prior to surgery 30 tablet 1    Ca Carbonate-Mag Hydroxide 550-110 MG CHEW Chew Twice daily      enoxaparin (LOVENOX) 40 mg/0 4 mL Inject 0 4 mL (40 mg total) under the skin daily 11 2 mL 0    ferrous sulfate 324 (65 Fe) mg Take 1 tablet (324 mg total) by mouth daily before breakfast Start to take 30 days prior to surgery 30 tablet 1    folic acid (FOLVITE) 1 mg tablet Take by mouth      gabapentin (NEURONTIN) 100 mg capsule Take 1 capsule (100 mg total) by mouth every 8 (eight) hours 90 capsule 0    ibandronate (BONIVA) 150 MG tablet Take 1 tablet by mouth as needed        Multiple Vitamin (multivitamin) tablet Take 1 tablet by mouth daily Start to take 30 days prior to surgery 30 tablet 1    PARoxetine (PAXIL) 10 mg tablet Take 1 tablet (10 mg total) by mouth daily 90 tablet 3    predniSONE 5 mg tablet Take 5 mg by mouth daily       solifenacin (VESICARE) 10 MG tablet Take 1 tablet (10 mg total) by mouth daily 90 tablet 3     No current facility-administered medications on file prior to visit       Allergies   Allergen Reactions    Aspirin Other (See Comments)     Per pt avoids taking due to rheumatology Meds        Objective     /61   Pulse 80   Ht 5' 4" (1 626 m)   Wt 52 3 kg (115 lb 3 2 oz)   BMI 19 77 kg/m²      PE:  AAOx 3  WDWN  Hearing intact, no drainage from eyes  no audible wheezing  no abdominal distension  LE compartments soft, AT/GS intact    Ortho Exam:  right hip:   INCs: C/D/I, No erythema, mild swelling  Mild pain with ROM  No calf swelling  No TTP over calf  Neg Homans bilaterally     Imaging Studies: I have personally reviewed pertinent films in PACS  XR right hip:  S/p ORIF hip, hardware intact, slight compression of fracture alignment

## 2022-07-21 ENCOUNTER — OFFICE VISIT (OUTPATIENT)
Dept: PHYSICAL THERAPY | Facility: CLINIC | Age: 67
End: 2022-07-21
Payer: OTHER MISCELLANEOUS

## 2022-07-21 DIAGNOSIS — S72.001D CLOSED FRACTURE OF RIGHT HIP WITH ROUTINE HEALING, SUBSEQUENT ENCOUNTER: Primary | ICD-10-CM

## 2022-07-21 DIAGNOSIS — Z87.81 S/P ORIF (OPEN REDUCTION INTERNAL FIXATION) FRACTURE: ICD-10-CM

## 2022-07-21 DIAGNOSIS — Z98.890 S/P ORIF (OPEN REDUCTION INTERNAL FIXATION) FRACTURE: ICD-10-CM

## 2022-07-21 PROCEDURE — 97110 THERAPEUTIC EXERCISES: CPT | Performed by: PHYSICAL THERAPIST

## 2022-07-21 PROCEDURE — 97112 NEUROMUSCULAR REEDUCATION: CPT | Performed by: PHYSICAL THERAPIST

## 2022-07-21 NOTE — PROGRESS NOTES
Daily Note     Today's date: 2022  Patient name: Saundra Dietz  : 1955  MRN: [id number]  Referring provider: Romelia Lennox, DO  Dx:   Encounter Diagnosis     ICD-10-CM    1  Closed fracture of right hip with routine healing, subsequent encounter  S72 001D    2  S/P ORIF (open reduction internal fixation) fracture  Z98 890     Z87 81                      Subjective: Patient reports having a good follow up with her surgeon  Does experience R hip pain, currently at a 6/10  Calf pain feeling a little better  Objective: See treatment diary below  Treatment rx as listed below  Assessment: Patient continues to demonstrate early heel off during gait  Focused on keeping heel down through stance phase of gait this session  Patient's gait dysfunction does not appear to be pain related, more of altered pattern  Patient able to correct with verbal cuing  Calf discomfort improved with stretching  Continue to progress as tolerated  Patient will continue to benefit from skilled PT in order to address impairments and improve function  Plan: Continue per plan of care  Progress treatment as tolerated  Scheduled to see Dr Laura Torres tomorrow ()           Precautions: falls risk, RA, osteoporosis  DOS: 2022    Manuals          R hip ROM                                                    Neuro Re-Ed             SLR HEP 2x10 2x10 2x10         Glute iso  5" hold  2x10 5" hold, 2x10 5" hold  2x10         Quad iso  5" hold  2x10 5" hold, 2x10 5" hold  2x10         Hip 3 ways  x20 x20 ea x20ea                                   Ther Ex             Bike  Did not tolerate  5 min rocking         Piriformis stretch HEP            HS stretch             Supine hip flexor stretch  5x20" 20"x5 20" x5         SAQ  3x10  5" hold 5" hold, 3x10 5" hold  3x10         LAQ             Clamshells HEP 2x10 2x10 2x10         Towel stretch gastroc   30"x5 30" x5                      Ther Activity Gait Training                                       Modalities                          IE/HEP JF

## 2022-07-26 ENCOUNTER — OFFICE VISIT (OUTPATIENT)
Dept: PHYSICAL THERAPY | Facility: CLINIC | Age: 67
End: 2022-07-26
Payer: OTHER MISCELLANEOUS

## 2022-07-26 DIAGNOSIS — Z98.890 S/P ORIF (OPEN REDUCTION INTERNAL FIXATION) FRACTURE: ICD-10-CM

## 2022-07-26 DIAGNOSIS — Z87.81 S/P ORIF (OPEN REDUCTION INTERNAL FIXATION) FRACTURE: ICD-10-CM

## 2022-07-26 DIAGNOSIS — S72.001D CLOSED FRACTURE OF RIGHT HIP WITH ROUTINE HEALING, SUBSEQUENT ENCOUNTER: Primary | ICD-10-CM

## 2022-07-26 PROCEDURE — 97116 GAIT TRAINING THERAPY: CPT | Performed by: PHYSICAL THERAPIST

## 2022-07-26 PROCEDURE — 97110 THERAPEUTIC EXERCISES: CPT | Performed by: PHYSICAL THERAPIST

## 2022-07-26 PROCEDURE — 97112 NEUROMUSCULAR REEDUCATION: CPT | Performed by: PHYSICAL THERAPIST

## 2022-07-26 NOTE — PROGRESS NOTES
Daily Note     Today's date: 2022  Patient name: Rosemary Weaver  : 1955  MRN: [id number]  Referring provider: Byron Pink DO  Dx:   Encounter Diagnosis     ICD-10-CM    1  Closed fracture of right hip with routine healing, subsequent encounter  S72 001D    2  S/P ORIF (open reduction internal fixation) fracture  Z98 890     Z87 81                      Subjective: Patient reports pain is about the same  C/o of increased LBP over the weekend  Objective: See treatment diary below  Treatment rx as listed below  R ankle DF PROM: 10 deg  Reported pain with ambulation 0/10  Vitals:  /90 mmHg, after 5 min of rest 110/70 mmHg  Pulse: 68 bpm    Assessment: Patient reported nausea and lightheadedness with standing hip exercises this session  Symptoms abated with 5 min of rest  BP decreased from 160/90 to 110/70   Advised patient to contact her PCP if symptoms return  No carry over seen for gait pattern  She continues to demonstrate early heel and decreased weightbearing through stance phase  Gait dysfunction not related to pain, ROM, or strength, and seems to be caused by patient's mistrust of the R hip and apprehension of falling  Patient able to correct inconsistently with verbal cuing  Continue to progress as tolerated  Patient will continue to benefit from skilled PT in order to address impairments and improve function  Plan: Continue per plan of care  Progress treatment as tolerated            Precautions: falls risk, RA, osteoporosis  DOS: 2022    Manuals         R hip ROM             R gastroc stretch     JF                                  Neuro Re-Ed             SLR HEP 2x10 2x10 2x10 2x10        Glute iso  5" hold  2x10 5" hold, 2x10 5" hold  2x10 5" hold  2x10        Quad iso  5" hold  2x10 5" hold, 2x10 5" hold  2x10 5" hold  2x10        Hip 3 ways  x20 x20 ea x20ea x20 ea        ADD iso     5"  Hold  2x10                     Ther Ex Bike  Did not tolerate  5 min rocking 5 min rocking        Piriformis stretch HEP            HS stretch             Supine hip flexor stretch  5x20" 20"x5 20" x5 20" x 5        SAQ  3x10  5" hold 5" hold, 3x10 5" hold  3x10 5" hold  3x10        LAQ             Clamshells HEP 2x10 2x10 2x10 2x10  OTB        S/L SLR             Towel stretch gastroc   30"x5 30" x5 30" x5                      Ther Activity                                       Gait Training             Weight shifting onto R leg     x30        Heel down through stance phase practice     5 min  Around clinic        Modalities                          IE/HEP JF

## 2022-07-28 ENCOUNTER — OFFICE VISIT (OUTPATIENT)
Dept: PHYSICAL THERAPY | Facility: CLINIC | Age: 67
End: 2022-07-28
Payer: OTHER MISCELLANEOUS

## 2022-07-28 DIAGNOSIS — S72.001D CLOSED FRACTURE OF RIGHT HIP WITH ROUTINE HEALING, SUBSEQUENT ENCOUNTER: Primary | ICD-10-CM

## 2022-07-28 DIAGNOSIS — Z98.890 S/P ORIF (OPEN REDUCTION INTERNAL FIXATION) FRACTURE: ICD-10-CM

## 2022-07-28 DIAGNOSIS — Z87.81 S/P ORIF (OPEN REDUCTION INTERNAL FIXATION) FRACTURE: ICD-10-CM

## 2022-07-28 PROCEDURE — 97110 THERAPEUTIC EXERCISES: CPT | Performed by: PHYSICAL THERAPIST

## 2022-07-28 PROCEDURE — 97116 GAIT TRAINING THERAPY: CPT | Performed by: PHYSICAL THERAPIST

## 2022-07-28 PROCEDURE — 97112 NEUROMUSCULAR REEDUCATION: CPT | Performed by: PHYSICAL THERAPIST

## 2022-07-28 NOTE — PROGRESS NOTES
Daily Note     Today's date: 2022  Patient name: Danielle Ortez  : 1955  MRN: [id number]  Referring provider: Paul Grajeda DO  Dx:   Encounter Diagnosis     ICD-10-CM    1  Closed fracture of right hip with routine healing, subsequent encounter  S72 001D    2  S/P ORIF (open reduction internal fixation) fracture  Z98 890     Z87 81        Start Time: 0900  Stop Time: 09  Total time in clinic (min): 45 minutes       Subjective: Patient reports pain is about the same  C/o of increased LBP over the weekend  Objective: See treatment diary below  Treatment rx as listed below  R ankle DF PROM: 10 deg  Reported pain with ambulation 0/10  Vitals:  /90 mmHg, after 5 min of rest 110/70 mmHg  Pulse: 68 bpm    Assessment: Patient reported nausea and lightheadedness with standing hip exercises this session  Symptoms abated with 5 min of rest  BP decreased from 160/90 to 110/70   Advised patient to contact her PCP if symptoms return  No carry over seen for gait pattern  She continues to demonstrate early heel and decreased weightbearing through stance phase  Gait dysfunction not related to pain, ROM, or strength, and seems to be caused by patient's mistrust of the R hip and apprehension of falling  Patient able to correct inconsistently with verbal cuing  Continue to progress as tolerated  Patient will continue to benefit from skilled PT in order to address impairments and improve function  Plan: Continue per plan of care  Progress treatment as tolerated            Precautions: falls risk, RA, osteoporosis  DOS: 2022    Manuals        R hip ROM             R gastroc stretch     JF JF                                 Neuro Re-Ed             SLR HEP 2x10 2x10 2x10 2x10 2x10       Glute iso  5" hold  2x10 5" hold, 2x10 5" hold  2x10 5" hold  2x10 5" hold  2x10       Quad iso  5" hold  2x10 5" hold, 2x10 5" hold  2x10 5" hold  2x10 5" hold  2x10       Hip 3 ways x20 x20 ea x20ea x20 ea x20 ea       ADD iso     5"  Hold  2x10 5" hold  2x10                    Ther Ex             Bike  Did not tolerate  5 min rocking 5 min rocking 5 min rocking       Piriformis stretch HEP            HS stretch             Supine hip flexor stretch  5x20" 20"x5 20" x5 20" x 5 20"x5       SAQ  3x10  5" hold 5" hold, 3x10 5" hold  3x10 5" hold  3x10 5" hold  3x10       LAQ             Clamshells HEP 2x10 2x10 2x10 2x10  OTB 2x10  OTB       S/L SLR             Towel stretch gastroc   30"x5 30" x5 30" x5  30" x5                    Ther Activity                                       Gait Training             Weight shifting onto R leg     x30 x30       Heel down through stance phase practice     5 min  Around clinic 5 min around clinic       Modalities                          IE/HEP JF

## 2022-08-01 ENCOUNTER — OFFICE VISIT (OUTPATIENT)
Dept: OBGYN CLINIC | Facility: MEDICAL CENTER | Age: 67
End: 2022-08-01

## 2022-08-01 ENCOUNTER — APPOINTMENT (OUTPATIENT)
Dept: RADIOLOGY | Facility: MEDICAL CENTER | Age: 67
End: 2022-08-01
Payer: COMMERCIAL

## 2022-08-01 ENCOUNTER — TELEPHONE (OUTPATIENT)
Dept: OBGYN CLINIC | Facility: OTHER | Age: 67
End: 2022-08-01

## 2022-08-01 VITALS
SYSTOLIC BLOOD PRESSURE: 135 MMHG | HEART RATE: 83 BPM | DIASTOLIC BLOOD PRESSURE: 68 MMHG | BODY MASS INDEX: 19.81 KG/M2 | HEIGHT: 64 IN | WEIGHT: 116 LBS

## 2022-08-01 DIAGNOSIS — Z87.81 STATUS POST FRACTURE OF RIGHT HIP: ICD-10-CM

## 2022-08-01 DIAGNOSIS — Z87.81 STATUS POST FRACTURE OF RIGHT HIP: Primary | ICD-10-CM

## 2022-08-01 PROCEDURE — 99024 POSTOP FOLLOW-UP VISIT: CPT | Performed by: ORTHOPAEDIC SURGERY

## 2022-08-01 PROCEDURE — 73502 X-RAY EXAM HIP UNI 2-3 VIEWS: CPT

## 2022-08-01 NOTE — TELEPHONE ENCOUNTER
Patient called in looking to speak to [facility] , in regards to her Right Hip surgery , she said yesterday she was off balance and both her legs could not hold her and someone had to carry her out to the car  She is very concerned       C/b # [phone]

## 2022-08-01 NOTE — PROGRESS NOTES
Assessment/Plan     1  Status post fracture of right hip      Orders Placed This Encounter   Procedures    XR hip/pelv 2-3 vws right if performed       - Patient presents 5 weeks s/p right hip cannulated screw fixation on 6/28/22   -compression of fracture noted on XR and scenario of increased pain and limited endurance discussed  We discussed next best step- CT vs  Monitoring  -patient opted to monitor   - A CT may be ordered at the next visit  - Recommended patient to mention balance concerns to PCP and PT as this may be related to lower back issues or something else considering she feels it is effecting both her legs  - Use walker or cane as needed  - Work note written  - Follow up in about 4 weeks  Return in about 4 weeks (around 8/29/2022) for Recheck  I answered all of the patient's questions during the visit and provided education of the patient's condition during the visit  The patient verbalized understanding of the information given and agrees with the plan  This note was dictated using [employer] software  It may contain errors including improperly dictated words  Please contact physician directly for any questions  Subjective   Chief Complaint:   Chief Complaint   Patient presents with    Right Hip - Post-op, Follow-up       HPI:  Abimael Judge is a 75 y o  female who presents for follow up for 4, almost 5 week follow up s/p right hip cannulated screw fixation on 6/28/22  She states she is still experiencing unsteadiness and balance issues over the weekend in both legs  She reports losing her balance while walking around for a while at the [employer] and had to be escorted back to her car  This is the most recent time this has occurred since her procedure, and the first time was about 3 weeks after surgery  This is alleviated with rest  She is currently taking oxycodone once a day prn, prednisone once a day prn, and Tylenol twice a day prn  She is attending PT twice a week   She reports her right leg has been feeling longer than her left  She has a history of rheumatoid arthritis  Review of Systems  See HPI for musculoskeletal review     All other systems reviewed are negative     History:  Past Medical History:   Diagnosis Date    Anemia      - corrected with iron    Ankle fracture, right     last assessed: 3/9/15    Anxiety     Chronic pain disorder     right side joint pain     Moderate exercise     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    Wears dentures     full upper    Wears glasses     reading     Past Surgical History:   Procedure Laterality Date    ANKLE SURGERY      ORIF /plate and 4 screws    CYST REMOVAL          ESOPHAGOGASTRODUODENOSCOPY      Diagnostic    FOOT SURGERY      plantar wart resection; resolved:     ORIF HIP FRACTURE Right 2022    Procedure: ORIF HIP W/ CANNUALATED SCREWS;  Surgeon: Telma Reyes DO;  Location: [facility];  Service: Orthopedics    TX TOTAL KNEE ARTHROPLASTY Right 3/1/2022    Procedure: ARTHROPLASTY KNEE TOTAL;  Surgeon: Telma Reyes DO;  Location: [facility];  Service: Orthopedics   [address]      WISDOM TOOTH EXTRACTION       Social History   Social History     Substance and Sexual Activity   Alcohol Use Yes    Comment: rare, maybe on holidays     Social History     Substance and Sexual Activity   Drug Use No     Social History     Tobacco Use   Smoking Status Former Smoker    Quit date:     Years since quittin 6   Smokeless Tobacco Never Used     Family History:   Family History   Problem Relation Age of Onset    Seizures Mother         epilepsy    Bone cancer Father     No Known Problems Sister     No Known Problems Brother     No Known Problems Son     No Known Problems Maternal Grandmother     No Known Problems Maternal Grandfather     No Known Problems Paternal Grandmother     No Known Problems Paternal Grandfather  Thyroid disease Sister     No Known Problems Sister     No Known Problems Brother        Current Outpatient Medications on File Prior to Visit   Medication Sig Dispense Refill    acetaminophen (TYLENOL) 325 mg tablet Take 3 tablets (975 mg total) by mouth every 8 (eight) hours  0    ascorbic acid (VITAMIN C) 500 MG tablet Take 1 tablet (500 mg total) by mouth daily Start to take 30 days prior to surgery 30 tablet 1    Ca Carbonate-Mag Hydroxide 550-110 MG CHEW Chew Twice daily      enoxaparin (LOVENOX) 40 mg/0 4 mL Inject 0 4 mL (40 mg total) under the skin daily 11 2 mL 0    ferrous sulfate 324 (65 Fe) mg Take 1 tablet (324 mg total) by mouth daily before breakfast Start to take 30 days prior to surgery 30 tablet 1    folic acid (FOLVITE) 1 mg tablet Take by mouth      gabapentin (NEURONTIN) 100 mg capsule Take 1 capsule (100 mg total) by mouth every 8 (eight) hours 90 capsule 0    ibandronate (BONIVA) 150 MG tablet Take 1 tablet by mouth as needed        Multiple Vitamin (multivitamin) tablet Take 1 tablet by mouth daily Start to take 30 days prior to surgery 30 tablet 1    oxyCODONE (ROXICODONE) 5 immediate release tablet Take 1 tablet (5 mg total) by mouth daily as needed for severe pain Max Daily Amount: 5 mg 7 tablet 0    PARoxetine (PAXIL) 10 mg tablet Take 1 tablet (10 mg total) by mouth daily 90 tablet 3    predniSONE 5 mg tablet Take 5 mg by mouth daily       solifenacin (VESICARE) 10 MG tablet Take 1 tablet (10 mg total) by mouth daily 90 tablet 3     No current facility-administered medications on file prior to visit       Allergies   Allergen Reactions    Aspirin Other (See Comments)     Per pt avoids taking due to rheumatology Meds        Objective     /68   Pulse 83   Ht 5' 4" (1 626 m)   Wt 52 6 kg (116 lb)   BMI 19 91 kg/m²      PE:  AAOx 3  WDWN  Hearing intact, no drainage from eyes  no audible wheezing  no abdominal distension  LE compartments soft, skin intact    Ortho Exam:   right hip:   INCs: C/D/I, No erythema, mild swelling  ROM: 0-80 flexion with pain, 0-20 IR with pain, 0-15 ER with pain      RLE:  EHL/AT/GS/quads/hamstrings/iliopsoas 5/5, sensation grossly intact L4, L5, S1, palpable pedal pulse  LLE:  EHL/AT/GS/quads/hamstrings/iliopsoas 5/5, sensation grossly intact L4, L5, S1, palpable pedal pulse         Imaging Studies: I have personally reviewed pertinent films in PACS  XR right hip:  S/p ORIF hip, hardware intact, compression of fracture alignment

## 2022-08-01 NOTE — LETTER
August 1, 2022     Patient: Stevie Anderson  YOB: 1955  Date of Visit: 8/1/2022      To Whom it May Concern: Yayo Huitron is under my professional care  Coby Chacon was seen in my office on 8/1/2022  Vegamaryellen Aurora remain out of work until her next follow up visit in 4 weeks  If you have any questions or concerns, please don't hesitate to call           Sincerely,          Demi Ortiz,         CC: No Recipients

## 2022-08-02 ENCOUNTER — TELEPHONE (OUTPATIENT)
Dept: OBGYN CLINIC | Facility: HOSPITAL | Age: 67
End: 2022-08-02

## 2022-08-02 ENCOUNTER — OFFICE VISIT (OUTPATIENT)
Dept: PHYSICAL THERAPY | Facility: CLINIC | Age: 67
End: 2022-08-02
Payer: OTHER MISCELLANEOUS

## 2022-08-02 DIAGNOSIS — S72.001D CLOSED FRACTURE OF RIGHT HIP WITH ROUTINE HEALING, SUBSEQUENT ENCOUNTER: Primary | ICD-10-CM

## 2022-08-02 DIAGNOSIS — Z98.890 S/P ORIF (OPEN REDUCTION INTERNAL FIXATION) FRACTURE: ICD-10-CM

## 2022-08-02 DIAGNOSIS — Z87.81 S/P ORIF (OPEN REDUCTION INTERNAL FIXATION) FRACTURE: ICD-10-CM

## 2022-08-02 PROCEDURE — 97112 NEUROMUSCULAR REEDUCATION: CPT | Performed by: PHYSICAL THERAPIST

## 2022-08-02 PROCEDURE — 97110 THERAPEUTIC EXERCISES: CPT | Performed by: PHYSICAL THERAPIST

## 2022-08-02 PROCEDURE — 97116 GAIT TRAINING THERAPY: CPT | Performed by: PHYSICAL THERAPIST

## 2022-08-02 NOTE — TELEPHONE ENCOUNTER
Jamie Shah from [facility] called for the 08/01/2022 OVN to be faxed to her at [phone]    Faxed, as requested

## 2022-08-02 NOTE — PROGRESS NOTES
Daily Note     Today's date: 2022  Patient name: Vamsi Mcmahon  : 1955  MRN: [id number]  Referring provider: Jena Haywood DO  Dx:   Encounter Diagnosis     ICD-10-CM    1  Closed fracture of right hip with routine healing, subsequent encounter  S72 001D    2  S/P ORIF (open reduction internal fixation) fracture  Z98 890     Z87 81                      Subjective: Patient reports that she had a "bad day" on Saturday  Was walking around in [place], when she felt like her legs went numb and were going to give out  Called Dr Jw Colon with these concerns  Patient had nn appointment with her surgeon yesterday, and new x-rays were taken  Per patient, they discussed the possibility of the screws loosening  She has a follow up in 4 weeks with Dr Jw Colon, and pending improvement in therapy, may discuss hip replacement  She is to continue therapy until then  Objective: See treatment diary below  Treatment rx as listed below  Patient denies increase pain or numbness with repeated extension and flexion      Assessment:  Continues to see little to no carry over seen for gait pattern  She continues to demonstrate early heel and decreased weightbearing through stance phase  Gait dysfunction not related to pain, ROM, or strength, and seems to be caused by patient's mistrust of the R hip and apprehension of falling  Patient able to correct inconsistently with verbal cuing  Unable to replicate patient's c/o of numbness and weakness in LE with testing  Continue to progress as tolerated  Patient will continue to benefit from skilled PT in order to address impairments and improve function  Plan: Continue per plan of care  Progress treatment as tolerated            Precautions: falls risk, RA, osteoporosis  DOS: 2022    Manuals       R hip ROM             R gastroc stretch     JF JF JF                                Neuro Re-Ed             SLR HEP 2x10 2x10 2x10 2x10 2x10 2x10      Glute iso  5" hold  2x10 5" hold, 2x10 5" hold  2x10 5" hold  2x10 5" hold  2x10 5" hold  2x10      Quad iso  5" hold  2x10 5" hold, 2x10 5" hold  2x10 5" hold  2x10 5" hold  2x10 5" hold  2x10      Hip 3 ways  x20 x20 ea x20ea x20 ea x20 ea x20 ea      ADD iso     5"  Hold  2x10 5" hold  2x10 5" hold  2x10                   Ther Ex             Bike  Did not tolerate  5 min rocking 5 min rocking 5 min rocking 5 min rocking      Piriformis stretch HEP            HS stretch             Supine hip flexor stretch  5x20" 20"x5 20" x5 20" x 5 20"x5 20" x5      SAQ  3x10  5" hold 5" hold, 3x10 5" hold  3x10 5" hold  3x10 5" hold  3x10 5" hold  3x10      LAQ             Clamshells HEP 2x10 2x10 2x10 2x10  OTB 2x10  OTB 2x10  OTB      S/L SLR             Towel stretch gastroc   30"x5 30" x5 30" x5  30" x5 30"x5                   Ther Activity                                       Gait Training             Weight shifting onto R leg     x30 x30 x30      Heel down through stance phase practice     5 min  Around clinic 5 min around clinic 5 min  Around clinic      Modalities                          IE/HEP JF

## 2022-08-04 ENCOUNTER — EVALUATION (OUTPATIENT)
Dept: PHYSICAL THERAPY | Facility: CLINIC | Age: 67
End: 2022-08-04
Payer: OTHER MISCELLANEOUS

## 2022-08-04 DIAGNOSIS — S72.001D CLOSED FRACTURE OF RIGHT HIP WITH ROUTINE HEALING, SUBSEQUENT ENCOUNTER: Primary | ICD-10-CM

## 2022-08-04 DIAGNOSIS — Z87.81 S/P ORIF (OPEN REDUCTION INTERNAL FIXATION) FRACTURE: ICD-10-CM

## 2022-08-04 DIAGNOSIS — Z98.890 S/P ORIF (OPEN REDUCTION INTERNAL FIXATION) FRACTURE: ICD-10-CM

## 2022-08-04 PROCEDURE — 97116 GAIT TRAINING THERAPY: CPT | Performed by: PHYSICAL THERAPIST

## 2022-08-04 PROCEDURE — 97112 NEUROMUSCULAR REEDUCATION: CPT | Performed by: PHYSICAL THERAPIST

## 2022-08-04 PROCEDURE — 97110 THERAPEUTIC EXERCISES: CPT | Performed by: PHYSICAL THERAPIST

## 2022-08-04 PROCEDURE — 97140 MANUAL THERAPY 1/> REGIONS: CPT | Performed by: PHYSICAL THERAPIST

## 2022-08-04 NOTE — PROGRESS NOTES
PT Re-evaluation     Today's date: 2022  Patient name: Sly Villegas  : 1955  MRN: [id number]  Referring provider: Roibn Medel DO  Dx:   Encounter Diagnosis     ICD-10-CM    1  Closed fracture of right hip with routine healing, subsequent encounter  S72 001D Ambulatory Referral to Physical Therapy   2  S/P ORIF (open reduction internal fixation) fracture  Z98 [id number]     Z87 81                   Assessment/Plan  Patient is a 75 y o  female presenting to OP PT s/p femoral head ORIF secondary to fracture on 2022  They have attended 8 visits over 4 weeks  She is 6 weeks weeks post op  Since starting therapy pt has made the following progress towards goals:  1) Pain: Patient reports that their max pain has decreased to 5/10 from 8/10  2) Range of motion: Patient's R hip ROM improved by about 5-10 degrees in all planes  3 )Strength: Patient's R hip strength has improved by 1/2 grade  4 ) Gait pattern: Patient demonstrates mildly improved push off and stance time during gait with RW  Patient is progressing slowly  They continue to have deficits with gait, R hip strength, R hip ROM, and pain  She continues to be a falls risk  Therapy has consisted of hip strengthening, stretching, and weigh bearing exercises  Their program will progress to include functional strengthening and normalization of gait pattern  Continue to progress as tolerated  Patient will continue to benefit from skilled PT in order to address impairments and improve function  See updated goals below  Goals  STG (4 weeks)  Pain: Patient will subjectively report maximum pain decreased by 2/10- MET  Strength: Patient's will demonstrate RLE strength improved by 1/2 grade- MET  ROM: Patient will increase R hip ROM by 10 deg- Progressing    LTG (8 weeks)  Pain: Patient will subjectively report less than 2/10 pain with functional activities  - progressing  Strength: Patient's will demonstrate RLE strength improved to WNL- progressing  ROM: Patient will increase R hip ROM to at least equal contralateral side- progressing  Patient will be ind with HEP by [facility] [address] details: Prognosis above is given PT services 2x/week tapering to 1x/week over the next 2 months and home program adherence  Patient would benefit from: skilled physical therapy  Referral necessary: no  Planned modality interventions: Hydrocollator packs, Cryotherapy, TENS and Low level laser  Planned therapy interventions: joint mobilization, manual therapy, massage, neuromuscular re-education, patient education, stretching, strengthening, therapeutic activities, therapeutic exercise, home exercise program, functional ROM exercises, gait training, flexibility, balance, abdominal trunk stabilization, motor coordination training, coordination and behavior modification  Frequency: 2x/week for 6 weeks  Duration in visits: 12  Plan of Care beginning date: 8/4/2022  Plan of Care expiration date: 9/15/2022  Treatment plan discussed with: patient    Subjective  UPDATE 8/4/2022: Patient reports that her hip is getting a little better  States that moving around the house and getting dressed on her own is getting easier  She states that she continues to be challenged by steps, navigating walk in closet, and walking  Patient believes that she is 40% better at this time  Walking with RW per PT recommendation, and feels better stability  Using cane at home  IE (7/11/2022): Patient is a 75 y o  female who presents for an initial outpatient physical therapy consultation s/p R hip ORIF secondary to femoral neck fracture  DOI: 6/26/2022  DOS: 6/28/2022  Surgeon: Dr Mckenna Gonzalez  LAYO: Patient reports that her hip fx occurred when slipping on a wet floor at work  Pain: 5-6/10  Sleep: off and on, hurts at night  Meds: Oxy (1x/day), tylenol   Function: RW,   Tried walking the [employer] 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  Hobbies/Recreational Activities: reading, puzzles, painting    Diagnostic Tests  Xray: surgical x-rays demonstrate fixation in good anatomical position    Patient Goals for Therapy- Patient reports 40% improvement towards PLOF  "Get back to PLOF, work"    Objective     Observations     Right Hip  Positive for edema and incision  Negative for atrophy and deformity  Additional Observation Details  Unable to visualize surgical incision due to dressing  8/4/2022: Patient incision looks clean, dry, and well healed  No s/s of infection  Palpation     Additional Palpation Details  Patient TTP as expected post-op    Neurological Testing     Additional Neurological Details  Patient reports Intermittent lateral knee numbness/tingling  Unable to replicate during evaluation  8/4/2022:  no longer experiencing numbness and tingling       Passive Range of Motion   Left Hip   Flexion: 100 degrees   Extension: 0 degrees   Abduction: 45 degrees   External rotation (90/90): 45 degrees   Internal rotation (90/90): 15 degrees     Right Hip   Flexion: 95 degrees (was 85 degrees )  Extension: 0 degrees (was -10 degrees)  Abduction: 40 degrees (was 30 degrees)  External rotation (90/90): 45 degrees (was 30 degrees)  Internal rotation (90/90): 5 degrees     Additional Passive Range of Motion Details  PROM limited by pain and significant muscle guarding    Strength/Myotome Testing     Left Hip   Planes of Motion   Flexion: 4+  Abduction: 4    Right Hip   Planes of Motion   Flexion: 4 (was 4-)  Abduction: 4- (was 3+)    Left Knee   Flexion: 4+  Extension: 4+    Right Knee   Flexion: 4+ (was 4)  Extension: 4+ (was 4)    Additional Strength Details  Mild pain with strength testing    Ambulation   Weight-Bearing Status   Weight-Bearing Status (Right): weight-bearing as tolerated    Assistive device used: front-wheeled walker    Observational Gait   Gait: antalgic and asymmetric   Decreased walking speed, stride length, right stance time, left step length and right step length  Right foot contact pattern: forefoot  Base of support: increased    Additional Observational Gait Details  Early toe off with R   2022: Gait improved with use of RW  When using cane, patient's gait degrades due to subject report of fear of falling  Functional tests:  TU 3s with RW (was 25 3s with RW)  5xSTS: 15s with B/L UE push-off (was16  2s with B/L UE push-off)         Precautions: falls risk, RA, osteoporosis  DOS: 2022    Manuals        R hip ROM               JF       R gastroc stretch         JF JF JF  JF                                                       Neuro Re-Ed                       SLR HEP 2x10 2x10 2x10 2x10 2x10 2x10  2x10       Glute iso   5" hold  2x10 5" hold, 2x10 5" hold  2x10 5" hold  2x10 5" hold  2x10 5" hold  2x10  5" hold  2x10       Quad iso   5" hold  2x10 5" hold, 2x10 5" hold  2x10 5" hold  2x10 5" hold  2x10 5" hold  2x10  5" hold       Hip 3 ways   x20 x20 ea x20ea x20 ea x20 ea x20 ea  x20 ea       ADD iso         5"  Hold  2x10 5" hold  2x10 5" hold  2x10  held for re-eval                               Ther Ex                       Bike   Did not tolerate   5 min rocking 5 min rocking 5 min rocking 5 min rocking  5 min rocking       Piriformis stretch HEP                     HS stretch                       Supine hip flexor stretch   5x20" 20"x5 20" x5 20" x 5 20"x5 20" x5  20"x5       SAQ   3x10  5" hold 5" hold, 3x10 5" hold  3x10 5" hold  3x10 5" hold  3x10 5" hold  3x10  held for re-eval       LAQ                       Clamshells HEP 2x10 2x10 2x10 2x10  OTB 2x10  OTB 2x10  OTB  held for re-eval       S/L SLR                       Towel stretch gastroc     30"x5 30" x5 30" x5  30" x5 30"x5  held for re-eval                               Ther Activity                                                                       Gait Training                       Weight shifting onto R leg         x30 x30 x30  x30       Heel down through stance phase practice         5 min  Around clinic 5 min around clinic 5 min  Around clinic  5 min around clinic       Modalities                                               IE/HEP JF              re-eval

## 2022-08-08 NOTE — PROGRESS NOTES
Daily Note     Today's date: 2022  Patient name: Luis Alberto Kendall  : 1955  MRN: [id number]  Referring provider: Ivy Morales DO  Dx:   Encounter Diagnosis     ICD-10-CM    1  Closed fracture of right hip with routine healing, subsequent encounter  S72 001D    2  S/P ORIF (open reduction internal fixation) fracture  Z98 [id number]     Z87 81                   Subjective: Patient reported walking around the [facility] with her cane completing a full lap  She did notice having some soreness after  Presented with RW this visit  Objective: See treatment diary below  Assessment: Now 6 weeks post-op  Spent time encouraging and reviewing with patient heel strike on R and to reduce vaulting and increased toe off, with gait also being affected by lack of TKE from prior TKA  Will continue to benefit from skilled PT to help improve proximal strength, increase ROM, and work on gait mechanics to reduce lack of heel strike in stance  Plan: Continue per plan of care  Progress treatment as tolerated              Precautions: falls risk, RA, osteoporosis  DOS: 2022     Manuals      R hip ROM               JF  EH     R gastroc stretch         JF JF JF  JF  EH                                                     Neuro Re-Ed                       SLR HEP 2x10 2x10 2x10 2x10 2x10 2x10  2x10  2x10     Glute iso   5" hold  2x10 5" hold, 2x10 5" hold  2x10 5" hold  2x10 5" hold  2x10 5" hold  2x10  5" hold  2x10  5" hold, 2x10     Quad iso   5" hold  2x10 5" hold, 2x10 5" hold  2x10 5" hold  2x10 5" hold  2x10 5" hold  2x10  5" hold  5" hold, 2x10     Hip 3 ways   x20 x20 ea x20ea x20 ea x20 ea x20 ea  x20 ea  x20 ea     ADD iso         5"  Hold  2x10 5" hold  2x10 5" hold  2x10  held for re-eval  5" hold,  2x10                             Ther Ex                       Bike   Did not tolerate   5 min rocking 5 min rocking 5 min rocking 5 min rocking  5 min rocking  5 min  rocking     Piriformis stretch HEP                     HS stretch                       Supine hip flexor stretch   5x20" 20"x5 20" x5 20" x 5 20"x5 20" x5  20"x5  20"x5     SAQ   3x10  5" hold 5" hold, 3x10 5" hold  3x10 5" hold  3x10 5" hold  3x10 5" hold  3x10  held for re-eval  5" hold,  3x10     LAQ                       Clamshells HEP 2x10 2x10 2x10 2x10  OTB 2x10  OTB 2x10  OTB  held for re-eval  OTB  2x10     S/L SLR                       Towel stretch gastroc     30"x5 30" x5 30" x5  30" x5 30"x5  held for re-eval                               Ther Activity                                                                       Gait Training                       Weight shifting onto R leg         x30 x30 x30  x30  x30     Heel down through stance phase practice         5 min  Around clinic 5 min around clinic 5 min  Around clinic  5 min around clinic  5 min around clinic                  Modalities                                               IE/HEP JF              re-eval

## 2022-08-09 ENCOUNTER — OFFICE VISIT (OUTPATIENT)
Dept: PHYSICAL THERAPY | Facility: CLINIC | Age: 67
End: 2022-08-09
Payer: OTHER MISCELLANEOUS

## 2022-08-09 DIAGNOSIS — Z87.81 S/P ORIF (OPEN REDUCTION INTERNAL FIXATION) FRACTURE: ICD-10-CM

## 2022-08-09 DIAGNOSIS — Z98.890 S/P ORIF (OPEN REDUCTION INTERNAL FIXATION) FRACTURE: ICD-10-CM

## 2022-08-09 DIAGNOSIS — S72.001D CLOSED FRACTURE OF RIGHT HIP WITH ROUTINE HEALING, SUBSEQUENT ENCOUNTER: Primary | ICD-10-CM

## 2022-08-09 PROCEDURE — 97140 MANUAL THERAPY 1/> REGIONS: CPT

## 2022-08-09 PROCEDURE — 97110 THERAPEUTIC EXERCISES: CPT

## 2022-08-09 PROCEDURE — 97112 NEUROMUSCULAR REEDUCATION: CPT

## 2022-08-11 ENCOUNTER — OFFICE VISIT (OUTPATIENT)
Dept: PHYSICAL THERAPY | Facility: CLINIC | Age: 67
End: 2022-08-11
Payer: OTHER MISCELLANEOUS

## 2022-08-11 DIAGNOSIS — Z87.81 S/P ORIF (OPEN REDUCTION INTERNAL FIXATION) FRACTURE: ICD-10-CM

## 2022-08-11 DIAGNOSIS — Z98.890 S/P ORIF (OPEN REDUCTION INTERNAL FIXATION) FRACTURE: ICD-10-CM

## 2022-08-11 DIAGNOSIS — S72.001D CLOSED FRACTURE OF RIGHT HIP WITH ROUTINE HEALING, SUBSEQUENT ENCOUNTER: Primary | ICD-10-CM

## 2022-08-11 PROCEDURE — 97110 THERAPEUTIC EXERCISES: CPT

## 2022-08-11 PROCEDURE — 97140 MANUAL THERAPY 1/> REGIONS: CPT

## 2022-08-11 PROCEDURE — 97112 NEUROMUSCULAR REEDUCATION: CPT

## 2022-08-11 NOTE — PROGRESS NOTES
Daily Note     Today's date: 2022  Patient name: Jerrell Aguilar  : 1955  MRN: [id number]  Referring provider: Michael Wong DO  Dx:   Encounter Diagnosis     ICD-10-CM    1  Closed fracture of right hip with routine healing, subsequent encounter  S72 001D    2  S/P ORIF (open reduction internal fixation) fracture  Z98 890     Z87 81                   Subjective: Patient reports stiffness in right hip today  Pain level rated 2/10 today  Patient using RW for safe mobility  Objective: See treatment diary below  Assessment: Patient demonstrated good technique with exercise routine  No increased pain with exercises  Verbal cues to correct pelvic control with weight shifting and SLS on right LE  Plan: Continue per plan of care  Progress treatment as tolerated              Precautions: falls risk, RA, osteoporosis  DOS: 2022     Manuals    R hip ROM               JF  EH  ksg   R gastroc stretch         JF JF JF  JF  EH  ksg                                                   Neuro Re-Ed                       SLR HEP 2x10 2x10 2x10 2x10 2x10 2x10  2x10  2x10  2x10   Glute iso   5" hold  2x10 5" hold, 2x10 5" hold  2x10 5" hold  2x10 5" hold  2x10 5" hold  2x10  5" hold  2x10  5" hold, 2x10  5" hold 2x10   Quad iso   5" hold  2x10 5" hold, 2x10 5" hold  2x10 5" hold  2x10 5" hold  2x10 5" hold  2x10  5" hold  5" hold, 2x10  5" 2x10   Hip 3 ways   x20 x20 ea x20ea x20 ea x20 ea x20 ea  x20 ea  x20 ea  x20 ea   ADD iso         5"  Hold  2x10 5" hold  2x10 5" hold  2x10  held for re-eval  5" hold,  2x10  5" hold 2x10                           Ther Ex                       Bike   Did not tolerate   5 min rocking 5 min rocking 5 min rocking 5 min rocking  5 min rocking  5 min  rocking 5 min rocking   Piriformis stretch HEP                     HS stretch                       Supine hip flexor stretch   5x20" 20"x5 20" x5 20" x 5 20"x5 20" x5  20"x5  20"x5 20" x5   SAQ   3x10  5" hold 5" hold, 3x10 5" hold  3x10 5" hold  3x10 5" hold  3x10 5" hold  3x10  held for re-eval  5" hold,  3x10 5"  3x10   LAQ                     5" 2x10   Clamshells HEP 2x10 2x10 2x10 2x10  OTB 2x10  OTB 2x10  OTB  held for re-eval  OTB  2x10  OTB  2x10   S/L SLR                       Towel stretch gastroc     30"x5 30" x5 30" x5  30" x5 30"x5  held for re-eval    standing calf stretch  20" x3                           Ther Activity                                                                       Gait Training                       Weight shifting onto R leg         x30 x30 x30  x30  x30  x30 placing left foot up on step   Heel down through stance phase practice         5 min  Around clinic 5 min around clinic 5 min  Around clinic  5 min around clinic  5 min around clinic                  Modalities                                               IE/HEP JF              re-eval

## 2022-08-16 ENCOUNTER — OFFICE VISIT (OUTPATIENT)
Dept: PHYSICAL THERAPY | Facility: CLINIC | Age: 67
End: 2022-08-16
Payer: OTHER MISCELLANEOUS

## 2022-08-16 DIAGNOSIS — S72.001D CLOSED FRACTURE OF RIGHT HIP WITH ROUTINE HEALING, SUBSEQUENT ENCOUNTER: Primary | ICD-10-CM

## 2022-08-16 DIAGNOSIS — Z87.81 S/P ORIF (OPEN REDUCTION INTERNAL FIXATION) FRACTURE: ICD-10-CM

## 2022-08-16 DIAGNOSIS — Z98.890 S/P ORIF (OPEN REDUCTION INTERNAL FIXATION) FRACTURE: ICD-10-CM

## 2022-08-16 PROCEDURE — 97110 THERAPEUTIC EXERCISES: CPT | Performed by: PHYSICAL THERAPIST

## 2022-08-16 PROCEDURE — 97140 MANUAL THERAPY 1/> REGIONS: CPT | Performed by: PHYSICAL THERAPIST

## 2022-08-16 PROCEDURE — 97112 NEUROMUSCULAR REEDUCATION: CPT | Performed by: PHYSICAL THERAPIST

## 2022-08-16 NOTE — PROGRESS NOTES
Daily Note     Today's date: 2022  Patient name: Travis Youssef  : 1955  MRN: [id number]  Referring provider: Kristian Shaikh DO  Dx:   Encounter Diagnosis     ICD-10-CM    1  Closed fracture of right hip with routine healing, subsequent encounter  S72 001D    2  S/P ORIF (open reduction internal fixation) fracture  Z98 [id number]     Z87 81                   Subjective: Patient reports that she continues to have pain, but its "tolerable"  Notes that she [place], and it went much better than last weekend  Objective: See treatment diary below  Treatment as indicated below  Assessment: Patient demonstrated good technique with exercise routine  No increased pain with exercises, except mild increase with SLR  Verbal cues to correct pelvic control with weight shifting and SLS on right LE  Patient continues to demonstrate early heel off during gait  Continue to progress as tolerated  Patient will continue to benefit from skilled PT in order to address impairments and improve function  Plan: Continue per plan of care  Progress treatment as tolerated              Precautions: falls risk, RA, osteoporosis  DOS: 2022     Manuals    R hip ROM               JF  EH  ksg JF   R gastroc stretch         JF JF JF  JF  EH  ksg JF                                                     Neuro Re-Ed                        SLR HEP 2x10 2x10 2x10 2x10 2x10 2x10  2x10  2x10  2x10 2x10   Glute iso   5" hold  2x10 5" hold, 2x10 5" hold  2x10 5" hold  2x10 5" hold  2x10 5" hold  2x10  5" hold  2x10  5" hold, 2x10  5" hold 2x10 5" hold  2x10   Quad iso   5" hold  2x10 5" hold, 2x10 5" hold  2x10 5" hold  2x10 5" hold  2x10 5" hold  2x10  5" hold  5" hold, 2x10  5" 2x10 5"  2x10   Hip 3 ways   x20 x20 ea x20ea x20 ea x20 ea x20 ea  x20 ea  x20 ea  x20 ea x20ea   ADD iso         5"  Hold  2x10 5" hold  2x10 5" hold  2x10  held for re-eval  5" hold,  2x10  5" hold 2x10 5" hold  2x10                            Ther Ex                        Bike   Did not tolerate   5 min rocking 5 min rocking 5 min rocking 5 min rocking  5 min rocking  5 min  rocking 5 min rocking 5 min rocking   Piriformis stretch HEP                      HS stretch                        Supine hip flexor stretch   5x20" 20"x5 20" x5 20" x 5 20"x5 20" x5  20"x5  20"x5 20" x5 20" x5   SAQ   3x10  5" hold 5" hold, 3x10 5" hold  3x10 5" hold  3x10 5" hold  3x10 5" hold  3x10  held for re-eval  5" hold,  3x10 5"  3x10 5"   3x10   LAQ                     5" 2x10 5"  2x10   Clamshells HEP 2x10 2x10 2x10 2x10  OTB 2x10  OTB 2x10  OTB  held for re-eval  OTB  2x10  OTB  2x10 OTB  2x10   S/L SLR                        Towel stretch gastroc     30"x5 30" x5 30" x5  30" x5 30"x5  held for re-eval    standing calf stretch  20" x3 Standing calf stretch  20" x3                            Ther Activity                                                                          Gait Training                        Weight shifting onto R leg         x30 x30 x30  x30  x30  x30 placing left foot up on step    Heel down through stance phase practice         5 min  Around clinic 5 min around clinic 5 min  Around clinic  5 min around clinic  5 min around clinic                    Modalities                                                 IE/HEP JF              re-eval

## 2022-08-25 ENCOUNTER — OFFICE VISIT (OUTPATIENT)
Dept: PHYSICAL THERAPY | Facility: CLINIC | Age: 67
End: 2022-08-25
Payer: OTHER MISCELLANEOUS

## 2022-08-25 DIAGNOSIS — S72.001D CLOSED FRACTURE OF RIGHT HIP WITH ROUTINE HEALING, SUBSEQUENT ENCOUNTER: Primary | ICD-10-CM

## 2022-08-25 DIAGNOSIS — Z98.890 S/P ORIF (OPEN REDUCTION INTERNAL FIXATION) FRACTURE: ICD-10-CM

## 2022-08-25 DIAGNOSIS — Z87.81 S/P ORIF (OPEN REDUCTION INTERNAL FIXATION) FRACTURE: ICD-10-CM

## 2022-08-25 PROCEDURE — 97112 NEUROMUSCULAR REEDUCATION: CPT | Performed by: PHYSICAL THERAPIST

## 2022-08-25 PROCEDURE — 97110 THERAPEUTIC EXERCISES: CPT | Performed by: PHYSICAL THERAPIST

## 2022-08-25 PROCEDURE — 97140 MANUAL THERAPY 1/> REGIONS: CPT | Performed by: PHYSICAL THERAPIST

## 2022-08-25 NOTE — PROGRESS NOTES
Daily Note     Today's date: 2022  Patient name: Vamsi Mcmahon  : 1955  MRN: [id number]  Referring provider: Jena Haywood DO  Dx:   Encounter Diagnosis     ICD-10-CM    1  Closed fracture of right hip with routine healing, subsequent encounter  S72 001D    2  S/P ORIF (open reduction internal fixation) fracture  Z98 890     Z87 81                   Subjective: Patient reports that she walked a lot during her vacation to visit family  Patient notes that she is sore today  She states that she continues to have difficulty with transfers, ambulation, and lifting her leg      Objective: See treatment diary below  Treatment as indicated below  Assessment: Patient demonstrated good technique with exercise routine  Patient continues to have complants of pain with lifting her R leg, such as with SLR and transitions on the mat  Verbal cues to correct pelvic control with weight shifting and SLS on right LE  Patient continues to demonstrate early heel off during gait  Continue to progress as tolerated  Patient will continue to benefit from skilled PT in order to address impairments and improve function  Plan: Continue per plan of care  Progress treatment as tolerated              Precautions: falls risk, RA, osteoporosis  DOS: 2022     Manuals    R hip ROM   JF  EH  ksg JF JF   R gastroc stretch JF  JF  EH  ksg JF JF                               Neuro Re-Ed             SLR 2x10  2x10  2x10  2x10 2x10 2x10   Glute iso 5" hold  2x10  5" hold  2x10  5" hold, 2x10  5" hold 2x10 5" hold  2x10 5" hold  2x10   Quad iso 5" hold  2x10  5" hold  5" hold, 2x10  5" 2x10 5"  2x10 5"   2x10   Hip 3 ways x20 ea  x20 ea  x20 ea  x20 ea x20ea x20 ea   ADD iso 5" hold  2x10  held for re-eval  5" hold,  2x10  5" hold 2x10 5" hold  2x10 5" hold  2x10                 Ther Ex             Bike 5 min rocking  5 min rocking  5 min  rocking 5 min rocking 5 min rocking 5 min  rocking Piriformis stretch             HS stretch             Supine hip flexor stretch 20" x5  20"x5  20"x5 20" x5 20" x5 20" x5   SAQ 5" hold  3x10  held for re-eval  5" hold,  3x10 5"  3x10 5"   3x10 5" hold  3x10   LAQ         5" 2x10 5"  2x10 5" hold  3x10   Clamshells 2x10  OTB  held for re-eval  OTB  2x10  OTB  2x10 OTB  2x10 OTB  2x10   S/L SLR             Towel stretch gastroc 30"x5  held for re-eval    standing calf stretch  20" x3 Standing calf stretch  20" x3 Standing calf stretch  20" x3   Step stretch          10"x10   Ther Activity                                         Gait Training             Weight shifting onto R leg x30  x30  x30  x30 placing left foot up on step  x20   Heel down through stance phase practice 5 min  Around clinic  5 min around clinic  5 min around clinic                Modalities                           IE/HEP    re-eval

## 2022-08-29 ENCOUNTER — APPOINTMENT (OUTPATIENT)
Dept: PHYSICAL THERAPY | Facility: CLINIC | Age: 67
End: 2022-08-29
Payer: OTHER MISCELLANEOUS

## 2022-08-30 ENCOUNTER — APPOINTMENT (OUTPATIENT)
Dept: RADIOLOGY | Facility: MEDICAL CENTER | Age: 67
End: 2022-08-30

## 2022-08-30 ENCOUNTER — TELEPHONE (OUTPATIENT)
Dept: OBGYN CLINIC | Facility: HOSPITAL | Age: 67
End: 2022-08-30

## 2022-08-30 ENCOUNTER — OFFICE VISIT (OUTPATIENT)
Dept: OBGYN CLINIC | Facility: MEDICAL CENTER | Age: 67
End: 2022-08-30

## 2022-08-30 VITALS
WEIGHT: 115.6 LBS | SYSTOLIC BLOOD PRESSURE: 145 MMHG | DIASTOLIC BLOOD PRESSURE: 82 MMHG | HEIGHT: 64 IN | HEART RATE: 66 BPM | BODY MASS INDEX: 19.74 KG/M2

## 2022-08-30 DIAGNOSIS — M25.551 PAIN IN RIGHT HIP: ICD-10-CM

## 2022-08-30 DIAGNOSIS — Z87.81 STATUS POST FRACTURE OF RIGHT HIP: ICD-10-CM

## 2022-08-30 DIAGNOSIS — Z87.81 STATUS POST FRACTURE OF RIGHT HIP: Primary | ICD-10-CM

## 2022-08-30 PROCEDURE — 99024 POSTOP FOLLOW-UP VISIT: CPT | Performed by: PHYSICIAN ASSISTANT

## 2022-08-30 PROCEDURE — 73502 X-RAY EXAM HIP UNI 2-3 VIEWS: CPT

## 2022-08-30 NOTE — TELEPHONE ENCOUNTER
Peg with [employer] is asking if the patient's CT scan order can be faxed to her  Attn: Peg with [employer]  Fax # [phone]    Thank you

## 2022-08-30 NOTE — LETTER
August 30, 2022     Patient: Thiago Saldaña  YOB: 1955  Date of Visit: 8/30/2022      To Whom it May Concern: Marycruz Rdz is under my professional care  Kai Puentes was seen in my office on 8/30/2022  Kai Puentes will remain out of work until her next follow up appointment  If you have any questions or concerns, please don't hesitate to call           Sincerely,          Mickey Roche PA-C        CC: No Recipients

## 2022-08-30 NOTE — PROGRESS NOTES
Assessment/Plan:  1  Status post fracture of right hip    2  Pain in right hip      Orders Placed This Encounter   Procedures    XR hip/pelv 2-3 vws right if performed    CT lower extremity wo contrast right       · Patient has evidence of fracture compression and change in alignment on x-rays though this does appear stable compared to imaging on 8/1/22  We will order a CT scan of the right hip to further evaluate  · Patient will hold on PT for now  Discussed letting pain be her guide as far as activity but I do recommend that she use a walker or cane for assistance  · Pain control prn- tylenol  May increase to 3000 mg per day  Patient notes allergic reaction to oxycodone  Contraindication for tramadol with paxil  · WBAT to RLE  · Continue to apply ice and elevate  · Work note provided to remain out of work  Return in about 1 week (around 9/6/2022) for CT scan review  I answered all of the patient's questions during the visit and provided education of the patient's condition during the visit  The patient verbalized understanding of the information given and agrees with the plan  This note was dictated using [employer] software  It may contain errors including improperly dictated words  Please contact physician directly for any questions  Subjective   Chief Complaint:   Chief Complaint   Patient presents with    Right Hip - Post-op, Follow-up       Danielle Ortez is a 75 y o  female who presents for 9 week follow up s/p right hip cannulated screw fixation on 6/28/22  Patient is present with her  and nurse   Patient states she continues to have pain in the groin and lateral hip with radiation into the lateral thigh  Patient does not note any improvement in her pain over the last few weeks  She is having difficulty weight bearing on the right leg  Patient has been doing PT but does not note any improvement  She has been using a walker or cane for assistance   She reports that she tried to walk [place] the [place] and her legs gave out on her requiring the help of several bystanders  She reports that oxycodone was causing rash and itching  She is now taking tylenol for pain  She completed lovenox DVT prophylaxis  Review of Systems  ROS:    See HPI for musculoskeletal review     All other systems reviewed are negative     History:  Past Medical History:   Diagnosis Date    Anemia      - corrected with iron    Ankle fracture, right     last assessed: 3/9/15    Anxiety     Chronic pain disorder     right side joint pain     Moderate exercise     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    Wears dentures     full upper    Wears glasses     reading     Past Surgical History:   Procedure Laterality Date    ANKLE SURGERY      ORIF /plate and 4 screws    CYST REMOVAL          ESOPHAGOGASTRODUODENOSCOPY      Diagnostic    FOOT SURGERY      plantar wart resection; resolved:     ORIF HIP FRACTURE Right 2022    Procedure: ORIF HIP W/ CANNUALATED SCREWS;  Surgeon: Claudeen Blizzard, DO;  Location: [facility];  Service: Orthopedics    SD TOTAL KNEE ARTHROPLASTY Right 3/1/2022    Procedure: ARTHROPLASTY KNEE TOTAL;  Surgeon: Claudeen Blizzard, DO;  Location: [facility];  Service: Orthopedics    TONSILLECTOMY AND ADENOIDECTOMY          TUBAL LIGATION      WISDOM TOOTH EXTRACTION       Social History   Social History     Substance and Sexual Activity   Alcohol Use Yes    Comment: rare, maybe on holidays     Social History     Substance and Sexual Activity   Drug Use No     Social History     Tobacco Use   Smoking Status Former Smoker    Quit date:     Years since quittin 6   Smokeless Tobacco Never Used     Family History:   Family History   Problem Relation Age of Onset    Seizures Mother         epilepsy    Bone cancer Father     No Known Problems Sister     No Known Problems Brother     No Known Problems Son     No Known Problems Maternal Grandmother     No Known Problems Maternal Grandfather     No Known Problems Paternal Grandmother     No Known Problems Paternal Grandfather     Thyroid disease Sister     No Known Problems Sister     No Known Problems Brother        Current Outpatient Medications on File Prior to Visit   Medication Sig Dispense Refill    acetaminophen (TYLENOL) 325 mg tablet Take 3 tablets (975 mg total) by mouth every 8 (eight) hours  0    ascorbic acid (VITAMIN C) 500 MG tablet Take 1 tablet (500 mg total) by mouth daily Start to take 30 days prior to surgery 30 tablet 1    Ca Carbonate-Mag Hydroxide 550-110 MG CHEW Chew Twice daily      enoxaparin (LOVENOX) 40 mg/0 4 mL Inject 0 4 mL (40 mg total) under the skin daily 11 2 mL 0    ferrous sulfate 324 (65 Fe) mg Take 1 tablet (324 mg total) by mouth daily before breakfast Start to take 30 days prior to surgery 30 tablet 1    folic acid (FOLVITE) 1 mg tablet Take by mouth      gabapentin (NEURONTIN) 100 mg capsule Take 1 capsule (100 mg total) by mouth every 8 (eight) hours 90 capsule 0    ibandronate (BONIVA) 150 MG tablet Take 1 tablet by mouth as needed        Multiple Vitamin (multivitamin) tablet Take 1 tablet by mouth daily Start to take 30 days prior to surgery 30 tablet 1    oxyCODONE (ROXICODONE) 5 immediate release tablet Take 1 tablet (5 mg total) by mouth daily as needed for severe pain Max Daily Amount: 5 mg 7 tablet 0    PARoxetine (PAXIL) 10 mg tablet Take 1 tablet (10 mg total) by mouth daily 90 tablet 3    predniSONE 5 mg tablet Take 5 mg by mouth daily       solifenacin (VESICARE) 10 MG tablet Take 1 tablet (10 mg total) by mouth daily 90 tablet 3     No current facility-administered medications on file prior to visit       Allergies   Allergen Reactions    Aspirin Other (See Comments)     Per pt avoids taking due to rheumatology Meds        Objective     /82   Pulse 66   Ht 5' 4" (1 626 m)   Wt 52 4 kg (115 lb 9 6 oz)   BMI 19 84 kg/m²      PE:  AAOx 3  WDWN  Hearing intact, no drainage from eyes  no audible wheezing  no abdominal distension  LE compartments soft, AT/GS intact    Ortho Exam:  right hip:   INCs: C/D/I, No erythema, mild swelling  pain with ROM    Imaging Studies: I have personally reviewed pertinent films in PACS  XR right hip:  S/p ORIF hip, hardware intact, fracture alignment maintained compared to previous imaging on 8/1/22 though there is compression compared to initial post op imaging

## 2022-08-31 ENCOUNTER — TELEPHONE (OUTPATIENT)
Dept: OBGYN CLINIC | Facility: HOSPITAL | Age: 67
End: 2022-08-31

## 2022-08-31 NOTE — TELEPHONE ENCOUNTER
[facility] called with STAT results  Nurse was not available at time of call  Clinical at the [facility] was not available  Betzaida Saucedo (Ct [employer]) stated it was Negative   No findings      Any questions or concerns   Please call    [phone]

## 2022-09-01 NOTE — TELEPHONE ENCOUNTER
Spoke to Erasto from [facility]  Will fax the CT report to [phone]  She also confirmed that patient has imaging disc  Will anticipate fax with report shortly

## 2022-09-01 NOTE — TELEPHONE ENCOUNTER
Adeline Lorenzo had her CT scan done outside [employer] [facility]  Can you please call her and have her get the reports and disc for us to be able to look at it for her follow up appointment next week? Thanks!

## 2022-09-01 NOTE — TELEPHONE ENCOUNTER
[facility]  Line was busy  I will call back again shortly to obtain CT report & to ensure patient has imaging disc as well       # [phone]

## 2022-09-06 ENCOUNTER — APPOINTMENT (OUTPATIENT)
Dept: RADIOLOGY | Facility: MEDICAL CENTER | Age: 67
End: 2022-09-06
Payer: OTHER MISCELLANEOUS

## 2022-09-06 ENCOUNTER — OFFICE VISIT (OUTPATIENT)
Dept: OBGYN CLINIC | Facility: MEDICAL CENTER | Age: 67
End: 2022-09-06
Payer: OTHER MISCELLANEOUS

## 2022-09-06 VITALS
DIASTOLIC BLOOD PRESSURE: 67 MMHG | SYSTOLIC BLOOD PRESSURE: 126 MMHG | HEIGHT: 64 IN | HEART RATE: 65 BPM | WEIGHT: 114 LBS | BODY MASS INDEX: 19.46 KG/M2

## 2022-09-06 DIAGNOSIS — S72.001K CLOSED DISPLACED FRACTURE OF RIGHT FEMORAL NECK WITH NONUNION: ICD-10-CM

## 2022-09-06 DIAGNOSIS — M16.11 PRIMARY OSTEOARTHRITIS OF RIGHT HIP: ICD-10-CM

## 2022-09-06 DIAGNOSIS — S72.001G: Primary | ICD-10-CM

## 2022-09-06 DIAGNOSIS — Z01.818 ENCOUNTER FOR PREADMISSION TESTING: ICD-10-CM

## 2022-09-06 DIAGNOSIS — Z01.818 PREOPERATIVE TESTING: ICD-10-CM

## 2022-09-06 PROBLEM — M17.11 ARTHRITIS OF RIGHT KNEE: Status: ACTIVE | Noted: 2022-09-06

## 2022-09-06 PROCEDURE — 73502 X-RAY EXAM HIP UNI 2-3 VIEWS: CPT

## 2022-09-06 PROCEDURE — 99213 OFFICE O/P EST LOW 20 MIN: CPT | Performed by: ORTHOPAEDIC SURGERY

## 2022-09-06 RX ORDER — CEFAZOLIN SODIUM 2 G/50ML
2000 SOLUTION INTRAVENOUS ONCE
Status: CANCELLED | OUTPATIENT
Start: 2022-09-28 | End: 2022-09-06

## 2022-09-06 RX ORDER — CHLORHEXIDINE GLUCONATE 4 G/100ML
SOLUTION TOPICAL DAILY PRN
Status: CANCELLED | OUTPATIENT
Start: 2022-09-28

## 2022-09-06 RX ORDER — FOLIC ACID 1 MG/1
1 TABLET ORAL DAILY
Qty: 30 TABLET | Refills: 1 | Status: SHIPPED | OUTPATIENT
Start: 2022-09-06

## 2022-09-06 RX ORDER — GABAPENTIN 300 MG/1
300 CAPSULE ORAL ONCE
Status: CANCELLED | OUTPATIENT
Start: 2022-09-28 | End: 2022-09-06

## 2022-09-06 RX ORDER — MULTIVITAMIN
1 TABLET ORAL DAILY
Qty: 30 TABLET | Refills: 1 | Status: SHIPPED | OUTPATIENT
Start: 2022-09-06

## 2022-09-06 RX ORDER — SODIUM CHLORIDE 9 MG/ML
75 INJECTION, SOLUTION INTRAVENOUS CONTINUOUS
Status: CANCELLED | OUTPATIENT
Start: 2022-09-28

## 2022-09-06 RX ORDER — ACETAMINOPHEN 325 MG/1
975 TABLET ORAL ONCE
Status: CANCELLED | OUTPATIENT
Start: 2022-09-28 | End: 2022-09-06

## 2022-09-06 RX ORDER — CHLORHEXIDINE GLUCONATE 0.12 MG/ML
15 RINSE ORAL ONCE
Status: CANCELLED | OUTPATIENT
Start: 2022-09-28 | End: 2022-09-06

## 2022-09-06 RX ORDER — FERROUS SULFATE TAB EC 324 MG (65 MG FE EQUIVALENT) 324 (65 FE) MG
324 TABLET DELAYED RESPONSE ORAL
Qty: 30 TABLET | Refills: 1 | Status: SHIPPED | OUTPATIENT
Start: 2022-09-06

## 2022-09-06 RX ORDER — ASCORBIC ACID 500 MG
500 TABLET ORAL DAILY
Qty: 30 TABLET | Refills: 1 | Status: SHIPPED | OUTPATIENT
Start: 2022-09-06

## 2022-09-06 NOTE — PROGRESS NOTES
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    3  Preoperative testing    4  Encounter for preadmission testing    5  Primary osteoarthritis of right hip      Orders Placed This Encounter   Procedures    XR hip/pelv 2-3 vws right if performed    Comprehensive metabolic panel    Hemoglobin A1C W/EAG Estimation    CBC and differential    Anemia Panel w/Reflex    Protime-INR    APTT    Ambulatory referral to [address] [facility] referral to Physical Therapy    Ambulatory referral to Cardiology    Type and screen       · Susanna Jamison has right femoral neck delayed union after cannulated screw fixation on 6/28/22  She also has underlying mild to moderate arthritis of her hip  She has a significant leg length discrepancy mainly from the compression at her hip fracture site  I am also concerned about pending cut out of her hardware  She has tried conservative treatment without adequate relief  We have obtained a CT scan at 10 weeks post op that demonstrates persistent fracture like and collapse of fracture alignment  We discussed treatment options as well as risks and benefits of treatment options  The patient would like to proceed with a RIGHT total hip arthroplasty and removal of hardware  The risks of surgery include, but are not limited to infection, blood clot, wound healing problems, blood loss, damage to blood vessels and nerves, persistent pain and stiffness, dislocation, fracture, leg-length discrepancy, need for additional surgery, need for revision surgery, failure of hardware, heart attack, stroke, death  The patient understood and agreed to by oral and written consent  I answered all questions regarding surgery  · The patient will be on  bid for DVT prophylaxis  · The patient will obtain clearance from their PCP and cardiologist   · Patient is on oral prednisone and methotrexate   Methotrexate was held 2 weeks pre op and 2 weeks post op and prednisone was continued in the [place] operative period for R TKA  Per previous Rheumatology recommendations, patient is safe to continue methotrexate and prednisone at her current dosing through the [place] operative period  She will likely require stress dose of steroid pre op  We will plan to give a week of antibiotic post op due to increased infection risk on oral steroid  · We will avoid gabapentin post op due to previous hallucinations  · We will discharge her with zofran and phenergan post op  · Continue protected WB with cane or walker  · Will hold on PT until post op  · Continue pain control with tylenol  Return for postop appt  I answered all of the patient's questions during the visit and provided education of the patient's condition during the visit  The patient verbalized understanding of the information given and agrees with the plan  This note was dictated using [employer] software  It may contain errors including improperly dictated words  Please contact physician directly for any questions  Subjective   Chief Complaint:   Chief Complaint   Patient presents with    Right Hip - Follow-up       Areta Cogan is a 75 y o  female who presents for 10 week follow up s/p right hip cannulated screw fixation on 6/28/22  Patient is here today for review for right hip CT scan  Patient states she continues to have pain in the right groin and lateral hip area  She notes she feels unsteady when she first stands up and fatigues easily after walking  Hip pain worsens as the day goes on  She is taking tylenol for pain  She has trouble sleeping due to pain  Patient notes right leg is shorter than left leg and she stand on her right toes to walk  Patient has held PT since her last visit at our recommendations  She is using a cane for assistance ambulating  Patient would like to move forward with conversion to total hip      Patient is on prednisone 5 mg daily and methotrexate for RA  Denies diabetes, MRSA, HIV, Hep C, blood clots, family history of blood clots, no smoking  She did get PCP, cardiac, and dental clearance prior to TKA  Review of Systems  ROS:    See HPI for musculoskeletal review     All other systems reviewed are negative     History:  Past Medical History:   Diagnosis Date    Anemia      - corrected with iron    Ankle fracture, right     last assessed: 3/9/15    Anxiety     Chronic pain disorder     right side joint pain     Moderate exercise     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    Wears dentures     full upper    Wears glasses     reading     Past Surgical History:   Procedure Laterality Date    ANKLE SURGERY      ORIF /plate and 4 screws    CYST REMOVAL          ESOPHAGOGASTRODUODENOSCOPY      Diagnostic    FOOT SURGERY      plantar wart resection; resolved:     ORIF HIP FRACTURE Right 2022    Procedure: ORIF HIP W/ CANNUALATED SCREWS;  Surgeon: Vanna Geller DO;  Location: [facility];  Service: Orthopedics    OK TOTAL KNEE ARTHROPLASTY Right 3/1/2022    Procedure: ARTHROPLASTY KNEE TOTAL;  Surgeon: Vanna Geller DO;  Location: [facility];  Service: Orthopedics    TONSILLECTOMY AND ADENOIDECTOMY          TUBAL LIGATION      WISDOM TOOTH EXTRACTION       Social History   Social History     Substance and Sexual Activity   Alcohol Use Yes    Comment: rare, maybe on holidays     Social History     Substance and Sexual Activity   Drug Use No     Social History     Tobacco Use   Smoking Status Former Smoker    Quit date:     Years since quittin 7   Smokeless Tobacco Never Used     Family History:   Family History   Problem Relation Age of Onset    Seizures Mother         epilepsy    Bone cancer Father     No Known Problems Sister     No Known Problems Brother     No Known Problems Son     No Known Problems Maternal Grandmother     No Known Problems Maternal Grandfather     No Known Problems Paternal Grandmother     No Known Problems Paternal Grandfather     Thyroid disease Sister     No Known Problems Sister     No Known Problems Brother        Current Outpatient Medications on File Prior to Visit   Medication Sig Dispense Refill    acetaminophen (TYLENOL) 325 mg tablet Take 3 tablets (975 mg total) by mouth every 8 (eight) hours  0    Ca Carbonate-Mag Hydroxide 550-110 MG CHEW Chew Twice daily      enoxaparin (LOVENOX) 40 mg/0 4 mL Inject 0 4 mL (40 mg total) under the skin daily 11 2 mL 0    gabapentin (NEURONTIN) 100 mg capsule Take 1 capsule (100 mg total) by mouth every 8 (eight) hours 90 capsule 0    ibandronate (BONIVA) 150 MG tablet Take 1 tablet by mouth as needed        oxyCODONE (ROXICODONE) 5 immediate release tablet Take 1 tablet (5 mg total) by mouth daily as needed for severe pain Max Daily Amount: 5 mg 7 tablet 0    PARoxetine (PAXIL) 10 mg tablet Take 1 tablet (10 mg total) by mouth daily 90 tablet 3    predniSONE 5 mg tablet Take 5 mg by mouth daily       solifenacin (VESICARE) 10 MG tablet Take 1 tablet (10 mg total) by mouth daily 90 tablet 3    [DISCONTINUED] ascorbic acid (VITAMIN C) 500 MG tablet Take 1 tablet (500 mg total) by mouth daily Start to take 30 days prior to surgery 30 tablet 1    [DISCONTINUED] ferrous sulfate 324 (65 Fe) mg Take 1 tablet (324 mg total) by mouth daily before breakfast Start to take 30 days prior to surgery 30 tablet 1    [DISCONTINUED] folic acid (FOLVITE) 1 mg tablet Take by mouth      [DISCONTINUED] Multiple Vitamin (multivitamin) tablet Take 1 tablet by mouth daily Start to take 30 days prior to surgery 30 tablet 1     No current facility-administered medications on file prior to visit       Allergies   Allergen Reactions    Aspirin Other (See Comments)     Per pt avoids taking due to rheumatology Meds        Objective     /67   Pulse 65   Ht 5' 4" (1 626 m)   Wt 51 7 kg (114 lb)   BMI 19 57 kg/m²      PE:  AAOx 3  WDWN  Hearing intact, no drainage from eyes  no audible wheezing  no abdominal distension  LE compartments soft, AT/GS intact    Ortho Exam:  right hip:   INCs: C/D/I, No erythema, no swelling  pain with hip flexion and ER, no pain with IR  Right leg appears 2-3 cm shorter than left leg, of note patient has a knee flexion contracture which is contributing somewhat      Imaging Studies: I have personally reviewed pertinent films in PACS  CT scan right hip:  S/p ORIF hip, femoral neck fracture line evident without complete healing indicative of delayed union vs impending nonunion, hardware currently contained in femoral head, mild to moderate arthritis      Scribe Attestation    I,:  Libia Mijares PA-C am acting as a scribe while in the presence of the attending physician :       I,:  Jennifer Martinez DO personally performed the services described in this documentation    as scribed in my presence :

## 2022-09-09 ENCOUNTER — TELEPHONE (OUTPATIENT)
Dept: OBGYN CLINIC | Facility: HOSPITAL | Age: 67
End: 2022-09-09

## 2022-09-09 NOTE — TELEPHONE ENCOUNTER
Preoperative Elective Admission Assessment     Living Situation:    Who does pt live with: spouse, Beny Spoon  What kind of home: single level  How do they enter the home: front  How many levels in home: 1   # of steps to enter home: 3  # of steps to second floor: n/a  Are there handrails: No  Are there landings: No  Sleeping arrangement: first/entry floor  Where is Bathroom: entry level  Where is the tub or shower: walk in shower with grab bars  Dogs or ther pets: n/a     First Floor Setup:   Is there a bathroom: Yes  Where would pt sleep: bed     DME: cane, walker     Patient's Current Level of Function: Ambulates: Independently and ADLs: Independent     Post-op Caregiver: spouse  Caregiver Name and phone number for Inpatient discharge needs: Kings Birzuela [phone]  Currently receive any HHC/aides/community supports: No     Post-op Transport: spouse  To/from hospital: spouse  To/from PT 2-3x/week: spouse  Uses community transport now: No     Outpatient Physical Therapy Site:  Site: [facility]  pre and post-op appts scheduled? No, sent to       Medication Management: self and out of bottle  Preferred Pharmacy for Post-op Medications: [facility], [place]  Blood Management Vitamin Regimen: Pt confirms taking as prescribed  Post-op anticoagulant: to be determined by surgical team postoperatively     DC Plan: Pt plans to be discharged home        Barriers to DC identified preoperatively: none identified     BMI: 19 57     Caresense: declined enrollment     Patient Education:  Pt educated on post-op pain, early mobilization (POD0), Average inpt LOS, OP PT goal   Patient educated that our goal is to appropriately discharge patient based off their post-op function while striving to maintain maximal independence   The goal is to discharge patient to home and for them to attend outpatient physical therapy      Assigned to care team? Yes

## 2022-09-12 ENCOUNTER — TELEPHONE (OUTPATIENT)
Dept: CARDIOLOGY CLINIC | Facility: CLINIC | Age: 67
End: 2022-09-12

## 2022-09-12 NOTE — TELEPHONE ENCOUNTER
P/C advised needs cardiac clearance advised looks like it is awaiting for a  signature, [facility], number given

## 2022-09-15 ENCOUNTER — TELEPHONE (OUTPATIENT)
Dept: OBGYN CLINIC | Facility: MEDICAL CENTER | Age: 67
End: 2022-09-15

## 2022-09-15 NOTE — TELEPHONE ENCOUNTER
Patient is scheduled for 9/28  I did a reminder call that she still needs to do labwork prior to her surgery  No answer so I left a detailed VM of reminder and w/my direct p#

## 2022-09-19 ENCOUNTER — APPOINTMENT (OUTPATIENT)
Dept: LAB | Facility: MEDICAL CENTER | Age: 67
End: 2022-09-19
Payer: COMMERCIAL

## 2022-09-19 DIAGNOSIS — S72.001G: ICD-10-CM

## 2022-09-19 DIAGNOSIS — Z01.818 PREOPERATIVE TESTING: ICD-10-CM

## 2022-09-19 DIAGNOSIS — Z01.818 ENCOUNTER FOR PREADMISSION TESTING: ICD-10-CM

## 2022-09-19 DIAGNOSIS — S72.001K CLOSED DISPLACED FRACTURE OF RIGHT FEMORAL NECK WITH NONUNION: ICD-10-CM

## 2022-09-19 LAB
ALBUMIN SERPL BCP-MCNC: 3.2 G/DL (ref 3.5–5)
ALP SERPL-CCNC: 96 U/L (ref 46–116)
ALT SERPL W P-5'-P-CCNC: 17 U/L (ref 12–78)
ANION GAP SERPL CALCULATED.3IONS-SCNC: 5 MMOL/L (ref 4–13)
APTT PPP: 28 SECONDS (ref 23–37)
AST SERPL W P-5'-P-CCNC: 13 U/L (ref 5–45)
BASOPHILS # BLD AUTO: 0.03 THOUSANDS/ΜL (ref 0–0.1)
BASOPHILS NFR BLD AUTO: 0 % (ref 0–1)
BILIRUB SERPL-MCNC: 0.58 MG/DL (ref 0.2–1)
BUN SERPL-MCNC: 12 MG/DL (ref 5–25)
CALCIUM ALBUM COR SERPL-MCNC: 9.7 MG/DL (ref 8.3–10.1)
CALCIUM SERPL-MCNC: 9.1 MG/DL (ref 8.3–10.1)
CHLORIDE SERPL-SCNC: 107 MMOL/L (ref 96–108)
CO2 SERPL-SCNC: 30 MMOL/L (ref 21–32)
CREAT SERPL-MCNC: 0.7 MG/DL (ref 0.6–1.3)
EOSINOPHIL # BLD AUTO: 0.2 THOUSAND/ΜL (ref 0–0.61)
EOSINOPHIL NFR BLD AUTO: 3 % (ref 0–6)
ERYTHROCYTE [DISTWIDTH] IN BLOOD BY AUTOMATED COUNT: 14.9 % (ref 11.6–15.1)
EST. AVERAGE GLUCOSE BLD GHB EST-MCNC: 108 MG/DL
GFR SERPL CREATININE-BSD FRML MDRD: 89 ML/MIN/1.73SQ M
GLUCOSE P FAST SERPL-MCNC: 85 MG/DL (ref 65–99)
HBA1C MFR BLD: 5.4 %
HCT VFR BLD AUTO: 39.8 % (ref 34.8–46.1)
HGB BLD-MCNC: 12.2 G/DL (ref 11.5–15.4)
IMM GRANULOCYTES # BLD AUTO: 0.02 THOUSAND/UL (ref 0–0.2)
IMM GRANULOCYTES NFR BLD AUTO: 0 % (ref 0–2)
INR PPP: 0.92 (ref 0.84–1.19)
LYMPHOCYTES # BLD AUTO: 1.66 THOUSANDS/ΜL (ref 0.6–4.47)
LYMPHOCYTES NFR BLD AUTO: 23 % (ref 14–44)
MCH RBC QN AUTO: 31.4 PG (ref 26.8–34.3)
MCHC RBC AUTO-ENTMCNC: 30.7 G/DL (ref 31.4–37.4)
MCV RBC AUTO: 103 FL (ref 82–98)
MONOCYTES # BLD AUTO: 0.63 THOUSAND/ΜL (ref 0.17–1.22)
MONOCYTES NFR BLD AUTO: 9 % (ref 4–12)
NEUTROPHILS # BLD AUTO: 4.71 THOUSANDS/ΜL (ref 1.85–7.62)
NEUTS SEG NFR BLD AUTO: 65 % (ref 43–75)
NRBC BLD AUTO-RTO: 0 /100 WBCS
PLATELET # BLD AUTO: 262 THOUSANDS/UL (ref 149–390)
PMV BLD AUTO: 9.7 FL (ref 8.9–12.7)
POTASSIUM SERPL-SCNC: 4 MMOL/L (ref 3.5–5.3)
PROT SERPL-MCNC: 7.2 G/DL (ref 6.4–8.4)
PROTHROMBIN TIME: 12.5 SECONDS (ref 11.6–14.5)
RBC # BLD AUTO: 3.88 MILLION/UL (ref 3.81–5.12)
SODIUM SERPL-SCNC: 142 MMOL/L (ref 135–147)
WBC # BLD AUTO: 7.25 THOUSAND/UL (ref 4.31–10.16)

## 2022-09-19 PROCEDURE — 83036 HEMOGLOBIN GLYCOSYLATED A1C: CPT

## 2022-09-19 PROCEDURE — 85025 COMPLETE CBC W/AUTO DIFF WBC: CPT

## 2022-09-19 PROCEDURE — 36415 COLL VENOUS BLD VENIPUNCTURE: CPT

## 2022-09-19 PROCEDURE — 85730 THROMBOPLASTIN TIME PARTIAL: CPT

## 2022-09-19 PROCEDURE — 80053 COMPREHEN METABOLIC PANEL: CPT

## 2022-09-19 PROCEDURE — 85610 PROTHROMBIN TIME: CPT

## 2022-09-21 ENCOUNTER — CONSULT (OUTPATIENT)
Dept: FAMILY MEDICINE CLINIC | Facility: CLINIC | Age: 67
End: 2022-09-21
Payer: COMMERCIAL

## 2022-09-21 VITALS
WEIGHT: 114.2 LBS | HEIGHT: 64 IN | RESPIRATION RATE: 12 BRPM | DIASTOLIC BLOOD PRESSURE: 64 MMHG | SYSTOLIC BLOOD PRESSURE: 126 MMHG | HEART RATE: 83 BPM | BODY MASS INDEX: 19.5 KG/M2 | OXYGEN SATURATION: 93 %

## 2022-09-21 DIAGNOSIS — Z01.811 PRE-OP CHEST EXAM: Primary | ICD-10-CM

## 2022-09-21 DIAGNOSIS — R94.31 ABNORMAL ECG: ICD-10-CM

## 2022-09-21 PROCEDURE — 99214 OFFICE O/P EST MOD 30 MIN: CPT | Performed by: FAMILY MEDICINE

## 2022-09-21 PROCEDURE — 93000 ELECTROCARDIOGRAM COMPLETE: CPT | Performed by: FAMILY MEDICINE

## 2022-09-21 PROCEDURE — 1160F RVW MEDS BY RX/DR IN RCRD: CPT | Performed by: FAMILY MEDICINE

## 2022-09-21 NOTE — PROGRESS NOTES
Assessment/Plan:       Problem List Items Addressed This Visit        Other    Abnormal ECG    Relevant Orders    POCT ECG      Other Visit Diagnoses     Pre-op chest exam    -  Primary          1  Pre-op chest exam  No cardiac contraindication to proceeding with surgery,  Safe to proceed with operation no further testing recommended at this time  2  Abnormal ECG  Similar to previous continue follow up cardiology post op  - POCT ECG    Subjective:      Patient ID: Tatianna Linton is a 75 y o  female  HPI    75year old pre op     She can walk up stairs and is active, though limited by hip  Can perform greater than 4-mets of activity with no shortness of breath or chest pains  Reports no change in health from previous cardiac pre op visit  Reviewed recent lab work and was within normal limits  EKG from 6/26 and echo from 3/02 reviewed  EKG today no significant change from previous, QT interval improved  Safe to proceed with operation no further testing recommended at this time  Likely need stress dose steroids      The following portions of the patient's history were reviewed and updated as appropriate: allergies, current medications, past family history, past medical history, past social history, past surgical history and problem list       Current Outpatient Medications:     acetaminophen (TYLENOL) 325 mg tablet, Take 3 tablets (975 mg total) by mouth every 8 (eight) hours, Disp: , Rfl: 0    ascorbic acid (VITAMIN C) 500 MG tablet, Take 1 tablet (500 mg total) by mouth daily Start to take 30 days prior to surgery, Disp: 30 tablet, Rfl: 1    Ca Carbonate-Mag Hydroxide 550-110 MG CHEW, Chew Twice daily, Disp: , Rfl:     ferrous sulfate 324 (65 Fe) mg, Take 1 tablet (324 mg total) by mouth daily before breakfast Start to take 30 days prior to surgery, Disp: 30 tablet, Rfl: 1    folic acid (FOLVITE) 1 mg tablet, Take 1 tablet (1 mg total) by mouth daily Start to take 30 days prior to surgery, Disp: 30 tablet, Rfl: 1    ibandronate (BONIVA) 150 MG tablet, Take 1 tablet by mouth as needed  , Disp: , Rfl:     Multiple Vitamin (multivitamin) tablet, Take 1 tablet by mouth daily Start to take 30 days prior to surgery, Disp: 30 tablet, Rfl: 1    predniSONE 5 mg tablet, Take 5 mg by mouth daily , Disp: , Rfl:      Review of Systems   Constitutional: Negative for activity change and appetite change  Respiratory: Negative for apnea and chest tightness  Gastrointestinal: Negative for abdominal distention and abdominal pain  Musculoskeletal: Positive for gait problem  Objective:      /64 (BP Location: Left arm, Patient Position: Sitting, Cuff Size: Standard)   Pulse 83   Resp 12   Ht 5' 4" (1 626 m)   Wt 51 8 kg (114 lb 3 2 oz)   SpO2 93%   BMI 19 60 kg/m²          Physical Exam  Constitutional:       Appearance: Normal appearance  Cardiovascular:      Rate and Rhythm: Normal rate and regular rhythm  Pulses: Normal pulses  Heart sounds: Normal heart sounds  Comments: +1 systolic murmur  Pulmonary:      Effort: Pulmonary effort is normal       Breath sounds: Normal breath sounds  Abdominal:      General: Abdomen is flat  Tenderness: There is no abdominal tenderness  Musculoskeletal:         General: Normal range of motion  Neurological:      Mental Status: She is alert             Ellen Pfeiffer MD

## 2022-09-22 ENCOUNTER — APPOINTMENT (OUTPATIENT)
Dept: LAB | Facility: MEDICAL CENTER | Age: 67
End: 2022-09-22
Payer: COMMERCIAL

## 2022-09-22 DIAGNOSIS — Z01.818 PREOPERATIVE TESTING: ICD-10-CM

## 2022-09-22 DIAGNOSIS — S72.001K CLOSED DISPLACED FRACTURE OF RIGHT FEMORAL NECK WITH NONUNION: ICD-10-CM

## 2022-09-22 DIAGNOSIS — Z01.818 ENCOUNTER FOR PREADMISSION TESTING: ICD-10-CM

## 2022-09-22 DIAGNOSIS — S72.001G: ICD-10-CM

## 2022-09-22 PROCEDURE — 36415 COLL VENOUS BLD VENIPUNCTURE: CPT

## 2022-09-22 PROCEDURE — 86901 BLOOD TYPING SEROLOGIC RH(D): CPT

## 2022-09-22 PROCEDURE — 86900 BLOOD TYPING SEROLOGIC ABO: CPT

## 2022-09-22 PROCEDURE — 86850 RBC ANTIBODY SCREEN: CPT

## 2022-09-22 RX ORDER — METHOTREXATE 2.5 MG/1
TABLET ORAL
COMMUNITY
Start: 2022-07-24

## 2022-09-22 NOTE — PRE-PROCEDURE INSTRUCTIONS
Pre-Surgery Instructions:   Medication Instructions    acetaminophen (TYLENOL) 325 mg tablet Uses PRN- OK to take day of surgery    ascorbic acid (VITAMIN C) 500 MG tablet Instructions provided by MD deng 9/28    Ca Carbonate-Mag Hydroxide 550-110 MG CHEW Hold day of surgery   ferrous sulfate 324 (65 Fe) mg Instructions provided by MD deng 7/14    folic acid (FOLVITE) 1 mg tablet Instructions provided by MD deng 9/28    ibandronate (BONIVA) 150 MG tablet Take day of surgery   Multiple Vitamin (multivitamin) tablet Instructions provided by MD DENG 9/28    predniSONE 5 mg tablet Take day of surgery  Pre procedure instructions reviewed verbalizes understanding  NPO after MN  Bathing reviewed  Morning meds with water  No NSAIDS  No Ortho vitamins dos  Ortho Class completed all question answered

## 2022-09-23 LAB
ABO GROUP BLD: NORMAL
BLD GP AB SCN SERPL QL: NEGATIVE
RH BLD: POSITIVE
SPECIMEN EXPIRATION DATE: NORMAL

## 2022-09-26 ENCOUNTER — ANESTHESIA EVENT (OUTPATIENT)
Dept: PERIOP | Facility: HOSPITAL | Age: 67
End: 2022-09-26
Payer: OTHER MISCELLANEOUS

## 2022-09-28 ENCOUNTER — HOSPITAL ENCOUNTER (OUTPATIENT)
Facility: HOSPITAL | Age: 67
Setting detail: OUTPATIENT SURGERY
Discharge: HOME | End: 2022-09-29
Attending: ORTHOPAEDIC SURGERY | Admitting: ORTHOPAEDIC SURGERY
Payer: OTHER MISCELLANEOUS

## 2022-09-28 ENCOUNTER — APPOINTMENT (OUTPATIENT)
Dept: RADIOLOGY | Facility: HOSPITAL | Age: 67
End: 2022-09-28
Payer: OTHER MISCELLANEOUS

## 2022-09-28 ENCOUNTER — ANESTHESIA (OUTPATIENT)
Dept: PERIOP | Facility: HOSPITAL | Age: 67
End: 2022-09-28
Payer: OTHER MISCELLANEOUS

## 2022-09-28 DIAGNOSIS — D50.9 IRON DEFICIENCY ANEMIA, UNSPECIFIED IRON DEFICIENCY ANEMIA TYPE: ICD-10-CM

## 2022-09-28 DIAGNOSIS — E78.5 HYPERLIPIDEMIA, UNSPECIFIED HYPERLIPIDEMIA TYPE: ICD-10-CM

## 2022-09-28 DIAGNOSIS — Z96.641 STATUS POST TOTAL HIP REPLACEMENT, RIGHT: Primary | ICD-10-CM

## 2022-09-28 DIAGNOSIS — M05.79 RHEUMATOID ARTHRITIS INVOLVING MULTIPLE SITES WITH POSITIVE RHEUMATOID FACTOR (HCC): ICD-10-CM

## 2022-09-28 DIAGNOSIS — Z96.651 HISTORY OF TOTAL RIGHT KNEE REPLACEMENT (TKR): ICD-10-CM

## 2022-09-28 PROCEDURE — C1776 JOINT DEVICE (IMPLANTABLE): HCPCS | Performed by: ORTHOPAEDIC SURGERY

## 2022-09-28 PROCEDURE — 73501 X-RAY EXAM HIP UNI 1 VIEW: CPT

## 2022-09-28 PROCEDURE — 27132 TOTAL HIP ARTHROPLASTY: CPT | Performed by: ORTHOPAEDIC SURGERY

## 2022-09-28 PROCEDURE — C1713 ANCHOR/SCREW BN/BN,TIS/BN: HCPCS | Performed by: ORTHOPAEDIC SURGERY

## 2022-09-28 PROCEDURE — 27132 TOTAL HIP ARTHROPLASTY: CPT | Performed by: PHYSICIAN ASSISTANT

## 2022-09-28 PROCEDURE — C1769 GUIDE WIRE: HCPCS | Performed by: ORTHOPAEDIC SURGERY

## 2022-09-28 PROCEDURE — 86920 COMPATIBILITY TEST SPIN: CPT

## 2022-09-28 DEVICE — TRI-LOCK BPS FEMORAL STEM 12/14 TAPER TRI-LOCK BPS W/GRIPTION SIZE 3 HI 101MM
Type: IMPLANTABLE DEVICE | Status: FUNCTIONAL
Brand: TRI-LOCK GRIPTION

## 2022-09-28 DEVICE — PINNACLE CANCELLOUS BONE SCREW 6.5MM X 15MM
Type: IMPLANTABLE DEVICE | Status: FUNCTIONAL
Brand: PINNACLE

## 2022-09-28 DEVICE — PINNACLE POROCOAT ACETABULAR SHELL SECTOR II 48MM OD
Type: IMPLANTABLE DEVICE | Status: FUNCTIONAL
Brand: PINNACLE POROCOAT

## 2022-09-28 DEVICE — PINNACLE HIP SOLUTIONS ALTRX POLYETHYLENE ACETABULAR LINER NEUTRAL 32MM ID 48MM OD
Type: IMPLANTABLE DEVICE | Status: FUNCTIONAL
Brand: PINNACLE ALTRX

## 2022-09-28 DEVICE — BIOLOX DELTA CERAMIC FEMORAL HEAD 32MM DIA +1 12/14 TAPER
Type: IMPLANTABLE DEVICE | Status: FUNCTIONAL
Brand: BIOLOX DELTA

## 2022-09-28 DEVICE — 1.7MM COCR CABLE WITH TI CRIMP 750MM-STERILE: Type: IMPLANTABLE DEVICE | Status: FUNCTIONAL

## 2022-09-28 RX ORDER — SODIUM CHLORIDE 9 MG/ML
75 INJECTION, SOLUTION INTRAVENOUS CONTINUOUS
Status: DISCONTINUED | OUTPATIENT
Start: 2022-09-28 | End: 2022-09-28

## 2022-09-28 RX ORDER — DOCUSATE SODIUM 100 MG/1
100 CAPSULE, LIQUID FILLED ORAL 2 TIMES DAILY
Status: DISCONTINUED | OUTPATIENT
Start: 2022-09-28 | End: 2022-09-29 | Stop reason: HOSPADM

## 2022-09-28 RX ORDER — SODIUM CHLORIDE, SODIUM LACTATE, POTASSIUM CHLORIDE, CALCIUM CHLORIDE 600; 310; 30; 20 MG/100ML; MG/100ML; MG/100ML; MG/100ML
50 INJECTION, SOLUTION INTRAVENOUS CONTINUOUS
Status: DISCONTINUED | OUTPATIENT
Start: 2022-09-28 | End: 2022-09-28

## 2022-09-28 RX ORDER — BISACODYL 10 MG
10 SUPPOSITORY, RECTAL RECTAL DAILY PRN
Status: DISCONTINUED | OUTPATIENT
Start: 2022-09-28 | End: 2022-09-29 | Stop reason: HOSPADM

## 2022-09-28 RX ORDER — PROPOFOL 10 MG/ML
INJECTION, EMULSION INTRAVENOUS AS NEEDED
Status: DISCONTINUED | OUTPATIENT
Start: 2022-09-28 | End: 2022-09-28

## 2022-09-28 RX ORDER — BISACODYL 10 MG
10 SUPPOSITORY, RECTAL RECTAL DAILY PRN
Status: DISCONTINUED | OUTPATIENT
Start: 2022-09-28 | End: 2022-09-28

## 2022-09-28 RX ORDER — CEFAZOLIN SODIUM 1 G/50ML
1000 SOLUTION INTRAVENOUS ONCE
Status: COMPLETED | OUTPATIENT
Start: 2022-09-28 | End: 2022-09-28

## 2022-09-28 RX ORDER — PROPOFOL 10 MG/ML
INJECTION, EMULSION INTRAVENOUS CONTINUOUS PRN
Status: DISCONTINUED | OUTPATIENT
Start: 2022-09-28 | End: 2022-09-28

## 2022-09-28 RX ORDER — BUPIVACAINE HYDROCHLORIDE 7.5 MG/ML
INJECTION, SOLUTION INTRASPINAL AS NEEDED
Status: DISCONTINUED | OUTPATIENT
Start: 2022-09-28 | End: 2022-09-28

## 2022-09-28 RX ORDER — CHLORHEXIDINE GLUCONATE 0.12 MG/ML
15 RINSE ORAL ONCE
Status: COMPLETED | OUTPATIENT
Start: 2022-09-28 | End: 2022-09-28

## 2022-09-28 RX ORDER — ACETAMINOPHEN 325 MG/1
975 TABLET ORAL EVERY 8 HOURS SCHEDULED
Status: DISCONTINUED | OUTPATIENT
Start: 2022-09-28 | End: 2022-09-29 | Stop reason: HOSPADM

## 2022-09-28 RX ORDER — ONDANSETRON 2 MG/ML
4 INJECTION INTRAMUSCULAR; INTRAVENOUS EVERY 6 HOURS PRN
Status: DISCONTINUED | OUTPATIENT
Start: 2022-09-28 | End: 2022-09-29 | Stop reason: HOSPADM

## 2022-09-28 RX ORDER — DEXAMETHASONE SODIUM PHOSPHATE 10 MG/ML
INJECTION, SOLUTION INTRAMUSCULAR; INTRAVENOUS AS NEEDED
Status: DISCONTINUED | OUTPATIENT
Start: 2022-09-28 | End: 2022-09-28

## 2022-09-28 RX ORDER — CEPHALEXIN 500 MG/1
500 CAPSULE ORAL EVERY 12 HOURS SCHEDULED
Qty: 14 CAPSULE | Refills: 0 | Status: CANCELLED | OUTPATIENT
Start: 2022-09-29 | End: 2022-10-06

## 2022-09-28 RX ORDER — CEPHALEXIN 500 MG/1
500 CAPSULE ORAL EVERY 12 HOURS SCHEDULED
Status: DISCONTINUED | OUTPATIENT
Start: 2022-09-29 | End: 2022-09-29 | Stop reason: HOSPADM

## 2022-09-28 RX ORDER — GABAPENTIN 300 MG/1
300 CAPSULE ORAL ONCE
Status: COMPLETED | OUTPATIENT
Start: 2022-09-28 | End: 2022-09-28

## 2022-09-28 RX ORDER — ENOXAPARIN SODIUM 100 MG/ML
40 INJECTION SUBCUTANEOUS DAILY
Status: DISCONTINUED | OUTPATIENT
Start: 2022-09-29 | End: 2022-09-29 | Stop reason: HOSPADM

## 2022-09-28 RX ORDER — ASCORBIC ACID 500 MG
500 TABLET ORAL DAILY
Status: DISCONTINUED | OUTPATIENT
Start: 2022-09-29 | End: 2022-09-29 | Stop reason: HOSPADM

## 2022-09-28 RX ORDER — OXYCODONE HYDROCHLORIDE 10 MG/1
10 TABLET ORAL EVERY 4 HOURS PRN
Status: DISCONTINUED | OUTPATIENT
Start: 2022-09-28 | End: 2022-09-29 | Stop reason: HOSPADM

## 2022-09-28 RX ORDER — FOLIC ACID 1 MG/1
1 TABLET ORAL DAILY
Status: DISCONTINUED | OUTPATIENT
Start: 2022-09-29 | End: 2022-09-29 | Stop reason: HOSPADM

## 2022-09-28 RX ORDER — HYDROMORPHONE HCL/PF 1 MG/ML
0.5 SYRINGE (ML) INJECTION EVERY 4 HOURS PRN
Status: DISCONTINUED | OUTPATIENT
Start: 2022-09-28 | End: 2022-09-29 | Stop reason: HOSPADM

## 2022-09-28 RX ORDER — CALCIUM CARBONATE 200(500)MG
1000 TABLET,CHEWABLE ORAL DAILY PRN
Status: DISCONTINUED | OUTPATIENT
Start: 2022-09-28 | End: 2022-09-29 | Stop reason: HOSPADM

## 2022-09-28 RX ORDER — ONDANSETRON 2 MG/ML
4 INJECTION INTRAMUSCULAR; INTRAVENOUS ONCE AS NEEDED
Status: DISCONTINUED | OUTPATIENT
Start: 2022-09-28 | End: 2022-09-28 | Stop reason: HOSPADM

## 2022-09-28 RX ORDER — MIDAZOLAM HYDROCHLORIDE 2 MG/2ML
INJECTION, SOLUTION INTRAMUSCULAR; INTRAVENOUS AS NEEDED
Status: DISCONTINUED | OUTPATIENT
Start: 2022-09-28 | End: 2022-09-28

## 2022-09-28 RX ORDER — ACETAMINOPHEN 325 MG/1
975 TABLET ORAL ONCE
Status: COMPLETED | OUTPATIENT
Start: 2022-09-28 | End: 2022-09-28

## 2022-09-28 RX ORDER — MAGNESIUM HYDROXIDE 1200 MG/15ML
LIQUID ORAL AS NEEDED
Status: DISCONTINUED | OUTPATIENT
Start: 2022-09-28 | End: 2022-09-28 | Stop reason: HOSPADM

## 2022-09-28 RX ORDER — OXYCODONE HYDROCHLORIDE 5 MG/1
5 TABLET ORAL EVERY 4 HOURS PRN
Status: DISCONTINUED | OUTPATIENT
Start: 2022-09-28 | End: 2022-09-29 | Stop reason: HOSPADM

## 2022-09-28 RX ORDER — SENNOSIDES 8.6 MG
1 TABLET ORAL
Status: DISCONTINUED | OUTPATIENT
Start: 2022-09-28 | End: 2022-09-29 | Stop reason: HOSPADM

## 2022-09-28 RX ORDER — HYDROMORPHONE HCL/PF 1 MG/ML
0.5 SYRINGE (ML) INJECTION
Status: DISCONTINUED | OUTPATIENT
Start: 2022-09-28 | End: 2022-09-28 | Stop reason: HOSPADM

## 2022-09-28 RX ORDER — FENTANYL CITRATE/PF 50 MCG/ML
50 SYRINGE (ML) INJECTION
Status: DISCONTINUED | OUTPATIENT
Start: 2022-09-28 | End: 2022-09-28 | Stop reason: HOSPADM

## 2022-09-28 RX ORDER — CEFAZOLIN SODIUM 2 G/50ML
2000 SOLUTION INTRAVENOUS ONCE
Status: DISCONTINUED | OUTPATIENT
Start: 2022-09-28 | End: 2022-09-28

## 2022-09-28 RX ORDER — PROMETHAZINE HYDROCHLORIDE 25 MG/1
25 TABLET ORAL EVERY 6 HOURS PRN
Status: DISCONTINUED | OUTPATIENT
Start: 2022-09-28 | End: 2022-09-29 | Stop reason: HOSPADM

## 2022-09-28 RX ORDER — CHLORHEXIDINE GLUCONATE 4 G/100ML
SOLUTION TOPICAL DAILY PRN
Status: DISCONTINUED | OUTPATIENT
Start: 2022-09-28 | End: 2022-09-28 | Stop reason: HOSPADM

## 2022-09-28 RX ORDER — CEFAZOLIN SODIUM 1 G/50ML
1000 SOLUTION INTRAVENOUS EVERY 8 HOURS
Status: COMPLETED | OUTPATIENT
Start: 2022-09-28 | End: 2022-09-29

## 2022-09-28 RX ORDER — PREDNISONE 1 MG/1
5 TABLET ORAL DAILY
Status: DISCONTINUED | OUTPATIENT
Start: 2022-09-29 | End: 2022-09-29 | Stop reason: HOSPADM

## 2022-09-28 RX ORDER — MAGNESIUM HYDROXIDE/ALUMINUM HYDROXICE/SIMETHICONE 120; 1200; 1200 MG/30ML; MG/30ML; MG/30ML
5 SUSPENSION ORAL DAILY
Status: DISCONTINUED | OUTPATIENT
Start: 2022-09-28 | End: 2022-09-29 | Stop reason: HOSPADM

## 2022-09-28 RX ORDER — SODIUM CHLORIDE, SODIUM LACTATE, POTASSIUM CHLORIDE, CALCIUM CHLORIDE 600; 310; 30; 20 MG/100ML; MG/100ML; MG/100ML; MG/100ML
100 INJECTION, SOLUTION INTRAVENOUS CONTINUOUS
Status: DISCONTINUED | OUTPATIENT
Start: 2022-09-28 | End: 2022-09-29 | Stop reason: HOSPADM

## 2022-09-28 RX ORDER — PROMETHAZINE HYDROCHLORIDE 25 MG/1
25 TABLET ORAL EVERY 6 HOURS PRN
Status: DISCONTINUED | OUTPATIENT
Start: 2022-09-28 | End: 2022-09-28

## 2022-09-28 RX ADMIN — SODIUM CHLORIDE, SODIUM LACTATE, POTASSIUM CHLORIDE, AND CALCIUM CHLORIDE: .6; .31; .03; .02 INJECTION, SOLUTION INTRAVENOUS at 13:21

## 2022-09-28 RX ADMIN — PROPOFOL 30 MG: 10 INJECTION, EMULSION INTRAVENOUS at 14:09

## 2022-09-28 RX ADMIN — TRANEXAMIC ACID 1000 MG: 100 INJECTION, SOLUTION INTRAVENOUS at 14:22

## 2022-09-28 RX ADMIN — BUPIVACAINE HYDROCHLORIDE IN DEXTROSE 2 ML: 7.5 INJECTION, SOLUTION SUBARACHNOID at 14:04

## 2022-09-28 RX ADMIN — SODIUM CHLORIDE, SODIUM LACTATE, POTASSIUM CHLORIDE, AND CALCIUM CHLORIDE: .6; .31; .03; .02 INJECTION, SOLUTION INTRAVENOUS at 16:20

## 2022-09-28 RX ADMIN — ACETAMINOPHEN 975 MG: 325 TABLET ORAL at 21:05

## 2022-09-28 RX ADMIN — SODIUM CHLORIDE, SODIUM LACTATE, POTASSIUM CHLORIDE, AND CALCIUM CHLORIDE: .6; .31; .03; .02 INJECTION, SOLUTION INTRAVENOUS at 14:43

## 2022-09-28 RX ADMIN — CEFAZOLIN SODIUM 1000 MG: 1 SOLUTION INTRAVENOUS at 21:14

## 2022-09-28 RX ADMIN — DOCUSATE SODIUM 100 MG: 100 CAPSULE, LIQUID FILLED ORAL at 21:09

## 2022-09-28 RX ADMIN — PROPOFOL 20 MG: 10 INJECTION, EMULSION INTRAVENOUS at 15:36

## 2022-09-28 RX ADMIN — MIDAZOLAM HYDROCHLORIDE 2 MG: 1 INJECTION, SOLUTION INTRAMUSCULAR; INTRAVENOUS at 13:54

## 2022-09-28 RX ADMIN — PROPOFOL 60 MCG/KG/MIN: 10 INJECTION, EMULSION INTRAVENOUS at 14:09

## 2022-09-28 RX ADMIN — ACETAMINOPHEN 650 MG: 325 TABLET ORAL at 11:44

## 2022-09-28 RX ADMIN — DEXAMETHASONE SODIUM PHOSPHATE 8 MG: 10 INJECTION, SOLUTION INTRAMUSCULAR; INTRAVENOUS at 14:50

## 2022-09-28 RX ADMIN — GABAPENTIN 300 MG: 300 CAPSULE ORAL at 11:44

## 2022-09-28 RX ADMIN — SODIUM CHLORIDE, SODIUM LACTATE, POTASSIUM CHLORIDE, AND CALCIUM CHLORIDE 100 ML/HR: .6; .31; .03; .02 INJECTION, SOLUTION INTRAVENOUS at 21:10

## 2022-09-28 RX ADMIN — CEFAZOLIN SODIUM 1000 MG: 1 SOLUTION INTRAVENOUS at 14:17

## 2022-09-28 RX ADMIN — SENNOSIDES 8.6 MG: 8.6 TABLET, FILM COATED ORAL at 21:05

## 2022-09-28 RX ADMIN — CHLORHEXIDINE GLUCONATE 0.12% ORAL RINSE 15 ML: 1.2 LIQUID ORAL at 11:44

## 2022-09-28 RX ADMIN — ALUMINUM HYDROXIDE, MAGNESIUM HYDROXIDE, AND SIMETHICONE 5 ML: 200; 200; 20 SUSPENSION ORAL at 21:05

## 2022-09-28 NOTE — OP NOTE
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 [S72 001G]    Procedure(s) (LRB):  ARTHROPLASTY HIP TOTAL, removal of cannulated screws, posterior approach, cerclage of femur fx (Right)    Specimen(s):  * No specimens in log *    Estimated Blood Loss:   700 mL    Drains:  Urethral Catheter Latex 16 Fr  (Active)   Number of days: 0       Anesthesia Type:   Spinal    Operative Indications:  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 [S72 001G]      Operative Findings:  See below    Complications:   None    Procedure and Technique:  Implants: Depuy  [place] acetabular sector II cup size 48mm  Trilock high offset stem size 3  Neutral acetabular liner  Two 6 5 x 15mm screw  32mm +1 Biolox delta ceramic femoral head  synthes cerclage wire     INDICATIONS FOR PROCEDURE:  The patients is a 75years old female who presented with collapse and nonunion s/p cannulated screw fixation  Surgery was then recommended to remove hardware and convert to a total hip arthroplasty  Extensive counseling in regards to the reasons for surgical intervention as well as the risks and benefits of surgery were reviewed  The risks include, but are not limited to infection, extensive blood clots, blood clots, wound healing problems, damage to blood vessels and nerves, dislocation, leg length discrepancy, fracture, the need for further surgery  The patient understood and agreed to by oral and written consent      OPERATIVE PROCEDURE:  The patient was identified as Sly Villegas by Her ID bracelet by the surgical staff in the preoperative area at [place] [address] [facility]   The patient was wheeled back to the surgical room and placed on the operative table  Preoperative antibiotics were given  Anesthesia was administered and the patient was intubated without problems  The patient was placed in the lateral decubitus  position and all bony prominences were carefully protected  The right leg was then prepped and draped in the usual sterile fashion  A timeout was performed where the patients name and surgical site were once again identified  An incision was made over  the posterolateral aspect of the patients right hip  Dissection was made through the skin and subcutaneous tissue down to the fascia over the gluteus juana  The fascia was incised and the gluteus juana muscle fibers were spread  A retractor was carefully placed under the gluteus medius to protect it  The external rotators were identified and released  The piriformis was tagged with an ethibond suture  The gluteus minimus was identified and a retractor was carefully placed under it to protect it  The capsule was incised and tagged with an ethibond suture  At this point all retractors were removed and the suture tagging the piriformis was used to martín the patients leg length  Retractors were once again placed  The three cannulated screws and 2 washers were located and removed without difficulty  The hip was then dislocated without problems  A trial and the neck length measurement from the template were used to identify the best place to make the neck cut  The cut was made with a saw and the femoral head was removed  Retractors were placed to provide acetabular exposure  The labrum was removed  Sequential reaming took place and a size 48mm acetabular shell was found to be the best fit  The shell was impacted into place  Two 6 5 x 15 mm screws were placed through the cup and the final liner was impacted into placed  The liner was checked and found to be secure  Attention was then paid to the femur  Instruments were used to provide for exposure  A box osteotome and canal finder were used to open the femoral canal   Sequential broaching took place and it was found that a size 3  femoral broach to be the best fit  The broach was left in place and a size 32mm +1 femoral head with a high offset neck were then trialed  The leg lengths were checked and the leg was taken through a ROM to evaluate for stability  Both the stability and leg lengths were found to be acceptable  After removing the femoral stem there was concern there may be a fracture of the calcar  We felt it best to place a cerclage wire  Attention returned to the femur and the final femoral stem was impacted into place  Another trial was performed and the stability and accuracy of the leg length were confirmed and found to be acceptable  The final 32mm +1 femoral head was impacted securely into place  The acetabulum was irrigated and the hip was carefully reduced  Copious amounts of irrigation was used to irrigate the hip  An irrisept wash followed by more irrigation was performed  The capsule and external rotators were repaired with a #5 Ethibond suture  Irrigation was used throughout the closure  The gluteus juana fascia was repaired with #1-0 vicryl suture  The subcutaneous fat was closed with a running #1-0 vicryl suture  The skin was closed with #2-0 vicryl and #3-0 monocryl subcutaneously  Dermabond and then a sterile dressing were placed over top  The patient was transferred onto a hospital bed and awakened without difficulty  I attest that I was present and performed this procedure  Shabnam Price PA-C and Sharon Cunningham ATC were present for the entire procedure and provided essential assistance with limb position, patient prepping, and retraction    A qualified resident physician was not available    Patient Disposition:  hemodynamically stable        SIGNATURE: Alma Lujan Nika Hawkins DO  DATE: September 28, 2022  TIME: 5:48 PM

## 2022-09-28 NOTE — ANESTHESIA PREPROCEDURE EVALUATION
Procedure:  ARTHROPLASTY HIP TOTAL, removal of cannulated screws, posterior approach (Right Hip)    Relevant Problems   CARDIO   (+) Hyperlipidemia      HEMATOLOGY   (+) Iron deficiency anemia      MUSCULOSKELETAL   (+) Arthritis of right knee   (+) Osteoarthritis of both hands   (+) Rheumatoid arthritis (HCC)      NEURO/PSYCH   (+) Anxiety disorder      Other   (+) Chronic fatigue   (+) History of total right knee replacement (TKR)   (+) Keratitis   (+) Tear film insufficiency        Physical Exam    Airway    Mallampati score: II  TM Distance: >3 FB  Neck ROM: full     Dental   upper dentures,     Cardiovascular  Cardiovascular exam normal    Pulmonary  Pulmonary exam normal     Other Findings        Anesthesia Plan  ASA Score- 3     Anesthesia Type- spinal with ASA Monitors  Additional Monitors:   Airway Plan:           Plan Factors-Exercise tolerance (METS): >4 METS  Chart reviewed  EKG reviewed  Existing labs reviewed  Patient summary reviewed  Patient is not a current smoker  Patient not instructed to abstain from smoking on day of procedure  Patient did not smoke on day of surgery  Induction-     Postoperative Plan- Plan for postoperative opioid use  Informed Consent- Anesthetic plan and risks discussed with patient and spouse  I personally reviewed this patient with the CRNA  Discussed and agreed on the Anesthesia Plan with the CRNA             Lab Results   Component Value Date    HGBA1C 5 4 09/19/2022       Lab Results   Component Value Date     03/09/2015    K 4 0 09/19/2022     09/19/2022    CO2 30 09/19/2022    ANIONGAP 7 03/09/2015    BUN 12 09/19/2022    CREATININE 0 70 09/19/2022    GLUCOSE 119 03/09/2015    GLUF 85 09/19/2022    CALCIUM 9 1 09/19/2022    CORRECTEDCA 9 7 09/19/2022    AST 13 09/19/2022    ALT 17 09/19/2022    ALKPHOS 96 09/19/2022    EGFR 89 09/19/2022       Lab Results   Component Value Date    WBC 7 25 09/19/2022    HGB 12 2 09/19/2022    HCT 39 8 09/19/2022     (H) 09/19/2022     09/19/2022   Sinus bradycardia  ST & T wave abnormality, consider inferior ischemia  ST & T wave abnormality, consider anterolateral ischemia  Prolonged QT  Abnormal ECG  When compared with ECG of 04-MAR-2022 16:34,  Nonspecific T wave abnormality has replaced inverted T waves in Inferior leads  T wave inversion less evident in Lateral leads  QT has lengthened  Confirmed by Heidi Sheldon ([id number]) on 6/26/2022 7:10:42 PM    March 2022 echo     Left Ventricle: Left ventricular cavity size is normal  Wall thickness is normal  The left ventricular ejection fraction is 74%  Systolic function is hyperdynamic  Wall motion is normal  Diastolic function is moderately abnormal, consistent with grade II (pseudonormal) relaxation  Left atrial filling pressure is elevated    Aortic Valve: The aortic valve velocity is increased due to increased flow    Mitral Valve: There is moderate thickening of the posterior leaflet  The posterior leaflet is prolapsed in late systole  There is mild regurgitation    Tricuspid Valve: There is mild to moderate regurgitation  The right ventricular systolic pressure is mildly elevated  The estimated right ventricular systolic pressure is 83 03 mmHg    Aorta: The aortic root is normal in size  The ascending aorta is mildly dilated   The ascending aorta is 3 5 cm

## 2022-09-28 NOTE — DISCHARGE INSTRUCTIONS
TOTAL HIP REPLACEMENT DISCHARGE INSTRUCTIONS    Surgical Dressing: You may remove your dressing 7 days from the date of your surgery then change your dressing daily until drainage stops  Do not put any lotions or creams on your incision  If there are any signs of infection such as drainage persisting beyond a few days, unusual looking drainage (yellow, green), increased redness around the incision, or fever/chills let your doctor know  Medications:  Upon discharge you will be given a prescription for an anticoagulant (i e  Ecotrin (Aspirin), Coumadin (Warfarin), Lovenox (Enoxaparin)) and a narcotic pain reliever  Do not take any over the counter NSAIDs (i e  Ibuprofen, Motrin, Advil, Naprosyn, Aleve) while on your anticoagulation medication  Narcotic pain relievers cannot be refilled over the phone  Please be mindful of the number of pills you have left so you can  a prescription for a refill if needed during office hours  If you are on Coumadin (Warfarin) you will need your blood drawn every Monday and Thursday once you are home  Initially your visiting nurse will draw your blood  Later you will go to an outpatient lab  You will receive a phone call the next day and your Coumadin (warfarin) will be dosed based on these results  Take this dosage daily until you hear from us next  Walking:  Use two crutches or a walker for EVERY step  Gradually increase your walking daily  You can progress to a cane as tolerated once advised by your physical therapist       Sleeping Positions: You may sleep on your back, stomach, or either side with a pillow between your knees  Also use the pillow between your legs as you turn to your side or stomach  Bathing:  No tub baths  Do not submerge your incision  You may shower  You may sit on a shower chair or stand and shower briefly  Use your crutches/walker to get in and out of the shower        Sexual Relations:  Resume according to your comfort  Swimming:  No swimming or hot tubs until approved by your physician  Swimming will be allowed once your incision is well healed  Driving: You may ride as a passenger now  No driving until your follow-up appointment  To get into the car use the front passenger seat with the seat pushed back as far as possible  Scoot yourself back in the seat  Use your hands to assist your legs into the car  Physical therapy:  When you have finished with home visiting PT, you may begin outpatient PT  Call your surgeons office if you have not already received a prescription  A prescription can be faxed to the outpatient center of your choice  Posterior hip precautions:  Avoid bending greater than 90 degrees at the hips, twisting/pivoting, and crossing the knees (You may cross at the ankles only) for the first 6 weeks after surgery  Your physical therapist will review this with you  Special considerations: To minimize swelling, stiffness, and decrease pain use cold as needed, but not heat  Ice 20 minutes at a time with a cloth between your skin and the ice  Ice after walking, when you have pain, or after you have completed your exercises  Limit your sitting to 60 minutes at one time  Continue to wear your SUNG stockings at all times for 2 weeks after surgery, except when washing  You can wear them as needed after that  Follow up:  Call [phone] to make an appointment to see your surgeon within 2 weeks of your surgery if you havent done so already  If you have any questions during business hours please call the direct office phone number [phone]  Otherwise please call [phone] for any concerns  For the future:  Prior to any dental work, including routine cleanings, call the office for a prescription for antibiotics to be taken prior to your dental appointment    For any invasive procedures (i e  endoscopy, colonoscopy, etc ) inform the doctor performing the procedure that you have a total knee replacement and will antibiotics just prior to the procedure to protect it

## 2022-09-28 NOTE — ANESTHESIA POSTPROCEDURE EVALUATION
Post-Op Assessment Note    CV Status:  Stable  Pain Score: 0    Pain management: adequate     Mental Status:  Alert and awake   Hydration Status:  Euvolemic and stable   PONV Controlled:  Controlled   Airway Patency:  Patent      Post Op Vitals Reviewed: Yes      Staff: CRNA, Anesthesiologist   Comments: Report given to recovering RN, VSS, Pt states she is comfortable        No complications documented      /58 (09/28/22 1741)    Temp 98 3 °F (36 8 °C) (09/28/22 1741)    Pulse (!) 54 (09/28/22 1741)   Resp 12 (09/28/22 1741)    SpO2 100 % (09/28/22 1741)

## 2022-09-28 NOTE — INTERVAL H&P NOTE
H&P reviewed  After examining the patient I find no changes in the patients condition since the H&P had been written      Vitals:    09/28/22 1113   BP: 117/59   Pulse: 59   Resp: 18   Temp: (!) 96 3 °F (35 7 °C)   SpO2: 96%

## 2022-09-29 VITALS
RESPIRATION RATE: 18 BRPM | HEART RATE: 62 BPM | OXYGEN SATURATION: 97 % | WEIGHT: 114 LBS | SYSTOLIC BLOOD PRESSURE: 109 MMHG | HEIGHT: 64 IN | DIASTOLIC BLOOD PRESSURE: 53 MMHG | BODY MASS INDEX: 19.46 KG/M2 | TEMPERATURE: 97.3 F

## 2022-09-29 PROBLEM — D72.829 LEUKOCYTOSIS: Status: ACTIVE | Noted: 2022-09-29

## 2022-09-29 LAB
ABO GROUP BLD BPU: NORMAL
ABO GROUP BLD BPU: NORMAL
ANION GAP SERPL CALCULATED.3IONS-SCNC: 2 MMOL/L (ref 5–14)
BPU ID: NORMAL
BPU ID: NORMAL
BUN SERPL-MCNC: 16 MG/DL (ref 5–25)
CALCIUM SERPL-MCNC: 8.6 MG/DL (ref 8.4–10.2)
CHLORIDE SERPL-SCNC: 100 MMOL/L (ref 96–108)
CO2 SERPL-SCNC: 33 MMOL/L (ref 21–32)
CREAT SERPL-MCNC: 0.64 MG/DL (ref 0.6–1.2)
CROSSMATCH: NORMAL
CROSSMATCH: NORMAL
ERYTHROCYTE [DISTWIDTH] IN BLOOD BY AUTOMATED COUNT: 14.2 % (ref 11.6–15.1)
GFR SERPL CREATININE-BSD FRML MDRD: 91 ML/MIN/1.73SQ M
GLUCOSE SERPL-MCNC: 138 MG/DL (ref 70–99)
HCT VFR BLD AUTO: 28.9 % (ref 34.8–46.1)
HGB BLD-MCNC: 9.2 G/DL (ref 11.5–15.4)
MCH RBC QN AUTO: 32.1 PG (ref 26.8–34.3)
MCHC RBC AUTO-ENTMCNC: 31.8 G/DL (ref 31.4–37.4)
MCV RBC AUTO: 101 FL (ref 82–98)
PLATELET # BLD AUTO: 206 THOUSANDS/UL (ref 149–390)
PMV BLD AUTO: 9.4 FL (ref 8.9–12.7)
POTASSIUM SERPL-SCNC: 5.1 MMOL/L (ref 3.5–5.3)
RBC # BLD AUTO: 2.87 MILLION/UL (ref 3.81–5.12)
SODIUM SERPL-SCNC: 135 MMOL/L (ref 135–147)
UNIT DISPENSE STATUS: NORMAL
UNIT DISPENSE STATUS: NORMAL
UNIT PRODUCT CODE: NORMAL
UNIT PRODUCT CODE: NORMAL
UNIT PRODUCT VOLUME: 350 ML
UNIT PRODUCT VOLUME: 350 ML
UNIT RH: NORMAL
UNIT RH: NORMAL
WBC # BLD AUTO: 11.01 THOUSAND/UL (ref 4.31–10.16)

## 2022-09-29 PROCEDURE — 85027 COMPLETE CBC AUTOMATED: CPT | Performed by: PHYSICIAN ASSISTANT

## 2022-09-29 PROCEDURE — 99024 POSTOP FOLLOW-UP VISIT: CPT | Performed by: ORTHOPAEDIC SURGERY

## 2022-09-29 PROCEDURE — 99242 OFF/OP CONSLTJ NEW/EST SF 20: CPT | Performed by: INTERNAL MEDICINE

## 2022-09-29 PROCEDURE — 97163 PT EVAL HIGH COMPLEX 45 MIN: CPT

## 2022-09-29 PROCEDURE — 97116 GAIT TRAINING THERAPY: CPT

## 2022-09-29 PROCEDURE — NC001 PR NO CHARGE: Performed by: PHYSICIAN ASSISTANT

## 2022-09-29 PROCEDURE — 80048 BASIC METABOLIC PNL TOTAL CA: CPT | Performed by: PHYSICIAN ASSISTANT

## 2022-09-29 RX ORDER — DOCUSATE SODIUM 100 MG/1
100 CAPSULE, LIQUID FILLED ORAL 2 TIMES DAILY
Qty: 20 CAPSULE | Refills: 0 | Status: SHIPPED | OUTPATIENT
Start: 2022-09-29 | End: 2022-10-09

## 2022-09-29 RX ORDER — ASPIRIN 325 MG
325 TABLET, DELAYED RELEASE (ENTERIC COATED) ORAL 2 TIMES DAILY
Qty: 84 TABLET | Refills: 0 | Status: SHIPPED | OUTPATIENT
Start: 2022-09-29 | End: 2022-10-05

## 2022-09-29 RX ORDER — ONDANSETRON 4 MG/1
4 TABLET, ORALLY DISINTEGRATING ORAL EVERY 6 HOURS PRN
Qty: 20 TABLET | Refills: 0 | Status: SHIPPED | OUTPATIENT
Start: 2022-09-29

## 2022-09-29 RX ORDER — CEPHALEXIN 500 MG/1
500 CAPSULE ORAL EVERY 6 HOURS SCHEDULED
Qty: 28 CAPSULE | Refills: 0 | Status: SHIPPED | OUTPATIENT
Start: 2022-09-29 | End: 2022-10-06

## 2022-09-29 RX ORDER — PROMETHAZINE HYDROCHLORIDE 12.5 MG/1
12.5 TABLET ORAL EVERY 6 HOURS PRN
Qty: 30 TABLET | Refills: 0 | Status: SHIPPED | OUTPATIENT
Start: 2022-09-29

## 2022-09-29 RX ADMIN — CEFAZOLIN SODIUM 1000 MG: 1 SOLUTION INTRAVENOUS at 05:35

## 2022-09-29 RX ADMIN — FOLIC ACID 1 MG: 1 TABLET ORAL at 08:56

## 2022-09-29 RX ADMIN — PREDNISONE 5 MG: 5 TABLET ORAL at 08:56

## 2022-09-29 RX ADMIN — SODIUM CHLORIDE, SODIUM LACTATE, POTASSIUM CHLORIDE, AND CALCIUM CHLORIDE 100 ML/HR: .6; .31; .03; .02 INJECTION, SOLUTION INTRAVENOUS at 05:43

## 2022-09-29 RX ADMIN — DOCUSATE SODIUM 100 MG: 100 CAPSULE, LIQUID FILLED ORAL at 08:56

## 2022-09-29 RX ADMIN — ACETAMINOPHEN 975 MG: 325 TABLET ORAL at 05:35

## 2022-09-29 RX ADMIN — IRON SUCROSE 300 MG: 20 INJECTION, SOLUTION INTRAVENOUS at 08:59

## 2022-09-29 RX ADMIN — OXYCODONE HYDROCHLORIDE 5 MG: 5 TABLET ORAL at 09:06

## 2022-09-29 RX ADMIN — ENOXAPARIN SODIUM 40 MG: 100 INJECTION SUBCUTANEOUS at 05:35

## 2022-09-29 RX ADMIN — OXYCODONE HYDROCHLORIDE AND ACETAMINOPHEN 500 MG: 500 TABLET ORAL at 08:56

## 2022-09-29 NOTE — ASSESSMENT & PLAN NOTE
· Most likely to reactive, no evidence of active infection  · Remains afebrile  · Patient is currently on Keflex 500 mg b i d   For surgical prophylaxis  · Follow temp and WBC curve

## 2022-09-29 NOTE — PLAN OF CARE
Problem: PHYSICAL THERAPY ADULT  Goal: Performs mobility at highest level of function for planned discharge setting  See evaluation for individualized goals  Description: Treatment/Interventions: ADL retraining, Functional transfer training, LE strengthening/ROM, Elevations, Therapeutic exercise, Endurance training, Equipment eval/education, Bed mobility, Gait training, Patient/family training, Spoke to nursing, Spoke to case management, OT  Equipment Recommended: Valeria Rodriguez       See flowsheet documentation for full assessment, interventions and recommendations  Outcome: Progressing  Note: Prognosis: Good  Problem List: Decreased strength, Decreased range of motion, Decreased endurance, Impaired balance, Decreased mobility, Decreased coordination, Obesity, Pain     Barriers to Discharge: Inaccessible home environment     PT Discharge Recommendation: Home with outpatient rehabilitation    See flowsheet documentation for full assessment

## 2022-09-29 NOTE — ASSESSMENT & PLAN NOTE
· Hemoglobin trended down from 12 2-9 2 most likely due to hemodilution from perioperative fluid resuscitation  · No evidence of active bleed  · Surgical site C/D/I  · Continue iron sucrose, folic said  · After discharge, Continue outpatient ferrous sulfate  · Outpatient follow-up with PCP No

## 2022-09-29 NOTE — PLAN OF CARE
Problem: PAIN - ADULT  Goal: Verbalizes/displays adequate comfort level or baseline comfort level  Description: Interventions:  - Encourage patient to monitor pain and request assistance  - Assess pain using appropriate pain scale  - Administer analgesics based on type and severity of pain and evaluate response  - Implement non-pharmacological measures as appropriate and evaluate response  - Consider cultural and social influences on pain and pain management  - Notify physician/advanced practitioner if interventions unsuccessful or patient reports new pain  Outcome: Progressing     Problem: INFECTION - ADULT  Goal: Absence or prevention of progression during hospitalization  Description: INTERVENTIONS:  - Assess and monitor for signs and symptoms of infection  - Monitor lab/diagnostic results  - Monitor all insertion sites, i e  indwelling lines, tubes, and drains  - Monitor endotracheal if appropriate and nasal secretions for changes in amount and color  - [place] appropriate cooling/warming therapies per order  - Administer medications as ordered  - Instruct and encourage patient and family to use good hand hygiene technique  - Identify and instruct in appropriate isolation precautions for identified infection/condition  Outcome: Progressing  Goal: Absence of fever/infection during neutropenic period  Description: INTERVENTIONS:  - Monitor WBC    Outcome: Progressing     Problem: SAFETY ADULT  Goal: Patient will remain free of falls  Description: INTERVENTIONS:  - Educate patient/family on patient safety including physical limitations  - Instruct patient to call for assistance with activity   - Consult OT/PT to assist with strengthening/mobility   - Keep Call bell within reach  - Keep bed low and locked with side rails adjusted as appropriate  - Keep care items and personal belongings within reach  - Initiate and maintain comfort rounds  - Make Fall Risk Sign visible to staff  - Apply yellow socks and bracelet for high fall risk patients  - Consider moving patient to room near nurses station  Outcome: Progressing  Goal: Maintain or return to baseline ADL function  Description: INTERVENTIONS:  -  Assess patient's ability to carry out ADLs; assess patient's baseline for ADL function and identify physical deficits which impact ability to perform ADLs (bathing, care of mouth/teeth, toileting, grooming, dressing, etc )  - Assess/evaluate cause of self-care deficits   - Assess range of motion  - Assess patient's mobility; develop plan if impaired  - Assess patient's need for assistive devices and provide as appropriate  - Encourage maximum independence but intervene and supervise when necessary  - Involve family in performance of ADLs  - Assess for home care needs following discharge   - Consider OT consult to assist with ADL evaluation and planning for discharge  - Provide patient education as appropriate  Outcome: Progressing  Goal: Maintains/Returns to pre admission functional level  Description: INTERVENTIONS:  - Perform BMAT or MOVE assessment daily    - Set and communicate daily mobility goal to care team and patient/family/caregiver  - Collaborate with rehabilitation services on mobility goals if consulted  - Out of bed for toileting  - Record patient progress and toleration of activity level   Outcome: Progressing     Problem: Knowledge Deficit  Goal: Patient/family/caregiver demonstrates understanding of disease process, treatment plan, medications, and discharge instructions  Description: Complete learning assessment and assess knowledge base    Interventions:  - Provide teaching at level of understanding  - Provide teaching via preferred learning methods  Outcome: Progressing     Problem: MUSCULOSKELETAL - ADULT  Goal: Maintain or return mobility to safest level of function  Description: INTERVENTIONS:  - Assess patient's ability to carry out ADLs; assess patient's baseline for ADL function and identify physical deficits which impact ability to perform ADLs (bathing, care of mouth/teeth, toileting, grooming, dressing, etc )  - Assess/evaluate cause of self-care deficits   - Assess range of motion  - Assess patient's mobility  - Assess patient's need for assistive devices and provide as appropriate  - Encourage maximum independence but intervene and supervise when necessary  - Involve family in performance of ADLs  - Assess for home care needs following discharge   - Consider OT consult to assist with ADL evaluation and planning for discharge  - Provide patient education as appropriate  Outcome: Progressing  Goal: Maintain proper alignment of affected body part  Description: INTERVENTIONS:  - Support, maintain and protect limb and body alignment  - Provide patient/ family with appropriate education  Outcome: Progressing

## 2022-09-29 NOTE — PHYSICAL THERAPY NOTE
Physical Therapy Evaluation    Patient's Name: Jose Perry    Admitting Diagnosis  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 [S72 001G]    Problem List  Patient Active Problem List   Diagnosis    Anxiety disorder    Hyperlipidemia    Keratitis    Osteoarthritis of both hands    Age-related osteoporosis with current pathological fracture    Rheumatoid arthritis ([address] )    Salivary secretion disturbance    Tear film insufficiency    Elevated blood pressure reading    Urge incontinence of urine    Chronic fatigue    Iron deficiency anemia    Abnormal ECG    Dizziness    History of total right knee replacement (TKR)    On prednisone therapy    Closed fracture of right hip (HCC)    Arthritis of right knee    Status post total hip replacement, right    Leukocytosis       Past Medical History  Past Medical History:   Diagnosis Date    Anemia     1995 - corrected with iron    Ankle fracture, right     last assessed: 3/9/15    Anxiety     Chronic pain disorder     right side joint pain     Moderate exercise     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    Wears dentures     full upper    Wears glasses     reading       Past Surgical History  Past Surgical History:   Procedure Laterality Date    ANKLE SURGERY Right     ORIF 5/16/plate and 4 screws    COLONOSCOPY      CYST REMOVAL      1969    ESOPHAGOGASTRODUODENOSCOPY      Diagnostic    FOOT SURGERY      plantar wart resection; resolved: 1969    JOINT REPLACEMENT Right     RTK    ORIF HIP FRACTURE Right 06/28/2022    Procedure: ORIF HIP W/ CANNUALATED SCREWS;  Surgeon: Claudeen Blizzard, DO;  Location: [facility];  Service: Orthopedics    SC TOTAL KNEE ARTHROPLASTY Right 03/01/2022    Procedure: ARTHROPLASTY KNEE TOTAL;  Surgeon: Claudeen Blizzard, DO;  Location: [facility];  Service: Orthopedics    TONSILLECTOMY AND ADENOIDECTOMY      1962    TUBAL LIGATION      WISDOM TOOTH EXTRACTION         Recent Imaging  XR hip/pelv 1 vw right if performed   Final Result by Carrillo Mora MD (09/29 5789)      Satisfactory appearance of right total hip arthroplasty  Workstation performed: [id number]             Recent Vital Signs  Vitals:    09/28/22 1940 09/28/22 2100 09/29/22 0300 09/29/22 0801   BP: 129/76 105/68 106/73 109/53   BP Location: Left arm Left arm Left arm Left arm   Pulse: 59 62 66 62   Resp: 18 18 18 18   Temp:  98 7 °F (37 1 °C) 97 9 °F (36 6 °C) (!) 97 3 °F (36 3 °C)   TempSrc:  Temporal Temporal Temporal   SpO2: 95% 98% 99% 97%   Weight:       Height:            09/29/22 0950   PT Last Visit   PT Visit Date 09/29/22   Note Type   Note type Evaluation   Pain Assessment   Pain Assessment Tool 0-10   Pain Score 5   Restrictions/Precautions   Weight Bearing Precautions Per Order No   Other Precautions Pain;THR   Home Living   Type of [address] One level;Stairs to enter with rails  (4 BENJI)   Bathroom Shower/Tub Walk-in shower   Bathroom Toilet Standard   Bathroom Accessibility Accessible via walker   Home Equipment Walker;Cane   Prior Function   Level of [place] Independent with ADLs and functional mobility   Lives With Spouse   Receives Help From Family   ADL Assistance Independent   IADLs Independent   Falls in the last 6 months 0   General   Family/Caregiver Present No   Cognition   Overall Cognitive Status WFL   Arousal/Participation Alert   Orientation Level Oriented X4   Memory Within functional limits   Following Commands Follows all commands and directions without difficulty   RLE Assessment   RLE Assessment   (3-/5)   LLE Assessment   LLE Assessment WFL   Coordination   Movements are Fluid and Coordinated 0   Coordination and Movement Description antalgic and wide base of support   Sensation WFL   Light Touch   RLE Light Touch Grossly intact   LLE Light Touch Grossly intact   Bed Mobility   Supine to Sit 4  Minimal assistance   Additional items Assist x 1;Bedrails;Verbal cues;LE management; Increased time required   Sit to Supine 5  Supervision   Additional items Assist x 1;Bedrails; Increased time required;Verbal cues   Transfers   Sit to Stand 5  Supervision   Additional items Assist x 1; Armrests; Increased time required;Verbal cues   Stand to Sit 5  Supervision   Additional items Assist x 1; Armrests; Increased time required;Verbal cues   Additional Comments with RW   Ambulation/Elevation   Gait pattern Decreased toe off;Decreased heel strike; Excessively slow;Decreased foot clearance; Improper Weight shift;Decreased R stance   Gait Assistance 5  Supervision   Additional items Assist x 1;Verbal cues; Tactile cues   Assistive Device Rolling walker   Distance 50ft x2 with seated rest   Stair Management Assistance 4  Minimal assist   Additional items Assist x 1;Verbal cues; Tactile cues; Increased time required   Stair Management Technique Step to pattern; Foreward; One rail L;With cane   Number of Stairs 4   Balance   Static Sitting Fair +   Dynamic Sitting Fair +   Static Standing Fair   Dynamic Standing Fair -   Ambulatory Fair -   Endurance Deficit   Endurance Deficit Yes   Endurance Deficit Description pain and post op fatigue   Activity Tolerance   Activity Tolerance Patient tolerated treatment well   Medical Staff Made Aware spoke to CM, PA   Nurse Made Aware spoke to RN   Assessment   Prognosis Good   Problem List Decreased strength;Decreased range of motion;Decreased endurance; Impaired balance;Decreased mobility; Decreased coordination;Obesity;Pain   Barriers to Discharge Inaccessible home environment   Goals   Patient Goals to go home today   STG Expiration Date 10/04/22   Short Term Goal #1 see eval note   PT Treatment Day 1   Plan   Treatment/Interventions ADL retraining;Functional transfer training;LE strengthening/ROM; Elevations; Therapeutic exercise; Endurance training;Equipment eval/education; Bed mobility;Gait training;Patient/family training;Spoke to nursing;Spoke to case management;OT   PT Frequency Twice a day   Recommendation PT Discharge Recommendation Home with outpatient rehabilitation   Equipment Recommended [facility] Recommended Wheeled walker   AM-PAC Basic Mobility Inpatient   Turning in Bed Without Bedrails 4   Lying on Back to Sitting on Edge of Flat Bed 3   Moving Bed to Chair 3   Standing Up From Chair 3   Walk in Room 3   Climb 3-5 Stairs 3   Basic Mobility Inpatient Raw Score 19   Basic Mobility Standardized Score 42 48   Highest Level Of Mobility   JH-HLM Goal 6: Walk 10 steps or more   JH-HLM Achieved 7: Walk 25 feet or more   Additional Treatment Session   Start Time 1010   End Time 1035   Treatment Assessment gait and transfers training with RW; stair training with SPC  Equipment Use RW SPC   End of Consult   Patient Position at End of Consult Bedside chair; All needs within reach         ASSESSMENT                                                                                                                     Danielle Ortez is a 75 y o  female admitted [facility]/28/2022 for <principal problem not specified>  Pt  has a past medical history of Anemia, Ankle fracture, right, Anxiety, Chronic pain disorder, Moderate exercise, Wears dentures, and Wears glasses    PT was consulted and pt was seen on 9/29/2022 for mobility assessment and d/c planning  Pt presents supine in bed alert and agreeable to therapy  Impairments limiting pt at this time include decreased ROM, impaired balance, decreased endurance, decreased coordination, new onset of impairment of functional mobility, decreased ADLS, decreased IADLS, pain, decreased activity tolerance, and decreased strength  Pt is currently functioning at a minimum assistance x1 level for bed mobility, supervision assistance x1 level for transfers, supervision assistance x1 level for ambulation with Rolling Walker, and minimum assistance x1 for elevations  The patient's AM-PAC Basic Mobility Inpatient Short Form Raw Score is 19   A Raw score of greater than 16 suggests the patient may benefit from discharge to home  Please also refer to the recommendation of the Physical Therapist for safe discharge planning  Goals                                                                                                                                    1) Bed mobility skills with modified independent assistance to facilitate safe return to previous living environment 2) Functional transfers with modified independent assistance to facilitate safe return to previous living environment  3) Ambulation with least restrictive AD modified independent assistance without LOB and stable vitals for safe ambulation home/ community distances  4) Stair training up/down flight 4 step/s with appropriate rail/s  and modified independent assistance for safe access to previous living environment  5) Improve balance grades to fair to reduce risk of falls  6)Improve LE strength grades by 1 to increase independence w/ transfers and gait  7) PT for ongoing pt and family education; DME needs and D/C planning to promote highest level of function in least restrictive environment  Recommendations                                                                                                              Pt will benefit from continued skilled IP PT to address the above mentioned impairments in order to maximize recovery and increase functional independence when completing mobility and ADLs  See flow sheet for goals and POC       DME: Jeferson Milan    Discharge Disposition:  Home with Outpatient Physical Therapy       Carlos Alberto Coles PT, DPT

## 2022-09-29 NOTE — DISCHARGE SUMMARY
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   Procedure: Right hip removal a cannulated screws and conversion to right total hip arthroplasty  Date of surgery: 9/28/22  Admission dates: 9/28/22 - 9/29/22    Payal Correia presented to the hospital on 9/28/2022 with Right hip delayed union of femoral neck fracture after cannulated screw fixation on 06/28/2022 for removal of cannulated screws and conversion to right total hip arthroplasty   She underwent the procedure that same day and the details of the procedure can be found in the operative note  Post-operatively She was placed on a pain and bowel regimen  She was made WBAT of the Right lower extremity and consults were placed to physical therapy and occupational therapy as well as internal medicine who all saw her throughout Her admission  She was placed on posterior hip precautions  She was started on Lovenox and sequential compression as well as SUNG stockings were used for DVT prophylaxis  She received 24 hours of antibiotic prophylaxis  On POD 1 labs were checked  Her dressing was found to be clean, dry, and intact  She worked with physical therapy and make progress         Later that day She was found to be safe and stable for discharge to Home  Discharge instructions were provided as well as needed prescriptions        Results from last 7 days   Lab Units 09/29/22  0453   WBC Thousand/uL 11 01*   HEMOGLOBIN g/dL 9 2*   HEMATOCRIT % 28 9*   PLATELETS Thousands/uL 206         Results from last 7 days   Lab Units 09/29/22  0453   POTASSIUM mmol/L 5 1   CHLORIDE mmol/L 100   CO2 mmol/L 33*   BUN mg/dL 16   CREATININE mg/dL 0 64   CALCIUM mg/dL 8 6     Arlyss Slim

## 2022-09-29 NOTE — ASSESSMENT & PLAN NOTE
· Stable, continue home methotrexate, folic acid, prednisone  · Follow-up with rheumatologist at [facility]

## 2022-09-29 NOTE — CONSULTS
[address]  [facility] 1955, 75 y o  female MRN: [id number]  Unit/Bed#: 7T [facility] 717-01 Encounter: [id number]  Primary Care Provider: Rhoda Ha MD   Date and time admitted to hospital: 9/28/2022 10:35 AM    Inpatient consult to Internal Medicine  Consult performed by: Honorio Rich MD  Consult ordered by: Jazz Wise PA-C          Status post total hip replacement, right  Assessment & Plan  · s/p OSKAR, posterior approach, removal of screws for delayed union of closed right hip fracture  · Currently well pain controlled  · Management per primary service    Leukocytosis  Assessment & Plan  · Most likely to reactive, no evidence of active infection  · Remains afebrile  · Patient is currently on Keflex 500 mg b i d  For surgical prophylaxis  · Follow temp and WBC curve    Iron deficiency anemia  Assessment & Plan  · Hemoglobin trended down from 12 2-9 2 most likely due to hemodilution from perioperative fluid resuscitation  · No evidence of active bleed  · Surgical site C/D/I  · Continue iron sucrose, folic said  · After discharge, Continue outpatient ferrous sulfate  · Outpatient follow-up with PCP    Rheumatoid arthritis ([facility] )  Assessment & Plan  · Stable, continue home methotrexate, folic acid, prednisone  · Follow-up with rheumatologist at [facility]      VTE Prophylaxis: Enoxaparin (Lovenox)  / sequential compression device       Total Time for Visit, including Counseling / Coordination of Care: 30 minutes  Greater than 50% of this total time spent on direct patient counseling and coordination of care  Chief Complaint:   Medical management    History of Present Illness: Ada Carrion is a 75 y o  female who had PMH of right hip fracture s/p OSKAR posterior approach, RA, anemia who was admitted under Orthopedic surgery team for elective OSKAR  We were consulted for medical management  Patient stated she has had regular follow-up with rheumatologist for RA    She has been compliant with her medications  Overnight, pain was 3/10 and this morning it was 5/10 after moving around  Patient stated oxycodone usually helps with the pain  No other major complaint  Review of Systems:    Review of Systems Patient was seen and examined at bedside  The patient denies any fever, chills, chest pain, palpitation, shortness of breath, N/V, abd pain  Past Medical and Surgical History:     Past Medical History:   Diagnosis Date    Anemia     1995 - corrected with iron    Ankle fracture, right     last assessed: 3/9/15    Anxiety     Chronic pain disorder     right side joint pain     Moderate exercise     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    Wears dentures     full upper    Wears glasses     reading       Past Surgical History:   Procedure Laterality Date    ANKLE SURGERY Right     ORIF 5/16/plate and 4 screws    COLONOSCOPY      CYST REMOVAL      1969    ESOPHAGOGASTRODUODENOSCOPY      Diagnostic    FOOT SURGERY      plantar wart resection; resolved: 1969    JOINT REPLACEMENT Right     RTK    ORIF HIP FRACTURE Right 06/28/2022    Procedure: ORIF HIP W/ CANNUALATED SCREWS;  Surgeon: Easton Lancaster DO;  [facility];  Service: Orthopedics    MD TOTAL KNEE ARTHROPLASTY Right 03/01/2022    Procedure: ARTHROPLASTY KNEE TOTAL;  Surgeon: Easton Lancaster DO;  [facility];  Service: Orthopedics    TONSILLECTOMY AND ADENOIDECTOMY      1962    TUBAL LIGATION      WISDOM TOOTH EXTRACTION         Meds/Allergies:    Prior to Admission medications    Medication Sig Start Date End Date Taking?  Authorizing Provider   acetaminophen (TYLENOL) 325 mg tablet Take 3 tablets (975 mg total) by mouth every 8 (eight) hours 3/3/22  Yes Dona Vega PA-C   ascorbic acid (VITAMIN C) 500 MG tablet Take 1 tablet (500 mg total) by mouth daily Start to take 30 days prior to surgery 9/6/22  Yes Dona Vega PA-C   Ca Carbonate-Mag Hydroxide 550-110 MG CHEW Chew Twice daily   Yes Historical Provider, MD   ferrous sulfate 324 (65 Fe) mg Take 1 tablet (324 mg total) by mouth daily before breakfast Start to take 30 days prior to surgery 22  Yes Artemio Ignacio PA-C   folic acid (FOLVITE) 1 mg tablet Take 1 tablet (1 mg total) by mouth daily Start to take 30 days prior to surgery 22  Yes Artemio Ignacio PA-C   ibandronate (BONIVA) 150 MG tablet Take 1 tablet by mouth as needed   16  Yes Historical Provider, MD   Multiple Vitamin (multivitamin) tablet Take 1 tablet by mouth daily Start to take 30 days prior to surgery 22  Yes Janusz Vega PA-C   predniSONE 5 mg tablet Take 5 mg by mouth daily    Yes Historical Provider, MD   methotrexate 2 5 MG tablet TAKE 8 TABLETS BY MOUTH ONCE A WEEK AS DIRECTED 22   Historical Provider, MD     I have reviewed home medications with patient personally  Allergies:    Allergies   Allergen Reactions    Aspirin Other (See Comments)     Per pt avoids taking due to rheumatology Meds       Social History:     Marital Status: /Civil Union     Substance Use History:   Social History     Substance and Sexual Activity   Alcohol Use Yes    Comment: rare, maybe on holidays     Social History     Tobacco Use   Smoking Status Former Smoker    Quit date:     Years since quittin 7   Smokeless Tobacco Never Used     Social History     Substance and Sexual Activity   Drug Use No       Family History:    Family History   Problem Relation Age of Onset    Seizures Mother         epilepsy    Bone cancer Father     No Known Problems Sister     No Known Problems Brother     No Known Problems Son     No Known Problems Maternal Grandmother     No Known Problems Maternal Grandfather     No Known Problems Paternal Grandmother     No Known Problems Paternal Grandfather     Thyroid disease Sister     No Known Problems Sister     No Known Problems Brother        Physical Exam:     Vitals:   Blood Pressure: 109/53 (09/29/22 0801)  Pulse: 62 (09/29/22 0801)  Temperature: (!) 97 3 °F (36 3 °C) (09/29/22 0801)  Temp Source: Temporal (09/29/22 0801)  Respirations: 18 (09/29/22 0801)  Height: 5' 4" (162 6 cm) (09/28/22 1136)  Weight - Scale: 51 7 kg (114 lb) (09/28/22 1136)  SpO2: 97 % (09/29/22 0801)    Physical Exam    General: breathing well on room air, no acute distress  HEENT: NC/AT, PERRL, EOM - normal  Neck: Supple  Pulm/Chest: Normal chest wall expansion, clear breath sounds on both side, no wheezing/rhonchi or crackles appreciated  CVS: RRR, normal S1&S2, no murmur appreciated, capillary refill <2s  Abd: soft, non tender, non distended, bowel sounds +  MSK: move all 4 extremities spontaneously, right hip surgical site C/D/I  Skin: warm  CNS: no acute focal neuro deficit      Additional Data:     Lab Results: I have personally reviewed pertinent reports  Results from last 7 days   Lab Units 09/29/22  0453   WBC Thousand/uL 11 01*   HEMOGLOBIN g/dL 9 2*   HEMATOCRIT % 28 9*   PLATELETS Thousands/uL 206     Results from last 7 days   Lab Units 09/29/22  0453   SODIUM mmol/L 135   POTASSIUM mmol/L 5 1   CHLORIDE mmol/L 100   CO2 mmol/L 33*   BUN mg/dL 16   CREATININE mg/dL 0 64   ANION GAP mmol/L 2*   CALCIUM mg/dL 8 6   GLUCOSE RANDOM mg/dL 138*                       Imaging: I have personally reviewed pertinent reports  XR hip/pelv 1 vw right if performed   Final Result by Baldo Dunlap MD (09/29 5085)      Satisfactory appearance of right total hip arthroplasty  Workstation performed: [id number]             EKG, Pathology, and Other Studies Reviewed on Admission:   EKG:  No acute ST-T changes    Allscripts / Epic Records Reviewed: Yes     ** Please Note: This note has been constructed using a voice recognition system   **

## 2022-09-29 NOTE — ASSESSMENT & PLAN NOTE
· s/p OKSAR, posterior approach, removal of screws for delayed union of closed right hip fracture  · Currently well pain controlled  · Management per primary service

## 2022-09-29 NOTE — PROGRESS NOTES
Progress Note - Orthopedics   Hilary Irvin 75 y o  female MRN: [id number]  Unit/Bed#: 7T [facility] 717-01 Encounter: [id number]    Assessment:  75year old Female POD1 s/p Right hip removal of percutaneous pinning with revision to OSKAR, ABLA    Plan:  -WBAT RLE  -PT/OT, posterior approach precautions  -Abduction pillow while in bed  -Pain control PRN  -Medical management per SLIM  -WBC 11 01, elevated likely reactive due to surgery, patient is afebrile  -Hgb 9 2, ABLA, will continue to monitor   -Continue Lovenox, SCDs, and TEDs for DVT ppx while in house, she will DC on ASA 325mg BID for DVT ppx  -Abx, will continue Keflex for 1 week post-op due to immunosuppressive therapy  -DC planning, home pending slim and PT clearance       Subjective:  57-year-old female postop day 1 status post right hip removal percutaneous pinning with cannulated screws with revision to right total hip arthroplasty  Patient was seen examined at bedside  Patient reports that she is overall doing well  States that her pain is well under control with taking Tylenol this morning  She denies having any numbness tingling in the right lower extremity  She denies any fevers chills or sweats  Patient denies a bowel movement since surgery but states that she is passing gas  Patient states that she was unable to attend physical therapy yesterday due to being out of surgery late  Patient will work with physical therapy today and plan to be discharged home  Vitals: Blood pressure 109/53, pulse 62, temperature (!) 97 3 °F (36 3 °C), temperature source Temporal, resp  rate 18, height 5' 4" (1 626 m), weight 51 7 kg (114 lb), SpO2 97 %, not currently breastfeeding  ,Body mass index is 19 57 kg/m²        Intake/Output Summary (Last 24 hours) at 9/29/2022 1021  Last data filed at 9/29/2022 0543  Gross per 24 hour   Intake 2600 ml   Output 1690 ml   Net 910 ml       Invasive Devices  Report    Peripheral Intravenous Line  Duration           Peripheral IV 09/28/22 Right;Lateral Wrist <1 day                Physical Exam: General appearance: alert and oriented, in no acute distress  Head: Normocephalic, without obvious abnormality, atraumatic  Eyes: conjunctivae/corneas clear  PERRL, EOM's intact  Fundi benign  Lungs: No stridor or wheezing  Heart: No apparent distress    Ortho Exam:   Right Lower Extremity Exam  Alignment:  Leg lengths appear equal   Inspection:  Mepilex dressing is clean dry and intact  There is no erythema or ecchymosis about the anterior lateral aspect of the hip  Abduction pillow is in place  Palpation:  Compartments are soft and compressible  Strength:  5/5 AT, GSC  Neurovascular:  Sensation intact in DP/SP/Arcos/Sa/T nerve distributions  Palpable DP & PT pulses  Gait:  Not tested  Lab, Imaging and other studies:   I have personally reviewed pertinent lab results    CBC:   Lab Results   Component Value Date    WBC 11 01 (H) 09/29/2022    HGB 9 2 (L) 09/29/2022    HCT 28 9 (L) 09/29/2022     (H) 09/29/2022     09/29/2022    MCH 32 1 09/29/2022    MCHC 31 8 09/29/2022    RDW 14 2 09/29/2022    MPV 9 4 09/29/2022     CMP:   Lab Results   Component Value Date    SODIUM 135 09/29/2022     09/29/2022    CO2 33 (H) 09/29/2022    BUN 16 09/29/2022    CREATININE 0 64 09/29/2022    CALCIUM 8 6 09/29/2022    EGFR 91 09/29/2022     [employer]

## 2022-09-30 ENCOUNTER — TELEPHONE (OUTPATIENT)
Dept: OBGYN CLINIC | Facility: HOSPITAL | Age: 67
End: 2022-09-30

## 2022-10-01 NOTE — PHYSICAL THERAPY NOTE
Physical TherapyTreatment Note    Patient's Name: Wilder Hernandez    Admitting Diagnosis  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 [S72 001G]    Problem List  Patient Active Problem List   Diagnosis    Anxiety disorder    Hyperlipidemia    Keratitis    Osteoarthritis of both hands    Age-related osteoporosis with current pathological fracture    Rheumatoid arthritis ([address] )    Salivary secretion disturbance    Tear film insufficiency    Elevated blood pressure reading    Urge incontinence of urine    Chronic fatigue    Iron deficiency anemia    Abnormal ECG    Dizziness    History of total right knee replacement (TKR)    On prednisone therapy    Closed fracture of right hip (HCC)    Arthritis of right knee    Status post total hip replacement, right    Leukocytosis       Past Medical History  Past Medical History:   Diagnosis Date    Anemia     1995 - corrected with iron    Ankle fracture, right     last assessed: 3/9/15    Anxiety     Chronic pain disorder     right side joint pain     Moderate exercise     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    Wears dentures     full upper    Wears glasses     reading       Past Surgical History  Past Surgical History:   Procedure Laterality Date    ANKLE SURGERY Right     ORIF 5/16/plate and 4 screws    COLONOSCOPY      CYST REMOVAL      1969    ESOPHAGOGASTRODUODENOSCOPY      Diagnostic    FOOT SURGERY      plantar wart resection; resolved: 1969    JOINT REPLACEMENT Right     RTK    ORIF HIP FRACTURE Right 06/28/2022    Procedure: ORIF HIP W/ CANNUALATED SCREWS;  Surgeon: Shirley Villalta DO;  [facility];  Service: Orthopedics    [place] TOTAL KNEE ARTHROPLASTY Right 03/01/2022    Procedure: ARTHROPLASTY KNEE TOTAL;  Surgeon: Shirley Villalta DO;  [facility];  Service: Orthopedics    TONSILLECTOMY AND ADENOIDECTOMY      1962    TUBAL LIGATION      WISDOM TOOTH EXTRACTION         Recent Imaging  XR hip/pelv 1 vw right if performed   Final Result by Freedom Alexander MD (09/29 9013)      Satisfactory appearance of right total hip arthroplasty  Workstation performed: [id number]             Recent Vital Signs  Vitals:    09/28/22 1940 09/28/22 2100 09/29/22 0300 09/29/22 0801   BP: 129/76 105/68 106/73 109/53   BP Location: Left arm Left arm Left arm Left arm   Pulse: 59 62 66 62   Resp: 18 18 18 18   Temp:  98 7 °F (37 1 °C) 97 9 °F (36 6 °C) (!) 97 3 °F (36 3 °C)   TempSrc:  Temporal Temporal Temporal   SpO2: 95% 98% 99% 97%   Weight:       Height:            09/29/22 1245   PT Last Visit   PT Visit Date 09/29/22   Note Type   Note Type BID visit/treatment   Pain Assessment   Pain Assessment Tool 0-10   Pain Score 3   Restrictions/Precautions   Other Precautions Pain;THR   General   Chart Reviewed Yes   Response to Previous Treatment Patient with no complaints from previous session  Family/Caregiver Present No   Cognition   Overall Cognitive Status WFL   Transfers   Sit to Stand 6  Modified independent   Stand to Sit 6  Modified independent   Ambulation/Elevation   Gait pattern Step through pattern;Decreased toe off;Decreased heel strike; Wide RAMBO; Decreased foot clearance   Gait Assistance 6  Modified independent   Additional items Verbal cues   Assistive Device Rolling walker   Distance 50ft x2   Stair Management Assistance 5  Supervision   Additional items Assist x 1;Verbal cues; Increased time required   Stair Management Technique Step to pattern; Foreward; One rail L;With cane   Number of Stairs 4   Ambulation/Elevation Additional Comments pt reports can enter back with just one step if needs to   Balance   Static Sitting Fair +   Dynamic Sitting Fair +   Static Standing Fair   Dynamic Standing Fair -   Ambulatory Fair -   Endurance Deficit   Endurance Deficit Yes   Endurance Deficit Description pain and fatigue; improved from AM   Activity Tolerance   Activity Tolerance Patient limited by fatigue;Patient limited by pain   Medical Staff Made Aware spoke to [facility]   Nurse Made Aware spoke to RN   Assessment   Prognosis Good   Problem List Decreased strength;Decreased range of motion;Decreased endurance; Impaired balance;Decreased mobility; Decreased coordination;Obesity;Pain   Assessment Improved to mod I with transfers and ambulation, supervision on stairs  Pt and  report comfortable with D/C at this level  Pt is cleared for D/C at this time  Barriers to Discharge Inaccessible home environment   Goals   Patient Goals go home   STG Expiration Date 10/04/22   Short Term Goal #1 see eval note   PT Treatment Day 1   Plan   Treatment/Interventions ADL retraining;LE strengthening/ROM; Functional transfer training;Elevations; Endurance training; Therapeutic exercise;Patient/family training;Equipment eval/education; Bed mobility;Gait training;Spoke to nursing;Spoke to case management;OT   Progress Progressing toward goals   PT Frequency Twice a day   Recommendation   PT Discharge Recommendation Home with outpatient rehabilitation   Equipment Recommended [phone] [facility] Recommended Wheeled walker   [address]   Turning in Bed Without Bedrails 4   Lying on Back to Sitting on Edge of Flat Bed 4   Moving Bed to Chair 4   Standing Up From Chair 4   Walk in Room 4   Climb 3-5 Stairs 3   Basic Mobility Inpatient Raw Score 23   Basic Mobility Standardized Score 50 88   Highest Level Of Mobility   JH-HLM Goal 7: Walk 25 feet or more   JH-HLM Achieved 7: Walk 25 feet or more   Education   Education Provided Mobility training;Home exercise program;Assistive device   Patient Explanation/teachback used;Demonstrates verbal understanding   End of Consult   Patient Position at End of Consult Bedside chair; All needs within reach       [facility] PT, DPT

## 2022-10-03 ENCOUNTER — OFFICE VISIT (OUTPATIENT)
Dept: PHYSICAL THERAPY | Facility: CLINIC | Age: 67
End: 2022-10-03
Payer: OTHER MISCELLANEOUS

## 2022-10-03 DIAGNOSIS — Z01.818 PREOPERATIVE TESTING: ICD-10-CM

## 2022-10-03 DIAGNOSIS — Z01.818 ENCOUNTER FOR PREADMISSION TESTING: ICD-10-CM

## 2022-10-03 DIAGNOSIS — S72.001K CLOSED DISPLACED FRACTURE OF RIGHT FEMORAL NECK WITH NONUNION: Primary | ICD-10-CM

## 2022-10-03 DIAGNOSIS — S72.001G: ICD-10-CM

## 2022-10-03 PROCEDURE — 97110 THERAPEUTIC EXERCISES: CPT | Performed by: PHYSICAL THERAPIST

## 2022-10-03 PROCEDURE — 97161 PT EVAL LOW COMPLEX 20 MIN: CPT | Performed by: PHYSICAL THERAPIST

## 2022-10-03 NOTE — PROGRESS NOTES
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  S72 001G Ambulatory referral to Physical Therapy   3  Preoperative testing  Z01 818 Ambulatory referral to Physical Therapy   4  Encounter for preadmission testing  Z01 818 Ambulatory referral to Physical Therapy                  Assessment  Assessment details: Vamsi Mcmahon is a 75 y o  female referred to outpatient physical therapy for s/p R OSKAR on 22  Impairments include pain, decreased R hip ROM, decreased RLE strength, decreased flexibility, impaired balance, and antalgic gait  These impairments are limiting patients functional ability to perform standing, ambulation, and stair navigation  Pt is restricted in participating in ADLs and occupational tasks  Pt would benefit from skilled physical therapy to address the limitations above to allow return to prior level of function  At this point in time, no further referral necessary based upon examination results  Impairments: abnormal gait, abnormal muscle tone, abnormal or restricted ROM, abnormal movement, activity intolerance, impaired balance, impaired physical strength, lacks appropriate home exercise program and pain with function    Symptom irritability: lowUnderstanding of Dx/Px/POC: good  Goals  Short term goals 2-3 weeks    1) Patient will demonstrate ability to perform HEP  2) Patient will demonstrate ability to ambulate without AD  3) Patient will improve LE strength by 1 MMT grade  Long term goals 4-8 weeks    1) Patient will demonstrate independence with comprehensive HEP  2) Patient improve FOTO score to equal or greater than expected values     3) Patient will demonstrate improved TUG score to <13 s to decrease risk for falls  4) Patient will demonstrate improved 5xSTS score to <12 s to decrease risk for falls  Plan  Plan details: Plan of care was discussed with patient at time of evaluation  Pt will be seen 2x a week for 8 weeks  Patient would benefit from: skilled physical therapy  Planned modality interventions: cryotherapy, TENS, thermotherapy: hydrocollator packs and low level laser therapy  Other planned modality interventions: PRN  Planned therapy interventions: balance, flexibility, functional ROM exercises, home exercise program, joint mobilization, manual therapy, neuromuscular re-education, strengthening, therapeutic activities, stretching, therapeutic exercise, gait training, patient education and abdominal trunk stabilization  Frequency: 2x week  Duration in weeks: 8  Treatment plan discussed with: patient        Subjective Evaluation    History of Present Illness  Date of surgery: 9/28/2022  Mechanism of injury: surgery  Mechanism of injury: Patient presents to outpatient physical therapy s/p ARTHROPLASTY HIP TOTAL, removal of cannulated screws, posterior approach, cerclage of femur fx (Right Hip) on 9/28/22  Previous femoral head ORIF secondary to fracture on 6/28/2022 in R hip  Hx of R TKA on 3/1/22  Pt was educated on her precautions in the hospital regarding her posterior approach  Pt currently ambulating with a rolling walker  Pt will have a follow up with her surgeon on Friday, Oct 7th  Patient denies episodes of numbness or tingling in RLE  Pain described as aching and sharp in R hip  Pain rating currently 0/10, at best 0/10 and at worst 10/10  Patient states limitations with ambulation, standing, stair navigation  Pt performs non-reciprocal stair navigation for her four steps outside with unilateral railing  Pt currently works as a CNA at [employer]  Pt would like to return to work but she may have to retire because her family wants her to  Aggravating factors include weight bearing  Patient states use of tylenol every 6 hours and prednisone for relief of symptoms  Pt goals for therapy include being able to walk on her own without an AD, and getting back to normal life  Not a recurrent problem   Quality of life: good    Pain  Current pain ratin  At best pain ratin  At worst pain rating: 10  Quality: sharp and dull ache  Relieving factors: medications and rest  Aggravating factors: walking, standing and stair climbing    Social Support  Steps to enter house: yes (4 steps at back deck)  Lives in: trailer  Lives with: spouse    Treatments  No previous or current treatments  Patient Goals  Patient goals for therapy: decreased edema, decreased pain, increased motion, return to sport/leisure activities, independence with ADLs/IADLs, increased strength and return to work          Objective     Observations     Right Hip  Positive for atrophy and incision  Negative for drainage  Additional Observation Details  Dressing remains over incision, assessed R hip for swelling and redness (-)        Palpation     Right   Tenderness of the TFL  Tenderness     Right Hip   Tenderness in the greater trochanter       Neurological Testing     Sensation     Hip   Left Hip   Intact: light touch    Right Hip   Intact: light touch    Active Range of Motion   Left Hip   Flexion: 90 degrees     Right Hip   Flexion: 40 degrees   Abduction: Right hip active abduction: unable      Passive Range of Motion   Left Hip   Flexion: 110 degrees   Abduction: 25 degrees     Right Hip   Flexion: 90 degrees   Abduction: 25 degrees     Strength/Myotome Testing     Left Hip   Planes of Motion   Flexion: 4-    Right Hip   Planes of Motion   Flexion: 3  Abduction: 2- (unable to do gravity eliminated without therapist assistance)    Left Knee   Flexion: 4  Extension: 4    Right Knee   Flexion: 4-  Extension: 4-    Left Ankle/Foot   Dorsiflexion: 4    Right Ankle/Foot   Dorsiflexion: 4    Ambulation     Ambulation: Level Surfaces   Ambulation with assistive device: independent    Observational Gait   Gait: antalgic   Decreased walking speed, stride length, right stance time, right swing time and right step length  Left foot contact pattern: heel to toe  Right foot contact pattern: heel to toe  Left arm swing: decreased  Right arm swing: decreased  Base of support: normal    Quality of Movement During Gait     Pelvis    Pelvis (Right): Positive Trendelenburg  Knee    Knee (Right): Positive stiff knee       Functional Assessment        Comments  5xSTS: 198 81 s with use of UE support for push off  TU 32 s with UE push off and RW              Precautions: RA, osteoporosis, R OSKAR on 22, R TKA on 3/1/22      Manuals 10/3            R hip PROM                                                    Neuro Re-Ed                                                                                                        Ther Ex             Bike             Standing hip abd HEP            LAQ HEP            SAQ HEP            Supine hip abd             Supine hip add             Clamshells             SL hip abd             Leg press             Ther Activity             Mini squats             Step ups             Gait Training             Amb w/ SPC                          Modalities

## 2022-10-04 ENCOUNTER — TRANSITIONAL CARE MANAGEMENT (OUTPATIENT)
Dept: FAMILY MEDICINE CLINIC | Facility: CLINIC | Age: 67
End: 2022-10-04

## 2022-10-05 ENCOUNTER — OFFICE VISIT (OUTPATIENT)
Dept: FAMILY MEDICINE CLINIC | Facility: CLINIC | Age: 67
End: 2022-10-05
Payer: COMMERCIAL

## 2022-10-05 VITALS
WEIGHT: 118.6 LBS | SYSTOLIC BLOOD PRESSURE: 108 MMHG | HEIGHT: 64 IN | DIASTOLIC BLOOD PRESSURE: 68 MMHG | BODY MASS INDEX: 20.25 KG/M2 | HEART RATE: 78 BPM | OXYGEN SATURATION: 97 %

## 2022-10-05 DIAGNOSIS — M80.00XA AGE-RELATED OSTEOPOROSIS WITH CURRENT PATHOLOGICAL FRACTURE, INITIAL ENCOUNTER: ICD-10-CM

## 2022-10-05 DIAGNOSIS — S72.001D CLOSED FRACTURE OF RIGHT HIP WITH ROUTINE HEALING, SUBSEQUENT ENCOUNTER: ICD-10-CM

## 2022-10-05 DIAGNOSIS — Z96.641 STATUS POST TOTAL HIP REPLACEMENT, RIGHT: ICD-10-CM

## 2022-10-05 DIAGNOSIS — Z79.52 ON PREDNISONE THERAPY: ICD-10-CM

## 2022-10-05 DIAGNOSIS — Z23 NEED FOR INFLUENZA VACCINATION: Primary | ICD-10-CM

## 2022-10-05 DIAGNOSIS — D72.829 LEUKOCYTOSIS, UNSPECIFIED TYPE: ICD-10-CM

## 2022-10-05 DIAGNOSIS — E78.2 MIXED HYPERLIPIDEMIA: ICD-10-CM

## 2022-10-05 DIAGNOSIS — D50.9 IRON DEFICIENCY ANEMIA, UNSPECIFIED IRON DEFICIENCY ANEMIA TYPE: ICD-10-CM

## 2022-10-05 DIAGNOSIS — M05.79 RHEUMATOID ARTHRITIS INVOLVING MULTIPLE SITES WITH POSITIVE RHEUMATOID FACTOR (HCC): ICD-10-CM

## 2022-10-05 PROCEDURE — 99496 TRANSJ CARE MGMT HIGH F2F 7D: CPT | Performed by: FAMILY MEDICINE

## 2022-10-05 PROCEDURE — 90471 IMMUNIZATION ADMIN: CPT

## 2022-10-05 PROCEDURE — 90662 IIV NO PRSV INCREASED AG IM: CPT

## 2022-10-05 RX ORDER — PREDNISONE 10 MG/1
TABLET ORAL
Qty: 9 TABLET | Refills: 0 | Status: SHIPPED | OUTPATIENT
Start: 2022-10-05

## 2022-10-05 NOTE — ASSESSMENT & PLAN NOTE
She has some postoperative anemia but remains on iron  She is doing extremely well with her recovery  She will continue with physical therapy  She was not able to take aspirin as per her rheumatologist based on her other medications but is wearing her Marvin stockings and routinely ambulating

## 2022-10-05 NOTE — ASSESSMENT & PLAN NOTE
She does appear to be having a flare of her right elbow versus some olecranon bursitis    I am going to place her on prednisone 20 mg for 3 days and 10 mg for 3 days and then she can return to 5 mg for 3 days and then back to 2 5 mg which she is currently on

## 2022-10-05 NOTE — ASSESSMENT & PLAN NOTE
She had a hip fracture back in June  Unfortunately, she required a revision with total hip replacement one week ago  She is doing quite well at this time and not requiring pain medication

## 2022-10-05 NOTE — PROGRESS NOTES
Assessment/Plan:       Problem List Items Addressed This Visit        Musculoskeletal and Integument    Age-related osteoporosis with current pathological fracture    Relevant Orders    CBC and differential    Comprehensive metabolic panel    Rheumatoid arthritis ([facility] [address] )     She does appear to be having a flare of her right elbow versus some olecranon bursitis  I am going to place her on prednisone 20 mg for 3 days and 10 mg for 3 days and then she can return to 5 mg for 3 days and then back to 2 5 mg which she is currently on         Relevant Medications    predniSONE 10 mg tablet    Other Relevant Orders    CBC and differential    Comprehensive metabolic panel    Closed fracture of right hip ([facility] [address] )     She had a hip fracture back in June  Unfortunately, she required a revision with total hip replacement one week ago  She is doing quite well at this time and not requiring pain medication  Other    Hyperlipidemia    Relevant Orders    Comprehensive metabolic panel    Iron deficiency anemia    Relevant Orders    CBC and differential    Iron, TIBC and Ferritin Panel    On prednisone therapy    Status post total hip replacement, right     She has some postoperative anemia but remains on iron  She is doing extremely well with her recovery  She will continue with physical therapy  She was not able to take aspirin as per her rheumatologist based on her other medications but is wearing her Marvin stockings and routinely ambulating  Leukocytosis    Relevant Medications    predniSONE 10 mg tablet    Other Relevant Orders    CBC and differential      Other Visit Diagnoses     Need for influenza vaccination    -  Primary    Relevant Orders    influenza vaccine, high-dose, PF 0 7 mL (FLUZONE HIGH-DOSE) (Completed)            Subjective:      Patient ID: Annel Aggarwal is a 75 y o  female      HPI   TCM Call     Date and time call was made  10/4/2022  [facility] reviewed  Records reviewed    Patient was hospitialized at  [facility]    Date of Admission  09/28/22    Date of discharge  09/29/22    Diagnosis  Right hip replacement    Disposition  Home health services    Were the patients medications reviewed and updated  Yes    Current Symptoms  None      TCM Call     Post hospital issues  None    Scheduled for follow up? Yes    Did you obtain your prescribed medications  Yes    Do you need help managing your prescriptions or medications  No    Is transportation to your appointment needed  No    I have advised the patient to call PCP with any new or worsening symptoms  SILAS Mason        Patient presents today for TCM  She was discharged a week ago after a total hip replacement  She was having unfortunate dislodging of hardware from her previous hip fracture back in June  Thankfully, she did extremely well the surgery is feeling quite well  She is undergoing physical therapy and only requiring Tylenol for pain  She does have some swelling of her right elbow over the past 2 weeks or so  This is painful and does not allow full extension of her arm  She does have a RA  She is on a low-dose of prednisone at 2 5 mg      The following portions of the patient's history were reviewed and updated as appropriate: allergies, current medications, past family history, past medical history, past social history, past surgical history and problem list       Current Outpatient Medications:     acetaminophen (TYLENOL) 325 mg tablet, Take 3 tablets (975 mg total) by mouth every 8 (eight) hours, Disp: , Rfl: 0    ascorbic acid (VITAMIN C) 500 MG tablet, Take 1 tablet (500 mg total) by mouth daily Start to take 30 days prior to surgery, Disp: 30 tablet, Rfl: 1    Ca Carbonate-Mag Hydroxide 550-110 MG CHEW, Chew Twice daily, Disp: , Rfl:     docusate sodium (COLACE) 100 mg capsule, Take 1 capsule (100 mg total) by mouth 2 (two) times a day for 10 days, Disp: 20 capsule, Rfl: 0    ferrous sulfate 324 (65 Fe) mg, Take 1 tablet (324 mg total) by mouth daily before breakfast Start to take 30 days prior to surgery, Disp: 30 tablet, Rfl: 1    folic acid (FOLVITE) 1 mg tablet, Take 1 tablet (1 mg total) by mouth daily Start to take 30 days prior to surgery, Disp: 30 tablet, Rfl: 1    ibandronate (BONIVA) 150 MG tablet, Take 1 tablet by mouth as needed  , Disp: , Rfl:     methotrexate 2 5 MG tablet, TAKE 8 TABLETS BY MOUTH ONCE A WEEK AS DIRECTED, Disp: , Rfl:     Multiple Vitamin (multivitamin) tablet, Take 1 tablet by mouth daily Start to take 30 days prior to surgery, Disp: 30 tablet, Rfl: 1    ondansetron (Zofran ODT) 4 mg disintegrating tablet, Take 1 tablet (4 mg total) by mouth every 6 (six) hours as needed for nausea or vomiting, Disp: 20 tablet, Rfl: 0    predniSONE 10 mg tablet, Take 2 tablets for 3 days and 1 tablet for 3 days and then return to 5 mg daily  , Disp: 9 tablet, Rfl: 0    predniSONE 5 mg tablet, Take 5 mg by mouth daily , Disp: , Rfl:     promethazine (PHENERGAN) 12 5 MG tablet, Take 1 tablet (12 5 mg total) by mouth every 6 (six) hours as needed for nausea or vomiting, Disp: 30 tablet, Rfl: 0    aspirin (ECOTRIN) 325 mg EC tablet, Take 1 tablet (325 mg total) by mouth 2 (two) times a day Take with food  Do not take at the same time of day as celebrex , Disp: 56 tablet, Rfl: 0     Review of Systems   Constitutional: Positive for fatigue  HENT: Negative for trouble swallowing  Respiratory: Negative for chest tightness, shortness of breath and wheezing  Cardiovascular: Negative for chest pain, palpitations and leg swelling  Gastrointestinal: Positive for constipation  Negative for abdominal pain and diarrhea  Endocrine: Negative for polyuria  Genitourinary: Negative for difficulty urinating, flank pain and urgency  Musculoskeletal: Negative for arthralgias and myalgias  Skin: Negative for rash  Neurological: Negative for dizziness and light-headedness     Hematological: Does not bruise/bleed easily  Psychiatric/Behavioral: Negative for sleep disturbance  Objective:      /68 (BP Location: Left arm, Patient Position: Sitting, Cuff Size: Adult)   Pulse 78   Ht 5' 4" (1 626 m)   Wt 53 8 kg (118 lb 9 6 oz)   SpO2 97%   BMI 20 36 kg/m²          Physical Exam  Vitals reviewed  Constitutional:       Appearance: She is well-developed  HENT:      Head: Normocephalic  Cardiovascular:      Rate and Rhythm: Regular rhythm  Heart sounds: Normal heart sounds  No murmur heard  Pulmonary:      Effort: No respiratory distress  Breath sounds: No wheezing or rales  Abdominal:      General: There is no distension  Tenderness: There is no abdominal tenderness  Musculoskeletal:      Right lower leg: No edema  Left lower leg: No edema  Skin:     Coloration: Skin is not jaundiced  Findings: No erythema or rash  Comments: THR wound looks excellent  Neurological:      Mental Status: She is alert and oriented to person, place, and time             Asim Sorrel

## 2022-10-06 ENCOUNTER — OFFICE VISIT (OUTPATIENT)
Dept: PHYSICAL THERAPY | Facility: CLINIC | Age: 67
End: 2022-10-06
Payer: OTHER MISCELLANEOUS

## 2022-10-06 DIAGNOSIS — Z01.818 PREOPERATIVE TESTING: ICD-10-CM

## 2022-10-06 DIAGNOSIS — Z01.818 ENCOUNTER FOR PREADMISSION TESTING: ICD-10-CM

## 2022-10-06 DIAGNOSIS — S72.001G: ICD-10-CM

## 2022-10-06 DIAGNOSIS — S72.001K CLOSED DISPLACED FRACTURE OF RIGHT FEMORAL NECK WITH NONUNION: Primary | ICD-10-CM

## 2022-10-06 PROCEDURE — 97110 THERAPEUTIC EXERCISES: CPT | Performed by: PHYSICAL THERAPIST

## 2022-10-06 PROCEDURE — 97116 GAIT TRAINING THERAPY: CPT | Performed by: PHYSICAL THERAPIST

## 2022-10-06 PROCEDURE — 97530 THERAPEUTIC ACTIVITIES: CPT | Performed by: PHYSICAL THERAPIST

## 2022-10-06 PROCEDURE — 97140 MANUAL THERAPY 1/> REGIONS: CPT | Performed by: PHYSICAL THERAPIST

## 2022-10-06 NOTE — PROGRESS NOTES
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  S72 001G    3  Preoperative testing  Z01 818    4  Encounter for preadmission testing  Z01 818                   Subjective: Pt reports she is feeling good  States she removed her bandage and saw her PCP who mentioned mild redness around the incision  Pt states she has been able to ambulate around the house with a SPC  Objective: See treatment diary below      Assessment: Assessed incision due to removal of dressing  (-) for signs of infection  Educated pt on gentle massage around incision to aid in scar mobility  Educated pt on signs of infection with good understanding  Mild pain present with hip abduction strengthening therefore decreased reps performed with sidelying hip abduction  Trialed ambulation with SPC; increased trendelenburg noted during gait mechanics; educated pt to perform gluteal isometrics during stance phase on RLE  Educated pt on how to properly fit a SPC  Significant difficulty with step ups due to significant RLE weakness  Reviewed HEP and answered all questions at this time  Plan: Continue per plan of care        Precautions: RA, osteoporosis, R OSKAR on 22, R TKA on 3/1/22      Manuals 10/3 10/6           R hip PROM  AG                                     Assessed incision  AG           Neuro Re-Ed                                                                                                        Ther Ex             Bike  NV           Standing hip abd HEP 3x10            LAQ HEP 1#  3x10            SAQ HEP 1#  3x10           Supine hip abd  2x10 w/therapist assist           Supine hip add             Clamshells             SL hip abd  1x10  1x4           Leg press Ther Activity             Mini squats             Step ups  4" x5 LLE B/L UE           Gait Training             Amb w/ SPC  [id number]                        Modalities

## 2022-10-07 ENCOUNTER — APPOINTMENT (OUTPATIENT)
Dept: RADIOLOGY | Facility: MEDICAL CENTER | Age: 67
End: 2022-10-07
Payer: COMMERCIAL

## 2022-10-07 ENCOUNTER — OFFICE VISIT (OUTPATIENT)
Dept: OBGYN CLINIC | Facility: MEDICAL CENTER | Age: 67
End: 2022-10-07

## 2022-10-07 VITALS
HEART RATE: 80 BPM | BODY MASS INDEX: 20.04 KG/M2 | HEIGHT: 64 IN | SYSTOLIC BLOOD PRESSURE: 116 MMHG | WEIGHT: 117.4 LBS | DIASTOLIC BLOOD PRESSURE: 52 MMHG

## 2022-10-07 DIAGNOSIS — Z96.641 STATUS POST TOTAL HIP REPLACEMENT, RIGHT: ICD-10-CM

## 2022-10-07 DIAGNOSIS — Z96.641 STATUS POST TOTAL HIP REPLACEMENT, RIGHT: Primary | ICD-10-CM

## 2022-10-07 PROCEDURE — 99024 POSTOP FOLLOW-UP VISIT: CPT | Performed by: ORTHOPAEDIC SURGERY

## 2022-10-07 PROCEDURE — 73502 X-RAY EXAM HIP UNI 2-3 VIEWS: CPT

## 2022-10-07 RX ORDER — ASPIRIN 325 MG
325 TABLET, DELAYED RELEASE (ENTERIC COATED) ORAL 2 TIMES DAILY
Qty: 56 TABLET | Refills: 0 | Status: SHIPPED | OUTPATIENT
Start: 2022-10-07

## 2022-10-07 NOTE — LETTER
October 7, 2022     Patient: Chay Wilkerson  YOB: 1955  Date of Visit: 10/7/2022      To Whom it May Concern: Carly Jessika is under my professional care  Indu Muller was seen in my office on 10/7/2022  Indu Muller will remain out of work  She will be re-evaluated in 4 weeks  If you have any questions or concerns, please don't hesitate to call  Sincerely,          Shalonda Beckwith DO        CC: Pj Chand

## 2022-10-07 NOTE — PROGRESS NOTES
Assessment/Plan:  1  Status post total hip replacement, right      Orders Placed This Encounter   Procedures    XR hip/pelv 2-3 vws right if performed       · Patient is doing well after right hip removal of hardware and conversion to R posterior OSKAR  · Continue outpatient PT   · Pain control prn- celebrex and tylenol  Patient aware to wean off tylenol #3  · Continue with ASA for DVT prophylaxis  Discussed with patient that we would prioritize DVT prophylaxis over celebrex for RA  I advised she can take both but should monitor closely for any stomach upset, blood in the stool, or dark stool  New script provided for remaining 4 weeks of ASA  · SUNG stockings as needed for swelling  · May shower and let water run over incision, do not submerge incision  · Continue with posterior hip precautions  · Patient should call ahead for abx prior to dental appts  · Work note provided to remain out of work  Return in about 4 weeks (around 11/4/2022) for R OSKAR post op 2  I answered all of the patient's questions during the visit and provided education of the patient's condition during the visit  The patient verbalized understanding of the information given and agrees with the plan  This note was dictated using [employer]ruly[employer] software  It may contain errors including improperly dictated words  Please contact physician directly for any questions  Subjective   Chief Complaint:   Chief Complaint   Patient presents with    Right Hip - Post-op       Sly Villegas is a 75 y o  female who presents for 9 dayfollow up s/p right hip removal of hardware and conversion to R posterior OSKAR  Patient states she is doing well  Her pain is 4/10  Patient states she is taking tylenol #3 for pain, celebrex, and prednisone  Her prednisone dosage was recently increased by PCP due to elbow pain  She reports she is not taking ASA because it interacts with her celebrex  Patient is not taking her methotrexate currently   Patient completed keflex  She is attending outpatient PT  They are working on her hip adduction  She ambulates with a cane at her home and a walker for longer distances  Patient denies leg length discrepancy  Denies fevers, chills, distal numbness, or tingling  Review of Systems  ROS:    See HPI for musculoskeletal review     All other systems reviewed are negative     History:  Past Medical History:   Diagnosis Date    Anemia     1995 - corrected with iron    Ankle fracture, right     last assessed: 3/9/15    Anxiety     Chronic pain disorder     right side joint pain     Moderate exercise     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    Wears dentures     full upper    Wears glasses     reading     Past Surgical History:   Procedure Laterality Date    ANKLE SURGERY Right     ORIF 5/16/plate and 4 screws    COLONOSCOPY      CYST REMOVAL      1969    ESOPHAGOGASTRODUODENOSCOPY      Diagnostic    FOOT SURGERY      plantar wart resection; resolved: 1969    JOINT REPLACEMENT Right     RTK    ORIF HIP FRACTURE Right 06/28/2022    Procedure: ORIF HIP W/ CANNUALATED SCREWS;  Surgeon: Marlen Junior DO;  Location: [facility];  Service: Orthopedics    [address] [place] [address] St Right 9/28/2022    Procedure: ARTHROPLASTY HIP TOTAL, removal of cannulated screws, posterior approach, cerclage of femur fx;  Surgeon: Marlen Junior DO;  Location: [address] [place] [address] MAIN OR;  Service: Orthopedics    IN TOTAL KNEE ARTHROPLASTY Right 03/01/2022    Procedure: ARTHROPLASTY KNEE TOTAL;  Surgeon: Marlen Junior DO;  Location: [facility];  Service: Orthopedics    TONSILLECTOMY AND ADENOIDECTOMY      1962    TUBAL LIGATION      WISDOM TOOTH EXTRACTION       Social History   Social History     Substance and Sexual Activity   Alcohol Use Yes    Comment: rare, maybe on holidays     Social History     Substance and Sexual Activity   Drug Use No     Social History     Tobacco Use   Smoking Status Former Smoker    Quit date: 1982    Years since quittin 7   Smokeless Tobacco Never Used     Family History:   Family History   Problem Relation Age of Onset    Seizures Mother         epilepsy    Bone cancer Father     No Known Problems Sister     No Known Problems Brother     No Known Problems Son     No Known Problems Maternal Grandmother     No Known Problems Maternal Grandfather     No Known Problems Paternal Grandmother     No Known Problems Paternal Grandfather     Thyroid disease Sister     No Known Problems Sister     No Known Problems Brother        Current Outpatient Medications on File Prior to Visit   Medication Sig Dispense Refill    acetaminophen (TYLENOL) 325 mg tablet Take 3 tablets (975 mg total) by mouth every 8 (eight) hours  0    ascorbic acid (VITAMIN C) 500 MG tablet Take 1 tablet (500 mg total) by mouth daily Start to take 30 days prior to surgery 30 tablet 1    docusate sodium (COLACE) 100 mg capsule Take 1 capsule (100 mg total) by mouth 2 (two) times a day for 10 days 20 capsule 0    ferrous sulfate 324 (65 Fe) mg Take 1 tablet (324 mg total) by mouth daily before breakfast Start to take 30 days prior to surgery 30 tablet 1    folic acid (FOLVITE) 1 mg tablet Take 1 tablet (1 mg total) by mouth daily Start to take 30 days prior to surgery 30 tablet 1    ibandronate (BONIVA) 150 MG tablet Take 1 tablet by mouth as needed        Multiple Vitamin (multivitamin) tablet Take 1 tablet by mouth daily Start to take 30 days prior to surgery 30 tablet 1    predniSONE 10 mg tablet Take 2 tablets for 3 days and 1 tablet for 3 days and then return to 5 mg daily   9 tablet 0    promethazine (PHENERGAN) 12 5 MG tablet Take 1 tablet (12 5 mg total) by mouth every 6 (six) hours as needed for nausea or vomiting 30 tablet 0    Ca Carbonate-Mag Hydroxide 550-110 MG CHEW Chew Twice daily      [] cephalexin (KEFLEX) 500 mg capsule Take 1 capsule (500 mg total) by mouth every 6 (six) hours for 7 days 28 capsule 0  methotrexate 2 5 MG tablet TAKE 8 TABLETS BY MOUTH ONCE A WEEK AS DIRECTED      ondansetron (Zofran ODT) 4 mg disintegrating tablet Take 1 tablet (4 mg total) by mouth every 6 (six) hours as needed for nausea or vomiting 20 tablet 0    predniSONE 5 mg tablet Take 5 mg by mouth daily        No current facility-administered medications on file prior to visit       Allergies   Allergen Reactions    Aspirin Other (See Comments)     Per pt avoids taking due to rheumatology Meds        Objective     /52   Pulse 80   Ht 5' 4" (1 626 m)   Wt 53 3 kg (117 lb 6 4 oz)   BMI 20 15 kg/m²      PE:  AAOx 3  WDWN  Hearing intact, no drainage from eyes  no audible wheezing  no abdominal distension  LE compartments soft, AT/GS intact    Ortho Exam:  right hip:   INC: C/D/I, No erythema, mild swelling  No pain with ROM    Imaging Studies: I have personally reviewed pertinent films in PACS  XR right hip:  S/p OSKAR, cerclage wire in place, adequate alignment

## 2022-10-10 ENCOUNTER — APPOINTMENT (OUTPATIENT)
Dept: PHYSICAL THERAPY | Facility: CLINIC | Age: 67
End: 2022-10-10

## 2022-10-13 ENCOUNTER — OFFICE VISIT (OUTPATIENT)
Dept: PHYSICAL THERAPY | Facility: CLINIC | Age: 67
End: 2022-10-13
Payer: OTHER MISCELLANEOUS

## 2022-10-13 DIAGNOSIS — S72.001K CLOSED DISPLACED FRACTURE OF RIGHT FEMORAL NECK WITH NONUNION: Primary | ICD-10-CM

## 2022-10-13 DIAGNOSIS — S72.001G: ICD-10-CM

## 2022-10-13 PROCEDURE — 97140 MANUAL THERAPY 1/> REGIONS: CPT

## 2022-10-13 PROCEDURE — 97530 THERAPEUTIC ACTIVITIES: CPT

## 2022-10-13 PROCEDURE — 97110 THERAPEUTIC EXERCISES: CPT

## 2022-10-13 NOTE — PROGRESS NOTES
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  S72 001G        Start Time: 1035  Stop Time: 1120  Total time in clinic (min): 45 minutes       Subjective: Patient reported she has been walking around the house short bouts without SPC  Feeling better overall despite still feeling weakness and some pain  She continues to require assistance with daily activities such as dressing  Saw Dr Margy Smith yesterday and no concern was raised over her incision  Objective: See treatment diary below  Assessment: 2 weeks, 1 day post-op  PROM performed respecting ROM limitations  She was able to advance reps for sidelying hip abd with minimal assistance  Cued to control return to neutral with clamshells  Muscle fatigue as expected at this point in her recovery  Plan: Continue per plan of care             Precautions: RA, osteoporosis, R OSKAR on 22, R TKA on 3/1/22    Manuals 10/3 10/6 10/13          R hip PROM  AG EH                                    Assessed incision  AG           Neuro Re-Ed                                                                                                        Ther Ex             Bike  NV 5 min  L0          Standing hip abd HEP 3x10  3x10  bilat          LAQ HEP 1#  3x10  1#  3x10          SAQ HEP 1#  3x10 1#  3x10          Supine hip abd  2x10 w/therapist assist Therapist assist  2x10          Supine hip add             Clamshells   2x10          SL hip abd  1x10  1x4 2x10          Leg press                          Ther Activity             Mini squats             Step ups  4" x5 LLE B/L UE 4"  2x10  bilat UE          Gait Training             Amb w/ SPC  [id number] Around clinic                       Modalities

## 2022-10-17 ENCOUNTER — OFFICE VISIT (OUTPATIENT)
Dept: PHYSICAL THERAPY | Facility: CLINIC | Age: 67
End: 2022-10-17
Payer: OTHER MISCELLANEOUS

## 2022-10-17 DIAGNOSIS — S72.001K CLOSED DISPLACED FRACTURE OF RIGHT FEMORAL NECK WITH NONUNION: Primary | ICD-10-CM

## 2022-10-17 DIAGNOSIS — S72.001G: ICD-10-CM

## 2022-10-17 PROCEDURE — 97110 THERAPEUTIC EXERCISES: CPT | Performed by: PHYSICAL THERAPIST

## 2022-10-17 PROCEDURE — 97140 MANUAL THERAPY 1/> REGIONS: CPT | Performed by: PHYSICAL THERAPIST

## 2022-10-17 PROCEDURE — 97530 THERAPEUTIC ACTIVITIES: CPT | Performed by: PHYSICAL THERAPIST

## 2022-10-17 NOTE — PROGRESS NOTES
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  S72 001G                      Subjective: Pt reports she is in 5/10 pain today that is more into the groin  States she feels it a lot if she turns it the wrong way  Objective: See treatment diary below  Assessment: Decreased tolerance to R hip abduction strengthening; significant fatigue and soreness present therefore decreased repetitions with sidelying clamshells  Mild increase in groin and hip pain with hip flexion AROM during bed mobility and supine hip abduction with therapist assist  Attempted unilateral UE support with RLE step ups however pt unable to perform due to decreased LE strength  Plan: Continue per plan of care  Precautions: RA, osteoporosis, R OSKAR on 22, R TKA on 3/1/22    Manuals 10/3 10/6 10/13 10/17         R hip PROM  AG EH AG                                   Assessed incision  AG           Neuro Re-Ed                                                                                                        Ther Ex             Bike  NV 5 min  L0 5'         Standing hip abd HEP 3x10  3x10  bilat 3x10 ea  LAQ HEP 1#  3x10  1#  3x10 1#  3x10          SAQ HEP 1#  3x10 1#  3x10 1#  3x10         Supine hip abd  2x10 w/therapist assist Therapist assist  2x10 2x10 w/therapist assist         Clamshells   2x10 x10          SL hip abd  1x10  1x4 2x10 2x10          Leg press             Hooklying hip abd    5" 3x10          Ther Activity             Mini squats             Step ups  4" x5 LLE B/L UE 4"  2x10  bilat UE 4" 2x10 ea   B/L UE         Gait Training             Amb w/ SPC  [id number] Around clinic Around clinic                      Modalities

## 2022-10-21 ENCOUNTER — OFFICE VISIT (OUTPATIENT)
Dept: PHYSICAL THERAPY | Facility: CLINIC | Age: 67
End: 2022-10-21
Payer: OTHER MISCELLANEOUS

## 2022-10-21 DIAGNOSIS — S72.001G: ICD-10-CM

## 2022-10-21 DIAGNOSIS — S72.001K CLOSED DISPLACED FRACTURE OF RIGHT FEMORAL NECK WITH NONUNION: Primary | ICD-10-CM

## 2022-10-21 PROCEDURE — 97110 THERAPEUTIC EXERCISES: CPT | Performed by: PHYSICAL THERAPIST

## 2022-10-21 PROCEDURE — 97140 MANUAL THERAPY 1/> REGIONS: CPT | Performed by: PHYSICAL THERAPIST

## 2022-10-21 PROCEDURE — 97530 THERAPEUTIC ACTIVITIES: CPT | Performed by: PHYSICAL THERAPIST

## 2022-10-21 NOTE — PROGRESS NOTES
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  S72 001G                      Subjective: Pt reports her groin pain has been off and on and she has been ambulating more without the cane  States she felt really good following last therapy session  Still unable to move her R leg inwards on her own  Objective: See treatment diary below  Assessment: Improved tolerance to treatment session with minimal pain  Increased fatigue with proximal strengthening as anticipated  Increased fear with initial step up however improvement with increased reps  Mild hip flexor compensations with sidelying hip abduction as increased fatigue  Overall, pt making good progress with skilled PT  Plan: Continue per plan of care  Precautions: RA, osteoporosis, R OSKAR on 22, R TKA on 3/1/22    Manuals 10/3 10/6 10/13 10/17 10/21        R hip PROM  AG EH AG AG                                  Assessed incision  AG           Neuro Re-Ed                                                                                                        Ther Ex             Bike  NV 5 min  L0 5' 5'        Standing hip abd HEP 3x10  3x10  bilat 3x10 ea  3x10 ea  LAQ HEP 1#  3x10  1#  3x10 1#  3x10  1 5# 3x10         SAQ HEP 1#  3x10 1#  3x10 1#  3x10 1 5#  3x10         Supine hip abd  2x10 w/therapist assist Therapist assist  2x10 2x10 w/therapist assist 2x10 w/therapist assist        Clamshells   2x10 x10  2x10        SL hip abd  1x10  1x4 2x10 2x10  2x10         Leg press             Hooklying hip abd    5" 3x10  5" 3x10         Ther Activity             Mini squats     2x10        Step ups  4" x5 LLE B/L UE 4"  2x10  bilat UE 4" 2x10 ea  B/L UE 4" 3x10 ea  B/L UE        Gait Training             Amb w/ SPC  [id number] Around clinic Around clinic Around clinic                     Modalities

## 2022-10-25 ENCOUNTER — OFFICE VISIT (OUTPATIENT)
Dept: PHYSICAL THERAPY | Facility: CLINIC | Age: 67
End: 2022-10-25
Payer: OTHER MISCELLANEOUS

## 2022-10-25 DIAGNOSIS — S72.001K CLOSED DISPLACED FRACTURE OF RIGHT FEMORAL NECK WITH NONUNION: Primary | ICD-10-CM

## 2022-10-25 DIAGNOSIS — S72.001G: ICD-10-CM

## 2022-10-25 PROCEDURE — 97140 MANUAL THERAPY 1/> REGIONS: CPT | Performed by: PHYSICAL THERAPIST

## 2022-10-25 PROCEDURE — 97110 THERAPEUTIC EXERCISES: CPT | Performed by: PHYSICAL THERAPIST

## 2022-10-25 PROCEDURE — 97530 THERAPEUTIC ACTIVITIES: CPT | Performed by: PHYSICAL THERAPIST

## 2022-10-25 NOTE — H&P (VIEW-ONLY)
Assessment/Plan:       Problem List Items Addressed This Visit        Other    Abnormal ECG    Relevant Orders    POCT ECG      Other Visit Diagnoses     Pre-op chest exam    -  Primary          1  Pre-op chest exam  No cardiac contraindication to proceeding with surgery,  Safe to proceed with operation no further testing recommended at this time  2  Abnormal ECG  Similar to previous continue follow up cardiology post op  - POCT ECG    Subjective:      Patient ID: Jaz Belcher is a 75 y o  female  HPI    75year old pre op     She can walk up stairs and is active, though limited by hip  Can perform greater than 4-mets of activity with no shortness of breath or chest pains  Reports no change in health from previous cardiac pre op visit  Reviewed recent lab work and was within normal limits  EKG from 6/26 and echo from 3/02 reviewed  EKG today no significant change from previous, QT interval improved  Safe to proceed with operation no further testing recommended at this time  Likely need stress dose steroids      The following portions of the patient's history were reviewed and updated as appropriate: allergies, current medications, past family history, past medical history, past social history, past surgical history and problem list       Current Outpatient Medications:     acetaminophen (TYLENOL) 325 mg tablet, Take 3 tablets (975 mg total) by mouth every 8 (eight) hours, Disp: , Rfl: 0    ascorbic acid (VITAMIN C) 500 MG tablet, Take 1 tablet (500 mg total) by mouth daily Start to take 30 days prior to surgery, Disp: 30 tablet, Rfl: 1    Ca Carbonate-Mag Hydroxide 550-110 MG CHEW, Chew Twice daily, Disp: , Rfl:     ferrous sulfate 324 (65 Fe) mg, Take 1 tablet (324 mg total) by mouth daily before breakfast Start to take 30 days prior to surgery, Disp: 30 tablet, Rfl: 1    folic acid (FOLVITE) 1 mg tablet, Take 1 tablet (1 mg total) by mouth daily Start to take 30 days prior to surgery, Disp: 30 tablet, Rfl: 1    ibandronate (BONIVA) 150 MG tablet, Take 1 tablet by mouth as needed  , Disp: , Rfl:     Multiple Vitamin (multivitamin) tablet, Take 1 tablet by mouth daily Start to take 30 days prior to surgery, Disp: 30 tablet, Rfl: 1    predniSONE 5 mg tablet, Take 5 mg by mouth daily , Disp: , Rfl:      Review of Systems   Constitutional: Negative for activity change and appetite change  Respiratory: Negative for apnea and chest tightness  Gastrointestinal: Negative for abdominal distention and abdominal pain  Musculoskeletal: Positive for gait problem  Objective:      /64 (BP Location: Left arm, Patient Position: Sitting, Cuff Size: Standard)   Pulse 83   Resp 12   Ht 5' 4" (1 626 m)   Wt 51 8 kg (114 lb 3 2 oz)   SpO2 93%   BMI 19 60 kg/m²          Physical Exam  Constitutional:       Appearance: Normal appearance  Cardiovascular:      Rate and Rhythm: Normal rate and regular rhythm  Pulses: Normal pulses  Heart sounds: Normal heart sounds  Comments: +1 systolic murmur  Pulmonary:      Effort: Pulmonary effort is normal       Breath sounds: Normal breath sounds  Abdominal:      General: Abdomen is flat  Tenderness: There is no abdominal tenderness  Musculoskeletal:         General: Normal range of motion  Neurological:      Mental Status: She is alert             Forest Villasenor MD Surgeon (Optional): WAYNE Reardon MD Biopsy Photograph Reviewed: Yes Size Of Lesion In Cm: 0.8 X Size Of Lesion In Cm (Optional): 0 Size Of Margin In Cm: 0.5 Anesthesia Volume In Cc: 13 Was An Eye Clamp Used?: No Eye Clamp Note Details: An eye clamp was used during the procedure. Excision Method: Elliptical Saucerization Depth: dermis and superficial adipose tissue Repair Type: Intermediate Suturegard Retention Suture: 2-0 Nylon Retention Suture Bite Size: 3 mm Length To Time In Minutes Device Was In Place: 10 Number Of Hemigard Strips Per Side: 1 Intermediate / Complex Repair - Final Wound Length In Cm: 3.2 Undermining Type: Entire Wound Debridement Text: The wound edges were debrided prior to proceeding with the closure to facilitate wound healing. Helical Rim Text: The closure involved the helical rim. Vermilion Border Text: The closure involved the vermilion border. Nostril Rim Text: The closure involved the nostril rim. Retention Suture Text: Retention sutures were placed to support the closure and prevent dehiscence. Suture Removal: 28 days Lab: [id number] Lab Facility: [id number] Graft Donor Site Bandage (Optional-Leave Blank If You Don't Want In Note): Steri-strips and a pressure bandage were applied to the donor site. Epidermal Closure Graft Donor Site (Optional): simple interrupted Billing Type: Third-Party Bill Excision Depth: adipose tissue Scalpel Size: 15 blade Anesthesia Type: 1% lidocaine with epinephrine Hemostasis: Electrocautery Estimated Blood Loss (Cc): minimal Detail Level: Detailed Deep Sutures: 3-0 Monocryl Dermal Closure: pulley stitch Epidermal Sutures: 3-0 Prolene Wound Care: Petrolatum Dressing: dry sterile dressing Suturegard Intro: Intraoperative tissue expansion was performed, utilizing the SUTUREGARD device, in order to reduce wound tension. Suturegard Body: The suture ends were repeatedly re-tightened and re-clamped to achieve the desired tissue expansion. Hemigard Intro: Due to skin fragility and wound tension, it was decided to use HEMIGARD adhesive retention suture devices to permit a linear closure. The skin was cleaned and dried for a 6cm distance away from the wound. Excessive hair, if present, was removed to allow for adhesion. Hemigard Postcare Instructions: The HEMIGARD strips are to remain completely dry for at least 5-7 days. Positioning (Leave Blank If You Do Not Want): The patient was placed in a comfortable position exposing the surgical site. Pre-Excision Curettage Text (Leave Blank If You Do Not Want): Prior to drawing the surgical margin the visible lesion was removed with electrodesiccation and curettage to clearly define the lesion size. Complex Repair Preamble Text (Leave Blank If You Do Not Want): Extensive wide undermining was performed. Intermediate Repair Preamble Text (Leave Blank If You Do Not Want): Undermining was performed with blunt dissection. Curvilinear Excision Additional Text (Leave Blank If You Do Not Want): The margin was drawn around the clinically apparent lesion.  A curvilinear shape was then drawn on the skin incorporating the lesion and margins.  Incisions were then made along these lines to the appropriate tissue plane and the lesion was extirpated. Fusiform Excision Additional Text (Leave Blank If You Do Not Want): The margin was drawn around the clinically apparent lesion.  A fusiform shape was then drawn on the skin incorporating the lesion and margins.  Incisions were then made along these lines to the appropriate tissue plane and the lesion was extirpated. Elliptical Excision Additional Text (Leave Blank If You Do Not Want): The margin was drawn around the clinically apparent lesion.  An elliptical shape was then drawn on the skin incorporating the lesion and margins.  Incisions were then made along these lines to the appropriate tissue plane and the lesion was extirpated. Saucerization Excision Additional Text (Leave Blank If You Do Not Want): The margin was drawn around the clinically apparent lesion.  Incisions were then made along these lines, in a tangential fashion, to the appropriate tissue plane and the lesion was extirpated. Slit Excision Additional Text (Leave Blank If You Do Not Want): A linear line was drawn on the skin overlying the lesion. An incision was made slowly until the lesion was visualized.  Once visualized, the lesion was removed with blunt dissection. Excisional Biopsy Additional Text (Leave Blank If You Do Not Want): The margin was drawn around the clinically apparent lesion. An elliptical shape was then drawn on the skin incorporating the lesion and margins.  Incisions were then made along these lines to the appropriate tissue plane and the lesion was extirpated. Perilesional Excision Additional Text (Leave Blank If You Do Not Want): The margin was drawn around the clinically apparent lesion. Incisions were then made along these lines to the appropriate tissue plane and the lesion was extirpated. Repair Performed By Another Provider Text (Leave Blank If You Do Not Want): After the tissue was excised the defect was repaired by another provider. No Repair - Repaired With Adjacent Surgical Defect Text (Leave Blank If You Do Not Want): After the excision the defect was repaired concurrently with another surgical defect which was in close approximation. Adjacent Tissue Transfer Text: The defect edges were debeveled with a #15 scalpel blade.  Given the location of the defect and the proximity to free margins an adjacent tissue transfer was deemed most appropriate.  Using a sterile surgical marker, an appropriate flap was drawn incorporating the defect and placing the expected incisions within the relaxed skin tension lines where possible.    The area thus outlined was incised deep to adipose tissue with a #15 scalpel blade.  The skin margins were undermined to an appropriate distance in all directions utilizing iris scissors. Advancement Flap (Single) Text: The defect edges were debeveled with a #15 scalpel blade.  Given the location of the defect and the proximity to free margins a single advancement flap was deemed most appropriate.  Using a sterile surgical marker, an appropriate advancement flap was drawn incorporating the defect and placing the expected incisions within the relaxed skin tension lines where possible.    The area thus outlined was incised deep to adipose tissue with a #15 scalpel blade.  The skin margins were undermined to an appropriate distance in all directions utilizing iris scissors. Advancement Flap (Double) Text: The defect edges were debeveled with a #15 scalpel blade.  Given the location of the defect and the proximity to free margins a double advancement flap was deemed most appropriate.  Using a sterile surgical marker, the appropriate advancement flaps were drawn incorporating the defect and placing the expected incisions within the relaxed skin tension lines where possible.    The area thus outlined was incised deep to adipose tissue with a #15 scalpel blade.  The skin margins were undermined to an appropriate distance in all directions utilizing iris scissors. Burow's Advancement Flap Text: The defect edges were debeveled with a #15 scalpel blade.  Given the location of the defect and the proximity to free margins a Burow's advancement flap was deemed most appropriate.  Using a sterile surgical marker, the appropriate advancement flap was drawn incorporating the defect and placing the expected incisions within the relaxed skin tension lines where possible.    The area thus outlined was incised deep to adipose tissue with a #15 scalpel blade.  The skin margins were undermined to an appropriate distance in all directions utilizing iris scissors. Chonodrocutaneous Helical Advancement Flap Text: The defect edges were debeveled with a #15 scalpel blade.  Given the location of the defect and the proximity to free margins a chondrocutaneous helical advancement flap was deemed most appropriate.  Using a sterile surgical marker, the appropriate advancement flap was drawn incorporating the defect and placing the expected incisions within the relaxed skin tension lines where possible.    The area thus outlined was incised deep to adipose tissue with a #15 scalpel blade.  The skin margins were undermined to an appropriate distance in all directions utilizing iris scissors. Crescentic Advancement Flap Text: The defect edges were debeveled with a #15 scalpel blade.  Given the location of the defect and the proximity to free margins a crescentic advancement flap was deemed most appropriate.  Using a sterile surgical marker, the appropriate advancement flap was drawn incorporating the defect and placing the expected incisions within the relaxed skin tension lines where possible.    The area thus outlined was incised deep to adipose tissue with a #15 scalpel blade.  The skin margins were undermined to an appropriate distance in all directions utilizing iris scissors. A-T Advancement Flap Text: The defect edges were debeveled with a #15 scalpel blade.  Given the location of the defect, shape of the defect and the proximity to free margins an A-T advancement flap was deemed most appropriate.  Using a sterile surgical marker, an appropriate advancement flap was drawn incorporating the defect and placing the expected incisions within the relaxed skin tension lines where possible.    The area thus outlined was incised deep to adipose tissue with a #15 scalpel blade.  The skin margins were undermined to an appropriate distance in all directions utilizing iris scissors. O-T Advancement Flap Text: The defect edges were debeveled with a #15 scalpel blade.  Given the location of the defect, shape of the defect and the proximity to free margins an O-T advancement flap was deemed most appropriate.  Using a sterile surgical marker, an appropriate advancement flap was drawn incorporating the defect and placing the expected incisions within the relaxed skin tension lines where possible.    The area thus outlined was incised deep to adipose tissue with a #15 scalpel blade.  The skin margins were undermined to an appropriate distance in all directions utilizing iris scissors. O-L Flap Text: The defect edges were debeveled with a #15 scalpel blade.  Given the location of the defect, shape of the defect and the proximity to free margins an O-L flap was deemed most appropriate.  Using a sterile surgical marker, an appropriate advancement flap was drawn incorporating the defect and placing the expected incisions within the relaxed skin tension lines where possible.    The area thus outlined was incised deep to adipose tissue with a #15 scalpel blade.  The skin margins were undermined to an appropriate distance in all directions utilizing iris scissors. O-Z Flap Text: The defect edges were debeveled with a #15 scalpel blade.  Given the location of the defect, shape of the defect and the proximity to free margins an O-Z flap was deemed most appropriate.  Using a sterile surgical marker, an appropriate transposition flap was drawn incorporating the defect and placing the expected incisions within the relaxed skin tension lines where possible. The area thus outlined was incised deep to adipose tissue with a #15 scalpel blade.  The skin margins were undermined to an appropriate distance in all directions utilizing iris scissors. Double O-Z Flap Text: The defect edges were debeveled with a #15 scalpel blade.  Given the location of the defect, shape of the defect and the proximity to free margins a Double O-Z flap was deemed most appropriate.  Using a sterile surgical marker, an appropriate transposition flap was drawn incorporating the defect and placing the expected incisions within the relaxed skin tension lines where possible. The area thus outlined was incised deep to adipose tissue with a #15 scalpel blade.  The skin margins were undermined to an appropriate distance in all directions utilizing iris scissors. V-Y Flap Text: The defect edges were debeveled with a #15 scalpel blade.  Given the location of the defect, shape of the defect and the proximity to free margins a V-Y flap was deemed most appropriate.  Using a sterile surgical marker, an appropriate advancement flap was drawn incorporating the defect and placing the expected incisions within the relaxed skin tension lines where possible.    The area thus outlined was incised deep to adipose tissue with a #15 scalpel blade.  The skin margins were undermined to an appropriate distance in all directions utilizing iris scissors. Advancement-Rotation Flap Text: The defect edges were debeveled with a #15 scalpel blade.  Given the location of the defect, shape of the defect and the proximity to free margins an advancement-rotation flap was deemed most appropriate.  Using a sterile surgical marker, an appropriate flap was drawn incorporating the defect and placing the expected incisions within the relaxed skin tension lines where possible. The area thus outlined was incised deep to adipose tissue with a #15 scalpel blade.  The skin margins were undermined to an appropriate distance in all directions utilizing iris scissors. Mercedes Flap Text: The defect edges were debeveled with a #15 scalpel blade.  Given the location of the defect, shape of the defect and the proximity to free margins a Mercedes flap was deemed most appropriate.  Using a sterile surgical marker, an appropriate advancement flap was drawn incorporating the defect and placing the expected incisions within the relaxed skin tension lines where possible. The area thus outlined was incised deep to adipose tissue with a #15 scalpel blade.  The skin margins were undermined to an appropriate distance in all directions utilizing iris scissors. Modified Advancement Flap Text: The defect edges were debeveled with a #15 scalpel blade.  Given the location of the defect, shape of the defect and the proximity to free margins a modified advancement flap was deemed most appropriate.  Using a sterile surgical marker, an appropriate advancement flap was drawn incorporating the defect and placing the expected incisions within the relaxed skin tension lines where possible.    The area thus outlined was incised deep to adipose tissue with a #15 scalpel blade.  The skin margins were undermined to an appropriate distance in all directions utilizing iris scissors. Mucosal Advancement Flap Text: Given the location of the defect, shape of the defect and the proximity to free margins a mucosal advancement flap was deemed most appropriate. Incisions were made with a 15 blade scalpel in the appropriate fashion along the cutaneous vermilion border and the mucosal lip. The remaining actinically damaged mucosal tissue was excised.  The mucosal advancement flap was then elevated to the gingival sulcus with care taken to preserve the neurovascular structures and advanced into the primary defect. Care was taken to ensure that precise realignment of the vermilion border was achieved. Peng Advancement Flap Text: The defect edges were debeveled with a #15 scalpel blade.  Given the location of the defect, shape of the defect and the proximity to free margins a Peng advancement flap was deemed most appropriate.  Using a sterile surgical marker, an appropriate advancement flap was drawn incorporating the defect and placing the expected incisions within the relaxed skin tension lines where possible. The area thus outlined was incised deep to adipose tissue with a #15 scalpel blade.  The skin margins were undermined to an appropriate distance in all directions utilizing iris scissors. Hatchet Flap Text: The defect edges were debeveled with a #15 scalpel blade.  Given the location of the defect, shape of the defect and the proximity to free margins a hatchet flap was deemed most appropriate.  Using a sterile surgical marker, an appropriate hatchet flap was drawn incorporating the defect and placing the expected incisions within the relaxed skin tension lines where possible.    The area thus outlined was incised deep to adipose tissue with a #15 scalpel blade.  The skin margins were undermined to an appropriate distance in all directions utilizing iris scissors. Rotation Flap Text: The defect edges were debeveled with a #15 scalpel blade.  Given the location of the defect, shape of the defect and the proximity to free margins a rotation flap was deemed most appropriate.  Using a sterile surgical marker, an appropriate rotation flap was drawn incorporating the defect and placing the expected incisions within the relaxed skin tension lines where possible.    The area thus outlined was incised deep to adipose tissue with a #15 scalpel blade.  The skin margins were undermined to an appropriate distance in all directions utilizing iris scissors. Spiral Flap Text: The defect edges were debeveled with a #15 scalpel blade.  Given the location of the defect, shape of the defect and the proximity to free margins a spiral flap was deemed most appropriate.  Using a sterile surgical marker, an appropriate rotation flap was drawn incorporating the defect and placing the expected incisions within the relaxed skin tension lines where possible. The area thus outlined was incised deep to adipose tissue with a #15 scalpel blade.  The skin margins were undermined to an appropriate distance in all directions utilizing iris scissors. Staged Advancement Flap Text: The defect edges were debeveled with a #15 scalpel blade.  Given the location of the defect, shape of the defect and the proximity to free margins a staged advancement flap was deemed most appropriate.  Using a sterile surgical marker, an appropriate advancement flap was drawn incorporating the defect and placing the expected incisions within the relaxed skin tension lines where possible. The area thus outlined was incised deep to adipose tissue with a #15 scalpel blade.  The skin margins were undermined to an appropriate distance in all directions utilizing iris scissors. Star Wedge Flap Text: The defect edges were debeveled with a #15 scalpel blade.  Given the location of the defect, shape of the defect and the proximity to free margins a star wedge flap was deemed most appropriate.  Using a sterile surgical marker, an appropriate rotation flap was drawn incorporating the defect and placing the expected incisions within the relaxed skin tension lines where possible. The area thus outlined was incised deep to adipose tissue with a #15 scalpel blade.  The skin margins were undermined to an appropriate distance in all directions utilizing iris scissors. Transposition Flap Text: The defect edges were debeveled with a #15 scalpel blade.  Given the location of the defect and the proximity to free margins a transposition flap was deemed most appropriate.  Using a sterile surgical marker, an appropriate transposition flap was drawn incorporating the defect.    The area thus outlined was incised deep to adipose tissue with a #15 scalpel blade.  The skin margins were undermined to an appropriate distance in all directions utilizing iris scissors. Muscle Hinge Flap Text: The defect edges were debeveled with a #15 scalpel blade.  Given the size, depth and location of the defect and the proximity to free margins a muscle hinge flap was deemed most appropriate.  Using a sterile surgical marker, an appropriate hinge flap was drawn incorporating the defect. The area thus outlined was incised with a #15 scalpel blade.  The skin margins were undermined to an appropriate distance in all directions utilizing iris scissors. Mustarde Flap Text: The defect edges were debeveled with a #15 scalpel blade.  Given the size, depth and location of the defect and the proximity to free margins a Mustarde flap was deemed most appropriate.  Using a sterile surgical marker, an appropriate flap was drawn incorporating the defect. The area thus outlined was incised with a #15 scalpel blade.  The skin margins were undermined to an appropriate distance in all directions utilizing iris scissors. Nasal Turnover Hinge Flap Text: The defect edges were debeveled with a #15 scalpel blade.  Given the size, depth, location of the defect and the defect being full thickness a nasal turnover hinge flap was deemed most appropriate.  Using a sterile surgical marker, an appropriate hinge flap was drawn incorporating the defect. The area thus outlined was incised with a #15 scalpel blade. The flap was designed to recreate the nasal mucosal lining and the alar rim. The skin margins were undermined to an appropriate distance in all directions utilizing iris scissors. Nasalis-Muscle-Based Myocutaneous Island Pedicle Flap Text: Using a #15 blade, an incision was made around the donor flap to the level of the nasalis muscle. Wide lateral undermining was then performed in both the subcutaneous plane above the nasalis muscle, and in a submuscular plane just above periosteum. This allowed the formation of a free nasalis muscle axial pedicle (based on the angular artery) which was still attached to the actual cutaneous flap, increasing its mobility and vascular viability. Hemostasis was obtained with pinpoint electrocoagulation. The flap was mobilized into position and the pivotal anchor points positioned and stabilized with buried interrupted sutures. Subcutaneous and dermal tissues were closed in a multilayered fashion with sutures. Tissue redundancies were excised, and the epidermal edges were apposed without significant tension and sutured with sutures. Orbicularis Oris Muscle Flap Text: The defect edges were debeveled with a #15 scalpel blade.  Given that the defect affected the competency of the oral sphincter an orbicularis oris muscle flap was deemed most appropriate to restore this competency and normal muscle function.  Using a sterile surgical marker, an appropriate flap was drawn incorporating the defect. The area thus outlined was incised with a #15 scalpel blade. Melolabial Transposition Flap Text: The defect edges were debeveled with a #15 scalpel blade.  Given the location of the defect and the proximity to free margins a melolabial flap was deemed most appropriate.  Using a sterile surgical marker, an appropriate melolabial transposition flap was drawn incorporating the defect.    The area thus outlined was incised deep to adipose tissue with a #15 scalpel blade.  The skin margins were undermined to an appropriate distance in all directions utilizing iris scissors. Rhombic Flap Text: The defect edges were debeveled with a #15 scalpel blade.  Given the location of the defect and the proximity to free margins a rhombic flap was deemed most appropriate.  Using a sterile surgical marker, an appropriate rhombic flap was drawn incorporating the defect.    The area thus outlined was incised deep to adipose tissue with a #15 scalpel blade.  The skin margins were undermined to an appropriate distance in all directions utilizing iris scissors. Rhomboid Transposition Flap Text: The defect edges were debeveled with a #15 scalpel blade.  Given the location of the defect and the proximity to free margins a rhomboid transposition flap was deemed most appropriate.  Using a sterile surgical marker, an appropriate rhomboid flap was drawn incorporating the defect.    The area thus outlined was incised deep to adipose tissue with a #15 scalpel blade.  The skin margins were undermined to an appropriate distance in all directions utilizing iris scissors. Bi-Rhombic Flap Text: The defect edges were debeveled with a #15 scalpel blade.  Given the location of the defect and the proximity to free margins a bi-rhombic flap was deemed most appropriate.  Using a sterile surgical marker, an appropriate rhombic flap was drawn incorporating the defect. The area thus outlined was incised deep to adipose tissue with a #15 scalpel blade.  The skin margins were undermined to an appropriate distance in all directions utilizing iris scissors. Helical Rim Advancement Flap Text: The defect edges were debeveled with a #15 blade scalpel.  Given the location of the defect and the proximity to free margins (helical rim) a double helical rim advancement flap was deemed most appropriate.  Using a sterile surgical marker, the appropriate advancement flaps were drawn incorporating the defect and placing the expected incisions between the helical rim and antihelix where possible.  The area thus outlined was incised through and through with a #15 scalpel blade.  With a skin hook and iris scissors, the flaps were gently and sharply undermined and freed up. Bilateral Helical Rim Advancement Flap Text: The defect edges were debeveled with a #15 blade scalpel.  Given the location of the defect and the proximity to free margins (helical rim) a bilateral helical rim advancement flap was deemed most appropriate.  Using a sterile surgical marker, the appropriate advancement flaps were drawn incorporating the defect and placing the expected incisions between the helical rim and antihelix where possible.  The area thus outlined was incised through and through with a #15 scalpel blade.  With a skin hook and iris scissors, the flaps were gently and sharply undermined and freed up. Ear Star Wedge Flap Text: The defect edges were debeveled with a #15 blade scalpel.  Given the location of the defect and the proximity to free margins (helical rim) an ear star wedge flap was deemed most appropriate.  Using a sterile surgical marker, the appropriate flap was drawn incorporating the defect and placing the expected incisions between the helical rim and antihelix where possible.  The area thus outlined was incised through and through with a #15 scalpel blade. Banner Transposition Flap Text: The defect edges were debeveled with a #15 scalpel blade.  Given the location of the defect and the proximity to free margins a Banner transposition flap was deemed most appropriate.  Using a sterile surgical marker, an appropriate flap drawn around the defect. The area thus outlined was incised deep to adipose tissue with a #15 scalpel blade.  The skin margins were undermined to an appropriate distance in all directions utilizing iris scissors. Bilobed Flap Text: The defect edges were debeveled with a #15 scalpel blade.  Given the location of the defect and the proximity to free margins a bilobe flap was deemed most appropriate.  Using a sterile surgical marker, an appropriate bilobe flap drawn around the defect.    The area thus outlined was incised deep to adipose tissue with a #15 scalpel blade.  The skin margins were undermined to an appropriate distance in all directions utilizing iris scissors. Bilobed Transposition Flap Text: The defect edges were debeveled with a #15 scalpel blade.  Given the location of the defect and the proximity to free margins a bilobed transposition flap was deemed most appropriate.  Using a sterile surgical marker, an appropriate bilobe flap drawn around the defect.    The area thus outlined was incised deep to adipose tissue with a #15 scalpel blade.  The skin margins were undermined to an appropriate distance in all directions utilizing iris scissors. Trilobed Flap Text: The defect edges were debeveled with a #15 scalpel blade.  Given the location of the defect and the proximity to free margins a trilobed flap was deemed most appropriate.  Using a sterile surgical marker, an appropriate trilobed flap drawn around the defect.    The area thus outlined was incised deep to adipose tissue with a #15 scalpel blade.  The skin margins were undermined to an appropriate distance in all directions utilizing iris scissors. Dorsal Nasal Flap Text: The defect edges were debeveled with a #15 scalpel blade.  Given the location of the defect and the proximity to free margins a dorsal nasal flap was deemed most appropriate.  Using a sterile surgical marker, an appropriate dorsal nasal flap was drawn around the defect.    The area thus outlined was incised deep to adipose tissue with a #15 scalpel blade.  The skin margins were undermined to an appropriate distance in all directions utilizing iris scissors. Island Pedicle Flap Text: The defect edges were debeveled with a #15 scalpel blade.  Given the location of the defect, shape of the defect and the proximity to free margins an island pedicle advancement flap was deemed most appropriate.  Using a sterile surgical marker, an appropriate advancement flap was drawn incorporating the defect, outlining the appropriate donor tissue and placing the expected incisions within the relaxed skin tension lines where possible.    The area thus outlined was incised deep to adipose tissue with a #15 scalpel blade.  The skin margins were undermined to an appropriate distance in all directions around the primary defect and laterally outward around the island pedicle utilizing iris scissors.  There was minimal undermining beneath the pedicle flap. Island Pedicle Flap With Canthal Suspension Text: The defect edges were debeveled with a #15 scalpel blade.  Given the location of the defect, shape of the defect and the proximity to free margins an island pedicle advancement flap was deemed most appropriate.  Using a sterile surgical marker, an appropriate advancement flap was drawn incorporating the defect, outlining the appropriate donor tissue and placing the expected incisions within the relaxed skin tension lines where possible. The area thus outlined was incised deep to adipose tissue with a #15 scalpel blade.  The skin margins were undermined to an appropriate distance in all directions around the primary defect and laterally outward around the island pedicle utilizing iris scissors.  There was minimal undermining beneath the pedicle flap. A suspension suture was placed in the canthal tendon to prevent tension and prevent ectropion. Alar Island Pedicle Flap Text: The defect edges were debeveled with a #15 scalpel blade.  Given the location of the defect, shape of the defect and the proximity to the alar rim an island pedicle advancement flap was deemed most appropriate.  Using a sterile surgical marker, an appropriate advancement flap was drawn incorporating the defect, outlining the appropriate donor tissue and placing the expected incisions within the nasal ala running parallel to the alar rim. The area thus outlined was incised with a #15 scalpel blade.  The skin margins were undermined minimally to an appropriate distance in all directions around the primary defect and laterally outward around the island pedicle utilizing iris scissors.  There was minimal undermining beneath the pedicle flap. Double Island Pedicle Flap Text: The defect edges were debeveled with a #15 scalpel blade.  Given the location of the defect, shape of the defect and the proximity to free margins a double island pedicle advancement flap was deemed most appropriate.  Using a sterile surgical marker, an appropriate advancement flap was drawn incorporating the defect, outlining the appropriate donor tissue and placing the expected incisions within the relaxed skin tension lines where possible.    The area thus outlined was incised deep to adipose tissue with a #15 scalpel blade.  The skin margins were undermined to an appropriate distance in all directions around the primary defect and laterally outward around the island pedicle utilizing iris scissors.  There was minimal undermining beneath the pedicle flap. Island Pedicle Flap-Requiring Vessel Identification Text: The defect edges were debeveled with a #15 scalpel blade.  Given the location of the defect, shape of the defect and the proximity to free margins an island pedicle advancement flap was deemed most appropriate.  Using a sterile surgical marker, an appropriate advancement flap was drawn, based on the axial vessel mentioned above, incorporating the defect, outlining the appropriate donor tissue and placing the expected incisions within the relaxed skin tension lines where possible.    The area thus outlined was incised deep to adipose tissue with a #15 scalpel blade.  The skin margins were undermined to an appropriate distance in all directions around the primary defect and laterally outward around the island pedicle utilizing iris scissors.  There was minimal undermining beneath the pedicle flap. Keystone Flap Text: The defect edges were debeveled with a #15 scalpel blade.  Given the location of the defect, shape of the defect a keystone flap was deemed most appropriate.  Using a sterile surgical marker, an appropriate keystone flap was drawn incorporating the defect, outlining the appropriate donor tissue and placing the expected incisions within the relaxed skin tension lines where possible. The area thus outlined was incised deep to adipose tissue with a #15 scalpel blade.  The skin margins were undermined to an appropriate distance in all directions around the primary defect and laterally outward around the flap utilizing iris scissors. O-T Plasty Text: The defect edges were debeveled with a #15 scalpel blade.  Given the location of the defect, shape of the defect and the proximity to free margins an O-T plasty was deemed most appropriate.  Using a sterile surgical marker, an appropriate O-T plasty was drawn incorporating the defect and placing the expected incisions within the relaxed skin tension lines where possible.    The area thus outlined was incised deep to adipose tissue with a #15 scalpel blade.  The skin margins were undermined to an appropriate distance in all directions utilizing iris scissors. O-Z Plasty Text: The defect edges were debeveled with a #15 scalpel blade.  Given the location of the defect, shape of the defect and the proximity to free margins an O-Z plasty (double transposition flap) was deemed most appropriate.  Using a sterile surgical marker, the appropriate transposition flaps were drawn incorporating the defect and placing the expected incisions within the relaxed skin tension lines where possible.    The area thus outlined was incised deep to adipose tissue with a #15 scalpel blade.  The skin margins were undermined to an appropriate distance in all directions utilizing iris scissors.  Hemostasis was achieved with electrocautery.  The flaps were then transposed into place, one clockwise and the other counterclockwise, and anchored with interrupted buried subcutaneous sutures. Double O-Z Plasty Text: The defect edges were debeveled with a #15 scalpel blade.  Given the location of the defect, shape of the defect and the proximity to free margins a Double O-Z plasty (double transposition flap) was deemed most appropriate.  Using a sterile surgical marker, the appropriate transposition flaps were drawn incorporating the defect and placing the expected incisions within the relaxed skin tension lines where possible. The area thus outlined was incised deep to adipose tissue with a #15 scalpel blade.  The skin margins were undermined to an appropriate distance in all directions utilizing iris scissors.  Hemostasis was achieved with electrocautery.  The flaps were then transposed into place, one clockwise and the other counterclockwise, and anchored with interrupted buried subcutaneous sutures. V-Y Plasty Text: The defect edges were debeveled with a #15 scalpel blade.  Given the location of the defect, shape of the defect and the proximity to free margins an V-Y advancement flap was deemed most appropriate.  Using a sterile surgical marker, an appropriate advancement flap was drawn incorporating the defect and placing the expected incisions within the relaxed skin tension lines where possible.    The area thus outlined was incised deep to adipose tissue with a #15 scalpel blade.  The skin margins were undermined to an appropriate distance in all directions utilizing iris scissors. H Plasty Text: Given the location of the defect, shape of the defect and the proximity to free margins a H-plasty was deemed most appropriate for repair.  Using a sterile surgical marker, the appropriate advancement arms of the H-plasty were drawn incorporating the defect and placing the expected incisions within the relaxed skin tension lines where possible. The area thus outlined was incised deep to adipose tissue with a #15 scalpel blade. The skin margins were undermined to an appropriate distance in all directions utilizing iris scissors.  The opposing advancement arms were then advanced into place in opposite direction and anchored with interrupted buried subcutaneous sutures. W Plasty Text: The lesion was extirpated to the level of the fat with a #15 scalpel blade.  Given the location of the defect, shape of the defect and the proximity to free margins a W-plasty was deemed most appropriate for repair.  Using a sterile surgical marker, the appropriate transposition arms of the W-plasty were drawn incorporating the defect and placing the expected incisions within the relaxed skin tension lines where possible.    The area thus outlined was incised deep to adipose tissue with a #15 scalpel blade.  The skin margins were undermined to an appropriate distance in all directions utilizing iris scissors.  The opposing transposition arms were then transposed into place in opposite direction and anchored with interrupted buried subcutaneous sutures. Z Plasty Text: The lesion was extirpated to the level of the fat with a #15 scalpel blade.  Given the location of the defect, shape of the defect and the proximity to free margins a Z-plasty was deemed most appropriate for repair.  Using a sterile surgical marker, the appropriate transposition arms of the Z-plasty were drawn incorporating the defect and placing the expected incisions within the relaxed skin tension lines where possible.    The area thus outlined was incised deep to adipose tissue with a #15 scalpel blade.  The skin margins were undermined to an appropriate distance in all directions utilizing iris scissors.  The opposing transposition arms were then transposed into place in opposite direction and anchored with interrupted buried subcutaneous sutures. Zygomaticofacial Flap Text: Given the location of the defect, shape of the defect and the proximity to free margins a zygomaticofacial flap was deemed most appropriate for repair.  Using a sterile surgical marker, the appropriate flap was drawn incorporating the defect and placing the expected incisions within the relaxed skin tension lines where possible. The area thus outlined was incised deep to adipose tissue with a #15 scalpel blade with preservation of a vascular pedicle.  The skin margins were undermined to an appropriate distance in all directions utilizing iris scissors.  The flap was then placed into the defect and anchored with interrupted buried subcutaneous sutures. Cheek Interpolation Flap Text: A decision was made to reconstruct the defect utilizing an interpolation axial flap and a staged reconstruction.  A telfa template was made of the defect.  This telfa template was then used to outline the Cheek Interpolation flap.  The donor area for the pedicle flap was then injected with anesthesia.  The flap was excised through the skin and subcutaneous tissue down to the layer of the underlying musculature.  The interpolation flap was carefully excised within this deep plane to maintain its blood supply.  The edges of the donor site were undermined.   The donor site was closed in a primary fashion.  The pedicle was then rotated into position and sutured.  Once the tube was sutured into place, adequate blood supply was confirmed with blanching and refill.  The pedicle was then wrapped with xeroform gauze and dressed appropriately with a telfa and gauze bandage to ensure continued blood supply and protect the attached pedicle. Cheek-To-Nose Interpolation Flap Text: A decision was made to reconstruct the defect utilizing an interpolation axial flap and a staged reconstruction.  A telfa template was made of the defect.  This telfa template was then used to outline the Cheek-To-Nose Interpolation flap.  The donor area for the pedicle flap was then injected with anesthesia.  The flap was excised through the skin and subcutaneous tissue down to the layer of the underlying musculature.  The interpolation flap was carefully excised within this deep plane to maintain its blood supply.  The edges of the donor site were undermined.   The donor site was closed in a primary fashion.  The pedicle was then rotated into position and sutured.  Once the tube was sutured into place, adequate blood supply was confirmed with blanching and refill.  The pedicle was then wrapped with xeroform gauze and dressed appropriately with a telfa and gauze bandage to ensure continued blood supply and protect the attached pedicle. Interpolation Flap Text: A decision was made to reconstruct the defect utilizing an interpolation axial flap and a staged reconstruction.  A telfa template was made of the defect.  This telfa template was then used to outline the interpolation flap.  The donor area for the pedicle flap was then injected with anesthesia.  The flap was excised through the skin and subcutaneous tissue down to the layer of the underlying musculature.  The interpolation flap was carefully excised within this deep plane to maintain its blood supply.  The edges of the donor site were undermined.   The donor site was closed in a primary fashion.  The pedicle was then rotated into position and sutured.  Once the tube was sutured into place, adequate blood supply was confirmed with blanching and refill.  The pedicle was then wrapped with xeroform gauze and dressed appropriately with a telfa and gauze bandage to ensure continued blood supply and protect the attached pedicle. Melolabial Interpolation Flap Text: A decision was made to reconstruct the defect utilizing an interpolation axial flap and a staged reconstruction.  A telfa template was made of the defect.  This telfa template was then used to outline the melolabial interpolation flap.  The donor area for the pedicle flap was then injected with anesthesia.  The flap was excised through the skin and subcutaneous tissue down to the layer of the underlying musculature.  The pedicle flap was carefully excised within this deep plane to maintain its blood supply.  The edges of the donor site were undermined.   The donor site was closed in a primary fashion.  The pedicle was then rotated into position and sutured.  Once the tube was sutured into place, adequate blood supply was confirmed with blanching and refill.  The pedicle was then wrapped with xeroform gauze and dressed appropriately with a telfa and gauze bandage to ensure continued blood supply and protect the attached pedicle. Mastoid Interpolation Flap Text: A decision was made to reconstruct the defect utilizing an interpolation axial flap and a staged reconstruction.  A telfa template was made of the defect.  This telfa template was then used to outline the mastoid interpolation flap.  The donor area for the pedicle flap was then injected with anesthesia.  The flap was excised through the skin and subcutaneous tissue down to the layer of the underlying musculature.  The pedicle flap was carefully excised within this deep plane to maintain its blood supply.  The edges of the donor site were undermined.   The donor site was closed in a primary fashion.  The pedicle was then rotated into position and sutured.  Once the tube was sutured into place, adequate blood supply was confirmed with blanching and refill.  The pedicle was then wrapped with xeroform gauze and dressed appropriately with a telfa and gauze bandage to ensure continued blood supply and protect the attached pedicle. Posterior Auricular Interpolation Flap Text: A decision was made to reconstruct the defect utilizing an interpolation axial flap and a staged reconstruction.  A telfa template was made of the defect.  This telfa template was then used to outline the posterior auricular interpolation flap.  The donor area for the pedicle flap was then injected with anesthesia.  The flap was excised through the skin and subcutaneous tissue down to the layer of the underlying musculature.  The pedicle flap was carefully excised within this deep plane to maintain its blood supply.  The edges of the donor site were undermined.   The donor site was closed in a primary fashion.  The pedicle was then rotated into position and sutured.  Once the tube was sutured into place, adequate blood supply was confirmed with blanching and refill.  The pedicle was then wrapped with xeroform gauze and dressed appropriately with a telfa and gauze bandage to ensure continued blood supply and protect the attached pedicle. Paramedian Forehead Flap Text: A decision was made to reconstruct the defect utilizing an interpolation axial flap and a staged reconstruction.  A telfa template was made of the defect.  This telfa template was then used to outline the paramedian forehead pedicle flap.  The donor area for the pedicle flap was then injected with anesthesia.  The flap was excised through the skin and subcutaneous tissue down to the layer of the underlying musculature.  The pedicle flap was carefully excised within this deep plane to maintain its blood supply.  The edges of the donor site were undermined.   The donor site was closed in a primary fashion.  The pedicle was then rotated into position and sutured.  Once the tube was sutured into place, adequate blood supply was confirmed with blanching and refill.  The pedicle was then wrapped with xeroform gauze and dressed appropriately with a telfa and gauze bandage to ensure continued blood supply and protect the attached pedicle. Abbe Flap (Upper To Lower Lip) Text: The defect of the lower lip was assessed and measured.  Given the location and size of the defect, an Abbe flap was deemed most appropriate.  Using a sterile surgical marker, an appropriate Abbe flap was measured and drawn on the upper lip. Local anesthesia was then infiltrated.  A scalpel was then used to incise the upper lip through and through the skin, vermilion, muscle and mucosa, leaving the flap pedicled on the opposite side.  The flap was then rotated and transferred to the lower lip defect.  The flap was then sutured into place with a three layer technique, closing the orbicularis oris muscle layer with subcutaneous buried sutures, followed by a mucosal layer and an epidermal layer. Abbe Flap (Lower To Upper Lip) Text: The defect of the upper lip was assessed and measured.  Given the location and size of the defect, an Abbe flap was deemed most appropriate.  Using a sterile surgical marker, an appropriate Abbe flap was measured and drawn on the lower lip. Local anesthesia was then infiltrated. A scalpel was then used to incise the upper lip through and through the skin, vermilion, muscle and mucosa, leaving the flap pedicled on the opposite side.  The flap was then rotated and transferred to the lower lip defect.  The flap was then sutured into place with a three layer technique, closing the orbicularis oris muscle layer with subcutaneous buried sutures, followed by a mucosal layer and an epidermal layer. Estlander Flap (Upper To Lower Lip) Text: The defect of the lower lip was assessed and measured.  Given the location and size of the defect, an Estlander flap was deemed most appropriate.  Using a sterile surgical marker, an appropriate Estlander flap was measured and drawn on the upper lip. Local anesthesia was then infiltrated. A scalpel was then used to incise the lateral aspect of the flap, through skin, muscle and mucosa, leaving the flap pedicled medially.  The flap was then rotated and positioned to fill the lower lip defect.  The flap was then sutured into place with a three layer technique, closing the orbicularis oris muscle layer with subcutaneous buried sutures, followed by a mucosal layer and an epidermal layer. Lip Wedge Excision Repair Text: Given the location of the defect and the proximity to free margins a full thickness wedge repair was deemed most appropriate.  Using a sterile surgical marker, the appropriate repair was drawn incorporating the defect and placing the expected incisions perpendicular to the vermilion border.  The vermilion border was also meticulously outlined to ensure appropriate reapproximation during the repair.  The area thus outlined was incised through and through with a #15 scalpel blade.  The muscularis and dermis were reaproximated with deep sutures following hemostasis. Care was taken to realign the vermilion border before proceeding with the superficial closure.  Once the vermilion was realigned the superfical and mucosal closure was finished. Ftsg Text: The defect edges were debeveled with a #15 scalpel blade.  Given the location of the defect, shape of the defect and the proximity to free margins a full thickness skin graft was deemed most appropriate.  Using a sterile surgical marker, the primary defect shape was transferred to the donor site. The area thus outlined was incised deep to adipose tissue with a #15 scalpel blade.  The harvested graft was then trimmed of adipose tissue until only dermis and epidermis was left.  The skin margins of the secondary defect were undermined to an appropriate distance in all directions utilizing iris scissors.  The secondary defect was closed with interrupted buried subcutaneous sutures.  The skin edges were then re-apposed with running  sutures.  The skin graft was then placed in the primary defect and oriented appropriately. Split-Thickness Skin Graft Text: The defect edges were debeveled with a #15 scalpel blade.  Given the location of the defect, shape of the defect and the proximity to free margins a split thickness skin graft was deemed most appropriate.  Using a sterile surgical marker, the primary defect shape was transferred to the donor site. The split thickness graft was then harvested.  The skin graft was then placed in the primary defect and oriented appropriately. Burow's Graft Text: The defect edges were debeveled with a #15 scalpel blade.  Given the location of the defect, shape of the defect, the proximity to free margins and the presence of a standing cone deformity a Burow's skin graft was deemed most appropriate. The standing cone was removed and this tissue was then trimmed to the shape of the primary defect. The adipose tissue was also removed until only dermis and epidermis were left.  The skin margins of the secondary defect were undermined to an appropriate distance in all directions utilizing iris scissors.  The secondary defect was closed with interrupted buried subcutaneous sutures.  The skin edges were then re-apposed with running  sutures.  The skin graft was then placed in the primary defect and oriented appropriately. Cartilage Graft Text: The defect edges were debeveled with a #15 scalpel blade.  Given the location of the defect, shape of the defect, the fact the defect involved a full thickness cartilage defect a cartilage graft was deemed most appropriate.  An appropriate donor site was identified, cleansed, and anesthetized. The cartilage graft was then harvested and transferred to the recipient site, oriented appropriately and then sutured into place.  The secondary defect was then repaired using a primary closure. Composite Graft Text: The defect edges were debeveled with a #15 scalpel blade.  Given the location of the defect, shape of the defect, the proximity to free margins and the fact the defect was full thickness a composite graft was deemed most appropriate.  The defect was outline and then transferred to the donor site.  A full thickness graft was then excised from the donor site. The graft was then placed in the primary defect, oriented appropriately and then sutured into place.  The secondary defect was then repaired using a primary closure. Epidermal Autograft Text: The defect edges were debeveled with a #15 scalpel blade.  Given the location of the defect, shape of the defect and the proximity to free margins an epidermal autograft was deemed most appropriate.  Using a sterile surgical marker, the primary defect shape was transferred to the donor site. The epidermal graft was then harvested.  The skin graft was then placed in the primary defect and oriented appropriately. Dermal Autograft Text: The defect edges were debeveled with a #15 scalpel blade.  Given the location of the defect, shape of the defect and the proximity to free margins a dermal autograft was deemed most appropriate.  Using a sterile surgical marker, the primary defect shape was transferred to the donor site. The area thus outlined was incised deep to adipose tissue with a #15 scalpel blade.  The harvested graft was then trimmed of adipose and epidermal tissue until only dermis was left.  The skin graft was then placed in the primary defect and oriented appropriately. Skin Substitute Text: The defect edges were debeveled with a #15 scalpel blade.  Given the location of the defect, shape of the defect and the proximity to free margins a skin substitute graft was deemed most appropriate.  The graft material was trimmed to fit the size of the defect. The graft was then placed in the primary defect and oriented appropriately. Tissue Cultured Epidermal Autograft Text: The defect edges were debeveled with a #15 scalpel blade.  Given the location of the defect, shape of the defect and the proximity to free margins a tissue cultured epidermal autograft was deemed most appropriate.  The graft was then trimmed to fit the size of the defect.  The graft was then placed in the primary defect and oriented appropriately. Xenograft Text: The defect edges were debeveled with a #15 scalpel blade.  Given the location of the defect, shape of the defect and the proximity to free margins a xenograft was deemed most appropriate.  The graft was then trimmed to fit the size of the defect.  The graft was then placed in the primary defect and oriented appropriately. Purse String (Intermediate) Text: Given the location of the defect and the characteristics of the surrounding skin a purse string intermediate closure was deemed most appropriate.  Undermining was performed circumferentially around the surgical defect.  A purse string suture was then placed and tightened. Purse String (Simple) Text: Given the location of the defect and the characteristics of the surrounding skin a purse string simple closure was deemed most appropriate.  Undermining was performed circumferentially around the surgical defect.  A purse string suture was then placed and tightened. Partial Purse String (Intermediate) Text: Given the location of the defect and the characteristics of the surrounding skin an intermediate purse string closure was deemed most appropriate.  Undermining was performed circumferentially around the surgical defect.  A purse string suture was then placed and tightened. Wound tension of the circular defect prevented complete closure of the wound. Partial Purse String (Simple) Text: Given the location of the defect and the characteristics of the surrounding skin a simple purse string closure was deemed most appropriate.  Undermining was performed circumferentially around the surgical defect.  A purse string suture was then placed and tightened. Wound tension of the circular defect prevented complete closure of the wound. Complex Repair And Single Advancement Flap Text: The defect edges were debeveled with a #15 scalpel blade.  The primary defect was closed partially with a complex linear closure.  Given the location of the remaining defect, shape of the defect and the proximity to free margins a single advancement flap was deemed most appropriate for complete closure of the defect.  Using a sterile surgical marker, an appropriate advancement flap was drawn incorporating the defect and placing the expected incisions within the relaxed skin tension lines where possible.    The area thus outlined was incised deep to adipose tissue with a #15 scalpel blade.  The skin margins were undermined to an appropriate distance in all directions utilizing iris scissors. Complex Repair And Double Advancement Flap Text: The defect edges were debeveled with a #15 scalpel blade.  The primary defect was closed partially with a complex linear closure.  Given the location of the remaining defect, shape of the defect and the proximity to free margins a double advancement flap was deemed most appropriate for complete closure of the defect.  Using a sterile surgical marker, an appropriate advancement flap was drawn incorporating the defect and placing the expected incisions within the relaxed skin tension lines where possible.    The area thus outlined was incised deep to adipose tissue with a #15 scalpel blade.  The skin margins were undermined to an appropriate distance in all directions utilizing iris scissors. Complex Repair And Modified Advancement Flap Text: The defect edges were debeveled with a #15 scalpel blade.  The primary defect was closed partially with a complex linear closure.  Given the location of the remaining defect, shape of the defect and the proximity to free margins a modified advancement flap was deemed most appropriate for complete closure of the defect.  Using a sterile surgical marker, an appropriate advancement flap was drawn incorporating the defect and placing the expected incisions within the relaxed skin tension lines where possible.    The area thus outlined was incised deep to adipose tissue with a #15 scalpel blade.  The skin margins were undermined to an appropriate distance in all directions utilizing iris scissors. Complex Repair And A-T Advancement Flap Text: The defect edges were debeveled with a #15 scalpel blade.  The primary defect was closed partially with a complex linear closure.  Given the location of the remaining defect, shape of the defect and the proximity to free margins an A-T advancement flap was deemed most appropriate for complete closure of the defect.  Using a sterile surgical marker, an appropriate advancement flap was drawn incorporating the defect and placing the expected incisions within the relaxed skin tension lines where possible.    The area thus outlined was incised deep to adipose tissue with a #15 scalpel blade.  The skin margins were undermined to an appropriate distance in all directions utilizing iris scissors. Complex Repair And O-T Advancement Flap Text: The defect edges were debeveled with a #15 scalpel blade.  The primary defect was closed partially with a complex linear closure.  Given the location of the remaining defect, shape of the defect and the proximity to free margins an O-T advancement flap was deemed most appropriate for complete closure of the defect.  Using a sterile surgical marker, an appropriate advancement flap was drawn incorporating the defect and placing the expected incisions within the relaxed skin tension lines where possible.    The area thus outlined was incised deep to adipose tissue with a #15 scalpel blade.  The skin margins were undermined to an appropriate distance in all directions utilizing iris scissors. Complex Repair And O-L Flap Text: The defect edges were debeveled with a #15 scalpel blade.  The primary defect was closed partially with a complex linear closure.  Given the location of the remaining defect, shape of the defect and the proximity to free margins an O-L flap was deemed most appropriate for complete closure of the defect.  Using a sterile surgical marker, an appropriate flap was drawn incorporating the defect and placing the expected incisions within the relaxed skin tension lines where possible.    The area thus outlined was incised deep to adipose tissue with a #15 scalpel blade.  The skin margins were undermined to an appropriate distance in all directions utilizing iris scissors. Complex Repair And Bilobe Flap Text: The defect edges were debeveled with a #15 scalpel blade.  The primary defect was closed partially with a complex linear closure.  Given the location of the remaining defect, shape of the defect and the proximity to free margins a bilobe flap was deemed most appropriate for complete closure of the defect.  Using a sterile surgical marker, an appropriate advancement flap was drawn incorporating the defect and placing the expected incisions within the relaxed skin tension lines where possible.    The area thus outlined was incised deep to adipose tissue with a #15 scalpel blade.  The skin margins were undermined to an appropriate distance in all directions utilizing iris scissors. Complex Repair And Melolabial Flap Text: The defect edges were debeveled with a #15 scalpel blade.  The primary defect was closed partially with a complex linear closure.  Given the location of the remaining defect, shape of the defect and the proximity to free margins a melolabial flap was deemed most appropriate for complete closure of the defect.  Using a sterile surgical marker, an appropriate advancement flap was drawn incorporating the defect and placing the expected incisions within the relaxed skin tension lines where possible.    The area thus outlined was incised deep to adipose tissue with a #15 scalpel blade.  The skin margins were undermined to an appropriate distance in all directions utilizing iris scissors. Complex Repair And Rotation Flap Text: The defect edges were debeveled with a #15 scalpel blade.  The primary defect was closed partially with a complex linear closure.  Given the location of the remaining defect, shape of the defect and the proximity to free margins a rotation flap was deemed most appropriate for complete closure of the defect.  Using a sterile surgical marker, an appropriate advancement flap was drawn incorporating the defect and placing the expected incisions within the relaxed skin tension lines where possible.    The area thus outlined was incised deep to adipose tissue with a #15 scalpel blade.  The skin margins were undermined to an appropriate distance in all directions utilizing iris scissors. Complex Repair And Rhombic Flap Text: The defect edges were debeveled with a #15 scalpel blade.  The primary defect was closed partially with a complex linear closure.  Given the location of the remaining defect, shape of the defect and the proximity to free margins a rhombic flap was deemed most appropriate for complete closure of the defect.  Using a sterile surgical marker, an appropriate advancement flap was drawn incorporating the defect and placing the expected incisions within the relaxed skin tension lines where possible.    The area thus outlined was incised deep to adipose tissue with a #15 scalpel blade.  The skin margins were undermined to an appropriate distance in all directions utilizing iris scissors. Complex Repair And Transposition Flap Text: The defect edges were debeveled with a #15 scalpel blade.  The primary defect was closed partially with a complex linear closure.  Given the location of the remaining defect, shape of the defect and the proximity to free margins a transposition flap was deemed most appropriate for complete closure of the defect.  Using a sterile surgical marker, an appropriate advancement flap was drawn incorporating the defect and placing the expected incisions within the relaxed skin tension lines where possible.    The area thus outlined was incised deep to adipose tissue with a #15 scalpel blade.  The skin margins were undermined to an appropriate distance in all directions utilizing iris scissors. Complex Repair And V-Y Plasty Text: The defect edges were debeveled with a #15 scalpel blade.  The primary defect was closed partially with a complex linear closure.  Given the location of the remaining defect, shape of the defect and the proximity to free margins a V-Y plasty was deemed most appropriate for complete closure of the defect.  Using a sterile surgical marker, an appropriate advancement flap was drawn incorporating the defect and placing the expected incisions within the relaxed skin tension lines where possible.    The area thus outlined was incised deep to adipose tissue with a #15 scalpel blade.  The skin margins were undermined to an appropriate distance in all directions utilizing iris scissors. Complex Repair And M Plasty Text: The defect edges were debeveled with a #15 scalpel blade.  The primary defect was closed partially with a complex linear closure.  Given the location of the remaining defect, shape of the defect and the proximity to free margins an M plasty was deemed most appropriate for complete closure of the defect.  Using a sterile surgical marker, an appropriate advancement flap was drawn incorporating the defect and placing the expected incisions within the relaxed skin tension lines where possible.    The area thus outlined was incised deep to adipose tissue with a #15 scalpel blade.  The skin margins were undermined to an appropriate distance in all directions utilizing iris scissors. Complex Repair And Double M Plasty Text: The defect edges were debeveled with a #15 scalpel blade.  The primary defect was closed partially with a complex linear closure.  Given the location of the remaining defect, shape of the defect and the proximity to free margins a double M plasty was deemed most appropriate for complete closure of the defect.  Using a sterile surgical marker, an appropriate advancement flap was drawn incorporating the defect and placing the expected incisions within the relaxed skin tension lines where possible.    The area thus outlined was incised deep to adipose tissue with a #15 scalpel blade.  The skin margins were undermined to an appropriate distance in all directions utilizing iris scissors. Complex Repair And W Plasty Text: The defect edges were debeveled with a #15 scalpel blade.  The primary defect was closed partially with a complex linear closure.  Given the location of the remaining defect, shape of the defect and the proximity to free margins a W plasty was deemed most appropriate for complete closure of the defect.  Using a sterile surgical marker, an appropriate advancement flap was drawn incorporating the defect and placing the expected incisions within the relaxed skin tension lines where possible.    The area thus outlined was incised deep to adipose tissue with a #15 scalpel blade.  The skin margins were undermined to an appropriate distance in all directions utilizing iris scissors. Complex Repair And Z Plasty Text: The defect edges were debeveled with a #15 scalpel blade.  The primary defect was closed partially with a complex linear closure.  Given the location of the remaining defect, shape of the defect and the proximity to free margins a Z plasty was deemed most appropriate for complete closure of the defect.  Using a sterile surgical marker, an appropriate advancement flap was drawn incorporating the defect and placing the expected incisions within the relaxed skin tension lines where possible.    The area thus outlined was incised deep to adipose tissue with a #15 scalpel blade.  The skin margins were undermined to an appropriate distance in all directions utilizing iris scissors. Complex Repair And Dorsal Nasal Flap Text: The defect edges were debeveled with a #15 scalpel blade.  The primary defect was closed partially with a complex linear closure.  Given the location of the remaining defect, shape of the defect and the proximity to free margins a dorsal nasal flap was deemed most appropriate for complete closure of the defect.  Using a sterile surgical marker, an appropriate flap was drawn incorporating the defect and placing the expected incisions within the relaxed skin tension lines where possible.    The area thus outlined was incised deep to adipose tissue with a #15 scalpel blade.  The skin margins were undermined to an appropriate distance in all directions utilizing iris scissors. Complex Repair And Ftsg Text: The defect edges were debeveled with a #15 scalpel blade.  The primary defect was closed partially with a complex linear closure.  Given the location of the defect, shape of the defect and the proximity to free margins a full thickness skin graft was deemed most appropriate to repair the remaining defect.  The graft was trimmed to fit the size of the remaining defect.  The graft was then placed in the primary defect, oriented appropriately, and sutured into place. Complex Repair And Burow's Graft Text: The defect edges were debeveled with a #15 scalpel blade.  The primary defect was closed partially with a complex linear closure.  Given the location of the defect, shape of the defect, the proximity to free margins and the presence of a standing cone deformity a Burow's graft was deemed most appropriate to repair the remaining defect.  The graft was trimmed to fit the size of the remaining defect.  The graft was then placed in the primary defect, oriented appropriately, and sutured into place. Complex Repair And Split-Thickness Skin Graft Text: The defect edges were debeveled with a #15 scalpel blade.  The primary defect was closed partially with a complex linear closure.  Given the location of the defect, shape of the defect and the proximity to free margins a split thickness skin graft was deemed most appropriate to repair the remaining defect.  The graft was trimmed to fit the size of the remaining defect.  The graft was then placed in the primary defect, oriented appropriately, and sutured into place. Complex Repair And Epidermal Autograft Text: The defect edges were debeveled with a #15 scalpel blade.  The primary defect was closed partially with a complex linear closure.  Given the location of the defect, shape of the defect and the proximity to free margins an epidermal autograft was deemed most appropriate to repair the remaining defect.  The graft was trimmed to fit the size of the remaining defect.  The graft was then placed in the primary defect, oriented appropriately, and sutured into place. Complex Repair And Dermal Autograft Text: The defect edges were debeveled with a #15 scalpel blade.  The primary defect was closed partially with a complex linear closure.  Given the location of the defect, shape of the defect and the proximity to free margins an dermal autograft was deemed most appropriate to repair the remaining defect.  The graft was trimmed to fit the size of the remaining defect.  The graft was then placed in the primary defect, oriented appropriately, and sutured into place. Complex Repair And Tissue Cultured Epidermal Autograft Text: The defect edges were debeveled with a #15 scalpel blade.  The primary defect was closed partially with a complex linear closure.  Given the location of the defect, shape of the defect and the proximity to free margins an tissue cultured epidermal autograft was deemed most appropriate to repair the remaining defect.  The graft was trimmed to fit the size of the remaining defect.  The graft was then placed in the primary defect, oriented appropriately, and sutured into place. Complex Repair And Xenograft Text: The defect edges were debeveled with a #15 scalpel blade.  The primary defect was closed partially with a complex linear closure.  Given the location of the defect, shape of the defect and the proximity to free margins a xenograft was deemed most appropriate to repair the remaining defect.  The graft was trimmed to fit the size of the remaining defect.  The graft was then placed in the primary defect, oriented appropriately, and sutured into place. Complex Repair And Skin Substitute Graft Text: The defect edges were debeveled with a #15 scalpel blade.  The primary defect was closed partially with a complex linear closure.  Given the location of the remaining defect, shape of the defect and the proximity to free margins a skin substitute graft was deemed most appropriate to repair the remaining defect.  The graft was trimmed to fit the size of the remaining defect.  The graft was then placed in the primary defect, oriented appropriately, and sutured into place. Path Notes (To The Dermatopathologist): Please check margins. Triangulation (Location Of Lesion In Relation To Distance From Anatomical Landmarks): Proximal Consent was obtained from the patient. The risks and benefits to therapy were discussed in detail. Specifically, the risks of infection, scarring, bleeding, prolonged wound healing, incomplete removal, allergy to anesthesia, nerve injury and recurrence were addressed. Prior to the procedure, the treatment site was clearly identified and confirmed by the patient. All components of Universal Protocol/PAUSE Rule completed.\\n\\nPatient was offered/allowed to view pre-op marking/surgical plan.  Rationale for margin and excision orientation explained to patient.  All questions answered. Post-Care Instructions: I reviewed with the patient in detail post-care instructions. Patient is not to engage in any heavy lifting, exercise, or swimming for the next 14 days. Should the patient develop any fevers, chills, bleeding, severe pain patient will contact the office immediately. Home Suture Removal Text: Patient was provided a home suture removal kit and will remove their sutures at home.  If they have any questions or difficulties they will call the office. Where Do You Want The Question To Include Opioid Counseling Located?: Case Summary Tab Information: Selecting Yes will display possible errors in your note based on the variables you have selected. This validation is only offered as a suggestion for you. PLEASE NOTE THAT THE VALIDATION TEXT WILL BE REMOVED WHEN YOU FINALIZE YOUR NOTE. IF YOU WANT TO FAX A PRELIMINARY NOTE YOU WILL NEED TO TOGGLE THIS TO 'NO' IF YOU DO NOT WANT IT IN YOUR FAXED NOTE.

## 2022-10-25 NOTE — PROGRESS NOTES
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  S72 001G                      Subjective: Pt reports her groin pain is improving each day and that her elbow is what is really bothering her the most due to her arthritis  Objective: See treatment diary below  Assessment: Pt will be 4 weeks post op tomorrow  Increased stretching with R hip PROM and no pain symptoms  Significant difficulty with SLR flexion due to remaining quadricep weakness  Decreased difficulty with step ups and decreased use of UE support; progress to 6 inch step next session  Overall, pt demonstrating good progress with skilled PT at this point in recovery  Plan: Continue per plan of care  Precautions: RA, osteoporosis, R OSKAR on 22, R TKA on 3/1/22    Manuals 10/3 10/6 10/13 10/17 10/21 10/25       R hip PROM  AG EH AG AG AG                                 Assessed incision  AG           Neuro Re-Ed                                                                                                        Ther Ex             Bike  NV 5 min  L0 5' 5' 5'       Standing hip abd HEP 3x10  3x10  bilat 3x10 ea  3x10 ea  3x10 ea          LAQ HEP 1#  3x10  1#  3x10 1#  3x10  1 5# 3x10  2#  3x10        SAQ HEP 1#  3x10 1#  3x10 1#  3x10 1 5#  3x10  2#  3x10        Supine hip abd  2x10 w/therapist assist Therapist assist  2x10 2x10 w/therapist assist 2x10 w/therapist assist 2x10        Clamshells   2x10 x10  2x10 3x10        SL hip abd  1x10  1x4 2x10 2x10  2x10         Leg press             Hooklying hip abd    5" 3x10  5" 3x10  5" 3x10        SLR Flexion      x10        Ther Activity             Mini squats     2x10 2x10        Step ups  4" x5 LLE B/L UE 4"  2x10  bilat UE 4" 2x10 ea  B/L UE 4" 3x10 ea  B/L UE 4" 3x10 ea   B/L UE 6"      Gait Training             Amb w/ SPC  [id number] Around clinic Around clinic Around clinic No AD around clinic                    Modalities

## 2022-10-27 ENCOUNTER — OFFICE VISIT (OUTPATIENT)
Dept: PHYSICAL THERAPY | Facility: CLINIC | Age: 67
End: 2022-10-27
Payer: OTHER MISCELLANEOUS

## 2022-10-27 DIAGNOSIS — S72.001K CLOSED DISPLACED FRACTURE OF RIGHT FEMORAL NECK WITH NONUNION: Primary | ICD-10-CM

## 2022-10-27 DIAGNOSIS — S72.001G: ICD-10-CM

## 2022-10-27 PROCEDURE — 97110 THERAPEUTIC EXERCISES: CPT | Performed by: PHYSICAL THERAPIST

## 2022-10-27 PROCEDURE — 97140 MANUAL THERAPY 1/> REGIONS: CPT | Performed by: PHYSICAL THERAPIST

## 2022-10-27 PROCEDURE — 97530 THERAPEUTIC ACTIVITIES: CPT | Performed by: PHYSICAL THERAPIST

## 2022-10-27 NOTE — PROGRESS NOTES
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  S72 001G                      Subjective: Pt reports her pain is improving and at worst a 3/10  States she did start driving locally in town and it takes her increased time to get her foot on the brake and pedal due to weakness  Objective: See treatment diary below  Assessment: Significant difficulty remains with SLR flexion due to quadricep weakness  Increased fatigue with proximal strengthening requiring rest breaks to decrease hip flexor compensations in sidelying position  Attempted 6 inch step ups with bilateral UE support with significant difficulty; increased fear and decreased weight bearing through RLE requiring heavy UE assistance to achieve step up  Significant trendelenburg present when stepping up due to R hip weakness  Plan: Continue per plan of care  Precautions: RA, osteoporosis, R OSKAR on 22, R TKA on 3/1/22    Manuals 10/3 10/6 10/13 10/17 10/21 10/25 10/27      R hip PROM  AG EH AG AG AG AG                                Assessed incision  AG           Neuro Re-Ed                                                                                                        Ther Ex             Bike  NV 5 min  L0 5' 5' 5' 5'      Standing hip abd HEP 3x10  3x10  bilat 3x10 ea  3x10 ea  3x10 ea  3x10 ea         LAQ HEP 1#  3x10  1#  3x10 1#  3x10  1 5# 3x10  2#  3x10  2#  3x10       SAQ HEP 1#  3x10 1#  3x10 1#  3x10 1 5#  3x10  2#  3x10        Supine hip abd  2x10 w/therapist assist Therapist assist  2x10 2x10 w/therapist assist 2x10 w/therapist assist 2x10        Clamshells   2x10 x10  2x10 3x10  3x10       SL hip abd  1x10  1x4 2x10 2x10  2x10   3x10       Leg press       NV      Hooklying hip abd    5" 3x10  5" 3x10  5" 3x10  5" 3x10       SLR Flexion      x10  2x10       Ther Activity             Mini squats     2x10 2x10  3x10       Step ups  4" x5 LLE B/L UE 4"  2x10  bilat UE 4" 2x10 ea  B/L UE 4" 3x10 ea  B/L UE 4" 3x10 ea   B/L UE 6" x10   4" 2x10 w/ B/L UE      Gait Training             Amb w/ SPC  [id number] Around clinic Around clinic Around clinic No AD around clinic No AD around clinic                   Modalities

## 2022-10-31 ENCOUNTER — OFFICE VISIT (OUTPATIENT)
Dept: PHYSICAL THERAPY | Facility: CLINIC | Age: 67
End: 2022-10-31

## 2022-10-31 DIAGNOSIS — S72.001G: ICD-10-CM

## 2022-10-31 DIAGNOSIS — S72.001K CLOSED DISPLACED FRACTURE OF RIGHT FEMORAL NECK WITH NONUNION: Primary | ICD-10-CM

## 2022-10-31 NOTE — PROGRESS NOTES
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  S72 001G                      Subjective: Pt reports she is feeling well in the hip and feels the best after therapy  States she has increased how much and how far she is driving now  Objective: See treatment diary below  Assessment: Increased hip rotation compensations with SLR flexion due to fatigue and performing towards end of session; performed SAQ to allow for improved form during quadricep strengthening  Performed lateral step downs with significant difficulty; unable to tap with heel due to significant difficulty with eccentric control therefore use of toe touch tap during step downs  Improved tolerance to activity with minimal pain throughout session  Pt has a follow up with her surgeon on   Plan: Continue per plan of care  Precautions: RA, osteoporosis, R OSKAR on 22, R TKA on 3/1/22    Manuals 10/3 10/6 10/13 10/17 10/21 10/25 10/27 10/31     R hip PROM  AG EH AG AG AG AG AG                               Assessed incision  AG           Neuro Re-Ed                                                                                                        Ther Ex             Bike  NV 5 min  L0 5' 5' 5' 5' 5'     Standing hip abd HEP 3x10  3x10  bilat 3x10 ea  3x10 ea  3x10 ea  3x10 ea  3x10 ea    OTB    LAQ HEP 1#  3x10  1#  3x10 1#  3x10  1 5# 3x10  2#  3x10  2#  3x10  2 5#  3x10      SAQ HEP 1#  3x10 1#  3x10 1#  3x10 1 5#  3x10  2#  3x10   2 5#  3x10      Supine hip abd  2x10 w/therapist assist Therapist assist  2x10 2x10 w/therapist assist 2x10 w/therapist assist 2x10        Clamshells   2x10 x10  2x10 3x10  3x10  3x10      SL hip abd  1x10  1x4 2x10 2x10  2x10   3x10  3x10     Leg press       NV 55#   4x10     Hooklying hip abd    5" 3x10  5" 3x10  5" 3x10  5" 3x10  5" 3x10      SLR Flexion      x10  2x10  x5     Ther Activity             Mini squats     2x10 2x10  3x10  3x10      Step ups  4" x5 LLE B/L UE 4"  2x10  bilat UE 4" 2x10 ea  B/L UE 4" 3x10 ea  B/L UE 4" 3x10 ea   B/L UE 6" x10   4" 2x10 w/ B/L UE 4" 3x10 w/ B/L UE support      Lateral step downs        4" x10      Sit to stands        Low mat  1x10   1x12   Arms across chest      Gait Training             Amb w/ SPC  [id number] Around clinic Around clinic Around clinic No AD around clinic No AD around clinic No AD around clinic                  Modalities

## 2022-11-02 ENCOUNTER — OFFICE VISIT (OUTPATIENT)
Dept: OBGYN CLINIC | Facility: MEDICAL CENTER | Age: 67
End: 2022-11-02

## 2022-11-02 ENCOUNTER — OFFICE VISIT (OUTPATIENT)
Dept: FAMILY MEDICINE CLINIC | Facility: CLINIC | Age: 67
End: 2022-11-02

## 2022-11-02 VITALS
SYSTOLIC BLOOD PRESSURE: 112 MMHG | BODY MASS INDEX: 19.63 KG/M2 | HEIGHT: 64 IN | OXYGEN SATURATION: 96 % | RESPIRATION RATE: 12 BRPM | WEIGHT: 115 LBS | HEART RATE: 69 BPM | DIASTOLIC BLOOD PRESSURE: 63 MMHG

## 2022-11-02 VITALS
BODY MASS INDEX: 19.67 KG/M2 | WEIGHT: 115.2 LBS | DIASTOLIC BLOOD PRESSURE: 63 MMHG | HEART RATE: 79 BPM | HEIGHT: 64 IN | SYSTOLIC BLOOD PRESSURE: 112 MMHG

## 2022-11-02 DIAGNOSIS — N39.41 URGE INCONTINENCE OF URINE: Primary | ICD-10-CM

## 2022-11-02 DIAGNOSIS — Z96.641 STATUS POST TOTAL HIP REPLACEMENT, RIGHT: Primary | ICD-10-CM

## 2022-11-02 DIAGNOSIS — Z79.52 ON PREDNISONE THERAPY: ICD-10-CM

## 2022-11-02 DIAGNOSIS — Z23 NEED FOR PROPHYLACTIC VACCINATION WITH STREPTOCOCCUS PNEUMONIAE (PNEUMOCOCCUS) AND INFLUENZA VACCINES: ICD-10-CM

## 2022-11-02 DIAGNOSIS — M05.79 RHEUMATOID ARTHRITIS INVOLVING MULTIPLE SITES WITH POSITIVE RHEUMATOID FACTOR (HCC): ICD-10-CM

## 2022-11-02 RX ORDER — SOLIFENACIN SUCCINATE 10 MG/1
10 TABLET, FILM COATED ORAL DAILY
Qty: 90 TABLET | Refills: 3 | Status: SHIPPED | OUTPATIENT
Start: 2022-11-02

## 2022-11-02 NOTE — LETTER
November 2, 2022     Patient: Alfred Hernandez  YOB: 1955  Date of Visit: 11/2/2022      To Whom it May Concern: Shivani Paris is under my professional care  Yasmin Briceño was seen in my office on 11/2/2022  Yasmin Briceño will remain out of work until her next follow up visit in 4 weeks  If you have any questions or concerns, please don't hesitate to call  Sincerely,          Patrice Palumbo DO        CC: Melissa Mcmahon

## 2022-11-02 NOTE — PROGRESS NOTES
Assessment/Plan:  1  Status post total hip replacement, right      No orders of the defined types were placed in this encounter  · Patient is doing well 5 weeks status post right hip removal of hardware and conversion to R posterior OSKAR  · Continue PT  · She will continue ASA DVT prophylaxis for one more week  · Pain control prn- tylenol  · Patient may resume her rheumatoid medications at the discretion of her rheumatologist    · Patient should call ahead for abx prior to dental appts  · Work note provided to remain out of work until next follow up  We will discuss returning to work at that visit  Return in about 4 weeks (around 11/30/2022) for R OSKAR post op 3  I answered all of the patient's questions during the visit and provided education of the patient's condition during the visit  The patient verbalized understanding of the information given and agrees with the plan  This note was dictated using deltaDNA software  It may contain errors including improperly dictated words  Please contact physician directly for any questions  Subjective   Chief Complaint:   Chief Complaint   Patient presents with   • Right Hip - Post-op, Follow-up       Alfred Hernandez is a 75 y o  female who presents for 5 week follow up s/p right removal of hardware and conversion to right posterior OSKAR on 9/28/22  Patient states she is doing well  She notes her pain is roughly a 4/10  Patient notes soreness in the anterior hip  She states she is not able to step up on large steps yet but is working on that  She is taking tylenol twice a day for pain  She has continued her prednisone but is not on methotrexate currently  She is attending PT twice per week and has discontinued her walker and cane  Patient denies leg length discrepancy  Review of Systems  ROS:    See HPI for musculoskeletal review     All other systems reviewed are negative     History:  Past Medical History:   Diagnosis Date   • Anemia     1995 - corrected with iron   • Ankle fracture, right     last assessed: 3/9/15   • Anxiety    • Chronic pain disorder     right side joint pain    • Moderate exercise     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   • Wears dentures     full upper   • Wears glasses     reading     Past Surgical History:   Procedure Laterality Date   • ANKLE SURGERY Right     ORIF /plate and 4 screws   • COLONOSCOPY     • CYST REMOVAL         • ESOPHAGOGASTRODUODENOSCOPY      Diagnostic   • FOOT SURGERY      plantar wart resection; resolved:    • JOINT REPLACEMENT Right     RTK   • ORIF HIP FRACTURE Right 2022    Procedure: ORIF HIP W/ CANNUALATED SCREWS;  Surgeon: Conrad Link DO;  Location: [facility];  Service: Orthopedics   • PA TOTAL HIP ARTHROPLASTY Right 2022    Procedure: ARTHROPLASTY HIP TOTAL, removal of cannulated screws, posterior approach, cerclage of femur fx;  Surgeon: Conrad Link DO;  Location: [address] [place] [address] MAIN OR;  Service: Orthopedics   • PA TOTAL KNEE ARTHROPLASTY Right 2022    Procedure: ARTHROPLASTY KNEE TOTAL;  Surgeon: Conrad Link DO;  Location: [facility];  Service: Orthopedics   • TONSILLECTOMY AND ADENOIDECTOMY         • TUBAL LIGATION     • WISDOM TOOTH EXTRACTION       Social History   Social History     Substance and Sexual Activity   Alcohol Use Yes    Comment: rare, maybe on holidays     Social History     Substance and Sexual Activity   Drug Use No     Social History     Tobacco Use   Smoking Status Former Smoker   • Quit date:    • Years since quittin 8   Smokeless Tobacco Never Used     Family History:   Family History   Problem Relation Age of Onset   • Seizures Mother         epilepsy   • Bone cancer Father    • No Known Problems Sister    • No Known Problems Brother    • No Known Problems Son    • No Known Problems Maternal Grandmother    • No Known Problems Maternal Grandfather    • No Known Problems Paternal Grandmother    • No Known Problems Paternal Grandfather    • Thyroid disease Sister    • No Known Problems Sister    • No Known Problems Brother        Current Outpatient Medications on File Prior to Visit   Medication Sig Dispense Refill   • acetaminophen (TYLENOL) 325 mg tablet Take 3 tablets (975 mg total) by mouth every 8 (eight) hours  0   • ascorbic acid (VITAMIN C) 500 MG tablet Take 1 tablet (500 mg total) by mouth daily Start to take 30 days prior to surgery 30 tablet 1   • aspirin (ECOTRIN) 325 mg EC tablet Take 1 tablet (325 mg total) by mouth 2 (two) times a day Take with food  Do not take at the same time of day as celebrex  56 tablet 0   • Ca Carbonate-Mag Hydroxide 550-110 MG CHEW Chew Twice daily     • docusate sodium (COLACE) 100 mg capsule Take 1 capsule (100 mg total) by mouth 2 (two) times a day for 10 days 20 capsule 0   • ferrous sulfate 324 (65 Fe) mg Take 1 tablet (324 mg total) by mouth daily before breakfast Start to take 30 days prior to surgery 30 tablet 1   • folic acid (FOLVITE) 1 mg tablet Take 1 tablet (1 mg total) by mouth daily Start to take 30 days prior to surgery 30 tablet 1   • ibandronate (BONIVA) 150 MG tablet Take 1 tablet by mouth as needed       • methotrexate 2 5 MG tablet TAKE 8 TABLETS BY MOUTH ONCE A WEEK AS DIRECTED     • Multiple Vitamin (multivitamin) tablet Take 1 tablet by mouth daily Start to take 30 days prior to surgery 30 tablet 1   • ondansetron (Zofran ODT) 4 mg disintegrating tablet Take 1 tablet (4 mg total) by mouth every 6 (six) hours as needed for nausea or vomiting 20 tablet 0   • predniSONE 10 mg tablet Take 2 tablets for 3 days and 1 tablet for 3 days and then return to 5 mg daily  9 tablet 0   • predniSONE 5 mg tablet Take 5 mg by mouth daily      • promethazine (PHENERGAN) 12 5 MG tablet Take 1 tablet (12 5 mg total) by mouth every 6 (six) hours as needed for nausea or vomiting 30 tablet 0     No current facility-administered medications on file prior to visit       Allergies Allergen Reactions   • Aspirin Other (See Comments)     Per pt avoids taking due to rheumatology Meds        Objective     /63   Pulse 79   Ht 5' 4" (1 626 m)   Wt 52 3 kg (115 lb 3 2 oz)   BMI 19 77 kg/m²      PE:  AAOx 3  WDWN  Hearing intact, no drainage from eyes  no audible wheezing  no abdominal distension  LE compartments soft, AT/GS intact    Ortho Exam:  right hip:   INC: healed, No erythema, minimal swelling  No pain with ROM  Palpable posterior tibial pulse

## 2022-11-02 NOTE — PATIENT INSTRUCTIONS
1  Urge incontinence of urine  Assessment & Plan:  Adrianna Camille had been stopped, patient notes her symptoms are much worse without this medication, will restart today, did not have side effects  Orders:  -     solifenacin (VESICARE) 10 MG tablet; Take 1 tablet (10 mg total) by mouth daily    2  Rheumatoid arthritis involving multiple sites with positive rheumatoid factor ([facility] [address] )  Assessment & Plan:  Continues to follow with rheumatology will be restarting methotrexate when able      3  On prednisone therapy  Assessment & Plan:  Currently 5mg     Orders:  -     Pneumococcal Conjugate Vaccine 20-valent (Pcv20)    4   Need for prophylactic vaccination with Streptococcus pneumoniae (Pneumococcus) and Influenza vaccines  -     Pneumococcal Conjugate Vaccine 20-valent (Pcv20)

## 2022-11-02 NOTE — ASSESSMENT & PLAN NOTE
Armen Latus had been stopped, patient notes her symptoms are much worse without this medication, will restart today, did not have side effects

## 2022-11-02 NOTE — PROGRESS NOTES
Assessment/Plan:       Problem List Items Addressed This Visit        Musculoskeletal and Integument    Rheumatoid arthritis ([facility] [address] ) - Primary     Continues to follow with rheumatology will be restarting methotrexate when able            Other    Urge incontinence of urine     Vesicare had been stopped, patient notes her symptoms are much worse without this medication, will restart today, did not have side effects  Relevant Medications    solifenacin (VESICARE) 10 MG tablet    On prednisone therapy     Currently 5mg          Relevant Orders    Pneumococcal Conjugate Vaccine 20-valent (Pcv20)                Subjective:      Patient ID: Maureen Nicholas is a 75 y o  female  HPI    75year old with pmh of rheumatoid arthritis, presenting for follow up of chronic conditions  She is overall doing well, and recovering well from hip surgery  Reports physical therapy is going very well  Her vesicare had been stopped patient noted she is having trouble with urge incontinence since this time, felt much better on the medication  Recent CMP and CBC reviewed  The following portions of the patient's history were reviewed and updated as appropriate: allergies, current medications, past family history, past medical history, past social history, past surgical history and problem list       Current Outpatient Medications:   •  acetaminophen (TYLENOL) 325 mg tablet, Take 3 tablets (975 mg total) by mouth every 8 (eight) hours, Disp: , Rfl: 0  •  ascorbic acid (VITAMIN C) 500 MG tablet, Take 1 tablet (500 mg total) by mouth daily Start to take 30 days prior to surgery, Disp: 30 tablet, Rfl: 1  •  aspirin (ECOTRIN) 325 mg EC tablet, Take 1 tablet (325 mg total) by mouth 2 (two) times a day Take with food   Do not take at the same time of day as celebrex , Disp: 56 tablet, Rfl: 0  •  Ca Carbonate-Mag Hydroxide 550-110 MG CHEW, Chew Twice daily, Disp: , Rfl:   •  ferrous sulfate 324 (65 Fe) mg, Take 1 tablet (324 mg total) by mouth daily before breakfast Start to take 30 days prior to surgery, Disp: 30 tablet, Rfl: 1  •  folic acid (FOLVITE) 1 mg tablet, Take 1 tablet (1 mg total) by mouth daily Start to take 30 days prior to surgery, Disp: 30 tablet, Rfl: 1  •  ibandronate (BONIVA) 150 MG tablet, Take 1 tablet by mouth as needed  , Disp: , Rfl:   •  methotrexate 2 5 MG tablet, TAKE 8 TABLETS BY MOUTH ONCE A WEEK AS DIRECTED, Disp: , Rfl:   •  Multiple Vitamin (multivitamin) tablet, Take 1 tablet by mouth daily Start to take 30 days prior to surgery, Disp: 30 tablet, Rfl: 1  •  ondansetron (Zofran ODT) 4 mg disintegrating tablet, Take 1 tablet (4 mg total) by mouth every 6 (six) hours as needed for nausea or vomiting, Disp: 20 tablet, Rfl: 0  •  predniSONE 5 mg tablet, Take 5 mg by mouth daily , Disp: , Rfl:   •  promethazine (PHENERGAN) 12 5 MG tablet, Take 1 tablet (12 5 mg total) by mouth every 6 (six) hours as needed for nausea or vomiting, Disp: 30 tablet, Rfl: 0  •  solifenacin (VESICARE) 10 MG tablet, Take 1 tablet (10 mg total) by mouth daily, Disp: 90 tablet, Rfl: 3  •  docusate sodium (COLACE) 100 mg capsule, Take 1 capsule (100 mg total) by mouth 2 (two) times a day for 10 days, Disp: 20 capsule, Rfl: 0     Review of Systems   Constitutional: Negative for activity change and appetite change  Respiratory: Negative for apnea and chest tightness  Gastrointestinal: Negative for abdominal distention and abdominal pain  Musculoskeletal: Negative for arthralgias and back pain  Objective:      /63 (BP Location: Left arm, Patient Position: Sitting, Cuff Size: Standard)   Pulse 69   Resp 12   Ht 5' 4" (1 626 m)   Wt 52 2 kg (115 lb)   SpO2 96%   BMI 19 74 kg/m²          Physical Exam  Constitutional:       Appearance: Normal appearance  Cardiovascular:      Rate and Rhythm: Normal rate and regular rhythm  Pulses: Normal pulses  Heart sounds: Normal heart sounds     Pulmonary:      Effort: Pulmonary effort is normal    Musculoskeletal:      Comments: Right elbow swelling and tender   Skin:     General: Skin is warm  Capillary Refill: Capillary refill takes less than 2 seconds  Neurological:      Mental Status: She is alert             Cristian Wolfe

## 2022-11-03 ENCOUNTER — OFFICE VISIT (OUTPATIENT)
Dept: PHYSICAL THERAPY | Facility: CLINIC | Age: 67
End: 2022-11-03

## 2022-11-03 DIAGNOSIS — S72.001G: ICD-10-CM

## 2022-11-03 DIAGNOSIS — S72.001K CLOSED DISPLACED FRACTURE OF RIGHT FEMORAL NECK WITH NONUNION: Primary | ICD-10-CM

## 2022-11-03 NOTE — PROGRESS NOTES
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  S72 001G                      Subjective: Pt reports she saw her surgeon yesterday and she is very happy with her progress  Pt reports she wants her to do four more weeks of therapy  Objective: See treatment diary below  Assessment: Improved tolerance to SLR flexion with decreased pelvis compensations  Good tolerance to progression of interventions without symptoms and increased fatigue as anticipated  Pt progressing well with skilled PT and would continue to benefit to maximize function and improve LE strength  Plan: Continue per plan of care  Progress as tolerated  Precautions: RA, osteoporosis, R OSKAR on 22, R TKA on 3/1/22    Manuals 10/3 10/6 10/13 10/17 10/21 10/25 10/27 10/31 11/3    R hip PROM  AG EH AG AG AG AG AG AG                              Assessed incision  AG           Neuro Re-Ed                                                                                                        Ther Ex             Bike  NV 5 min  L0 5' 5' 5' 5' 5' 5'    Standing hip abd HEP 3x10  3x10  bilat 3x10 ea  3x10 ea  3x10 ea  3x10 ea  3x10 ea  OTB  3x10 ea       LAQ HEP 1#  3x10  1#  3x10 1#  3x10  1 5# 3x10  2#  3x10  2#  3x10  2 5#  3x10  3#  3x10    SAQ HEP 1#  3x10 1#  3x10 1#  3x10 1 5#  3x10  2#  3x10   2 5#  3x10  3#  3x10    Supine hip abd  2x10 w/therapist assist Therapist assist  2x10 2x10 w/therapist assist 2x10 w/therapist assist 2x10        Clamshells   2x10 x10  2x10 3x10  3x10  3x10  OTB 3x10     SL hip abd  1x10  1x4 2x10 2x10  2x10   3x10  3x10 3x10     Leg press       NV 55#   4x10 55#   4x10    Hooklying hip abd    5" 3x10  5" 3x10  5" 3x10  5" 3x10  5" 3x10  5" 3x10     SLR Flexion      x10  2x10  x5 2x10     Ther Activity             Mini squats     2x10 2x10  3x10  3x10  3x10    Step ups  4" x5 LLE B/L UE 4"  2x10  bilat UE 4" 2x10 ea  B/L UE 4" 3x10 ea  B/L UE 4" 3x10 ea   B/L UE 6" x10   4" 2x10 w/ B/L UE 4" 3x10 w/ B/L UE support  4" 4x10 w/ B/L UE support     Lateral step downs        4" x10  NV    Sit to stands        Low mat  1x10   1x12   Arms across chest  NV    Gait Training             Amb w/ SPC  [id number] Around clinic Around clinic Around clinic No AD around clinic No AD around clinic No AD around clinic No AD around clinic                 Modalities

## 2022-11-07 ENCOUNTER — OFFICE VISIT (OUTPATIENT)
Dept: PHYSICAL THERAPY | Facility: CLINIC | Age: 67
End: 2022-11-07

## 2022-11-07 DIAGNOSIS — S72.001G: ICD-10-CM

## 2022-11-07 DIAGNOSIS — S72.001K CLOSED DISPLACED FRACTURE OF RIGHT FEMORAL NECK WITH NONUNION: Primary | ICD-10-CM

## 2022-11-07 NOTE — PROGRESS NOTES
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  S72 001G                      Subjective: Pt reports she is feeling good today but did have some ache in the leg due to not sleeping with her pillow between her knees  States her elbow is what bothers her the most right now  Objective: See treatment diary below  Assessment: Pt demonstrating significant improvement to activity tolerance  Demonstrates improvements in LE strength with decreased trendelenburg noted during ambulation  Pt able to progress with step ups to unilateral support with increased fear initially however resolved with increased repetitions  Verbal cues to promote improvement in eccentric control with standing hip abduction with good carryover  Plan: Continue per plan of care  Progress as tolerated  Precautions: RA, osteoporosis, R OSAKR on 22, R TKA on 3/1/22    Manuals 10/3 10/6 10/13 10/17 10/21 10/25 10/27 10/31 11/3 11/7   R hip PROM  AG EH AG AG AG AG AG AG AG                             Assessed incision  AG           Neuro Re-Ed                                                                                                        Ther Ex             Bike  NV 5 min  L0 5' 5' 5' 5' 5' 5' 5'   Standing hip abd HEP 3x10  3x10  bilat 3x10 ea  3x10 ea  3x10 ea  3x10 ea  3x10 ea  OTB  3x10 ea  OTB  3x10 ea      LAQ HEP 1#  3x10  1#  3x10 1#  3x10  1 5# 3x10  2#  3x10  2#  3x10  2 5#  3x10  3#  3x10 3#  3x10   SAQ HEP 1#  3x10 1#  3x10 1#  3x10 1 5#  3x10  2#  3x10   2 5#  3x10  3#  3x10 3#  3x10   Supine hip abd  2x10 w/therapist assist Therapist assist  2x10 2x10 w/therapist assist 2x10 w/therapist assist 2x10        Clamshells   2x10 x10  2x10 3x10 3x10  3x10  OTB 3x10  OTB 3x10    SL hip abd  1x10  1x4 2x10 2x10  2x10   3x10  3x10 3x10  3x10    Leg press       NV 55#   4x10 55#   4x10    Hooklying hip abd    5" 3x10  5" 3x10  5" 3x10  5" 3x10  5" 3x10  5" 3x10  5" 3x10    SLR Flexion      x10  2x10  x5 2x10  3x10    Ther Activity             Mini squats     2x10 2x10  3x10  3x10  3x10 3x10   Step ups  4" x5 LLE B/L UE 4"  2x10  bilat UE 4" 2x10 ea  B/L UE 4" 3x10 ea  B/L UE 4" 3x10 ea   B/L UE 6" x10   4" 2x10 w/ B/L UE 4" 3x10 w/ B/L UE support  4" 4x10 w/ B/L UE support  4" 3x10 w/ unilateral UE support    Lateral step downs        4" x10  NV 4" x10    Sit to stands        Low mat  1x10   1x12   Arms across chest  NV Low mat 2x10 arms across chest    Gait Training             Amb w/ SPC  [id number] Around clinic Around clinic Around clinic No AD around clinic No AD around clinic No AD around clinic No AD around clinic No AD around clinic                Modalities

## 2022-11-10 ENCOUNTER — OFFICE VISIT (OUTPATIENT)
Dept: PHYSICAL THERAPY | Facility: CLINIC | Age: 67
End: 2022-11-10

## 2022-11-10 DIAGNOSIS — S72.001G: ICD-10-CM

## 2022-11-10 DIAGNOSIS — S72.001K CLOSED DISPLACED FRACTURE OF RIGHT FEMORAL NECK WITH NONUNION: Primary | ICD-10-CM

## 2022-11-10 NOTE — PROGRESS NOTES
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  S72 001G                      Subjective: Pt reports she continues to feel really good after therapy  States the inside of her foot sometimes bothers her but when she walks more it feels better and her walking mechanics improve  Objective: See treatment diary below  Assessment: Mild lateral movement noted with SLR flexion due to weakness in medial musculature; provided further hip add strengthening to continue to address LE weakness  Significant difficulty with hip adduction against gravity therefore required therapist assist to complete intervention  Verbal cues to improve weight shift to RLE during sit to stands with good carryover  Good tolerance to continued progression of interventions with increased fatigue as anticipated  Plan: Continue per plan of care  Progress as tolerated  Precautions: RA, osteoporosis, R OSKAR on 22, R TKA on 3/1/22    Manuals 11/10     10/25 10/27 10/31 11/3 11/7   R hip PROM AG     AG AG AG AG AG                             Assessed incision             Neuro Re-Ed                                                                                                        Ther Ex             Bike 5'     5' 5' 5' 5' 5'   Standing hip abd OTB 3x10 ea  3x10 ea  3x10 ea  3x10 ea  OTB  3x10 ea  OTB  3x10 ea      LAQ 43  3x10     2#  3x10  2#  3x10  2 5#  3x10  3#  3x10 3#  3x10   SAQ 4#  3x10      2#  3x10   2 5#  3x10  3#  3x10 3#  3x10   Supine hip abd      2x10        Clamshells OTB 3x10      3x10  3x10  3x10  OTB 3x10  OTB 3x10    SL hip abd 3x10      3x10  3x10 3x10  3x10    Leg press 65#  3x10       NV 55#   4x10 55#   4x10    Hooklying hip abd 5" 3x10      5" 3x10  5" 3x10  5" 3x10  5" 3x10  5" 3x10    SLR Flexion 3x10     x10  2x10  x5 2x10  3x10    SL hip add 2x10 w/ therapist assist            Ther Activity             Mini squats      2x10  3x10  3x10  3x10 3x10   Step ups 4" 3x10 w/ unilateral UE support      4" 3x10 ea   B/L UE 6" x10   4" 2x10 w/ B/L UE 4" 3x10 w/ B/L UE support  4" 4x10 w/ B/L UE support  4" 3x10 w/ unilateral UE support    Lateral step downs        4" x10  NV 4" x10    Sit to stands Low mat 2x10 arms across chest        Low mat  1x10   1x12   Arms across chest  NV Low mat 2x10 arms across chest    Gait Training             Amb w/ SPC No AD around clinic     No AD around clinic No AD around clinic No AD around clinic No AD around clinic No AD around clinic                Modalities

## 2022-11-14 ENCOUNTER — OFFICE VISIT (OUTPATIENT)
Dept: PHYSICAL THERAPY | Facility: CLINIC | Age: 67
End: 2022-11-14

## 2022-11-14 DIAGNOSIS — S72.001G: ICD-10-CM

## 2022-11-14 DIAGNOSIS — S72.001K CLOSED DISPLACED FRACTURE OF RIGHT FEMORAL NECK WITH NONUNION: Primary | ICD-10-CM

## 2022-11-14 NOTE — PROGRESS NOTES
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  S72 001G                   Assessment  Assessment details: Abran Kelly is a 75 y o  female referred to outpatient physical therapy for s/p R OSKAR on 22  Pt reports 50% improvement which correlates to improved FOTO outcomes  Improvements include decreased pain at worst, improved LE strength, and improved tolerance to activity  Impairments include pain, decreased R hip ROM, decreased RLE strength, decreased flexibility, impaired balance, and antalgic gait  These impairments are limiting patients functional ability to perform stair navigation  Pt is restricted in participating in ADLs and occupational tasks  Pt would continue to benefit from skilled physical therapy to address the limitations above to allow return to prior level of function  Impairments: abnormal gait, abnormal muscle tone, abnormal or restricted ROM, abnormal movement, activity intolerance, impaired balance, impaired physical strength, lacks appropriate home exercise program and pain with function    Symptom irritability: lowUnderstanding of Dx/Px/POC: good  Goals  Short term goals 2-3 weeks    1) Patient will demonstrate ability to perform HEP  (MET)  2) Patient will demonstrate ability to ambulate without AD  (MET)  3) Patient will improve LE strength by 1 MMT grade  (MET)      Long term goals 4-8 weeks    1) Patient will demonstrate independence with comprehensive HEP  (PROGRESSING)  2) Patient improve FOTO score to equal or greater than expected values   (PROGRESSING)  3) Patient will demonstrate improved TUG score to <13 s to decrease risk for falls  (MET)  4) Patient will demonstrate improved 5xSTS score to <12 s to decrease risk for falls  (PROGRESSING)      Plan  Plan details: Plan of care was discussed with patient at time of evaluation  Pt will be seen 2x a week for 8 weeks  Patient would benefit from: skilled physical therapy  Planned modality interventions: cryotherapy, TENS, thermotherapy: hydrocollator packs and low level laser therapy  Other planned modality interventions: PRN  Planned therapy interventions: balance, flexibility, functional ROM exercises, home exercise program, joint mobilization, manual therapy, neuromuscular re-education, strengthening, therapeutic activities, stretching, therapeutic exercise, gait training, patient education and abdominal trunk stabilization  Frequency: 2x week  Duration in weeks: 8  Treatment plan discussed with: patient        Subjective Evaluation    History of Present Illness  Date of surgery: 9/28/2022  Mechanism of injury: surgery  Mechanism of injury: Patient presents to outpatient physical therapy s/p ARTHROPLASTY HIP TOTAL, removal of cannulated screws, posterior approach, cerclage of femur fx (Right Hip) on 9/28/22  Previous femoral head ORIF secondary to fracture on 6/28/2022 in R hip  Hx of R TKA on 3/1/22  Pt was educated on her precautions in the hospital regarding her posterior approach  Pt currently ambulating with a rolling walker  Pt will have a follow up with her surgeon on Friday, Oct 7th  Patient denies episodes of numbness or tingling in RLE  Pain described as aching and sharp in R hip  Pain rating currently 0/10, at best 0/10 and at worst 10/10  Patient states limitations with ambulation, standing, stair navigation  Pt performs non-reciprocal stair navigation for her four steps outside with unilateral railing  Pt currently works as a CNA at [employer]  Pt would like to return to work but she may have to retire because her family wants her to  Aggravating factors include weight bearing   Patient states use of tylenol every 6 hours and prednisone for relief of symptoms  Pt goals for therapy include being able to walk on her own without an AD, and getting back to normal life  22: Pt is over 6 weeks post op  Pt is now ambulating without AD  Increased stiffness however feels relief with skilled PT  States she does not feel limited with ambulation or standing but continues to perform non-reciprocal gait pattern on the steps  Continues to feel increased LE weakness and that her leg wants to go out to the side more when she is using it  Not a recurrent problem   Quality of life: good    Pain  Current pain ratin  At best pain ratin  At worst pain rating: 3  Quality: sharp and dull ache  Relieving factors: medications and rest  Aggravating factors: walking, standing and stair climbing    Social Support  Steps to enter house: yes (4 steps at back deck)  Lives in: trailer  Lives with: spouse    Treatments  No previous or current treatments  Patient Goals  Patient goals for therapy: decreased edema, decreased pain, increased motion, return to sport/leisure activities, independence with ADLs/IADLs, increased strength and return to work          Objective     Observations     Right Hip  Positive for atrophy and incision  Negative for drainage  Additional Observation Details  Dressing remains over incision, assessed R hip for swelling and redness (-)        Palpation     Right   Tenderness of the TFL  Tenderness     Right Hip   Tenderness in the greater trochanter       Neurological Testing     Sensation     Hip   Left Hip   Intact: light touch    Right Hip   Intact: light touch    Active Range of Motion   Left Hip   Flexion: 90 degrees     Right Hip   Flexion: 70 degrees   Abduction: 25 degrees     Passive Range of Motion   Left Hip   Flexion: 110 degrees   Abduction: 25 degrees     Right Hip   Flexion: 95 degrees   Abduction: 25 degrees     Strength/Myotome Testing     Left Hip   Planes of Motion   Flexion: 4+    Right Hip   Planes of Motion   Flexion: 4-  Abduction: 3    Left Knee   Flexion: 4  Extension: 4    Right Knee   Flexion: 4  Extension: 4    Left Ankle/Foot   Dorsiflexion: 4    Right Ankle/Foot   Dorsiflexion: 4    Ambulation     Ambulation: Level Surfaces   Ambulation with assistive device: independent    Observational Gait   Gait: antalgic   Decreased walking speed, stride length, right stance time, right swing time and right step length  Left foot contact pattern: heel to toe  Right foot contact pattern: heel to toe  Left arm swing: decreased  Right arm swing: decreased  Base of support: normal    Quality of Movement During Gait     Pelvis    Pelvis (Right): Positive Trendelenburg  Knee    Knee (Right): Positive stiff knee  Functional Assessment        Comments  5xSTS: 19 81 s with use of UE support for push off : 13 58 s with UE support for push off  TU 32 s with UE push off and RW : 11 37 s with UE push off and no AD             Precautions: RA, osteoporosis, R OSKAR on 22, R TKA on 3/1/22    Manuals 11/10 11/14    10/25 10/27 10/31 11/3 11/7   R hip PROM AG AG    AG AG AG AG AG                Re-eval  AG           Assessed incision             Neuro Re-Ed                                                                                                        Ther Ex             Bike 5' 5'    5' 5' 5' 5' 5'   Standing hip abd OTB 3x10 ea  3x10 ea  3x10 ea  3x10 ea  OTB  3x10 ea  OTB  3x10 ea      LAQ 4#  3x10 4#  3x10    2#  3x10  2#  3x10  2 5#  3x10  3#  3x10 3#  3x10   SAQ 4#  3x10  HEP    2#  3x10   2 5#  3x10  3#  3x10 3#  3x10   Supine hip abd      2x10        Clamshells OTB 3x10  OTB 3x10     3x10  3x10  3x10  OTB 3x10  OTB 3x10    SL hip abd 3x10 3x10     3x10  3x10 3x10  3x10    Leg press 65#  3x10  65#  3x10      NV 55#   4x10 55#   4x10    Hooklying hip abd 5" 3x10  5" 3x10     5" 3x10  5" 3x10  5" 3x10  5" 3x10  5" 3x10    SLR Flexion 3x10 3x10    x10  2x10  x5 2x10 3x10    SL hip add 2x10 w/ therapist assist 2x10 w/ therapist assist           Ther Activity             Mini squats      2x10  3x10  3x10  3x10 3x10   Step ups 4" 3x10 w/ unilateral UE support  4" 3x10 w/ unilateral UE support     4" 3x10 ea   B/L UE 6" x10   4" 2x10 w/ B/L UE 4" 3x10 w/ B/L UE support  4" 4x10 w/ B/L UE support  4" 3x10 w/ unilateral UE support    Lateral step downs        4" x10  NV 4" x10    Sit to stands Low mat 2x10 arms across chest  Low mat 2x10 arms across chest       Low mat  1x10   1x12   Arms across chest  NV Low mat 2x10 arms across chest    Gait Training             Amb w/ SPC No AD around clinic No AD around clinic    No AD around clinic No AD around clinic No AD around clinic No AD around clinic No AD around clinic                Modalities

## 2022-11-17 ENCOUNTER — OFFICE VISIT (OUTPATIENT)
Dept: PHYSICAL THERAPY | Facility: CLINIC | Age: 67
End: 2022-11-17

## 2022-11-17 DIAGNOSIS — S72.001K CLOSED DISPLACED FRACTURE OF RIGHT FEMORAL NECK WITH NONUNION: Primary | ICD-10-CM

## 2022-11-17 DIAGNOSIS — S72.001G: ICD-10-CM

## 2022-11-17 NOTE — PROGRESS NOTES
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  S72 001G                      Subjective: Pt reports her foot and her elbow are what bother her and not really her hip  States she tried some scar away for her scar but had minimal improvements  Objective: See treatment diary below        Assessment: Educated pt to schedule a visit with her PCP regarding her new foot pain with good understanding  Pt is now 7 weeks post op  Transitioned sit to stands to staggered stance due to increased weight bearing with LLE vs RLE  Significant difficulty with staggered sit to stands with RLE in back due to remaining weakness and decreased balance; therapist assist to hold LLE in front to maintain staggered stance  Pt able to perform step ups without UE support; required stand by assist and finger tip touch for initial reps due to increased fear however then was able to remove UE  Plan: Continue per plan of care  Precautions: RA, osteoporosis, R OSKAR on 22, R TKA on 3/1/22    Manuals 11/10 11/14 11/17   10/25 10/27 10/31 11/3 11/7   R hip PROM AG AG AG   AG AG AG AG AG                Re-eval  AG           Assessed incision             Neuro Re-Ed                                                                                                        Ther Ex             Bike 5' 5'    5' 5' 5' 5' 5'   Standing hip abd OTB 3x10 ea  OTB 3x10 ea  3x10 ea  3x10 ea  3x10 ea  OTB  3x10 ea  OTB  3x10 ea      LAQ 4#  3x10 4#  3x10 4#  3x10   2#  3x10  2#  3x10  2 5#  3x10  3#  3x10 3#  3x10   SAQ 4#  3x10  HEP    2#  3x10   2 5#  3x10  3#  3x10 3#  3x10   Supine hip abd      2x10        Clamshells OTB 3x10  OTB 3x10  OTB 3x10    3x10  3x10  3x10 OTB 3x10  OTB 3x10    SL hip abd 3x10 3x10     3x10  3x10 3x10  3x10    Leg press 65#  3x10  65#  3x10  75#  3x10     NV 55#   4x10 55#   4x10    Hooklying hip abd 5" 3x10  5" 3x10  5" 3x10    5" 3x10  5" 3x10  5" 3x10  5" 3x10  5" 3x10    SLR Flexion 3x10 3x10 3x10   x10  2x10  x5 2x10  3x10    SL hip add 2x10 w/ therapist assist 2x10 w/ therapist assist 3x10 w/ therapist assist          Ther Activity             Mini squats   3x10   2x10  3x10  3x10  3x10 3x10   Step ups 4" 3x10 w/ unilateral UE support  4" 3x10 w/ unilateral UE support  4" 2x10 without UE assist   4" 3x10 ea   B/L UE 6" x10   4" 2x10 w/ B/L UE 4" 3x10 w/ B/L UE support  4" 4x10 w/ B/L UE support  4" 3x10 w/ unilateral UE support    Lateral step downs        4" x10  NV 4" x10    Sit to stands Low mat 2x10 arms across chest  Low mat 2x10 arms across chest  2x5 staggered RLE in back          Low mat  1x10   1x12   Arms across chest  NV Low mat 2x10 arms across chest    Gait Training             Amb w/ SPC No AD around clinic No AD around clinic No AD around clinic   No AD around clinic No AD around clinic No AD around clinic No AD around clinic No AD around clinic                Modalities

## 2022-11-21 ENCOUNTER — OFFICE VISIT (OUTPATIENT)
Dept: PHYSICAL THERAPY | Facility: CLINIC | Age: 67
End: 2022-11-21

## 2022-11-21 DIAGNOSIS — S72.001G: ICD-10-CM

## 2022-11-21 DIAGNOSIS — S72.001K CLOSED DISPLACED FRACTURE OF RIGHT FEMORAL NECK WITH NONUNION: Primary | ICD-10-CM

## 2022-11-21 NOTE — PROGRESS NOTES
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  S72 001G                      Subjective: Pt reports her hip feels great with no pain  Objective: See treatment diary below        Assessment: Good tolerance to progression of interventions with increased fatigue as anticipated  Progressed sidelying hip add with therapist assist to standing at [employer] machine  Continues to have increased difficulty with staggered sit to stands due to strength deficits in quadricep and requires therapist assist to hold LLE down to maintain staggered stance  Altered mini squats to incorporate hip add and VMO strengthening with good tolerance  Pt continues to be progressing well with skilled PT  Gait mechanics improve post session due to decreased stiffness in R hip joint  Plan: Continue per plan of care  Precautions: RA, osteoporosis, R OSKAR on 22, R TKA on 3/1/22    Manuals 11/10 11/14 11/17 11/21  10/25 10/27 10/31 11/3 11/7   R hip PROM AG AG AG AG  AG AG AG AG AG                Re-eval  AG           Assessed incision             Neuro Re-Ed                                                                                                        Ther Ex             Bike 5' 5'  5'  5' 5' 5' 5' 5'   Standing hip abd OTB 3x10 ea  OTB 3x10 ea  Pink 3x10   3x10 ea  3x10 ea  3x10 ea  OTB  3x10 ea  OTB  3x10 ea      LAQ 4#  3x10 4#  3x10 4#  3x10 4#  3x10  2#  3x10  2#  3x10  2 5#  3x10  3#  3x10 3#  3x10   SAQ 4#  3x10  HEP    2#  3x10   2 5#  3x10  3#  3x10 3#  3x10   Supine hip abd      2x10        Clamshells OTB 3x10  OTB 3x10  OTB 3x10  Pink 3x10   3x10  3x10  3x10  OTB 3x10  OTB 3x10    SL hip abd 3x10 3x10  3x10   3x10  3x10 3x10  3x10 Leg press 65#  3x10  65#  3x10  75#  3x10  75#  3x10    NV 55#   4x10 55#   4x10    Hooklying hip abd 5" 3x10  5" 3x10  5" 3x10  5" 3x10   5" 3x10  5" 3x10  5" 3x10  5" 3x10  5" 3x10    SLR Flexion 3x10 3x10 3x10 3x10   x10  2x10  x5 2x10  3x10    SL hip add 2x10 w/ therapist assist 2x10 w/ therapist assist 3x10 w/ therapist assist Multihip  10#  3x10         Ther Activity             Mini squats   3x10 At wall w/ ball squeeze  3x10   2x10  3x10  3x10  3x10 3x10   Step ups 4" 3x10 w/ unilateral UE support  4" 3x10 w/ unilateral UE support  4" 2x10 without UE assist 4" 3x10 without UE assist  4" 3x10 ea   B/L UE 6" x10   4" 2x10 w/ B/L UE 4" 3x10 w/ B/L UE support  4" 4x10 w/ B/L UE support  4" 3x10 w/ unilateral UE support    Lateral step downs        4" x10  NV 4" x10    Sit to stands Low mat 2x10 arms across chest  Low mat 2x10 arms across chest  2x5 staggered RLE in back      3x5 staggered RLE in back       Low mat  1x10   1x12   Arms across chest  NV Low mat 2x10 arms across chest    Gait Training             Amb w/ SPC No AD around clinic No AD around clinic No AD around clinic No AD around clinic  No AD around clinic No AD around clinic No AD around clinic No AD around clinic No AD around clinic                Modalities

## 2022-11-23 ENCOUNTER — OFFICE VISIT (OUTPATIENT)
Dept: PHYSICAL THERAPY | Facility: CLINIC | Age: 67
End: 2022-11-23

## 2022-11-23 DIAGNOSIS — S72.001G: ICD-10-CM

## 2022-11-23 DIAGNOSIS — S72.001K CLOSED DISPLACED FRACTURE OF RIGHT FEMORAL NECK WITH NONUNION: Primary | ICD-10-CM

## 2022-11-23 NOTE — PROGRESS NOTES
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  S72 001G                      Subjective: patient stated that her hip is feeling good, no c/o of pain this AM      Her R foot is bothering her with off/on pain  She went [place] a walk at the mall y[place], walking longer than usual      Patient has been standing a little more lately getting ready for Thanksgiving  When she's standing she is usually WB on R forefoot only  Objective: See treatment diary below    EDU on the use of SPC PRN if the foot pain is getting worse, and to maintain good ambulation mechanics/for safety  Assessment: Tolerated treatment well  Patient demonstrated good endurance, little to no rest required throughout session  Progressed with increased reps for some tasks today, well tolerated  Step up were challenging today, requiring UE support to complete  Progress as able  Plan: Continue per plan of care  Precautions: RA, osteoporosis, R OSKAR on 22, R TKA on 3/1/22    Manuals 11/10 11/14 11/17 11/21 11/23  10/27 10/31 11/3 11/7   R hip PROM AG AG AG AG MB  AG AG AG AG                Re-eval  AG           Assessed incision             Neuro Re-Ed                                                                                                        Ther Ex             Bike 5' 5'  5' 5' -ROM  5' 5' 5' 5'   Standing hip abd OTB 3x10 ea  OTB 3x10 ea  Pink 3x10  MH- see below  3x10 ea  3x10 ea  OTB  3x10 ea  OTB  3x10 ea      LAQ 4#  3x10 4#  3x10 4#  3x10 4#  3x10 4#  3x10  2#  3x10  2 5#  3x10  3#  3x10 3#  3x10   SAQ 4#  3x10  HEP      2 5#  3x10  3#  3x10 3#  3x10   Supine hip abd             Clamshells OTB 3x10  OTB 3x10  OTB 3x10  Pink 3x10  Pink 3x10 3x10  3x10  OTB 3x10  OTB 3x10    SL hip abd 3x10 3x10  3x10   3x10  3x10 3x10  3x10    Leg press 65#  3x10  65#  3x10  75#  3x10  75#  3x10  75#  30x  NV 55#   4x10 55#   4x10    Hooklying hip abd 5" 3x10  5" 3x10  5" 3x10  5" 3x10  5" 3x10   5" 3x10  5" 3x10  5" 3x10  5" 3x10    SLR Flexion 3x10 3x10 3x10 3x10  2x10 2#  2x10  x5 2x10  3x10    SL hip add 2x10 w/ therapist assist 2x10 w/ therapist assist 3x10 w/ therapist assist Multihip  10#  3x10 Multihip  10#  3x10        Ther Activity             Mini squats   3x10 At wall w/ ball squeeze  3x10  At wall w/ ball squeeze  3x10   3x10  3x10  3x10 3x10   Step ups 4" 3x10 w/ unilateral UE support  4" 3x10 w/ unilateral UE support  4" 2x10 without UE assist 4" 3x10 without UE assist 4" 3x10 with UE assist -ft p!  6" x10   4" 2x10 w/ B/L UE 4" 3x10 w/ B/L UE support  4" 4x10 w/ B/L UE support  4" 3x10 w/ unilateral UE support    Lateral step downs        4" x10  NV 4" x10    Sit to stands Low mat 2x10 arms across chest  Low mat 2x10 arms across chest  2x5 staggered RLE in back      3x5 staggered RLE in back    15 staggered RLE in back   Low mat  1x10   1x12   Arms across chest  NV Low mat 2x10 arms across chest    Gait Training             Amb w/ SPC No AD around clinic No AD around clinic No AD around clinic No AD around clinic No AD around clinic  No AD around clinic No AD around clinic No AD around clinic No AD around clinic                Modalities

## 2022-11-29 ENCOUNTER — OFFICE VISIT (OUTPATIENT)
Dept: PHYSICAL THERAPY | Facility: CLINIC | Age: 67
End: 2022-11-29

## 2022-11-29 DIAGNOSIS — S72.001K CLOSED DISPLACED FRACTURE OF RIGHT FEMORAL NECK WITH NONUNION: Primary | ICD-10-CM

## 2022-11-29 DIAGNOSIS — S72.001G: ICD-10-CM

## 2022-11-29 NOTE — PROGRESS NOTES
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  S72 001G                      Subjective: Pt reports her hip feels great but her foot is really bothering her  States she is going to go to the doctor regarding her foot  Objective: See treatment diary below      Assessment: Pt able to tolerate increased step height for step ups with use of bilateral UE support for safety and confidence; educated pt on not pulling body up with UE and to focus on weight bearing through RLE  Decreased anterior weight shift during staggered sit to stands; improved anterior weight shift with verbal cues with improved performance with sit to stands  Good tolerance to progression of interventions  Re-assess next visit  Plan: Discharge next session  Precautions: RA, osteoporosis, R OSKAR on 22, R TKA on 3/1/22    Manuals 11/10 11/14 11/17 11/21 11/23 11/29       R hip PROM AG AG AG AG MB AG                    Re-eval  AG           Assessed incision             Neuro Re-Ed                                                                                                        Ther Ex             Bike 5' 5'  5' 5' -ROM 5'       Standing hip abd OTB 3x10 ea  OTB 3x10 ea  Pink 3x10  MH- see below Multihip 25#  2x10 ea          LAQ 4#  3x10 4#  3x10 4#  3x10 4#  3x10 4#  3x10 4#  3x10       SAQ 4#  3x10  HEP           Supine hip abd             Clamshells OTB 3x10  OTB 3x10  OTB 3x10  Pink 3x10  Pink 3x10  Pink 3x10        SL hip abd 3x10 3x10  3x10         Leg press 65#  3x10  65#  3x10  75#  3x10  75#  3x10  75#  30x 85#  3x10        Hooklying hip abd 5" 3x10  5" 3x10  5" 3x10  5" 3x10  5" 3x10  5" 3x10        SLR Flexion 3x10 3x10 3x10 3x10  2x10 2# 2#  3x10 SL hip add 2x10 w/ therapist assist 2x10 w/ therapist assist 3x10 w/ therapist assist Multihip  10#  3x10 Multihip  10#  3x10 Multihip  10#  3x10       Ther Activity             Mini squats   3x10 At wall w/ ball squeeze  3x10  At wall w/ ball squeeze  3x10  At wall w/ ball squeeze  3x10        Step ups 4" 3x10 w/ unilateral UE support  4" 3x10 w/ unilateral UE support  4" 2x10 without UE assist 4" 3x10 without UE assist 4" 3x10 with UE assist -ft p! 6" x15 with UE support        Lateral step downs             Sit to stands Low mat 2x10 arms across chest  Low mat 2x10 arms across chest  2x5 staggered RLE in back      3x5 staggered RLE in back    15 staggered RLE in back 1x15 staggered RLE in back       Gait Training             Amb w/ SPC No AD around clinic No AD around clinic No AD around clinic No AD around clinic No AD around clinic No AD around clinic                    Modalities

## 2022-12-01 ENCOUNTER — OFFICE VISIT (OUTPATIENT)
Dept: PHYSICAL THERAPY | Facility: CLINIC | Age: 67
End: 2022-12-01

## 2022-12-01 DIAGNOSIS — S72.001G: ICD-10-CM

## 2022-12-01 DIAGNOSIS — S72.001K CLOSED DISPLACED FRACTURE OF RIGHT FEMORAL NECK WITH NONUNION: Primary | ICD-10-CM

## 2022-12-01 NOTE — PROGRESS NOTES
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  S72 001G                      Assessment  Assessment details: Shiraz Miller is a 75 y o  female referred to outpatient physical therapy for s/p R OSKAR on 22  Pt reports 80% improvement which correlates to improved FOTO outcomes  Shiraz Miller has been compliant with attending PT and home exercise program since initial evshelbie Regan has made improvements in objective data and achieved desired functional goals  Patient reports having returned to their prior level or function  It was mutually agreed to Discharge to home exercise program at this time  Patient has good understanding of provided home exercise program and was instructed to call with any questions or if issues should arise  Goals  Short term goals 2-3 weeks    1) Patient will demonstrate ability to perform HEP  (MET)  2) Patient will demonstrate ability to ambulate without AD  (MET)  3) Patient will improve LE strength by 1 MMT grade  (MET)      Long term goals 4-8 weeks    1) Patient will demonstrate independence with comprehensive HEP  (PROGRESSING)  2) Patient improve FOTO score to equal or greater than expected values  (PROGRESSING)  3) Patient will demonstrate improved TUG score to <13 s to decrease risk for falls  (MET)  4) Patient will demonstrate improved 5xSTS score to <12 s to decrease risk for falls  (MET)      Plan  Plan details: Discharged     Treatment plan discussed with: patient        Subjective Evaluation    History of Present Illness  Date of surgery: 2022  Mechanism of injury: surgery  Mechanism of injury: Patient presents to outpatient physical therapy s/p ARTHROPLASTY HIP TOTAL, removal of cannulated screws, posterior approach, cerclage of femur fx (Right Hip) on 22  Previous femoral head ORIF secondary to fracture on 2022 in R hip  Hx of R TKA on 3/1/22  Pt was educated on her precautions in the hospital regarding her posterior approach  Pt currently ambulating with a rolling walker  Pt will have a follow up with her surgeon on Friday, Oct 7th  Patient denies episodes of numbness or tingling in RLE  Pain described as aching and sharp in R hip  Pain rating currently 0/10, at best 0/10 and at worst 10/10  Patient states limitations with ambulation, standing, stair navigation  Pt performs non-reciprocal stair navigation for her four steps outside with unilateral railing  Pt currently works as a CNA at [employer]  Pt would like to return to work but she may have to retire because her family wants her to  Aggravating factors include weight bearing  Patient states use of tylenol every 6 hours and prednisone for relief of symptoms  Pt goals for therapy include being able to walk on her own without an AD, and getting back to normal life  22: Pt is over 6 weeks post op  Pt is now ambulating without AD  Increased stiffness however feels relief with skilled PT  States she does not feel limited with ambulation or standing but continues to perform non-reciprocal gait pattern on the steps  Continues to feel increased LE weakness and that her leg wants to go out to the side more when she is using it             Not a recurrent problem   Quality of life: good    Pain  Current pain ratin  At best pain ratin  At worst pain rating: 3  Quality: sharp and dull ache  Relieving factors: medications and rest  Aggravating factors: walking, standing and stair climbing    Social Support  Steps to enter house: yes (4 steps at back deck)  Lives in: trailer  Lives with: spouse    Treatments  No previous or current treatments  Patient Goals  Patient goals for therapy: decreased edema, decreased pain, increased motion, return to sport/leisure activities, independence with ADLs/IADLs, increased strength and return to work          Objective     Observations     Right Hip  Positive for atrophy and incision  Negative for drainage  Additional Observation Details  Dressing remains over incision, assessed R hip for swelling and redness (-)        Tenderness     Right Hip   No tenderness in the greater trochanter  Neurological Testing     Sensation     Hip   Left Hip   Intact: light touch    Right Hip   Intact: light touch    Active Range of Motion   Left Hip   Flexion: 90 degrees     Right Hip   Flexion: 90 degrees   Abduction: 30 degrees     Passive Range of Motion   Left Hip   Flexion: 110 degrees   Abduction: 25 degrees     Right Hip   Flexion: 95 degrees   Abduction: 25 degrees     Strength/Myotome Testing     Left Hip   Planes of Motion   Flexion: 4+    Right Hip   Planes of Motion   Flexion: 4+  Abduction: 4-    Left Knee   Flexion: 5  Extension: 5    Right Knee   Flexion: 4+  Extension: 4+    Left Ankle/Foot   Dorsiflexion: 4+    Right Ankle/Foot   Dorsiflexion: 4+    Ambulation     Ambulation: Level Surfaces   Ambulation with assistive device: independent    Observational Gait   Gait: antalgic   Decreased walking speed, stride length, right stance time, right swing time and right step length  Left foot contact pattern: heel to toe  Right foot contact pattern: heel to toe  Left arm swing: decreased  Right arm swing: decreased  Base of support: normal    Quality of Movement During Gait     Pelvis    Pelvis (Right): Positive Trendelenburg  Knee    Knee (Right): Positive stiff knee       Functional Assessment        Comments  5xSTS: 19 81 s with use of UE support for push off : 13 58 s with UE support for push off 22: 12 20 s without UE support   TU 32 s with UE push off and RW : 11 37 s with UE push off and no AD 22: 9 04 s without UE support and without AD             Precautions: RA, osteoporosis, R OSKAR on 9/28/22, R TKA on 3/1/22    Manuals 11/10 11/14 11/17 11/21 11/23 11/29 12/1      R hip PROM AG AG AG AG MB AG AG                   Re-eval  AG     AG      Assessed incision             Neuro Re-Ed                                                                                                        Ther Ex             Bike 5' 5'  5' 5' -ROM 5' 5'      Standing hip abd OTB 3x10 ea  OTB 3x10 ea  Pink 3x10  MH- see below Multihip 25#  2x10 ea          LAQ 4#  3x10 4#  3x10 4#  3x10 4#  3x10 4#  3x10 4#  3x10 4#  3x10      SAQ 4#  3x10  HEP           Supine hip abd             Clamshells OTB 3x10  OTB 3x10  OTB 3x10  Pink 3x10  Pink 3x10  Pink 3x10  Pink 3x10       SL hip abd 3x10 3x10  3x10         Leg press 65#  3x10  65#  3x10  75#  3x10  75#  3x10  75#  30x 85#  3x10  85#  3x10       Hooklying hip abd 5" 3x10  5" 3x10  5" 3x10  5" 3x10  5" 3x10  5" 3x10        SLR Flexion 3x10 3x10 3x10 3x10  2x10 2# 2#  3x10  2#  3x10       SL hip add 2x10 w/ therapist assist 2x10 w/ therapist assist 3x10 w/ therapist assist Multihip  10#  3x10 Multihip  10#  3x10 Multihip  10#  3x10       Ther Activity             Mini squats   3x10 At wall w/ ball squeeze  3x10  At wall w/ ball squeeze  3x10  At wall w/ ball squeeze  3x10        Step ups 4" 3x10 w/ unilateral UE support  4" 3x10 w/ unilateral UE support  4" 2x10 without UE assist 4" 3x10 without UE assist 4" 3x10 with UE assist -ft p! 6" x15 with UE support  6" 2x10 unilateral UE support      Lateral step downs             Sit to stands Low mat 2x10 arms across chest  Low mat 2x10 arms across chest  2x5 staggered RLE in back      3x5 staggered RLE in back    15 staggered RLE in back 1x15 staggered RLE in back 2x10 staggered RLE in back       Gait Training             Amb w/ SPC No AD around clinic No AD around clinic No AD around clinic No AD around clinic No AD around clinic No AD around clinic No AD around clinic Modalities

## 2022-12-02 ENCOUNTER — OFFICE VISIT (OUTPATIENT)
Dept: OBGYN CLINIC | Facility: MEDICAL CENTER | Age: 67
End: 2022-12-02

## 2022-12-02 ENCOUNTER — APPOINTMENT (OUTPATIENT)
Dept: RADIOLOGY | Facility: MEDICAL CENTER | Age: 67
End: 2022-12-02

## 2022-12-02 VITALS
HEIGHT: 64 IN | DIASTOLIC BLOOD PRESSURE: 70 MMHG | BODY MASS INDEX: 19.67 KG/M2 | WEIGHT: 115.2 LBS | SYSTOLIC BLOOD PRESSURE: 122 MMHG | HEART RATE: 76 BPM

## 2022-12-02 DIAGNOSIS — M79.671 PAIN IN RIGHT FOOT: ICD-10-CM

## 2022-12-02 DIAGNOSIS — M79.671 PAIN IN RIGHT FOOT: Primary | ICD-10-CM

## 2022-12-02 DIAGNOSIS — Z96.641 STATUS POST TOTAL HIP REPLACEMENT, RIGHT: ICD-10-CM

## 2022-12-02 NOTE — PROGRESS NOTES
Assessment/Plan:  1  Pain in right foot    2  Status post total hip replacement, right      Orders Placed This Encounter   Procedures   • Ankle Cude ankle/Ankle Brace   • XR foot 3+ vw right   • MRI foot/forefoot toes right wo contrast     Monitor right foot  Will check MRI right foot to rule out stress fracture  WBAT RLE  Brace right ankle for compression  Transition to home exercises for hip and foot  Pain control prn- OTC pain meds  Patient should call ahead for abx prior to dental appts  Return for appt after MRI  I answered all of the patient's questions during the visit and provided education of the patient's condition during the visit  The patient verbalized understanding of the information given and agrees with the plan  This note was dictated using BlueSnap software  It may contain errors including improperly dictated words  Please contact physician directly for any questions  Subjective   Chief Complaint:   Chief Complaint   Patient presents with   • Right Hip - Post-op, Follow-up       Carolann Nichole is a 75 y o  female who presents for 9 week follow up s/p right OSKAR, removal of cannulated screws  Her hip is doing well  No complaints regarding her hip  She recently finished PT  Her leg lengths feels equal   She is happy with her hip  She admits pain in her right foot over the past month  She thinks it may have been caused by initially walking on her toes after surgery  She mentioned it to PT who expressed concern for a stress fracture  She was shown some exercises for her foot which she is continuing at home  She takes tylenol for this and has also tried ice and biofreeze with some relief  Review of Systems  ROS:    See HPI for musculoskeletal review     All other systems reviewed are negative     History:  Past Medical History:   Diagnosis Date   • Anemia     1995 - corrected with iron   • Ankle fracture, right     last assessed: 3/9/15   • Anxiety    • Chronic pain disorder right side joint pain    • Moderate exercise     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   • Wears dentures     full upper   • Wears glasses     reading     Past Surgical History:   Procedure Laterality Date   • ANKLE SURGERY Right     ORIF /plate and 4 screws   • COLONOSCOPY     • CYST REMOVAL         • ESOPHAGOGASTRODUODENOSCOPY      Diagnostic   • FOOT SURGERY      plantar wart resection; resolved:    • JOINT REPLACEMENT Right     RTK   • ORIF HIP FRACTURE Right 2022    Procedure: ORIF HIP W/ CANNUALATED SCREWS;  Surgeon: Doyle Watters DO;  Location: [facility];  Service: Orthopedics   • AR TOTAL HIP ARTHROPLASTY Right 2022    Procedure: ARTHROPLASTY HIP TOTAL, removal of cannulated screws, posterior approach, cerclage of femur fx;  Surgeon: Doyle Watters DO;  Location: [address] [place] [address] MAIN OR;  Service: Orthopedics   • AR TOTAL KNEE ARTHROPLASTY Right 2022    Procedure: ARTHROPLASTY KNEE TOTAL;  Surgeon: Doyle Watters DO;  Location: [facility];  Service: Orthopedics   • TONSILLECTOMY AND ADENOIDECTOMY         • TUBAL LIGATION     • WISDOM TOOTH EXTRACTION       Social History   Social History     Substance and Sexual Activity   Alcohol Use Yes    Comment: rare, maybe on holidays     Social History     Substance and Sexual Activity   Drug Use No     Social History     Tobacco Use   Smoking Status Former   • Types: Cigarettes   • Quit date:    • Years since quittin 9   Smokeless Tobacco Never     Family History:   Family History   Problem Relation Age of Onset   • Seizures Mother         epilepsy   • Bone cancer Father    • No Known Problems Sister    • No Known Problems Brother    • No Known Problems Son    • No Known Problems Maternal Grandmother    • No Known Problems Maternal Grandfather    • No Known Problems Paternal Grandmother    • No Known Problems Paternal Grandfather    • Thyroid disease Sister    • No Known Problems Sister    • No Known Problems Brother        Current Outpatient Medications on File Prior to Visit   Medication Sig Dispense Refill   • acetaminophen (TYLENOL) 325 mg tablet Take 3 tablets (975 mg total) by mouth every 8 (eight) hours  0   • ascorbic acid (VITAMIN C) 500 MG tablet Take 1 tablet (500 mg total) by mouth daily Start to take 30 days prior to surgery 30 tablet 1   • aspirin (ECOTRIN) 325 mg EC tablet Take 1 tablet (325 mg total) by mouth 2 (two) times a day Take with food  Do not take at the same time of day as celebrex  56 tablet 0   • Ca Carbonate-Mag Hydroxide 550-110 MG CHEW Chew Twice daily     • docusate sodium (COLACE) 100 mg capsule Take 1 capsule (100 mg total) by mouth 2 (two) times a day for 10 days 20 capsule 0   • ferrous sulfate 324 (65 Fe) mg Take 1 tablet (324 mg total) by mouth daily before breakfast Start to take 30 days prior to surgery 30 tablet 1   • folic acid (FOLVITE) 1 mg tablet Take 1 tablet (1 mg total) by mouth daily Start to take 30 days prior to surgery 30 tablet 1   • ibandronate (BONIVA) 150 MG tablet Take 1 tablet by mouth as needed       • methotrexate 2 5 MG tablet TAKE 8 TABLETS BY MOUTH ONCE A WEEK AS DIRECTED     • Multiple Vitamin (multivitamin) tablet Take 1 tablet by mouth daily Start to take 30 days prior to surgery 30 tablet 1   • ondansetron (Zofran ODT) 4 mg disintegrating tablet Take 1 tablet (4 mg total) by mouth every 6 (six) hours as needed for nausea or vomiting 20 tablet 0   • predniSONE 5 mg tablet Take 5 mg by mouth daily      • promethazine (PHENERGAN) 12 5 MG tablet Take 1 tablet (12 5 mg total) by mouth every 6 (six) hours as needed for nausea or vomiting 30 tablet 0   • solifenacin (VESICARE) 10 MG tablet Take 1 tablet (10 mg total) by mouth daily 90 tablet 3     No current facility-administered medications on file prior to visit       Allergies   Allergen Reactions   • Aspirin Other (See Comments)     Per pt avoids taking due to rheumatology Meds        Objective     /70 Pulse 76   Ht 5' 4" (1 626 m)   Wt 52 3 kg (115 lb 3 2 oz)   BMI 19 77 kg/m²      PE:  AAOx 3  WDWN  Hearing intact, no drainage from eyes  no audible wheezing  no abdominal distension  LE compartments soft, AT/GS intact    Ortho Exam:  right hip:   INC: healed, No erythema  No pain with ROM    R foot:   +TTP over deltoid ligament, 1st MT  No TTP over 2-5 MT, lateral ankle, achilles tendon  R ankle DF/PF intact without pain  R eversion without pain, R inversion with pain  No swelling  No erythema    I personally reviewed XRs in PACS:  XR R foot:  No acute osseous abnormality, severe DJD talonavicular joint, mild to moderate degenerative changes elsewhere

## 2022-12-16 ENCOUNTER — OFFICE VISIT (OUTPATIENT)
Dept: OBGYN CLINIC | Facility: MEDICAL CENTER | Age: 67
End: 2022-12-16

## 2022-12-16 ENCOUNTER — TELEPHONE (OUTPATIENT)
Dept: OBGYN CLINIC | Facility: HOSPITAL | Age: 67
End: 2022-12-16

## 2022-12-16 VITALS
DIASTOLIC BLOOD PRESSURE: 76 MMHG | SYSTOLIC BLOOD PRESSURE: 119 MMHG | BODY MASS INDEX: 20.35 KG/M2 | HEART RATE: 71 BPM | HEIGHT: 64 IN | WEIGHT: 119.2 LBS

## 2022-12-16 DIAGNOSIS — Z96.641 STATUS POST TOTAL HIP REPLACEMENT, RIGHT: Primary | ICD-10-CM

## 2022-12-16 DIAGNOSIS — M19.071 ARTHRITIS OF RIGHT FOOT: ICD-10-CM

## 2022-12-16 NOTE — TELEPHONE ENCOUNTER
Caller: Patient      Doctor: Sandy Barrera    Reason for call: Patient was returning a call  She will call Podiatry when she is ready to schedule       Call back#: [phone]

## 2022-12-16 NOTE — PROGRESS NOTES
Assessment/Plan:  1  Status post total hip replacement, right    2  Arthritis of right foot      Orders Placed This Encounter   Procedures   • Ambulatory Referral to Podiatry       · Patient is doing well 11 weeks status post R hip removal of cannulated screws and conversion to R OSKAR  · Patient provided referral to podiatry for evaluation of right foot  · Continue home exercises  · Pain control prn- OTC pain meds  · Patient should call ahead for abx prior to dental appts  · Patient has reached maximum medical improvement regarding her right hip  Return in about 41 weeks (around 9/29/2023) for R OSKAR year post op  I answered all of the patient's questions during the visit and provided education of the patient's condition during the visit  The patient verbalized understanding of the information given and agrees with the plan  This note was dictated using OrdrIt software  It may contain errors including improperly dictated words  Please contact physician directly for any questions  Subjective   Chief Complaint:   Chief Complaint   Patient presents with   • Right Foot - Follow-up       Chandan Conner is a 75 y o  female who presents for 11 week follow up s/p right hip removal of hardware and conversion to R OSKAR  Patient states she is doing well  She is here today with MRI report for right foot  Patient states she has no complaints for her hip  She notes pain in the foot with ambulation when she is not wearing the brace, but she does have pain with ambulating when she does not have the brace on  She is doing home exercises  Patient is on her baseline rheum medications including celebrex and prednisone  Patient is retiring  Review of Systems  ROS:    See HPI for musculoskeletal review     All other systems reviewed are negative     History:  Past Medical History:   Diagnosis Date   • Anemia     1995 - corrected with iron   • Ankle fracture, right     last assessed: 3/9/15   • Anxiety    • Chronic pain disorder     right side joint pain    • Moderate exercise     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   • Wears dentures     full upper   • Wears glasses     reading     Past Surgical History:   Procedure Laterality Date   • ANKLE SURGERY Right     ORIF /plate and 4 screws   • COLONOSCOPY     • CYST REMOVAL         • ESOPHAGOGASTRODUODENOSCOPY      Diagnostic   • FOOT SURGERY      plantar wart resection; resolved:    • JOINT REPLACEMENT Right     RTK   • ORIF HIP FRACTURE Right 2022    Procedure: ORIF HIP W/ CANNUALATED SCREWS;  Surgeon: Jose Herrera DO;  Location[facility];  Service: Orthopedics   • NV TOTAL HIP ARTHROPLASTY Right 2022    Procedure: ARTHROPLASTY HIP TOTAL, removal of cannulated screws, posterior approach, cerclage of femur fx;  Surgeon: Jose Herrera DO;  Location: [address] [place] [address] MAIN OR;  Service: Orthopedics   • NV TOTAL KNEE ARTHROPLASTY Right 2022    Procedure: ARTHROPLASTY KNEE TOTAL;  Surgeon: Jose Herrera DO;  Location: [facility];  Service: Orthopedics   • TONSILLECTOMY AND ADENOIDECTOMY         • TUBAL LIGATION     • WISDOM TOOTH EXTRACTION       Social History   Social History     Substance and Sexual Activity   Alcohol Use Yes    Comment: rare, maybe on holidays     Social History     Substance and Sexual Activity   Drug Use No     Social History     Tobacco Use   Smoking Status Former   • Types: Cigarettes   • Quit date:    • Years since quittin 9   Smokeless Tobacco Never     Family History:   Family History   Problem Relation Age of Onset   • Seizures Mother         epilepsy   • Bone cancer Father    • No Known Problems Sister    • No Known Problems Brother    • No Known Problems Son    • No Known Problems Maternal Grandmother    • No Known Problems Maternal Grandfather    • No Known Problems Paternal Grandmother    • No Known Problems Paternal Grandfather    • Thyroid disease Sister    • No Known Problems Sister    • No Known Problems Brother        Current Outpatient Medications on File Prior to Visit   Medication Sig Dispense Refill   • acetaminophen (TYLENOL) 325 mg tablet Take 3 tablets (975 mg total) by mouth every 8 (eight) hours  0   • ascorbic acid (VITAMIN C) 500 MG tablet Take 1 tablet (500 mg total) by mouth daily Start to take 30 days prior to surgery 30 tablet 1   • aspirin (ECOTRIN) 325 mg EC tablet Take 1 tablet (325 mg total) by mouth 2 (two) times a day Take with food  Do not take at the same time of day as celebrex  56 tablet 0   • Ca Carbonate-Mag Hydroxide 550-110 MG CHEW Chew Twice daily     • docusate sodium (COLACE) 100 mg capsule Take 1 capsule (100 mg total) by mouth 2 (two) times a day for 10 days 20 capsule 0   • ferrous sulfate 324 (65 Fe) mg Take 1 tablet (324 mg total) by mouth daily before breakfast Start to take 30 days prior to surgery 30 tablet 1   • folic acid (FOLVITE) 1 mg tablet Take 1 tablet (1 mg total) by mouth daily Start to take 30 days prior to surgery 30 tablet 1   • ibandronate (BONIVA) 150 MG tablet Take 1 tablet by mouth as needed       • methotrexate 2 5 MG tablet TAKE 8 TABLETS BY MOUTH ONCE A WEEK AS DIRECTED     • Multiple Vitamin (multivitamin) tablet Take 1 tablet by mouth daily Start to take 30 days prior to surgery 30 tablet 1   • ondansetron (Zofran ODT) 4 mg disintegrating tablet Take 1 tablet (4 mg total) by mouth every 6 (six) hours as needed for nausea or vomiting 20 tablet 0   • predniSONE 5 mg tablet Take 5 mg by mouth daily      • promethazine (PHENERGAN) 12 5 MG tablet Take 1 tablet (12 5 mg total) by mouth every 6 (six) hours as needed for nausea or vomiting 30 tablet 0   • solifenacin (VESICARE) 10 MG tablet Take 1 tablet (10 mg total) by mouth daily 90 tablet 3     No current facility-administered medications on file prior to visit       Allergies   Allergen Reactions   • Aspirin Other (See Comments)     Per pt avoids taking due to rheumatology Meds        Objective /76   Pulse 71   Ht 5' 4" (1 626 m)   Wt 54 1 kg (119 lb 3 2 oz)   BMI 20 46 kg/m²      PE:  AAOx 3  WDWN  Hearing intact, no drainage from eyes  no audible wheezing  no abdominal distension  LE compartments soft, AT/GS intact    Ortho Exam:  right hip:   INC: healed, No erythema  Ankle brace in place   No pain with ROM

## 2022-12-16 NOTE — LETTER
December 16, 2022     Patient: Areta Cogan  YOB: 1955  Date of Visit: 12/16/2022      To Whom it May Concern: Almira Boas is under my professional care  Lynda Myles was seen in my office on 12/16/2022  Lynda Bueno has reached maximum medical improvement regarding her right hip  She may return to work with full duty, no restrictions  If you have any questions or concerns, please don't hesitate to call           Sincerely,          Carly Nesbitt,         CC: No Recipients

## 2023-03-02 ENCOUNTER — HOSPITAL ENCOUNTER (OUTPATIENT)
Dept: MAMMOGRAPHY | Facility: MEDICAL CENTER | Age: 68
Discharge: HOME | End: 2023-03-02

## 2023-03-02 VITALS — BODY MASS INDEX: 20.32 KG/M2 | WEIGHT: 119 LBS | HEIGHT: 64 IN

## 2023-03-02 DIAGNOSIS — Z12.31 ENCOUNTER FOR SCREENING MAMMOGRAM FOR MALIGNANT NEOPLASM OF BREAST: ICD-10-CM

## 2023-05-16 ENCOUNTER — RA CDI HCC (OUTPATIENT)
Dept: OTHER | Facility: HOSPITAL | Age: 68
End: 2023-05-16

## 2023-05-17 NOTE — PROGRESS NOTES
Markell [facility] 74  coding opportunities     [id number]     Chart Reviewed number of suggestions sent to Provider: 1   GR    Patients Insurance     Medicare Insurance: [facility] [address]

## 2023-10-04 DIAGNOSIS — N39.41 URGE INCONTINENCE OF URINE: ICD-10-CM

## 2023-10-04 RX ORDER — SOLIFENACIN SUCCINATE 10 MG/1
10 TABLET, FILM COATED ORAL DAILY
Qty: 90 TABLET | Refills: 3 | Status: SHIPPED | OUTPATIENT
Start: 2023-10-04

## 2023-10-10 ENCOUNTER — IMMUNIZATIONS (OUTPATIENT)
Dept: FAMILY MEDICINE CLINIC | Facility: CLINIC | Age: 68
End: 2023-10-10

## 2023-10-10 DIAGNOSIS — Z23 ENCOUNTER FOR IMMUNIZATION: Primary | ICD-10-CM

## 2023-11-06 ENCOUNTER — OFFICE VISIT (OUTPATIENT)
Dept: FAMILY MEDICINE CLINIC | Facility: CLINIC | Age: 68
End: 2023-11-06
Payer: COMMERCIAL

## 2023-11-06 VITALS
WEIGHT: 116 LBS | DIASTOLIC BLOOD PRESSURE: 84 MMHG | RESPIRATION RATE: 12 BRPM | BODY MASS INDEX: 19.81 KG/M2 | OXYGEN SATURATION: 98 % | SYSTOLIC BLOOD PRESSURE: 120 MMHG | HEART RATE: 62 BPM | HEIGHT: 64 IN

## 2023-11-06 DIAGNOSIS — M81.0 AGE-RELATED OSTEOPOROSIS WITHOUT CURRENT PATHOLOGICAL FRACTURE: ICD-10-CM

## 2023-11-06 DIAGNOSIS — H53.9 VISION CHANGES: ICD-10-CM

## 2023-11-06 DIAGNOSIS — E78.2 MIXED HYPERLIPIDEMIA: ICD-10-CM

## 2023-11-06 DIAGNOSIS — Z00.00 WELCOME TO MEDICARE PREVENTIVE VISIT: Primary | ICD-10-CM

## 2023-11-06 DIAGNOSIS — Z79.52 ON PREDNISONE THERAPY: ICD-10-CM

## 2023-11-06 DIAGNOSIS — M05.79 RHEUMATOID ARTHRITIS INVOLVING MULTIPLE SITES WITH POSITIVE RHEUMATOID FACTOR (HCC): ICD-10-CM

## 2023-11-06 DIAGNOSIS — R53.82 CHRONIC FATIGUE: ICD-10-CM

## 2023-11-06 DIAGNOSIS — D50.9 IRON DEFICIENCY ANEMIA, UNSPECIFIED IRON DEFICIENCY ANEMIA TYPE: ICD-10-CM

## 2023-11-06 PROBLEM — R42 DIZZINESS: Status: RESOLVED | Noted: 2022-03-03 | Resolved: 2023-11-06

## 2023-11-06 PROBLEM — R03.0 ELEVATED BLOOD PRESSURE READING: Status: RESOLVED | Noted: 2018-02-05 | Resolved: 2023-11-06

## 2023-11-06 PROBLEM — S72.001A CLOSED FRACTURE OF RIGHT HIP (HCC): Status: RESOLVED | Noted: 2022-06-26 | Resolved: 2023-11-06

## 2023-11-06 PROCEDURE — 99214 OFFICE O/P EST MOD 30 MIN: CPT | Performed by: FAMILY MEDICINE

## 2023-11-06 PROCEDURE — 93000 ELECTROCARDIOGRAM COMPLETE: CPT | Performed by: FAMILY MEDICINE

## 2023-11-06 PROCEDURE — G0438 PPPS, INITIAL VISIT: HCPCS | Performed by: FAMILY MEDICINE

## 2023-11-06 NOTE — PROGRESS NOTES
Assessment and Plan:     Problem List Items Addressed This Visit        Musculoskeletal and Integument    Age-related osteoporosis without current pathological fracture     She will continue on ibandronate. Rheumatoid arthritis ([address])     Continue close follow-up with rheumatology. Other    Hyperlipidemia    Relevant Orders    Comprehensive metabolic panel    Lipid Panel with Direct LDL reflex    Chronic fatigue    Relevant Orders    CBC and differential    Comprehensive metabolic panel    TSH, 3rd generation with Free T4 reflex    Iron deficiency anemia    Relevant Orders    CBC and differential    Comprehensive metabolic panel    On prednisone therapy    Relevant Orders    Comprehensive metabolic panel    Hemoglobin A1C    Vision changes    Relevant Orders    Ambulatory Referral to Ophthalmology   Other Visit Diagnoses     Welcome to Medicare preventive visit    -  Primary    Relevant Orders    POCT ECG (Completed)          Depression Screening and Follow-up Plan: Patient was screened for depression during today's encounter. They screened negative with a PHQ-2 score of 0. Preventive health issues were discussed with patient, and age appropriate screening tests were ordered as noted in patient's After Visit Summary. Personalized health advice and appropriate referrals for health education or preventive services given if needed, as noted in patient's After Visit Summary. History of Present Illness:     Patient presents for a Medicare Wellness Visit    HPI patient presents today for welcome to Medicare visit. She retired within the past year. She has had some difficulty adjusting to her free time as she is a self-described "go-getter". She has rheumatoid arthritis and follows closely with rheumatology. She has some ongoing fatigue which seems to have actually gotten a bit worse since she stopped working. She does have a history of some mild anemia.   She is followed closely by rheumatology on a routine basis for RA. She has some chronic arthralgias but these seem to be controlled. Patient Care Team:  Arlen Mckinley MD as PCP - General  Lasha Valadez DO as PCP - PCP-[facility] Attributed-Rosi Brand MD as PCP - PCP-Julio (RTE)  Leandro Artis DO     Review of Systems:     Review of Systems   Constitutional:  Negative for appetite change, chills, fatigue, fever and unexpected weight change. HENT:  Negative for trouble swallowing. Eyes:  Positive for visual disturbance. Negative for photophobia. Respiratory:  Negative for cough, chest tightness, shortness of breath and wheezing. Cardiovascular:  Negative for chest pain, palpitations and leg swelling. Gastrointestinal:  Negative for abdominal distention, abdominal pain, blood in stool, constipation and diarrhea. Endocrine: Negative for polyuria. Genitourinary:  Negative for difficulty urinating and flank pain. Musculoskeletal:  Negative for arthralgias and myalgias. Skin:  Negative for rash. Neurological:  Negative for dizziness and light-headedness. Hematological:  Negative for adenopathy. Does not bruise/bleed easily. Psychiatric/Behavioral:  Negative for dysphoric mood and sleep disturbance. The patient is not nervous/anxious.          Problem List:     Patient Active Problem List   Diagnosis   • Anxiety disorder   • Hyperlipidemia   • Keratitis   • Osteoarthritis of both hands   • Age-related osteoporosis without current pathological fracture   • Rheumatoid arthritis ([address])   • Tear film insufficiency   • Urge incontinence of urine   • Chronic fatigue   • Iron deficiency anemia   • Abnormal ECG   • History of total right knee replacement (TKR)   • On prednisone therapy   • Arthritis of right knee   • Status post total hip replacement, right   • Leukocytosis   • Vision changes      Past Medical and Surgical History:     Past Medical History:   Diagnosis Date   • Anemia 1995 - corrected with iron   • Ankle fracture, right     last assessed: 3/9/15   • Anxiety    • Chronic pain disorder     right side joint pain    • Closed fracture of right hip ([address]) 06/26/2022   • Moderate exercise     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   • Wears dentures     full upper   • Wears glasses     reading     Past Surgical History:   Procedure Laterality Date   • ANKLE SURGERY Right     ORIF 5/16/plate and 4 screws   • COLONOSCOPY     • CYST REMOVAL      1969   • ESOPHAGOGASTRODUODENOSCOPY      Diagnostic   • FOOT SURGERY      plantar wart resection; resolved: 1969   • JOINT REPLACEMENT Right     RTK   • ORIF HIP FRACTURE Right 06/28/2022    Procedure: ORIF HIP W/ CANNUALATED SCREWS;  Surgeon: Moris Adkins DO;  [facility];  Service: Orthopedics   • AL ARTHRP ACETBLR/PROX FEM PROSTC AGRFT/ALGRFT Right 9/28/2022    Procedure: ARTHROPLASTY HIP TOTAL, removal of cannulated screws, posterior approach, cerclage of femur fx;  Surgeon: Moris Adkins DO;  Location: [address] [place] [address] MAIN OR;  Service: Orthopedics   • AL ARTHRP KNE CONDYLE&PLATU MEDIAL&LAT COMPARTMENTS Right 03/01/2022    Procedure: ARTHROPLASTY KNEE TOTAL;  Surgeon: Moris Adkins DO;  [facility];  Service: Orthopedics   • TONSILLECTOMY AND ADENOIDECTOMY      1962   • TUBAL LIGATION     • WISDOM TOOTH EXTRACTION        Family History:     Family History   Problem Relation Age of Onset   • Seizures Mother         epilepsy   • Bone cancer Father    • No Known Problems Sister    • Thyroid disease Sister    • No Known Problems Sister    • No Known Problems Maternal Grandmother    • No Known Problems Maternal Grandfather    • No Known Problems Paternal Grandmother    • No Known Problems Paternal Grandfather    • No Known Problems Brother    • No Known Problems Brother    • No Known Problems Son    • Breast cancer Neg Hx       Social History:     Social History     Socioeconomic History   • Marital status: /Civil [employer]     Spouse name: Not on file   • Number of children: Not on file   • Years of education: Not on file   • Highest education level: Not on file   Occupational History   • Not on file   Tobacco Use   • Smoking status: Former     Types: Cigarettes     Quit date: 17     Years since quittin.8   • Smokeless tobacco: Never   Vaping Use   • Vaping Use: Never used   Substance and Sexual Activity   • Alcohol use: Yes     Comment: rare, maybe on holidays   • Drug use: No   • Sexual activity: Not Currently     Comment: defer   Other Topics Concern   • Not on file   Social History Narrative   • Not on file     Social Determinants of Health     Financial Resource Strain: Low Risk  (2023)    Overall Financial Resource Strain (CARDIA)    • Difficulty of Paying Living Expenses: Not hard at all   Food Insecurity: Not on file   Transportation Needs: No Transportation Needs (2023)    PRAPARE - Transportation    • Lack of Transportation (Medical): No    • Lack of Transportation (Non-Medical): No   Physical Activity: Not on file   Stress: Not on file   Social Connections: Not on file   Intimate Partner Violence: Not on file   Housing Stability: Not on file      Medications and Allergies:     Current Outpatient Medications   Medication Sig Dispense Refill   • acetaminophen (TYLENOL) 325 mg tablet Take 3 tablets (975 mg total) by mouth every 8 (eight) hours  0   • ascorbic acid (VITAMIN C) 500 MG tablet Take 1 tablet (500 mg total) by mouth daily Start to take 30 days prior to surgery 30 tablet 1   • aspirin (ECOTRIN) 325 mg EC tablet Take 1 tablet (325 mg total) by mouth 2 (two) times a day Take with food. Do not take at the same time of day as celebrex.  56 tablet 0   • Ca Carbonate-Mag Hydroxide 550-110 MG CHEW Chew Twice daily     • docusate sodium (COLACE) 100 mg capsule Take 1 capsule (100 mg total) by mouth 2 (two) times a day for 10 days 20 capsule 0   • ferrous sulfate 324 (65 Fe) mg Take 1 tablet (324 mg total) by mouth daily before breakfast Start to take 30 days prior to surgery 30 tablet 1   • folic acid (FOLVITE) 1 mg tablet Take 1 tablet (1 mg total) by mouth daily Start to take 30 days prior to surgery 30 tablet 1   • ibandronate (BONIVA) 150 MG tablet Take 1 tablet by mouth as needed       • methotrexate 2.5 MG tablet TAKE 8 TABLETS BY MOUTH ONCE A WEEK AS DIRECTED     • Multiple Vitamin (multivitamin) tablet Take 1 tablet by mouth daily Start to take 30 days prior to surgery 30 tablet 1   • ondansetron (Zofran ODT) 4 mg disintegrating tablet Take 1 tablet (4 mg total) by mouth every 6 (six) hours as needed for nausea or vomiting 20 tablet 0   • predniSONE 5 mg tablet Take 5 mg by mouth daily      • promethazine (PHENERGAN) 12.5 MG tablet Take 1 tablet (12.5 mg total) by mouth every 6 (six) hours as needed for nausea or vomiting 30 tablet 0   • solifenacin (VESICARE) 10 MG tablet Take 1 tablet (10 mg total) by mouth daily 90 tablet 3     No current facility-administered medications for this visit.      Allergies   Allergen Reactions   • Aspirin Other (See Comments)     Per pt avoids taking due to rheumatology Meds      Immunizations:     Immunization History   Administered Date(s) Administered   • COVID-19 PFIZER VACCINE 0.3 ML IM 01/05/2021, 01/26/2021, 10/22/2021   • INFLUENZA 10/24/2012, 12/23/2013, 12/16/2014, 11/28/2019, 10/22/2021, 10/05/2022   • Influenza Quadrivalent Preservative Free 3 years and older IM 11/01/2016   • Influenza, high dose seasonal 0.7 mL 10/05/2022   • Pneumococcal Conjugate Vaccine 20-valent (Pcv20), Polysace 11/02/2022   • Tdap 01/15/2020      Health Maintenance:         Topic Date Due   • Cervical Cancer Screening  Never done   • DXA SCAN  11/12/2023   • Breast Cancer Screening: Mammogram  03/02/2024   • Colorectal Cancer Screening  06/07/2024   • Hepatitis C Screening  Completed         Topic Date Due   • Influenza Vaccine (1) 09/01/2023      Medicare Screening Tests and Risk Assessments: Kezia Flores is here for her Subsequent Wellness visit. Health Risk Assessment:   Patient rates overall health as good. Patient feels that their physical health rating is same. Patient is satisfied with their life. Eyesight was rated as slightly worse. Hearing was rated as same. Patient feels that their emotional and mental health rating is same. Patients states they are often angry. Patient states they are often unusually tired/fatigued. Pain experienced in the last 7 days has been some. Patient's pain rating has been 8/10. Patient states that she has experienced no weight loss or gain in last 6 months. Depression Screening:   PHQ-2 Score: 0      Fall Risk Screening: In the past year, patient has experienced: no history of falling in past year      Urinary Incontinence Screening:   Patient has not leaked urine accidently in the last six months. Home Safety:  Patient does not have trouble with stairs inside or outside of their home. Patient has working smoke alarms and has working carbon monoxide detector. Home safety hazards include: none. Nutrition:   Current diet is Regular. Medications:   Patient is currently taking over-the-counter supplements. OTC medications include: see medication list. Patient is able to manage medications. Activities of Daily Living (ADLs)/Instrumental Activities of Daily Living (IADLs):   Walk and transfer into and out of bed and chair?: Yes  Dress and groom yourself?: Yes    Bathe or shower yourself?: Yes    Feed yourself?  Yes  Do your laundry/housekeeping?: Yes  Manage your money, pay your bills and track your expenses?: Yes  Make your own meals?: Yes    Do your own shopping?: Yes    Previous Hospitalizations:   Any hospitalizations or ED visits within the last 12 months?: No      Advance Care Planning:   Living will: Yes    Advanced directive: Yes      PREVENTIVE SCREENINGS      Cardiovascular Screening:    General: Screening Not Indicated and History Lipid Disorder      Colorectal Cancer Screening:     General: Screening Current      Breast Cancer Screening:     General: Screening Current      Cervical Cancer Screening:    General: Screening Not Indicated      Osteoporosis Screening:    General: Screening Not Indicated and History Osteoporosis      Lung Cancer Screening:     General: Screening Not Indicated      Hepatitis C Screening:    General: Screening Current    Screening, Brief Intervention, and Referral to Treatment (SBIRT)    Screening  Typical number of drinks in a day: 0  Typical number of drinks in a week: 0  Interpretation: Low risk drinking behavior. Single Item Drug Screening:  How often have you used an illegal drug (including marijuana) or a prescription medication for non-medical reasons in the past year? never    Single Item Drug Screen Score: 0  Interpretation: Negative screen for possible drug use disorder    Vision Screening    Right eye Left eye Both eyes   Without correction 20/200 20/200 20/50   With correction           Physical Exam:     /84 (BP Location: Left arm, Patient Position: Sitting, Cuff Size: Standard)   Pulse 62   Resp 12   Ht 5' 4" (1.626 m)   Wt 52.6 kg (116 lb)   SpO2 98%   BMI 19.91 kg/m²     Physical Exam  Constitutional:       General: She is not in acute distress. Appearance: She is well-developed. She is not diaphoretic. HENT:      Head: Normocephalic. Right Ear: External ear normal.      Left Ear: External ear normal.      Nose: Nose normal.   Eyes:      General: No scleral icterus. Right eye: No discharge. Left eye: No discharge. Conjunctiva/sclera: Conjunctivae normal.      Pupils: Pupils are equal, round, and reactive to light. Neck:      Thyroid: No thyromegaly. Trachea: No tracheal deviation. Cardiovascular:      Rate and Rhythm: Normal rate and regular rhythm. Heart sounds: Normal heart sounds. No murmur heard.   Pulmonary: Effort: Pulmonary effort is normal. No respiratory distress. Breath sounds: Normal breath sounds. No stridor. No wheezing, rhonchi or rales. Abdominal:      General: Bowel sounds are normal. There is no distension. Palpations: Abdomen is soft. Tenderness: There is no abdominal tenderness. There is no guarding. Musculoskeletal:         General: Deformity (Changes consistent with known arthritis) present. No tenderness. Normal range of motion. Right lower leg: No edema. Left lower leg: No edema. Lymphadenopathy:      Cervical: No cervical adenopathy. Skin:     General: Skin is warm. Coloration: Skin is not pale. Findings: No erythema or rash. Neurological:      Cranial Nerves: No cranial nerve deficit. Coordination: Coordination normal.   Psychiatric:         Mood and Affect: Mood normal.         Behavior: Behavior normal.         Thought Content:  Thought content normal.          Damaso Monteiro MD

## 2024-01-16 ENCOUNTER — TELEPHONE (OUTPATIENT)
Age: 69
End: 2024-01-16

## 2024-01-16 DIAGNOSIS — Z96.641 HISTORY OF TOTAL HIP REPLACEMENT, RIGHT: Primary | ICD-10-CM

## 2024-01-16 RX ORDER — AMOXICILLIN 500 MG/1
2000 CAPSULE ORAL ONCE
Qty: 4 CAPSULE | Refills: 2 | Status: SHIPPED | OUTPATIENT
Start: 2024-01-16 | End: 2024-01-16

## 2024-01-16 NOTE — TELEPHONE ENCOUNTER
Caller: Patient     Doctor: Faby    Reason for call: Patient is having dental work and asking if she needs to take antibiotics prior.    Call back#: [phone]

## 2024-01-19 ENCOUNTER — OFFICE VISIT (OUTPATIENT)
Dept: FAMILY MEDICINE CLINIC | Facility: CLINIC | Age: 69
End: 2024-01-19
Payer: COMMERCIAL

## 2024-01-19 ENCOUNTER — TELEPHONE (OUTPATIENT)
Age: 69
End: 2024-01-19

## 2024-01-19 VITALS
BODY MASS INDEX: 20.55 KG/M2 | DIASTOLIC BLOOD PRESSURE: 82 MMHG | HEART RATE: 84 BPM | WEIGHT: 116 LBS | HEIGHT: 63 IN | OXYGEN SATURATION: 97 % | SYSTOLIC BLOOD PRESSURE: 132 MMHG | RESPIRATION RATE: 16 BRPM

## 2024-01-19 DIAGNOSIS — M05.79 RHEUMATOID ARTHRITIS INVOLVING MULTIPLE SITES WITH POSITIVE RHEUMATOID FACTOR (HCC): ICD-10-CM

## 2024-01-19 DIAGNOSIS — G50.9 TRIGEMINAL NERVE DISEASE: Primary | ICD-10-CM

## 2024-01-19 PROCEDURE — 99214 OFFICE O/P EST MOD 30 MIN: CPT | Performed by: FAMILY MEDICINE

## 2024-01-19 RX ORDER — GABAPENTIN 300 MG/1
300 CAPSULE ORAL DAILY
Qty: 30 CAPSULE | Refills: 0 | Status: SHIPPED | OUTPATIENT
Start: 2024-01-19

## 2024-01-19 RX ORDER — CELECOXIB 200 MG/1
200 CAPSULE ORAL DAILY
COMMUNITY
Start: 2023-12-27

## 2024-01-19 RX ORDER — AMOXICILLIN 500 MG/1
CAPSULE ORAL
COMMUNITY
Start: 2024-01-16

## 2024-01-19 NOTE — PATIENT INSTRUCTIONS
1. Trigeminal nerve disease  -     gabapentin (Neurontin) 300 mg capsule; Take 1 capsule (300 mg total) by mouth daily Can increase to two times per day after 3 days if tolerating well  -     Ambulatory Referral to Neurology; Future    2. Rheumatoid arthritis involving multiple sites with positive rheumatoid factor (HCC)

## 2024-01-19 NOTE — ASSESSMENT & PLAN NOTE
Possible trigeminal neuralgia, vs dental disease pressing onto nerve route, continue follow up dentist, referred to neurology, and will start gabapentin, consider carbamazepine if not effective.

## 2024-01-19 NOTE — PROGRESS NOTES
Assessment/Plan:       Problem List Items Addressed This Visit          Nervous and Auditory    Trigeminal nerve disease - Primary     Possible trigeminal neuralgia, vs dental disease pressing onto nerve route, continue follow up dentist, referred to neurology, and will start gabapentin, consider carbamazepine if not effective.         Relevant Medications    gabapentin (Neurontin) 300 mg capsule    Other Relevant Orders    Ambulatory Referral to Neurology       Musculoskeletal and Integument    Rheumatoid arthritis (HCC)     Continues to follow with rheumatology         Relevant Medications    celecoxib (CeleBREX) 200 mg capsule         Subjective:      Patient ID: Aracelis Devi is a 69 y.o. female.    HPI    69 year old with pmh of RA, gingivitis, presenting for gum pain for the last two weeks.  Reports a stabbing electric pain in her gums on the left side, usually above her lateral incisor and cuspid teeth.    The pain is constant, but has spurts of severe pain.      Took tylenol yesterday this helped, today was of minimal to no benefit and patient presented for appointment.    Dentist thought it may be trigeminal neuralgia and referred her for evaluation, patient reports having xrays done with no cause found.    Described as a sudden stabbing pain.    The following portions of the patient's history were reviewed and updated as appropriate: allergies, current medications, past family history, past medical history, past social history, past surgical history and problem list.      Current Outpatient Medications:     acetaminophen (TYLENOL) 325 mg tablet, Take 3 tablets (975 mg total) by mouth every 8 (eight) hours, Disp: , Rfl: 0    amoxicillin (AMOXIL) 500 mg capsule, TAKE FOUR CAPSULES BY MOUTH ONE HOUR BEFORE APPOINTMENT, Disp: , Rfl:     ascorbic acid (VITAMIN C) 500 MG tablet, Take 1 tablet (500 mg total) by mouth daily Start to take 30 days prior to surgery, Disp: 30 tablet, Rfl: 1    Ca Carbonate-Mag Hydroxide  550-110 MG CHEW, Chew Twice daily, Disp: , Rfl:     celecoxib (CeleBREX) 200 mg capsule, Take 200 mg by mouth daily, Disp: , Rfl:     ferrous sulfate 324 (65 Fe) mg, Take 1 tablet (324 mg total) by mouth daily before breakfast Start to take 30 days prior to surgery, Disp: 30 tablet, Rfl: 1    folic acid (FOLVITE) 1 mg tablet, Take 1 tablet (1 mg total) by mouth daily Start to take 30 days prior to surgery, Disp: 30 tablet, Rfl: 1    gabapentin (Neurontin) 300 mg capsule, Take 1 capsule (300 mg total) by mouth daily Can increase to two times per day after 3 days if tolerating well, Disp: 30 capsule, Rfl: 0    ibandronate (BONIVA) 150 MG tablet, Take 1 tablet by mouth as needed  , Disp: , Rfl:     methotrexate 2.5 MG tablet, TAKE 8 TABLETS BY MOUTH ONCE A WEEK AS DIRECTED, Disp: , Rfl:     Multiple Vitamin (multivitamin) tablet, Take 1 tablet by mouth daily Start to take 30 days prior to surgery, Disp: 30 tablet, Rfl: 1    predniSONE 5 mg tablet, Take 5 mg by mouth daily , Disp: , Rfl:     aspirin (ECOTRIN) 325 mg EC tablet, Take 1 tablet (325 mg total) by mouth 2 (two) times a day Take with food. Do not take at the same time of day as celebrex. (Patient not taking: Reported on 1/19/2024), Disp: 56 tablet, Rfl: 0    docusate sodium (COLACE) 100 mg capsule, Take 1 capsule (100 mg total) by mouth 2 (two) times a day for 10 days (Patient not taking: Reported on 1/19/2024), Disp: 20 capsule, Rfl: 0    ondansetron (Zofran ODT) 4 mg disintegrating tablet, Take 1 tablet (4 mg total) by mouth every 6 (six) hours as needed for nausea or vomiting (Patient not taking: Reported on 1/19/2024), Disp: 20 tablet, Rfl: 0    promethazine (PHENERGAN) 12.5 MG tablet, Take 1 tablet (12.5 mg total) by mouth every 6 (six) hours as needed for nausea or vomiting (Patient not taking: Reported on 1/19/2024), Disp: 30 tablet, Rfl: 0    solifenacin (VESICARE) 10 MG tablet, Take 1 tablet (10 mg total) by mouth daily (Patient not taking: Reported  "on 1/19/2024), Disp: 90 tablet, Rfl: 3     Review of Systems   Constitutional:  Negative for activity change and appetite change.   Respiratory:  Negative for apnea and chest tightness.    Cardiovascular:  Negative for chest pain and palpitations.   Gastrointestinal:  Negative for abdominal distention and abdominal pain.   Musculoskeletal:  Positive for arthralgias.         Objective:      /82 (BP Location: Left arm, Patient Position: Sitting, Cuff Size: Standard)   Pulse 84   Resp 16   Ht 5' 3\" (1.6 m)   Wt 52.6 kg (116 lb)   SpO2 97%   BMI 20.55 kg/m²          Physical Exam  Constitutional:       Appearance: Normal appearance.   HENT:      Mouth/Throat:      Mouth: Mucous membranes are moist.      Dentition: Dental tenderness and gingival swelling present. No dental caries, dental abscesses or gum lesions.        Comments: Tender whre circled, did have tenderness and a shock of pain with palpation around mandible as well, missing teeth no obvious dental infection on exam.  Cardiovascular:      Rate and Rhythm: Normal rate and regular rhythm.   Pulmonary:      Effort: Pulmonary effort is normal.      Breath sounds: Normal breath sounds.   Skin:     General: Skin is warm.      Capillary Refill: Capillary refill takes less than 2 seconds.   Neurological:      General: No focal deficit present.      Mental Status: She is alert and oriented to person, place, and time.           Pan Beckman MD  "

## 2024-01-19 NOTE — TELEPHONE ENCOUNTER
Patient has an appointment today for dental pain her appointment was moved to today at [facility] and patient is in severe pain and wanted to confirm if she can take tylenol. I did advise that tylenol is on her medication list and that she can take this now to help with the pain until she has her appointment at .

## 2024-02-08 ENCOUNTER — CONSULT (OUTPATIENT)
Dept: NEUROLOGY | Facility: CLINIC | Age: 69
End: 2024-02-08
Payer: COMMERCIAL

## 2024-02-08 VITALS
TEMPERATURE: 97 F | SYSTOLIC BLOOD PRESSURE: 140 MMHG | DIASTOLIC BLOOD PRESSURE: 88 MMHG | OXYGEN SATURATION: 98 % | BODY MASS INDEX: 20.16 KG/M2 | WEIGHT: 113.8 LBS | HEIGHT: 63 IN | HEART RATE: 62 BPM

## 2024-02-08 DIAGNOSIS — G50.0 LEFT-SIDED TRIGEMINAL NEURALGIA: Primary | ICD-10-CM

## 2024-02-08 PROCEDURE — 99203 OFFICE O/P NEW LOW 30 MIN: CPT

## 2024-02-08 RX ORDER — GABAPENTIN 300 MG/1
CAPSULE ORAL
Qty: 30 CAPSULE | Refills: 3 | Status: SHIPPED | OUTPATIENT
Start: 2024-02-08

## 2024-02-08 RX ORDER — GABAPENTIN 100 MG/1
100 CAPSULE ORAL DAILY
Qty: 30 CAPSULE | Refills: 5 | Status: SHIPPED | OUTPATIENT
Start: 2024-02-08

## 2024-02-08 NOTE — PATIENT INSTRUCTIONS
- Avoid Listerine (or anything with menthol), cold drinks, excessively chewy foods   - Take Gabapentin 100 mg in the morning and 300 mg at bedtime  - Complete Labs  - Complete MRI Brain/Trigeminal wwo   - Follow up in 6 weeks

## 2024-02-08 NOTE — PROGRESS NOTES
Patient ID: Aracelis Devi is a 69 y.o. female.    Assessment/Plan:    Left-sided trigeminal neuralgia  Aracelis Devi is a 69 year old female with left V2 trigeminal neuralgia exacerbated over the last 3 weeks. From her hx it sounds like she may have also had a prior exacerbation 2 years ago after undergoing a nose piercing (same distribution of pain). She has found some relief with Gabapentin 300 mg at bedtime, but is still having 1-2 episodes a day lasting 10 minutes in duration. We discussed she may find more relief with increasing Gabapentin further to include a daytime dose. As such she will take Gabapentin 100 mg in the morning and 300 mg at bedtime, and we may increase this further as tolerated. She was advised to monitor for dizziness or drowsiness. I have asked her to complete serum work up to include sed rate, crp, and cmp as well as MRI Brain/Trigeminal nerve to evaluate for secondary etiologies such as neoplasm, mass/compression, or acute inflammatory state. She was advised to avoid anything Listerine or other mouth washes, lozenges etc that contain menthol as this is likely exacerbating her pain due to the cold. She was also advised to avoid cold drinks, excessively chewy foods or other known triggers. She will follow up in 6 weeks, sooner if needed.     Plan:      - Avoid Listerine (or anything with menthol), cold drinks, excessively chewy foods   - Take Gabapentin 100 mg in the morning and 300 mg at bedtime  - Complete Labs  - Complete MRI Brain/Trigeminal wwo   - Follow up in 6 weeks         Diagnoses and all orders for this visit:    Left-sided trigeminal neuralgia  -     Ambulatory Referral to Neurology  -     MRI brain cavernous / trigeminal wo and w contrast; Future  -     Sedimentation rate, automated; Future  -     C-reactive protein; Future  -     Comprehensive metabolic panel; Future  -     gabapentin (Neurontin) 100 mg capsule; Take 1 capsule (100 mg total) by mouth daily  -     gabapentin  (Neurontin) 300 mg capsule; Take 1 capsule at bedtime         I have spent a total time of 40 minutes on 02/08/24 in caring for this patient including Prognosis, Risks and benefits of tx options, Instructions for management, Patient and family education, Importance of tx compliance, Risk factor reductions, Impressions, Documenting in the medical record, Reviewing / ordering tests, medicine, procedures  , and Obtaining or reviewing history  .      Subjective:    VIET Devi is a 69 year old female with a hx of rheumatoid arthritis on long term prednisone, osteoarthritis, hyperlipidemia, and chronic fatigue who presents to the office in consultation referred by Pan Beckman* for evaluation and treatment of jaw pain.    She describes 3 weeks ago brushing her teeth and using listerine mouth wash when she suddenly developed severe needle like stabbing pain in her left upper gum area.     She denies any prior incidence of this however, does recall after having her nose (left nostril) pierced 2 years ago she began having pain from nose across her cheek to her ear. However once she removed the nose piecing this went away.     She states the pain she first developed 3 weeks ago continues to occur intermittently and is triggered by using Listerine mouth wash (not necessarily brushing her teeth), and drinking cold drinks. She states the pain occurs with and without wearing her dentures. She was evaluated by her Dentist Dr. David Mcghee and had xrays completed which were reportedly normal and not consistent with dental disease. She states she was also told that her gums were healthy and not indicative of gum disease.     She states he PCP started her on Gabapentin 300 mg daily at bedtime. She denies any adverse effects and does feel that this has helped. She is currently having pain less often; dwindling. 1-2 times a day, lasting 10 minutes at a time.  Stabbing pain- needle. Does not radiate. Stays in left  "upper gum. She reports favoring the right side for chewing but does not feel pain is triggered by water or touch running across face.    Other medications:  Celebrex 200 mg daily   Methotrexate 2.5 mg daily  Prednisone 5 mg daily    The following portions of the patient's history were reviewed and updated as appropriate: allergies, current medications, past family history, past medical history, past social history, past surgical history, and problem list.     Objective:    Blood pressure 140/88, pulse 62, temperature (!) 97 °F (36.1 °C), temperature source Temporal, height 5' 3\" (1.6 m), weight 51.6 kg (113 lb 12.8 oz), SpO2 98%, not currently breastfeeding.    Physical Exam  Vitals reviewed.   Constitutional:       Appearance: Normal appearance. She is not toxic-appearing or diaphoretic.   HENT:      Head: Normocephalic and atraumatic.      Nose: Nose normal.      Mouth/Throat:      Mouth: Mucous membranes are moist.      Pharynx: Oropharynx is clear.   Eyes:      Extraocular Movements: Extraocular movements intact.      Conjunctiva/sclera: Conjunctivae normal.      Pupils: Pupils are equal, round, and reactive to light.   Cardiovascular:      Rate and Rhythm: Normal rate.      Pulses: Normal pulses.   Pulmonary:      Effort: Pulmonary effort is normal.   Musculoskeletal:         General: Deformity (bilateral hands) present. Normal range of motion.      Cervical back: Normal range of motion and neck supple.      Right lower leg: No edema.      Left lower leg: No edema.   Skin:     General: Skin is warm.   Neurological:      General: No focal deficit present.      Mental Status: She is alert and oriented to person, place, and time.      Cranial Nerves: No cranial nerve deficit.      Sensory: No sensory deficit.      Motor: No weakness.      Coordination: Coordination normal.      Gait: Gait normal.      Deep Tendon Reflexes: Reflexes normal.   Psychiatric:         Mood and Affect: Mood normal.         Behavior: " Behavior normal.         Thought Content: Thought content normal.         Judgment: Judgment normal.       Neurological Examination  On neurological examination patient is alert, awake, oriented and in no distress. Speech is fluent without dysarthria or aphasia. Cranial nerves 2-12 were symmetrically intact bilaterally. No TMJ tenderness. Full jaw range of motion with no tenderness, popping, or clicking. No pain with palpation. No evidence of any focal weakness or sensory loss in the upper or lower extremities. No fasciculations present. No abnormal involuntary movements. She is able to rise easily without assistance from a seated position. Casual gait is normal including stance, stride, and arm swing.      ROS:    Review of Systems   Constitutional:  Negative for appetite change, fatigue and fever.   HENT: Negative.  Negative for hearing loss, tinnitus, trouble swallowing and voice change.    Eyes: Negative.  Negative for photophobia, pain and visual disturbance.   Respiratory: Negative.  Negative for shortness of breath.    Cardiovascular: Negative.  Negative for palpitations.   Gastrointestinal: Negative.  Negative for nausea and vomiting.   Endocrine: Negative.  Negative for cold intolerance.   Genitourinary: Negative.  Negative for dysuria, frequency and urgency.   Musculoskeletal:  Negative for back pain, gait problem, myalgias, neck pain and neck stiffness.   Skin: Negative.  Negative for rash.   Allergic/Immunologic: Negative.    Neurological: Negative.  Negative for dizziness, tremors, seizures, syncope, facial asymmetry, speech difficulty, weakness, light-headedness, numbness and headaches.   Hematological: Negative.  Does not bruise/bleed easily.   Psychiatric/Behavioral: Negative.  Negative for confusion, hallucinations and sleep disturbance.      Reviewed ROS as entered by medical assistant.

## 2024-02-08 NOTE — ASSESSMENT & PLAN NOTE
Aracelis Devi is a 69 year old female with left V2 trigeminal neuralgia exacerbated over the last 3 weeks. From her hx it sounds like she may have also had a prior exacerbation 2 years ago after undergoing a nose piercing (same distribution of pain). She has found some relief with Gabapentin 300 mg at bedtime, but is still having 1-2 episodes a day lasting 10 minutes in duration. We discussed she may find more relief with increasing Gabapentin further to include a daytime dose. As such she will take Gabapentin 100 mg in the morning and 300 mg at bedtime, and we may increase this further as tolerated. She was advised to monitor for dizziness or drowsiness. I have asked her to complete serum work up to include sed rate, crp, and cmp as well as MRI Brain/Trigeminal nerve to evaluate for secondary etiologies such as neoplasm, mass/compression, or acute inflammatory state. She was advised to avoid anything Listerine or other mouth washes, lozenges etc that contain menthol as this is likely exacerbating her pain due to the cold. She was also advised to avoid cold drinks, excessively chewy foods or other known triggers. She will follow up in 6 weeks, sooner if needed.     Plan:      - Avoid Listerine (or anything with menthol), cold drinks, excessively chewy foods   - Take Gabapentin 100 mg in the morning and 300 mg at bedtime  - Complete Labs  - Complete MRI Brain/Trigeminal wwo   - Follow up in 6 weeks

## 2024-03-11 ENCOUNTER — TELEPHONE (OUTPATIENT)
Dept: NEUROLOGY | Facility: CLINIC | Age: 69
End: 2024-03-11

## 2024-03-18 LAB
ALBUMIN SERPL-MCNC: 3.9 G/DL (ref 3.5–5.7)
ALP SERPL-CCNC: 71 U/L (ref 35–120)
ALT SERPL-CCNC: 7 U/L
ANION GAP SERPL CALCULATED.3IONS-SCNC: 8 MMOL/L (ref 3–11)
AST SERPL-CCNC: 16 U/L
BILIRUB SERPL-MCNC: 0.9 MG/DL (ref 0.2–1)
BUN SERPL-MCNC: 8 MG/DL (ref 7–25)
CALCIUM SERPL-MCNC: 9.3 MG/DL (ref 8.5–10.1)
CHLORIDE SERPL-SCNC: 104 MMOL/L (ref 100–109)
CO2 SERPL-SCNC: 34 MMOL/L (ref 21–31)
CREAT SERPL-MCNC: 0.68 MG/DL (ref 0.4–1.1)
CRP SERPL-MCNC: 6.9 MG/L
CYTOLOGY CMNT CVX/VAG CYTO-IMP: ABNORMAL
ERYTHROCYTE [SEDIMENTATION RATE] IN BLOOD BY WESTERGREN METHOD: 11 MM/HR (ref 0–30)
GFR/BSA.PRED SERPLBLD CYS-BASED-ARV: 93 ML/MIN/{1.73_M2}
GLUCOSE SERPL-MCNC: 78 MG/DL (ref 65–99)
POTASSIUM SERPL-SCNC: 4 MMOL/L (ref 3.5–5.2)
PROT SERPL-MCNC: 6.5 G/DL (ref 6.3–8.3)
SODIUM SERPL-SCNC: 146 MMOL/L (ref 135–145)

## 2024-03-19 ENCOUNTER — TELEPHONE (OUTPATIENT)
Dept: NEUROLOGY | Facility: CLINIC | Age: 69
End: 2024-03-19

## 2024-03-19 NOTE — TELEPHONE ENCOUNTER
Called pt left message on voicemail to reschedule her apt on 3/26 until after her mri which was 4/8.  So Alessandra can go over the results with her at the office visit

## 2024-03-19 NOTE — TELEPHONE ENCOUNTER
Pt's MRI is not scheduled until 4/8. If she is doing okay please ask her to reschedule about 10 days after MRI is complete so we may do her follow up and review MRI at that time.

## 2024-03-19 NOTE — TELEPHONE ENCOUNTER
Pt called in to r/s appt to after MRI on 4/8. Moved appt to 4/16 @ 10:00 with Alessandra in [place].     Alessandra I used a HFU slot for appt so not to push pt out for weeks.

## 2024-04-01 ENCOUNTER — TELEPHONE (OUTPATIENT)
Dept: NEUROLOGY | Facility: CLINIC | Age: 69
End: 2024-04-01

## 2024-04-08 ENCOUNTER — HOSPITAL ENCOUNTER (OUTPATIENT)
Dept: MRI IMAGING | Facility: HOSPITAL | Age: 69
Discharge: HOME | End: 2024-04-08
Payer: COMMERCIAL

## 2024-04-08 ENCOUNTER — TELEPHONE (OUTPATIENT)
Dept: NEUROLOGY | Facility: CLINIC | Age: 69
End: 2024-04-08

## 2024-04-08 DIAGNOSIS — G50.0 LEFT-SIDED TRIGEMINAL NEURALGIA: ICD-10-CM

## 2024-04-08 PROCEDURE — G1004 CDSM NDSC: HCPCS

## 2024-04-08 PROCEDURE — A9585 GADOBUTROL INJECTION: HCPCS | Performed by: FAMILY MEDICINE

## 2024-04-08 PROCEDURE — 70553 MRI BRAIN STEM W/O & W/DYE: CPT

## 2024-04-08 RX ORDER — GADOBUTROL 604.72 MG/ML
5 INJECTION INTRAVENOUS
Status: COMPLETED | OUTPATIENT
Start: 2024-04-08 | End: 2024-04-08

## 2024-04-08 RX ADMIN — GADOBUTROL 5 ML: 604.72 INJECTION INTRAVENOUS at 08:41

## 2024-04-08 NOTE — TELEPHONE ENCOUNTER
Patient requesting refill of Prednisone, 5mg. Patient uses [facility] pharmacy in [place].

## 2024-04-08 NOTE — TELEPHONE ENCOUNTER
Chart reviewed. It appears pt has been getting script for prednisone from Rheumatology. Last script sent 2/9/24. 30 tablets, no refills.    Spoke with pt and she confirms that she takes prednisone for her Rheumatoid arthritis. Says she meant to call Rheumatology but figured she'd see if Alessandra would prescribe. Pt says she can call rheumatology to request script. Advised her that prescribing provider would be the one to call. Pt verbalizes understanding. Nothing further at this time.

## 2024-04-10 ENCOUNTER — OFFICE VISIT (OUTPATIENT)
Dept: FAMILY MEDICINE CLINIC | Facility: CLINIC | Age: 69
End: 2024-04-10
Payer: COMMERCIAL

## 2024-04-10 VITALS
BODY MASS INDEX: 19.73 KG/M2 | DIASTOLIC BLOOD PRESSURE: 66 MMHG | HEART RATE: 55 BPM | RESPIRATION RATE: 12 BRPM | OXYGEN SATURATION: 100 % | SYSTOLIC BLOOD PRESSURE: 124 MMHG | TEMPERATURE: 97 F | WEIGHT: 111.4 LBS

## 2024-04-10 DIAGNOSIS — M05.79 RHEUMATOID ARTHRITIS INVOLVING MULTIPLE SITES WITH POSITIVE RHEUMATOID FACTOR (HCC): ICD-10-CM

## 2024-04-10 DIAGNOSIS — K59.01 SLOW TRANSIT CONSTIPATION: Primary | ICD-10-CM

## 2024-04-10 DIAGNOSIS — E78.2 MIXED HYPERLIPIDEMIA: ICD-10-CM

## 2024-04-10 DIAGNOSIS — D50.9 IRON DEFICIENCY ANEMIA, UNSPECIFIED IRON DEFICIENCY ANEMIA TYPE: ICD-10-CM

## 2024-04-10 DIAGNOSIS — M81.0 AGE-RELATED OSTEOPOROSIS WITHOUT CURRENT PATHOLOGICAL FRACTURE: ICD-10-CM

## 2024-04-10 DIAGNOSIS — Z79.52 ON PREDNISONE THERAPY: ICD-10-CM

## 2024-04-10 PROCEDURE — 99214 OFFICE O/P EST MOD 30 MIN: CPT | Performed by: FAMILY MEDICINE

## 2024-04-10 PROCEDURE — G2211 COMPLEX E/M VISIT ADD ON: HCPCS | Performed by: FAMILY MEDICINE

## 2024-04-10 NOTE — ASSESSMENT & PLAN NOTE
Recent labs have not shown significant anemia/iron deficiency.  Stop ferrous sulfate at this time in light of significant constipation.  Repeat labs which are already ordered

## 2024-04-10 NOTE — ASSESSMENT & PLAN NOTE
Clinic Care Coordination Contact  [facility]/Voicemail     Clinical Data: [facility] Outreach  Outreach attempted on 04/10/24; total outreach attempts x 2:   With the assistance of [facility]  Services,  CC left message on mother's voicemail with call back information and requested return call.  Additional Information: Working on gaining SMRT referral, along with placing Jacob on waitlists for [facility] and [facility].   Status: Patient is on [facility] panel, status as enrolled.   Plan: [facility] to continue outreach attempts.      LITO Morgan  , Care Coordination  [facility]  [phone]         Not on medication prior to admission  Patient can follow-up with PCP

## 2024-04-10 NOTE — PROGRESS NOTES
Assessment/Plan:       Problem List Items Addressed This Visit        Digestive    Slow transit constipation - Primary     She is having some significant constipation that is relatively acute in onset over the past 10 days.  She is having small amounts of stooling which is reassuring but she does appear to have a large stool bolus on the left side on exam today which is very mildly tender to deep palpation.  I want her to  a bottle of magnesium citrate on the way home and drink half of it tomorrow morning.  I would anticipate significant stooling within 3 to 4 hours.  If she is not experiencing this, she will finish the bottle tomorrow afternoon.  If she has not moved her bowels after this she will contact me on 4/12/2024 and we will proceed with further workup such as CT etc.  Assuming she has success with this approach, I would like her to take Colace once a day on an ongoing basis at least until I see her next.  I would also like her to stop iron for the foreseeable future and we will monitor her labs to make sure she is not becoming anemic.         Relevant Orders    CBC and differential    Comprehensive metabolic panel    TSH, 3rd generation with Free T4 reflex       Musculoskeletal and Integument    Age-related osteoporosis without current pathological fracture     She remained stable on ibandronate.         Rheumatoid arthritis (HCC)    Relevant Orders    CBC and differential    Comprehensive metabolic panel    Lipid Panel with Direct LDL reflex       Blood    Iron deficiency anemia     Recent labs have not shown significant anemia/iron deficiency.  Stop ferrous sulfate at this time in light of significant constipation.  Repeat labs which are already ordered         Relevant Orders    CBC and differential    Comprehensive metabolic panel    Iron, TIBC and Ferritin Panel       Other    Hyperlipidemia    Relevant Orders    Lipid Panel with Direct LDL reflex    On prednisone therapy     Continue close  "monitoring of glucose.              Subjective:      Patient ID: Aracelis Devi is a 69 y.o. female.    GI Problem  Primary symptoms do not include fatigue, abdominal pain, diarrhea, myalgias, arthralgias or rash.   The illness is also significant for constipation.   Patient presents today for follow-up of chronic health issues as well as a new issue of some constipation.  She notes she has really not had a solid bowel movement in about 10 or 11 days.  She has had small \"pebbling\" bowel movements about once or twice a day since that time with a lot of straining.  She has some left abdominal fullness but not really significant pain.  She has been passing flatus.  She denies any nausea or vomiting.  She has history of anxiety which is well-controlled this time.    The following portions of the patient's history were reviewed and updated as appropriate: allergies, current medications, past family history, past medical history, past social history, past surgical history and problem list.      Current Outpatient Medications:   •  acetaminophen (TYLENOL) 325 mg tablet, Take 3 tablets (975 mg total) by mouth every 8 (eight) hours, Disp: , Rfl: 0  •  amoxicillin (AMOXIL) 500 mg capsule, , Disp: , Rfl:   •  ascorbic acid (VITAMIN C) 500 MG tablet, Take 1 tablet (500 mg total) by mouth daily Start to take 30 days prior to surgery, Disp: 30 tablet, Rfl: 1  •  celecoxib (CeleBREX) 200 mg capsule, Take 200 mg by mouth daily, Disp: , Rfl:   •  folic acid (FOLVITE) 1 mg tablet, Take 1 tablet (1 mg total) by mouth daily Start to take 30 days prior to surgery, Disp: 30 tablet, Rfl: 1  •  ibandronate (BONIVA) 150 MG tablet, Take 1 tablet by mouth as needed  , Disp: , Rfl:   •  methotrexate 2.5 MG tablet, TAKE 8 TABLETS BY MOUTH ONCE A WEEK AS DIRECTED, Disp: , Rfl:   •  Multiple Vitamin (multivitamin) tablet, Take 1 tablet by mouth daily Start to take 30 days prior to surgery, Disp: 30 tablet, Rfl: 1  •  ondansetron (Zofran ODT) 4 mg " disintegrating tablet, Take 1 tablet (4 mg total) by mouth every 6 (six) hours as needed for nausea or vomiting, Disp: 20 tablet, Rfl: 0  •  predniSONE 5 mg tablet, Take 5 mg by mouth daily , Disp: , Rfl:   •  solifenacin (VESICARE) 10 MG tablet, Take 1 tablet (10 mg total) by mouth daily, Disp: 90 tablet, Rfl: 3  •  docusate sodium (COLACE) 100 mg capsule, Take 1 capsule (100 mg total) by mouth 2 (two) times a day for 10 days (Patient not taking: Reported on 1/19/2024), Disp: 20 capsule, Rfl: 0     Review of Systems   Constitutional:  Negative for fatigue.   HENT:  Negative for trouble swallowing.    Respiratory:  Negative for chest tightness, shortness of breath and wheezing.    Cardiovascular:  Negative for chest pain, palpitations and leg swelling.   Gastrointestinal:  Positive for constipation. Negative for abdominal pain, blood in stool and diarrhea.   Endocrine: Negative for polyuria.   Genitourinary:  Negative for difficulty urinating and flank pain.   Musculoskeletal:  Negative for arthralgias and myalgias.   Skin:  Negative for rash.   Psychiatric/Behavioral:  Negative for sleep disturbance.          Objective:      /66 (BP Location: Left arm, Patient Position: Sitting, Cuff Size: Standard)   Pulse 55   Temp (!) 97 °F (36.1 °C) (Tympanic)   Resp 12   Wt 50.5 kg (111 lb 6.4 oz)   SpO2 100%   BMI 19.73 kg/m²          Physical Exam  Vitals reviewed.   Constitutional:       Appearance: She is well-developed.   HENT:      Head: Normocephalic.   Cardiovascular:      Rate and Rhythm: Regular rhythm.      Heart sounds: Normal heart sounds. No murmur heard.  Pulmonary:      Effort: No respiratory distress.      Breath sounds: No wheezing or rales.   Abdominal:      General: There is distension.      Tenderness: There is no abdominal tenderness.      Comments: She has some very slight abdominal distention and some mild tenderness to very deep palpation in the left lower quadrant.   Musculoskeletal:       Right lower leg: No edema.      Left lower leg: No edema.   Skin:     Findings: No erythema or rash.   Neurological:      Mental Status: She is alert and oriented to person, place, and time.   Psychiatric:         Mood and Affect: Mood normal.           Mani Crump Jr, MD

## 2024-04-10 NOTE — PATIENT INSTRUCTIONS
1. Slow transit constipation  Assessment & Plan:  She is having some significant constipation that is relatively acute in onset over the past 10 days.  She is having small amounts of stooling which is reassuring but she does appear to have a large stool bolus on the left side on exam today which is very mildly tender to deep palpation.  I want her to  a bottle of magnesium citrate on the way home and drink half of it tomorrow morning.  I would anticipate significant stooling within 3 to 4 hours.  If she is not experiencing this, she will finish the bottle tomorrow afternoon.  If she has not moved her bowels after this she will contact me on 4/12/2024 and we will proceed with further workup such as CT etc.  Assuming she has success with this approach, I would like her to take Colace once a day on an ongoing basis at least until I see her next.  I would also like her to stop iron for the foreseeable future and we will monitor her labs to make sure she is not becoming anemic.      2. Rheumatoid arthritis involving multiple sites with positive rheumatoid factor (HCC)

## 2024-04-10 NOTE — ASSESSMENT & PLAN NOTE
She is having some significant constipation that is relatively acute in onset over the past 10 days.  She is having small amounts of stooling which is reassuring but she does appear to have a large stool bolus on the left side on exam today which is very mildly tender to deep palpation.  I want her to  a bottle of magnesium citrate on the way home and drink half of it tomorrow morning.  I would anticipate significant stooling within 3 to 4 hours.  If she is not experiencing this, she will finish the bottle tomorrow afternoon.  If she has not moved her bowels after this she will contact me on 4/12/2024 and we will proceed with further workup such as CT etc.  Assuming she has success with this approach, I would like her to take Colace once a day on an ongoing basis at least until I see her next.  I would also like her to stop iron for the foreseeable future and we will monitor her labs to make sure she is not becoming anemic.   Surgeon (Optional): Bob Elliott PA-C Biopsy Photograph Reviewed: Yes Previous Accession (Optional): [id number] Size Of Lesion In Cm: 4.5 Size Of Margin In Cm: 0.5 Anesthesia Volume In Cc: 15 Was An Eye Clamp Used?: No Eye Clamp Note Details: An eye clamp was used during the procedure. Excision Method: Elliptical Saucerization Depth: dermis and superficial adipose tissue Repair Type: Intermediate Intermediate / Complex Repair - Final Wound Length In Cm: 12 Suturegard Retention Suture: 2-0 Nylon Retention Suture Bite Size: 3 mm Length To Time In Minutes Device Was In Place: 10 Number Of Hemigard Strips Per Side: 1 Undermining Type: Entire Wound Debridement Text: The wound edges were debrided prior to proceeding with the closure to facilitate wound healing. Helical Rim Text: The closure involved the helical rim. Vermilion Border Text: The closure involved the vermilion border. Nostril Rim Text: The closure involved the nostril rim. Retention Suture Text: Retention sutures were placed to support the closure and prevent dehiscence. Primary Defect Length (In Cm): 5.5 Secondary Defect Length (In Cm): 0 Suture Removal: 14 days Lab: 6 Lab Facility: 3 Graft Donor Site Bandage (Optional-Leave Blank If You Don't Want In Note): Steri-strips and a pressure bandage were applied to the donor site. Epidermal Closure Graft Donor Site (Optional): simple interrupted Billing Type: Third-Party Bill Excision Depth: muscle Scalpel Size: 15 blade Anesthesia Type: 1% lidocaine with epinephrine Additional Anesthesia Volume In Cc: 9 Hemostasis: Electrocautery Estimated Blood Loss (Cc): minimal Detail Level: Detailed Deep Sutures: 3-0 Vicryl Epidermal Sutures: 4-0 Ethilon Epidermal Closure: running horizontal mattress Wound Care: Mupirocin Dressing: pressure dressing Suturegard Intro: Intraoperative tissue expansion was performed, utilizing the SUTUREGARD device, in order to reduce wound tension. Suturegard Body: The suture ends were repeatedly re-tightened and re-clamped to achieve the desired tissue expansion. Hemigard Intro: Due to skin fragility and wound tension, it was decided to use HEMIGARD adhesive retention suture devices to permit a linear closure. The skin was cleaned and dried for a 6cm distance away from the wound. Excessive hair, if present, was removed to allow for adhesion. Hemigard Postcare Instructions: The HEMIGARD strips are to remain completely dry for at least 5-7 days. Positioning (Leave Blank If You Do Not Want): The patient was placed in a comfortable position exposing the surgical site. Pre-Excision Curettage Text (Leave Blank If You Do Not Want): Prior to drawing the surgical margin the visible lesion was removed with electrodesiccation and curettage to clearly define the lesion size. Complex Repair Preamble Text (Leave Blank If You Do Not Want): Extensive wide undermining was performed. Intermediate Repair Preamble Text (Leave Blank If You Do Not Want): Undermining was performed with blunt dissection. Curvilinear Excision Additional Text (Leave Blank If You Do Not Want): The margin was drawn around the clinically apparent lesion.  A curvilinear shape was then drawn on the skin incorporating the lesion and margins.  Incisions were then made along these lines to the appropriate tissue plane and the lesion was extirpated. Fusiform Excision Additional Text (Leave Blank If You Do Not Want): The margin was drawn around the clinically apparent lesion.  A fusiform shape was then drawn on the skin incorporating the lesion and margins.  Incisions were then made along these lines to the appropriate tissue plane and the lesion was extirpated. Elliptical Excision Additional Text (Leave Blank If You Do Not Want): The margin was drawn around the clinically apparent lesion.  An elliptical shape was then drawn on the skin incorporating the lesion and margins.  Incisions were then made along these lines to the appropriate tissue plane and the lesion was extirpated. Saucerization Excision Additional Text (Leave Blank If You Do Not Want): The margin was drawn around the clinically apparent lesion.  Incisions were then made along these lines, in a tangential fashion, to the appropriate tissue plane and the lesion was extirpated. Slit Excision Additional Text (Leave Blank If You Do Not Want): A linear line was drawn on the skin overlying the lesion. An incision was made slowly until the lesion was visualized.  Once visualized, the lesion was removed with blunt dissection. Excisional Biopsy Additional Text (Leave Blank If You Do Not Want): The margin was drawn around the clinically apparent lesion. An elliptical shape was then drawn on the skin incorporating the lesion and margins.  Incisions were then made along these lines to the appropriate tissue plane and the lesion was extirpated. Perilesional Excision Additional Text (Leave Blank If You Do Not Want): The margin was drawn around the clinically apparent lesion. Incisions were then made along these lines to the appropriate tissue plane and the lesion was extirpated. Repair Performed By Another Provider Text (Leave Blank If You Do Not Want): After the tissue was excised the defect was repaired by another provider. No Repair - Repaired With Adjacent Surgical Defect Text (Leave Blank If You Do Not Want): After the excision the defect was repaired concurrently with another surgical defect which was in close approximation. Adjacent Tissue Transfer Text: The defect edges were debeveled with a #15 scalpel blade.  Given the location of the defect and the proximity to free margins an adjacent tissue transfer was deemed most appropriate.  Using a sterile surgical marker, an appropriate flap was drawn incorporating the defect and placing the expected incisions within the relaxed skin tension lines where possible.    The area thus outlined was incised deep to adipose tissue with a #15 scalpel blade.  The skin margins were undermined to an appropriate distance in all directions utilizing iris scissors. Advancement Flap (Single) Text: The defect edges were debeveled with a #15 scalpel blade.  Given the location of the defect and the proximity to free margins a single advancement flap was deemed most appropriate.  Using a sterile surgical marker, an appropriate advancement flap was drawn incorporating the defect and placing the expected incisions within the relaxed skin tension lines where possible.    The area thus outlined was incised deep to adipose tissue with a #15 scalpel blade.  The skin margins were undermined to an appropriate distance in all directions utilizing iris scissors. Advancement Flap (Double) Text: The defect edges were debeveled with a #15 scalpel blade.  Given the location of the defect and the proximity to free margins a double advancement flap was deemed most appropriate.  Using a sterile surgical marker, the appropriate advancement flaps were drawn incorporating the defect and placing the expected incisions within the relaxed skin tension lines where possible.    The area thus outlined was incised deep to adipose tissue with a #15 scalpel blade.  The skin margins were undermined to an appropriate distance in all directions utilizing iris scissors. Burow's Advancement Flap Text: The defect edges were debeveled with a #15 scalpel blade.  Given the location of the defect and the proximity to free margins a Burow's advancement flap was deemed most appropriate.  Using a sterile surgical marker, the appropriate advancement flap was drawn incorporating the defect and placing the expected incisions within the relaxed skin tension lines where possible.    The area thus outlined was incised deep to adipose tissue with a #15 scalpel blade.  The skin margins were undermined to an appropriate distance in all directions utilizing iris scissors. Chonodrocutaneous Helical Advancement Flap Text: The defect edges were debeveled with a #15 scalpel blade.  Given the location of the defect and the proximity to free margins a chondrocutaneous helical advancement flap was deemed most appropriate.  Using a sterile surgical marker, the appropriate advancement flap was drawn incorporating the defect and placing the expected incisions within the relaxed skin tension lines where possible.    The area thus outlined was incised deep to adipose tissue with a #15 scalpel blade.  The skin margins were undermined to an appropriate distance in all directions utilizing iris scissors. Crescentic Advancement Flap Text: The defect edges were debeveled with a #15 scalpel blade.  Given the location of the defect and the proximity to free margins a crescentic advancement flap was deemed most appropriate.  Using a sterile surgical marker, the appropriate advancement flap was drawn incorporating the defect and placing the expected incisions within the relaxed skin tension lines where possible.    The area thus outlined was incised deep to adipose tissue with a #15 scalpel blade.  The skin margins were undermined to an appropriate distance in all directions utilizing iris scissors. A-T Advancement Flap Text: The defect edges were debeveled with a #15 scalpel blade.  Given the location of the defect, shape of the defect and the proximity to free margins an A-T advancement flap was deemed most appropriate.  Using a sterile surgical marker, an appropriate advancement flap was drawn incorporating the defect and placing the expected incisions within the relaxed skin tension lines where possible.    The area thus outlined was incised deep to adipose tissue with a #15 scalpel blade.  The skin margins were undermined to an appropriate distance in all directions utilizing iris scissors. O-T Advancement Flap Text: The defect edges were debeveled with a #15 scalpel blade.  Given the location of the defect, shape of the defect and the proximity to free margins an O-T advancement flap was deemed most appropriate.  Using a sterile surgical marker, an appropriate advancement flap was drawn incorporating the defect and placing the expected incisions within the relaxed skin tension lines where possible.    The area thus outlined was incised deep to adipose tissue with a #15 scalpel blade.  The skin margins were undermined to an appropriate distance in all directions utilizing iris scissors. O-L Flap Text: The defect edges were debeveled with a #15 scalpel blade.  Given the location of the defect, shape of the defect and the proximity to free margins an O-L flap was deemed most appropriate.  Using a sterile surgical marker, an appropriate advancement flap was drawn incorporating the defect and placing the expected incisions within the relaxed skin tension lines where possible.    The area thus outlined was incised deep to adipose tissue with a #15 scalpel blade.  The skin margins were undermined to an appropriate distance in all directions utilizing iris scissors. O-Z Flap Text: The defect edges were debeveled with a #15 scalpel blade.  Given the location of the defect, shape of the defect and the proximity to free margins an O-Z flap was deemed most appropriate.  Using a sterile surgical marker, an appropriate transposition flap was drawn incorporating the defect and placing the expected incisions within the relaxed skin tension lines where possible. The area thus outlined was incised deep to adipose tissue with a #15 scalpel blade.  The skin margins were undermined to an appropriate distance in all directions utilizing iris scissors. Double O-Z Flap Text: The defect edges were debeveled with a #15 scalpel blade.  Given the location of the defect, shape of the defect and the proximity to free margins a Double O-Z flap was deemed most appropriate.  Using a sterile surgical marker, an appropriate transposition flap was drawn incorporating the defect and placing the expected incisions within the relaxed skin tension lines where possible. The area thus outlined was incised deep to adipose tissue with a #15 scalpel blade.  The skin margins were undermined to an appropriate distance in all directions utilizing iris scissors. V-Y Flap Text: The defect edges were debeveled with a #15 scalpel blade.  Given the location of the defect, shape of the defect and the proximity to free margins a V-Y flap was deemed most appropriate.  Using a sterile surgical marker, an appropriate advancement flap was drawn incorporating the defect and placing the expected incisions within the relaxed skin tension lines where possible.    The area thus outlined was incised deep to adipose tissue with a #15 scalpel blade.  The skin margins were undermined to an appropriate distance in all directions utilizing iris scissors. Advancement-Rotation Flap Text: The defect edges were debeveled with a #15 scalpel blade.  Given the location of the defect, shape of the defect and the proximity to free margins an advancement-rotation flap was deemed most appropriate.  Using a sterile surgical marker, an appropriate flap was drawn incorporating the defect and placing the expected incisions within the relaxed skin tension lines where possible. The area thus outlined was incised deep to adipose tissue with a #15 scalpel blade.  The skin margins were undermined to an appropriate distance in all directions utilizing iris scissors. Mercedes Flap Text: The defect edges were debeveled with a #15 scalpel blade.  Given the location of the defect, shape of the defect and the proximity to free margins a Mercedes flap was deemed most appropriate.  Using a sterile surgical marker, an appropriate advancement flap was drawn incorporating the defect and placing the expected incisions within the relaxed skin tension lines where possible. The area thus outlined was incised deep to adipose tissue with a #15 scalpel blade.  The skin margins were undermined to an appropriate distance in all directions utilizing iris scissors. Modified Advancement Flap Text: The defect edges were debeveled with a #15 scalpel blade.  Given the location of the defect, shape of the defect and the proximity to free margins a modified advancement flap was deemed most appropriate.  Using a sterile surgical marker, an appropriate advancement flap was drawn incorporating the defect and placing the expected incisions within the relaxed skin tension lines where possible.    The area thus outlined was incised deep to adipose tissue with a #15 scalpel blade.  The skin margins were undermined to an appropriate distance in all directions utilizing iris scissors. Mucosal Advancement Flap Text: Given the location of the defect, shape of the defect and the proximity to free margins a mucosal advancement flap was deemed most appropriate. Incisions were made with a 15 blade scalpel in the appropriate fashion along the cutaneous vermilion border and the mucosal lip. The remaining actinically damaged mucosal tissue was excised.  The mucosal advancement flap was then elevated to the gingival sulcus with care taken to preserve the neurovascular structures and advanced into the primary defect. Care was taken to ensure that precise realignment of the vermilion border was achieved. Peng Advancement Flap Text: The defect edges were debeveled with a #15 scalpel blade.  Given the location of the defect, shape of the defect and the proximity to free margins a Peng advancement flap was deemed most appropriate.  Using a sterile surgical marker, an appropriate advancement flap was drawn incorporating the defect and placing the expected incisions within the relaxed skin tension lines where possible. The area thus outlined was incised deep to adipose tissue with a #15 scalpel blade.  The skin margins were undermined to an appropriate distance in all directions utilizing iris scissors. Hatchet Flap Text: The defect edges were debeveled with a #15 scalpel blade.  Given the location of the defect, shape of the defect and the proximity to free margins a hatchet flap was deemed most appropriate.  Using a sterile surgical marker, an appropriate hatchet flap was drawn incorporating the defect and placing the expected incisions within the relaxed skin tension lines where possible.    The area thus outlined was incised deep to adipose tissue with a #15 scalpel blade.  The skin margins were undermined to an appropriate distance in all directions utilizing iris scissors. Rotation Flap Text: The defect edges were debeveled with a #15 scalpel blade.  Given the location of the defect, shape of the defect and the proximity to free margins a rotation flap was deemed most appropriate.  Using a sterile surgical marker, an appropriate rotation flap was drawn incorporating the defect and placing the expected incisions within the relaxed skin tension lines where possible.    The area thus outlined was incised deep to adipose tissue with a #15 scalpel blade.  The skin margins were undermined to an appropriate distance in all directions utilizing iris scissors. Bilateral Rotation Flap Text: The defect edges were debeveled with a #15 scalpel blade. Given the location of the defect, shape of the defect and the proximity to free margins a bilateral rotation flap was deemed most appropriate. Using a sterile surgical marker, an appropriate rotation flap was drawn incorporating the defect and placing the expected incisions within the relaxed skin tension lines where possible. The area thus outlined was incised deep to adipose tissue with a #15 scalpel blade. The skin margins were undermined to an appropriate distance in all directions utilizing iris scissors. Following this, the designed flap was carried over into the primary defect and sutured into place. Spiral Flap Text: The defect edges were debeveled with a #15 scalpel blade.  Given the location of the defect, shape of the defect and the proximity to free margins a spiral flap was deemed most appropriate.  Using a sterile surgical marker, an appropriate rotation flap was drawn incorporating the defect and placing the expected incisions within the relaxed skin tension lines where possible. The area thus outlined was incised deep to adipose tissue with a #15 scalpel blade.  The skin margins were undermined to an appropriate distance in all directions utilizing iris scissors. Staged Advancement Flap Text: The defect edges were debeveled with a #15 scalpel blade.  Given the location of the defect, shape of the defect and the proximity to free margins a staged advancement flap was deemed most appropriate.  Using a sterile surgical marker, an appropriate advancement flap was drawn incorporating the defect and placing the expected incisions within the relaxed skin tension lines where possible. The area thus outlined was incised deep to adipose tissue with a #15 scalpel blade.  The skin margins were undermined to an appropriate distance in all directions utilizing iris scissors. Star Wedge Flap Text: The defect edges were debeveled with a #15 scalpel blade.  Given the location of the defect, shape of the defect and the proximity to free margins a star wedge flap was deemed most appropriate.  Using a sterile surgical marker, an appropriate rotation flap was drawn incorporating the defect and placing the expected incisions within the relaxed skin tension lines where possible. The area thus outlined was incised deep to adipose tissue with a #15 scalpel blade.  The skin margins were undermined to an appropriate distance in all directions utilizing iris scissors. Transposition Flap Text: The defect edges were debeveled with a #15 scalpel blade.  Given the location of the defect and the proximity to free margins a transposition flap was deemed most appropriate.  Using a sterile surgical marker, an appropriate transposition flap was drawn incorporating the defect.    The area thus outlined was incised deep to adipose tissue with a #15 scalpel blade.  The skin margins were undermined to an appropriate distance in all directions utilizing iris scissors. Muscle Hinge Flap Text: The defect edges were debeveled with a #15 scalpel blade.  Given the size, depth and location of the defect and the proximity to free margins a muscle hinge flap was deemed most appropriate.  Using a sterile surgical marker, an appropriate hinge flap was drawn incorporating the defect. The area thus outlined was incised with a #15 scalpel blade.  The skin margins were undermined to an appropriate distance in all directions utilizing iris scissors. Mustarde Flap Text: The defect edges were debeveled with a #15 scalpel blade.  Given the size, depth and location of the defect and the proximity to free margins a Mustarde flap was deemed most appropriate.  Using a sterile surgical marker, an appropriate flap was drawn incorporating the defect. The area thus outlined was incised with a #15 scalpel blade.  The skin margins were undermined to an appropriate distance in all directions utilizing iris scissors. Nasal Turnover Hinge Flap Text: The defect edges were debeveled with a #15 scalpel blade.  Given the size, depth, location of the defect and the defect being full thickness a nasal turnover hinge flap was deemed most appropriate.  Using a sterile surgical marker, an appropriate hinge flap was drawn incorporating the defect. The area thus outlined was incised with a #15 scalpel blade. The flap was designed to recreate the nasal mucosal lining and the alar rim. The skin margins were undermined to an appropriate distance in all directions utilizing iris scissors. Nasalis-Muscle-Based Myocutaneous Island Pedicle Flap Text: Using a #15 blade, an incision was made around the donor flap to the level of the nasalis muscle. Wide lateral undermining was then performed in both the subcutaneous plane above the nasalis muscle, and in a submuscular plane just above periosteum. This allowed the formation of a free nasalis muscle axial pedicle (based on the angular artery) which was still attached to the actual cutaneous flap, increasing its mobility and vascular viability. Hemostasis was obtained with pinpoint electrocoagulation. The flap was mobilized into position and the pivotal anchor points positioned and stabilized with buried interrupted sutures. Subcutaneous and dermal tissues were closed in a multilayered fashion with sutures. Tissue redundancies were excised, and the epidermal edges were apposed without significant tension and sutured with sutures. Nasalis Myocutaneous Flap Text: Using a #15 blade, an incision was made around the donor flap to the level of the nasalis muscle. Wide lateral undermining was then performed in both the subcutaneous plane above the nasalis muscle, and in a submuscular plane just above periosteum. This allowed the formation of a free nasalis muscle axial pedicle which was still attached to the actual cutaneous flap, increasing its mobility and vascular viability. Hemostasis was obtained with pinpoint electrocoagulation. The flap was mobilized into position and the pivotal anchor points positioned and stabilized with buried interrupted sutures. Subcutaneous and dermal tissues were closed in a multilayered fashion with sutures. Tissue redundancies were excised, and the epidermal edges were apposed without significant tension and sutured with sutures. Orbicularis Oris Muscle Flap Text: The defect edges were debeveled with a #15 scalpel blade.  Given that the defect affected the competency of the oral sphincter an orbicularis oris muscle flap was deemed most appropriate to restore this competency and normal muscle function.  Using a sterile surgical marker, an appropriate flap was drawn incorporating the defect. The area thus outlined was incised with a #15 scalpel blade. Melolabial Transposition Flap Text: The defect edges were debeveled with a #15 scalpel blade.  Given the location of the defect and the proximity to free margins a melolabial flap was deemed most appropriate.  Using a sterile surgical marker, an appropriate melolabial transposition flap was drawn incorporating the defect.    The area thus outlined was incised deep to adipose tissue with a #15 scalpel blade.  The skin margins were undermined to an appropriate distance in all directions utilizing iris scissors. Rectangular Flap Text: The defect edges were debeveled with a #15 scalpel blade. Given the location of the defect and the proximity to free margins a rectangular flap was deemed most appropriate. Using a sterile surgical marker, an appropriate rectangular flap was drawn incorporating the defect. The area thus outlined was incised deep to adipose tissue with a #15 scalpel blade. The skin margins were undermined to an appropriate distance in all directions utilizing iris scissors. Following this, the designed flap was carried over into the primary defect and sutured into place. Rhombic Flap Text: The defect edges were debeveled with a #15 scalpel blade.  Given the location of the defect and the proximity to free margins a rhombic flap was deemed most appropriate.  Using a sterile surgical marker, an appropriate rhombic flap was drawn incorporating the defect.    The area thus outlined was incised deep to adipose tissue with a #15 scalpel blade.  The skin margins were undermined to an appropriate distance in all directions utilizing iris scissors. Rhomboid Transposition Flap Text: The defect edges were debeveled with a #15 scalpel blade.  Given the location of the defect and the proximity to free margins a rhomboid transposition flap was deemed most appropriate.  Using a sterile surgical marker, an appropriate rhomboid flap was drawn incorporating the defect.    The area thus outlined was incised deep to adipose tissue with a #15 scalpel blade.  The skin margins were undermined to an appropriate distance in all directions utilizing iris scissors. Bi-Rhombic Flap Text: The defect edges were debeveled with a #15 scalpel blade.  Given the location of the defect and the proximity to free margins a bi-rhombic flap was deemed most appropriate.  Using a sterile surgical marker, an appropriate rhombic flap was drawn incorporating the defect. The area thus outlined was incised deep to adipose tissue with a #15 scalpel blade.  The skin margins were undermined to an appropriate distance in all directions utilizing iris scissors. Helical Rim Advancement Flap Text: The defect edges were debeveled with a #15 blade scalpel.  Given the location of the defect and the proximity to free margins (helical rim) a double helical rim advancement flap was deemed most appropriate.  Using a sterile surgical marker, the appropriate advancement flaps were drawn incorporating the defect and placing the expected incisions between the helical rim and antihelix where possible.  The area thus outlined was incised through and through with a #15 scalpel blade.  With a skin hook and iris scissors, the flaps were gently and sharply undermined and freed up. Bilateral Helical Rim Advancement Flap Text: The defect edges were debeveled with a #15 blade scalpel.  Given the location of the defect and the proximity to free margins (helical rim) a bilateral helical rim advancement flap was deemed most appropriate.  Using a sterile surgical marker, the appropriate advancement flaps were drawn incorporating the defect and placing the expected incisions between the helical rim and antihelix where possible.  The area thus outlined was incised through and through with a #15 scalpel blade.  With a skin hook and iris scissors, the flaps were gently and sharply undermined and freed up. Ear Star Wedge Flap Text: The defect edges were debeveled with a #15 blade scalpel.  Given the location of the defect and the proximity to free margins (helical rim) an ear star wedge flap was deemed most appropriate.  Using a sterile surgical marker, the appropriate flap was drawn incorporating the defect and placing the expected incisions between the helical rim and antihelix where possible.  The area thus outlined was incised through and through with a #15 scalpel blade. Banner Transposition Flap Text: The defect edges were debeveled with a #15 scalpel blade.  Given the location of the defect and the proximity to free margins a Banner transposition flap was deemed most appropriate.  Using a sterile surgical marker, an appropriate flap drawn around the defect. The area thus outlined was incised deep to adipose tissue with a #15 scalpel blade.  The skin margins were undermined to an appropriate distance in all directions utilizing iris scissors. Bilobed Flap Text: The defect edges were debeveled with a #15 scalpel blade.  Given the location of the defect and the proximity to free margins a bilobe flap was deemed most appropriate.  Using a sterile surgical marker, an appropriate bilobe flap drawn around the defect.    The area thus outlined was incised deep to adipose tissue with a #15 scalpel blade.  The skin margins were undermined to an appropriate distance in all directions utilizing iris scissors. Bilobed Transposition Flap Text: The defect edges were debeveled with a #15 scalpel blade.  Given the location of the defect and the proximity to free margins a bilobed transposition flap was deemed most appropriate.  Using a sterile surgical marker, an appropriate bilobe flap drawn around the defect.    The area thus outlined was incised deep to adipose tissue with a #15 scalpel blade.  The skin margins were undermined to an appropriate distance in all directions utilizing iris scissors. Trilobed Flap Text: The defect edges were debeveled with a #15 scalpel blade.  Given the location of the defect and the proximity to free margins a trilobed flap was deemed most appropriate.  Using a sterile surgical marker, an appropriate trilobed flap drawn around the defect.    The area thus outlined was incised deep to adipose tissue with a #15 scalpel blade.  The skin margins were undermined to an appropriate distance in all directions utilizing iris scissors. Dorsal Nasal Flap Text: The defect edges were debeveled with a #15 scalpel blade.  Given the location of the defect and the proximity to free margins a dorsal nasal flap was deemed most appropriate.  Using a sterile surgical marker, an appropriate dorsal nasal flap was drawn around the defect.    The area thus outlined was incised deep to adipose tissue with a #15 scalpel blade.  The skin margins were undermined to an appropriate distance in all directions utilizing iris scissors. Island Pedicle Flap Text: The defect edges were debeveled with a #15 scalpel blade.  Given the location of the defect, shape of the defect and the proximity to free margins an island pedicle advancement flap was deemed most appropriate.  Using a sterile surgical marker, an appropriate advancement flap was drawn incorporating the defect, outlining the appropriate donor tissue and placing the expected incisions within the relaxed skin tension lines where possible.    The area thus outlined was incised deep to adipose tissue with a #15 scalpel blade.  The skin margins were undermined to an appropriate distance in all directions around the primary defect and laterally outward around the island pedicle utilizing iris scissors.  There was minimal undermining beneath the pedicle flap. Island Pedicle Flap With Canthal Suspension Text: The defect edges were debeveled with a #15 scalpel blade.  Given the location of the defect, shape of the defect and the proximity to free margins an island pedicle advancement flap was deemed most appropriate.  Using a sterile surgical marker, an appropriate advancement flap was drawn incorporating the defect, outlining the appropriate donor tissue and placing the expected incisions within the relaxed skin tension lines where possible. The area thus outlined was incised deep to adipose tissue with a #15 scalpel blade.  The skin margins were undermined to an appropriate distance in all directions around the primary defect and laterally outward around the island pedicle utilizing iris scissors.  There was minimal undermining beneath the pedicle flap. A suspension suture was placed in the canthal tendon to prevent tension and prevent ectropion. Alar Island Pedicle Flap Text: The defect edges were debeveled with a #15 scalpel blade.  Given the location of the defect, shape of the defect and the proximity to the alar rim an island pedicle advancement flap was deemed most appropriate.  Using a sterile surgical marker, an appropriate advancement flap was drawn incorporating the defect, outlining the appropriate donor tissue and placing the expected incisions within the nasal ala running parallel to the alar rim. The area thus outlined was incised with a #15 scalpel blade.  The skin margins were undermined minimally to an appropriate distance in all directions around the primary defect and laterally outward around the island pedicle utilizing iris scissors.  There was minimal undermining beneath the pedicle flap. Double Island Pedicle Flap Text: The defect edges were debeveled with a #15 scalpel blade.  Given the location of the defect, shape of the defect and the proximity to free margins a double island pedicle advancement flap was deemed most appropriate.  Using a sterile surgical marker, an appropriate advancement flap was drawn incorporating the defect, outlining the appropriate donor tissue and placing the expected incisions within the relaxed skin tension lines where possible.    The area thus outlined was incised deep to adipose tissue with a #15 scalpel blade.  The skin margins were undermined to an appropriate distance in all directions around the primary defect and laterally outward around the island pedicle utilizing iris scissors.  There was minimal undermining beneath the pedicle flap. Island Pedicle Flap-Requiring Vessel Identification Text: The defect edges were debeveled with a #15 scalpel blade.  Given the location of the defect, shape of the defect and the proximity to free margins an island pedicle advancement flap was deemed most appropriate.  Using a sterile surgical marker, an appropriate advancement flap was drawn, based on the axial vessel mentioned above, incorporating the defect, outlining the appropriate donor tissue and placing the expected incisions within the relaxed skin tension lines where possible.    The area thus outlined was incised deep to adipose tissue with a #15 scalpel blade.  The skin margins were undermined to an appropriate distance in all directions around the primary defect and laterally outward around the island pedicle utilizing iris scissors.  There was minimal undermining beneath the pedicle flap. Keystone Flap Text: The defect edges were debeveled with a #15 scalpel blade.  Given the location of the defect, shape of the defect a keystone flap was deemed most appropriate.  Using a sterile surgical marker, an appropriate keystone flap was drawn incorporating the defect, outlining the appropriate donor tissue and placing the expected incisions within the relaxed skin tension lines where possible. The area thus outlined was incised deep to adipose tissue with a #15 scalpel blade.  The skin margins were undermined to an appropriate distance in all directions around the primary defect and laterally outward around the flap utilizing iris scissors. O-T Plasty Text: The defect edges were debeveled with a #15 scalpel blade.  Given the location of the defect, shape of the defect and the proximity to free margins an O-T plasty was deemed most appropriate.  Using a sterile surgical marker, an appropriate O-T plasty was drawn incorporating the defect and placing the expected incisions within the relaxed skin tension lines where possible.    The area thus outlined was incised deep to adipose tissue with a #15 scalpel blade.  The skin margins were undermined to an appropriate distance in all directions utilizing iris scissors. O-Z Plasty Text: The defect edges were debeveled with a #15 scalpel blade.  Given the location of the defect, shape of the defect and the proximity to free margins an O-Z plasty (double transposition flap) was deemed most appropriate.  Using a sterile surgical marker, the appropriate transposition flaps were drawn incorporating the defect and placing the expected incisions within the relaxed skin tension lines where possible.    The area thus outlined was incised deep to adipose tissue with a #15 scalpel blade.  The skin margins were undermined to an appropriate distance in all directions utilizing iris scissors.  Hemostasis was achieved with electrocautery.  The flaps were then transposed into place, one clockwise and the other counterclockwise, and anchored with interrupted buried subcutaneous sutures. Double O-Z Plasty Text: The defect edges were debeveled with a #15 scalpel blade.  Given the location of the defect, shape of the defect and the proximity to free margins a Double O-Z plasty (double transposition flap) was deemed most appropriate.  Using a sterile surgical marker, the appropriate transposition flaps were drawn incorporating the defect and placing the expected incisions within the relaxed skin tension lines where possible. The area thus outlined was incised deep to adipose tissue with a #15 scalpel blade.  The skin margins were undermined to an appropriate distance in all directions utilizing iris scissors.  Hemostasis was achieved with electrocautery.  The flaps were then transposed into place, one clockwise and the other counterclockwise, and anchored with interrupted buried subcutaneous sutures. V-Y Plasty Text: The defect edges were debeveled with a #15 scalpel blade.  Given the location of the defect, shape of the defect and the proximity to free margins an V-Y advancement flap was deemed most appropriate.  Using a sterile surgical marker, an appropriate advancement flap was drawn incorporating the defect and placing the expected incisions within the relaxed skin tension lines where possible.    The area thus outlined was incised deep to adipose tissue with a #15 scalpel blade.  The skin margins were undermined to an appropriate distance in all directions utilizing iris scissors. H Plasty Text: Given the location of the defect, shape of the defect and the proximity to free margins a H-plasty was deemed most appropriate for repair.  Using a sterile surgical marker, the appropriate advancement arms of the H-plasty were drawn incorporating the defect and placing the expected incisions within the relaxed skin tension lines where possible. The area thus outlined was incised deep to adipose tissue with a #15 scalpel blade. The skin margins were undermined to an appropriate distance in all directions utilizing iris scissors.  The opposing advancement arms were then advanced into place in opposite direction and anchored with interrupted buried subcutaneous sutures. W Plasty Text: The lesion was extirpated to the level of the fat with a #15 scalpel blade.  Given the location of the defect, shape of the defect and the proximity to free margins a W-plasty was deemed most appropriate for repair.  Using a sterile surgical marker, the appropriate transposition arms of the W-plasty were drawn incorporating the defect and placing the expected incisions within the relaxed skin tension lines where possible.    The area thus outlined was incised deep to adipose tissue with a #15 scalpel blade.  The skin margins were undermined to an appropriate distance in all directions utilizing iris scissors.  The opposing transposition arms were then transposed into place in opposite direction and anchored with interrupted buried subcutaneous sutures. Z Plasty Text: The lesion was extirpated to the level of the fat with a #15 scalpel blade.  Given the location of the defect, shape of the defect and the proximity to free margins a Z-plasty was deemed most appropriate for repair.  Using a sterile surgical marker, the appropriate transposition arms of the Z-plasty were drawn incorporating the defect and placing the expected incisions within the relaxed skin tension lines where possible.    The area thus outlined was incised deep to adipose tissue with a #15 scalpel blade.  The skin margins were undermined to an appropriate distance in all directions utilizing iris scissors.  The opposing transposition arms were then transposed into place in opposite direction and anchored with interrupted buried subcutaneous sutures. Double Z Plasty Text: The lesion was extirpated to the level of the fat with a #15 scalpel blade. Given the location of the defect, shape of the defect and the proximity to free margins a double Z-plasty was deemed most appropriate for repair. Using a sterile surgical marker, the appropriate transposition arms of the double Z-plasty were drawn incorporating the defect and placing the expected incisions within the relaxed skin tension lines where possible. The area thus outlined was incised deep to adipose tissue with a #15 scalpel blade. The skin margins were undermined to an appropriate distance in all directions utilizing iris scissors. The opposing transposition arms were then transposed and carried over into place in opposite direction and anchored with interrupted buried subcutaneous sutures. Zygomaticofacial Flap Text: Given the location of the defect, shape of the defect and the proximity to free margins a zygomaticofacial flap was deemed most appropriate for repair.  Using a sterile surgical marker, the appropriate flap was drawn incorporating the defect and placing the expected incisions within the relaxed skin tension lines where possible. The area thus outlined was incised deep to adipose tissue with a #15 scalpel blade with preservation of a vascular pedicle.  The skin margins were undermined to an appropriate distance in all directions utilizing iris scissors.  The flap was then placed into the defect and anchored with interrupted buried subcutaneous sutures. Cheek Interpolation Flap Text: A decision was made to reconstruct the defect utilizing an interpolation axial flap and a staged reconstruction.  A telfa template was made of the defect.  This telfa template was then used to outline the Cheek Interpolation flap.  The donor area for the pedicle flap was then injected with anesthesia.  The flap was excised through the skin and subcutaneous tissue down to the layer of the underlying musculature.  The interpolation flap was carefully excised within this deep plane to maintain its blood supply.  The edges of the donor site were undermined.   The donor site was closed in a primary fashion.  The pedicle was then rotated into position and sutured.  Once the tube was sutured into place, adequate blood supply was confirmed with blanching and refill.  The pedicle was then wrapped with xeroform gauze and dressed appropriately with a telfa and gauze bandage to ensure continued blood supply and protect the attached pedicle. Cheek-To-Nose Interpolation Flap Text: A decision was made to reconstruct the defect utilizing an interpolation axial flap and a staged reconstruction.  A telfa template was made of the defect.  This telfa template was then used to outline the Cheek-To-Nose Interpolation flap.  The donor area for the pedicle flap was then injected with anesthesia.  The flap was excised through the skin and subcutaneous tissue down to the layer of the underlying musculature.  The interpolation flap was carefully excised within this deep plane to maintain its blood supply.  The edges of the donor site were undermined.   The donor site was closed in a primary fashion.  The pedicle was then rotated into position and sutured.  Once the tube was sutured into place, adequate blood supply was confirmed with blanching and refill.  The pedicle was then wrapped with xeroform gauze and dressed appropriately with a telfa and gauze bandage to ensure continued blood supply and protect the attached pedicle. Interpolation Flap Text: A decision was made to reconstruct the defect utilizing an interpolation axial flap and a staged reconstruction.  A telfa template was made of the defect.  This telfa template was then used to outline the interpolation flap.  The donor area for the pedicle flap was then injected with anesthesia.  The flap was excised through the skin and subcutaneous tissue down to the layer of the underlying musculature.  The interpolation flap was carefully excised within this deep plane to maintain its blood supply.  The edges of the donor site were undermined.   The donor site was closed in a primary fashion.  The pedicle was then rotated into position and sutured.  Once the tube was sutured into place, adequate blood supply was confirmed with blanching and refill.  The pedicle was then wrapped with xeroform gauze and dressed appropriately with a telfa and gauze bandage to ensure continued blood supply and protect the attached pedicle. Melolabial Interpolation Flap Text: A decision was made to reconstruct the defect utilizing an interpolation axial flap and a staged reconstruction.  A telfa template was made of the defect.  This telfa template was then used to outline the melolabial interpolation flap.  The donor area for the pedicle flap was then injected with anesthesia.  The flap was excised through the skin and subcutaneous tissue down to the layer of the underlying musculature.  The pedicle flap was carefully excised within this deep plane to maintain its blood supply.  The edges of the donor site were undermined.   The donor site was closed in a primary fashion.  The pedicle was then rotated into position and sutured.  Once the tube was sutured into place, adequate blood supply was confirmed with blanching and refill.  The pedicle was then wrapped with xeroform gauze and dressed appropriately with a telfa and gauze bandage to ensure continued blood supply and protect the attached pedicle. Mastoid Interpolation Flap Text: A decision was made to reconstruct the defect utilizing an interpolation axial flap and a staged reconstruction.  A telfa template was made of the defect.  This telfa template was then used to outline the mastoid interpolation flap.  The donor area for the pedicle flap was then injected with anesthesia.  The flap was excised through the skin and subcutaneous tissue down to the layer of the underlying musculature.  The pedicle flap was carefully excised within this deep plane to maintain its blood supply.  The edges of the donor site were undermined.   The donor site was closed in a primary fashion.  The pedicle was then rotated into position and sutured.  Once the tube was sutured into place, adequate blood supply was confirmed with blanching and refill.  The pedicle was then wrapped with xeroform gauze and dressed appropriately with a telfa and gauze bandage to ensure continued blood supply and protect the attached pedicle. Posterior Auricular Interpolation Flap Text: A decision was made to reconstruct the defect utilizing an interpolation axial flap and a staged reconstruction.  A telfa template was made of the defect.  This telfa template was then used to outline the posterior auricular interpolation flap.  The donor area for the pedicle flap was then injected with anesthesia.  The flap was excised through the skin and subcutaneous tissue down to the layer of the underlying musculature.  The pedicle flap was carefully excised within this deep plane to maintain its blood supply.  The edges of the donor site were undermined.   The donor site was closed in a primary fashion.  The pedicle was then rotated into position and sutured.  Once the tube was sutured into place, adequate blood supply was confirmed with blanching and refill.  The pedicle was then wrapped with xeroform gauze and dressed appropriately with a telfa and gauze bandage to ensure continued blood supply and protect the attached pedicle. Paramedian Forehead Flap Text: A decision was made to reconstruct the defect utilizing an interpolation axial flap and a staged reconstruction.  A telfa template was made of the defect.  This telfa template was then used to outline the paramedian forehead pedicle flap.  The donor area for the pedicle flap was then injected with anesthesia.  The flap was excised through the skin and subcutaneous tissue down to the layer of the underlying musculature.  The pedicle flap was carefully excised within this deep plane to maintain its blood supply.  The edges of the donor site were undermined.   The donor site was closed in a primary fashion.  The pedicle was then rotated into position and sutured.  Once the tube was sutured into place, adequate blood supply was confirmed with blanching and refill.  The pedicle was then wrapped with xeroform gauze and dressed appropriately with a telfa and gauze bandage to ensure continued blood supply and protect the attached pedicle. Abbe Flap (Upper To Lower Lip) Text: The defect of the lower lip was assessed and measured.  Given the location and size of the defect, an Abbe flap was deemed most appropriate.  Using a sterile surgical marker, an appropriate Abbe flap was measured and drawn on the upper lip. Local anesthesia was then infiltrated.  A scalpel was then used to incise the upper lip through and through the skin, vermilion, muscle and mucosa, leaving the flap pedicled on the opposite side.  The flap was then rotated and transferred to the lower lip defect.  The flap was then sutured into place with a three layer technique, closing the orbicularis oris muscle layer with subcutaneous buried sutures, followed by a mucosal layer and an epidermal layer. Abbe Flap (Lower To Upper Lip) Text: The defect of the upper lip was assessed and measured.  Given the location and size of the defect, an Abbe flap was deemed most appropriate.  Using a sterile surgical marker, an appropriate Abbe flap was measured and drawn on the lower lip. Local anesthesia was then infiltrated. A scalpel was then used to incise the upper lip through and through the skin, vermilion, muscle and mucosa, leaving the flap pedicled on the opposite side.  The flap was then rotated and transferred to the lower lip defect.  The flap was then sutured into place with a three layer technique, closing the orbicularis oris muscle layer with subcutaneous buried sutures, followed by a mucosal layer and an epidermal layer. Estlander Flap (Upper To Lower Lip) Text: The defect of the lower lip was assessed and measured.  Given the location and size of the defect, an Estlander flap was deemed most appropriate.  Using a sterile surgical marker, an appropriate Estlander flap was measured and drawn on the upper lip. Local anesthesia was then infiltrated. A scalpel was then used to incise the lateral aspect of the flap, through skin, muscle and mucosa, leaving the flap pedicled medially.  The flap was then rotated and positioned to fill the lower lip defect.  The flap was then sutured into place with a three layer technique, closing the orbicularis oris muscle layer with subcutaneous buried sutures, followed by a mucosal layer and an epidermal layer. Lip Wedge Excision Repair Text: Given the location of the defect and the proximity to free margins a full thickness wedge repair was deemed most appropriate.  Using a sterile surgical marker, the appropriate repair was drawn incorporating the defect and placing the expected incisions perpendicular to the vermilion border.  The vermilion border was also meticulously outlined to ensure appropriate reapproximation during the repair.  The area thus outlined was incised through and through with a #15 scalpel blade.  The muscularis and dermis were reaproximated with deep sutures following hemostasis. Care was taken to realign the vermilion border before proceeding with the superficial closure.  Once the vermilion was realigned the superfical and mucosal closure was finished. Ftsg Text: The defect edges were debeveled with a #15 scalpel blade.  Given the location of the defect, shape of the defect and the proximity to free margins a full thickness skin graft was deemed most appropriate.  Using a sterile surgical marker, the primary defect shape was transferred to the donor site. The area thus outlined was incised deep to adipose tissue with a #15 scalpel blade.  The harvested graft was then trimmed of adipose tissue until only dermis and epidermis was left.  The skin margins of the secondary defect were undermined to an appropriate distance in all directions utilizing iris scissors.  The secondary defect was closed with interrupted buried subcutaneous sutures.  The skin edges were then re-apposed with running  sutures.  The skin graft was then placed in the primary defect and oriented appropriately. Split-Thickness Skin Graft Text: The defect edges were debeveled with a #15 scalpel blade.  Given the location of the defect, shape of the defect and the proximity to free margins a split thickness skin graft was deemed most appropriate.  Using a sterile surgical marker, the primary defect shape was transferred to the donor site. The split thickness graft was then harvested.  The skin graft was then placed in the primary defect and oriented appropriately. Pinch Graft Text: The defect edges were debeveled with a #15 scalpel blade. Given the location of the defect, shape of the defect and the proximity to free margins a pinch graft was deemed most appropriate. Using a sterile surgical marker, the primary defect shape was transferred to the donor site. The area thus outlined was incised deep to adipose tissue with a #15 scalpel blade.  The harvested graft was then trimmed of adipose tissue until only dermis and epidermis was left. The skin margins of the secondary defect were undermined to an appropriate distance in all directions utilizing iris scissors.  The secondary defect was closed with interrupted buried subcutaneous sutures.  The skin edges were then re-apposed with running  sutures.  The skin graft was then placed in the primary defect and oriented appropriately. Burow's Graft Text: The defect edges were debeveled with a #15 scalpel blade.  Given the location of the defect, shape of the defect, the proximity to free margins and the presence of a standing cone deformity a Burow's skin graft was deemed most appropriate. The standing cone was removed and this tissue was then trimmed to the shape of the primary defect. The adipose tissue was also removed until only dermis and epidermis were left.  The skin margins of the secondary defect were undermined to an appropriate distance in all directions utilizing iris scissors.  The secondary defect was closed with interrupted buried subcutaneous sutures.  The skin edges were then re-apposed with running  sutures.  The skin graft was then placed in the primary defect and oriented appropriately. Cartilage Graft Text: The defect edges were debeveled with a #15 scalpel blade.  Given the location of the defect, shape of the defect, the fact the defect involved a full thickness cartilage defect a cartilage graft was deemed most appropriate.  An appropriate donor site was identified, cleansed, and anesthetized. The cartilage graft was then harvested and transferred to the recipient site, oriented appropriately and then sutured into place.  The secondary defect was then repaired using a primary closure. Composite Graft Text: The defect edges were debeveled with a #15 scalpel blade.  Given the location of the defect, shape of the defect, the proximity to free margins and the fact the defect was full thickness a composite graft was deemed most appropriate.  The defect was outline and then transferred to the donor site.  A full thickness graft was then excised from the donor site. The graft was then placed in the primary defect, oriented appropriately and then sutured into place.  The secondary defect was then repaired using a primary closure. Epidermal Autograft Text: The defect edges were debeveled with a #15 scalpel blade.  Given the location of the defect, shape of the defect and the proximity to free margins an epidermal autograft was deemed most appropriate.  Using a sterile surgical marker, the primary defect shape was transferred to the donor site. The epidermal graft was then harvested.  The skin graft was then placed in the primary defect and oriented appropriately. Dermal Autograft Text: The defect edges were debeveled with a #15 scalpel blade.  Given the location of the defect, shape of the defect and the proximity to free margins a dermal autograft was deemed most appropriate.  Using a sterile surgical marker, the primary defect shape was transferred to the donor site. The area thus outlined was incised deep to adipose tissue with a #15 scalpel blade.  The harvested graft was then trimmed of adipose and epidermal tissue until only dermis was left.  The skin graft was then placed in the primary defect and oriented appropriately. Skin Substitute Text: The defect edges were debeveled with a #15 scalpel blade.  Given the location of the defect, shape of the defect and the proximity to free margins a skin substitute graft was deemed most appropriate.  The graft material was trimmed to fit the size of the defect. The graft was then placed in the primary defect and oriented appropriately. Tissue Cultured Epidermal Autograft Text: The defect edges were debeveled with a #15 scalpel blade.  Given the location of the defect, shape of the defect and the proximity to free margins a tissue cultured epidermal autograft was deemed most appropriate.  The graft was then trimmed to fit the size of the defect.  The graft was then placed in the primary defect and oriented appropriately. Xenograft Text: The defect edges were debeveled with a #15 scalpel blade.  Given the location of the defect, shape of the defect and the proximity to free margins a xenograft was deemed most appropriate.  The graft was then trimmed to fit the size of the defect.  The graft was then placed in the primary defect and oriented appropriately. Purse String (Intermediate) Text: Given the location of the defect and the characteristics of the surrounding skin a purse string intermediate closure was deemed most appropriate.  Undermining was performed circumferentially around the surgical defect.  A purse string suture was then placed and tightened. Purse String (Simple) Text: Given the location of the defect and the characteristics of the surrounding skin a purse string simple closure was deemed most appropriate.  Undermining was performed circumferentially around the surgical defect.  A purse string suture was then placed and tightened. Partial Purse String (Intermediate) Text: Given the location of the defect and the characteristics of the surrounding skin an intermediate purse string closure was deemed most appropriate.  Undermining was performed circumferentially around the surgical defect.  A purse string suture was then placed and tightened. Wound tension of the circular defect prevented complete closure of the wound. Partial Purse String (Simple) Text: Given the location of the defect and the characteristics of the surrounding skin a simple purse string closure was deemed most appropriate.  Undermining was performed circumferentially around the surgical defect.  A purse string suture was then placed and tightened. Wound tension of the circular defect prevented complete closure of the wound. Complex Repair And Single Advancement Flap Text: The defect edges were debeveled with a #15 scalpel blade.  The primary defect was closed partially with a complex linear closure.  Given the location of the remaining defect, shape of the defect and the proximity to free margins a single advancement flap was deemed most appropriate for complete closure of the defect.  Using a sterile surgical marker, an appropriate advancement flap was drawn incorporating the defect and placing the expected incisions within the relaxed skin tension lines where possible.    The area thus outlined was incised deep to adipose tissue with a #15 scalpel blade.  The skin margins were undermined to an appropriate distance in all directions utilizing iris scissors. Complex Repair And Double Advancement Flap Text: The defect edges were debeveled with a #15 scalpel blade.  The primary defect was closed partially with a complex linear closure.  Given the location of the remaining defect, shape of the defect and the proximity to free margins a double advancement flap was deemed most appropriate for complete closure of the defect.  Using a sterile surgical marker, an appropriate advancement flap was drawn incorporating the defect and placing the expected incisions within the relaxed skin tension lines where possible.    The area thus outlined was incised deep to adipose tissue with a #15 scalpel blade.  The skin margins were undermined to an appropriate distance in all directions utilizing iris scissors. Complex Repair And Modified Advancement Flap Text: The defect edges were debeveled with a #15 scalpel blade.  The primary defect was closed partially with a complex linear closure.  Given the location of the remaining defect, shape of the defect and the proximity to free margins a modified advancement flap was deemed most appropriate for complete closure of the defect.  Using a sterile surgical marker, an appropriate advancement flap was drawn incorporating the defect and placing the expected incisions within the relaxed skin tension lines where possible.    The area thus outlined was incised deep to adipose tissue with a #15 scalpel blade.  The skin margins were undermined to an appropriate distance in all directions utilizing iris scissors. Complex Repair And A-T Advancement Flap Text: The defect edges were debeveled with a #15 scalpel blade.  The primary defect was closed partially with a complex linear closure.  Given the location of the remaining defect, shape of the defect and the proximity to free margins an A-T advancement flap was deemed most appropriate for complete closure of the defect.  Using a sterile surgical marker, an appropriate advancement flap was drawn incorporating the defect and placing the expected incisions within the relaxed skin tension lines where possible.    The area thus outlined was incised deep to adipose tissue with a #15 scalpel blade.  The skin margins were undermined to an appropriate distance in all directions utilizing iris scissors. Complex Repair And O-T Advancement Flap Text: The defect edges were debeveled with a #15 scalpel blade.  The primary defect was closed partially with a complex linear closure.  Given the location of the remaining defect, shape of the defect and the proximity to free margins an O-T advancement flap was deemed most appropriate for complete closure of the defect.  Using a sterile surgical marker, an appropriate advancement flap was drawn incorporating the defect and placing the expected incisions within the relaxed skin tension lines where possible.    The area thus outlined was incised deep to adipose tissue with a #15 scalpel blade.  The skin margins were undermined to an appropriate distance in all directions utilizing iris scissors. Complex Repair And O-L Flap Text: The defect edges were debeveled with a #15 scalpel blade.  The primary defect was closed partially with a complex linear closure.  Given the location of the remaining defect, shape of the defect and the proximity to free margins an O-L flap was deemed most appropriate for complete closure of the defect.  Using a sterile surgical marker, an appropriate flap was drawn incorporating the defect and placing the expected incisions within the relaxed skin tension lines where possible.    The area thus outlined was incised deep to adipose tissue with a #15 scalpel blade.  The skin margins were undermined to an appropriate distance in all directions utilizing iris scissors. Complex Repair And Bilobe Flap Text: The defect edges were debeveled with a #15 scalpel blade.  The primary defect was closed partially with a complex linear closure.  Given the location of the remaining defect, shape of the defect and the proximity to free margins a bilobe flap was deemed most appropriate for complete closure of the defect.  Using a sterile surgical marker, an appropriate advancement flap was drawn incorporating the defect and placing the expected incisions within the relaxed skin tension lines where possible.    The area thus outlined was incised deep to adipose tissue with a #15 scalpel blade.  The skin margins were undermined to an appropriate distance in all directions utilizing iris scissors. Complex Repair And Melolabial Flap Text: The defect edges were debeveled with a #15 scalpel blade.  The primary defect was closed partially with a complex linear closure.  Given the location of the remaining defect, shape of the defect and the proximity to free margins a melolabial flap was deemed most appropriate for complete closure of the defect.  Using a sterile surgical marker, an appropriate advancement flap was drawn incorporating the defect and placing the expected incisions within the relaxed skin tension lines where possible.    The area thus outlined was incised deep to adipose tissue with a #15 scalpel blade.  The skin margins were undermined to an appropriate distance in all directions utilizing iris scissors. Complex Repair And Rotation Flap Text: The defect edges were debeveled with a #15 scalpel blade.  The primary defect was closed partially with a complex linear closure.  Given the location of the remaining defect, shape of the defect and the proximity to free margins a rotation flap was deemed most appropriate for complete closure of the defect.  Using a sterile surgical marker, an appropriate advancement flap was drawn incorporating the defect and placing the expected incisions within the relaxed skin tension lines where possible.    The area thus outlined was incised deep to adipose tissue with a #15 scalpel blade.  The skin margins were undermined to an appropriate distance in all directions utilizing iris scissors. Complex Repair And Rhombic Flap Text: The defect edges were debeveled with a #15 scalpel blade.  The primary defect was closed partially with a complex linear closure.  Given the location of the remaining defect, shape of the defect and the proximity to free margins a rhombic flap was deemed most appropriate for complete closure of the defect.  Using a sterile surgical marker, an appropriate advancement flap was drawn incorporating the defect and placing the expected incisions within the relaxed skin tension lines where possible.    The area thus outlined was incised deep to adipose tissue with a #15 scalpel blade.  The skin margins were undermined to an appropriate distance in all directions utilizing iris scissors. Complex Repair And Transposition Flap Text: The defect edges were debeveled with a #15 scalpel blade.  The primary defect was closed partially with a complex linear closure.  Given the location of the remaining defect, shape of the defect and the proximity to free margins a transposition flap was deemed most appropriate for complete closure of the defect.  Using a sterile surgical marker, an appropriate advancement flap was drawn incorporating the defect and placing the expected incisions within the relaxed skin tension lines where possible.    The area thus outlined was incised deep to adipose tissue with a #15 scalpel blade.  The skin margins were undermined to an appropriate distance in all directions utilizing iris scissors. Complex Repair And V-Y Plasty Text: The defect edges were debeveled with a #15 scalpel blade.  The primary defect was closed partially with a complex linear closure.  Given the location of the remaining defect, shape of the defect and the proximity to free margins a V-Y plasty was deemed most appropriate for complete closure of the defect.  Using a sterile surgical marker, an appropriate advancement flap was drawn incorporating the defect and placing the expected incisions within the relaxed skin tension lines where possible.    The area thus outlined was incised deep to adipose tissue with a #15 scalpel blade.  The skin margins were undermined to an appropriate distance in all directions utilizing iris scissors. Complex Repair And M Plasty Text: The defect edges were debeveled with a #15 scalpel blade.  The primary defect was closed partially with a complex linear closure.  Given the location of the remaining defect, shape of the defect and the proximity to free margins an M plasty was deemed most appropriate for complete closure of the defect.  Using a sterile surgical marker, an appropriate advancement flap was drawn incorporating the defect and placing the expected incisions within the relaxed skin tension lines where possible.    The area thus outlined was incised deep to adipose tissue with a #15 scalpel blade.  The skin margins were undermined to an appropriate distance in all directions utilizing iris scissors. Complex Repair And Double M Plasty Text: The defect edges were debeveled with a #15 scalpel blade.  The primary defect was closed partially with a complex linear closure.  Given the location of the remaining defect, shape of the defect and the proximity to free margins a double M plasty was deemed most appropriate for complete closure of the defect.  Using a sterile surgical marker, an appropriate advancement flap was drawn incorporating the defect and placing the expected incisions within the relaxed skin tension lines where possible.    The area thus outlined was incised deep to adipose tissue with a #15 scalpel blade.  The skin margins were undermined to an appropriate distance in all directions utilizing iris scissors. Complex Repair And W Plasty Text: The defect edges were debeveled with a #15 scalpel blade.  The primary defect was closed partially with a complex linear closure.  Given the location of the remaining defect, shape of the defect and the proximity to free margins a W plasty was deemed most appropriate for complete closure of the defect.  Using a sterile surgical marker, an appropriate advancement flap was drawn incorporating the defect and placing the expected incisions within the relaxed skin tension lines where possible.    The area thus outlined was incised deep to adipose tissue with a #15 scalpel blade.  The skin margins were undermined to an appropriate distance in all directions utilizing iris scissors. Complex Repair And Z Plasty Text: The defect edges were debeveled with a #15 scalpel blade.  The primary defect was closed partially with a complex linear closure.  Given the location of the remaining defect, shape of the defect and the proximity to free margins a Z plasty was deemed most appropriate for complete closure of the defect.  Using a sterile surgical marker, an appropriate advancement flap was drawn incorporating the defect and placing the expected incisions within the relaxed skin tension lines where possible.    The area thus outlined was incised deep to adipose tissue with a #15 scalpel blade.  The skin margins were undermined to an appropriate distance in all directions utilizing iris scissors. Complex Repair And Dorsal Nasal Flap Text: The defect edges were debeveled with a #15 scalpel blade.  The primary defect was closed partially with a complex linear closure.  Given the location of the remaining defect, shape of the defect and the proximity to free margins a dorsal nasal flap was deemed most appropriate for complete closure of the defect.  Using a sterile surgical marker, an appropriate flap was drawn incorporating the defect and placing the expected incisions within the relaxed skin tension lines where possible.    The area thus outlined was incised deep to adipose tissue with a #15 scalpel blade.  The skin margins were undermined to an appropriate distance in all directions utilizing iris scissors. Complex Repair And Ftsg Text: The defect edges were debeveled with a #15 scalpel blade.  The primary defect was closed partially with a complex linear closure.  Given the location of the defect, shape of the defect and the proximity to free margins a full thickness skin graft was deemed most appropriate to repair the remaining defect.  The graft was trimmed to fit the size of the remaining defect.  The graft was then placed in the primary defect, oriented appropriately, and sutured into place. Complex Repair And Burow's Graft Text: The defect edges were debeveled with a #15 scalpel blade.  The primary defect was closed partially with a complex linear closure.  Given the location of the defect, shape of the defect, the proximity to free margins and the presence of a standing cone deformity a Burow's graft was deemed most appropriate to repair the remaining defect.  The graft was trimmed to fit the size of the remaining defect.  The graft was then placed in the primary defect, oriented appropriately, and sutured into place. Complex Repair And Split-Thickness Skin Graft Text: The defect edges were debeveled with a #15 scalpel blade.  The primary defect was closed partially with a complex linear closure.  Given the location of the defect, shape of the defect and the proximity to free margins a split thickness skin graft was deemed most appropriate to repair the remaining defect.  The graft was trimmed to fit the size of the remaining defect.  The graft was then placed in the primary defect, oriented appropriately, and sutured into place. Complex Repair And Epidermal Autograft Text: The defect edges were debeveled with a #15 scalpel blade.  The primary defect was closed partially with a complex linear closure.  Given the location of the defect, shape of the defect and the proximity to free margins an epidermal autograft was deemed most appropriate to repair the remaining defect.  The graft was trimmed to fit the size of the remaining defect.  The graft was then placed in the primary defect, oriented appropriately, and sutured into place. Complex Repair And Dermal Autograft Text: The defect edges were debeveled with a #15 scalpel blade.  The primary defect was closed partially with a complex linear closure.  Given the location of the defect, shape of the defect and the proximity to free margins an dermal autograft was deemed most appropriate to repair the remaining defect.  The graft was trimmed to fit the size of the remaining defect.  The graft was then placed in the primary defect, oriented appropriately, and sutured into place. Complex Repair And Tissue Cultured Epidermal Autograft Text: The defect edges were debeveled with a #15 scalpel blade.  The primary defect was closed partially with a complex linear closure.  Given the location of the defect, shape of the defect and the proximity to free margins an tissue cultured epidermal autograft was deemed most appropriate to repair the remaining defect.  The graft was trimmed to fit the size of the remaining defect.  The graft was then placed in the primary defect, oriented appropriately, and sutured into place. Complex Repair And Xenograft Text: The defect edges were debeveled with a #15 scalpel blade.  The primary defect was closed partially with a complex linear closure.  Given the location of the defect, shape of the defect and the proximity to free margins a xenograft was deemed most appropriate to repair the remaining defect.  The graft was trimmed to fit the size of the remaining defect.  The graft was then placed in the primary defect, oriented appropriately, and sutured into place. Complex Repair And Skin Substitute Graft Text: The defect edges were debeveled with a #15 scalpel blade.  The primary defect was closed partially with a complex linear closure.  Given the location of the remaining defect, shape of the defect and the proximity to free margins a skin substitute graft was deemed most appropriate to repair the remaining defect.  The graft was trimmed to fit the size of the remaining defect.  The graft was then placed in the primary defect, oriented appropriately, and sutured into place. Path Notes (To The Dermatopathologist): Please check margins. Consent was obtained from the patient. The risks and benefits to therapy were discussed in detail. Specifically, the risks of infection, scarring, bleeding, prolonged wound healing, incomplete removal, allergy to anesthesia, nerve injury and recurrence were addressed. Prior to the procedure, the treatment site was clearly identified and confirmed by the patient. All components of Universal Protocol/PAUSE Rule completed. Post-Care Instructions: I reviewed with the patient in detail post-care instructions. Patient is not to engage in any heavy lifting, exercise, or swimming for the next 14 days. Should the patient develop any fevers, chills, bleeding, severe pain patient will contact the office immediately. Home Suture Removal Text: Patient was provided a home suture removal kit and will remove their sutures at home.  If they have any questions or difficulties they will call the office. Where Do You Want The Question To Include Opioid Counseling Located?: Case Summary Tab Information: Selecting Yes will display possible errors in your note based on the variables you have selected. This validation is only offered as a suggestion for you. PLEASE NOTE THAT THE VALIDATION TEXT WILL BE REMOVED WHEN YOU FINALIZE YOUR NOTE. IF YOU WANT TO FAX A PRELIMINARY NOTE YOU WILL NEED TO TOGGLE THIS TO 'NO' IF YOU DO NOT WANT IT IN YOUR FAXED NOTE.

## 2024-04-16 ENCOUNTER — OFFICE VISIT (OUTPATIENT)
Dept: NEUROLOGY | Facility: CLINIC | Age: 69
End: 2024-04-16
Payer: COMMERCIAL

## 2024-04-16 VITALS
BODY MASS INDEX: 19.49 KG/M2 | HEART RATE: 64 BPM | DIASTOLIC BLOOD PRESSURE: 65 MMHG | SYSTOLIC BLOOD PRESSURE: 135 MMHG | WEIGHT: 110 LBS | HEIGHT: 63 IN | TEMPERATURE: 97.7 F

## 2024-04-16 DIAGNOSIS — K59.01 SLOW TRANSIT CONSTIPATION: ICD-10-CM

## 2024-04-16 DIAGNOSIS — G50.0 LEFT-SIDED TRIGEMINAL NEURALGIA: Primary | ICD-10-CM

## 2024-04-16 PROCEDURE — 99213 OFFICE O/P EST LOW 20 MIN: CPT

## 2024-04-16 NOTE — PROGRESS NOTES
Patient ID: Aracelis Devi is a 69 y.o. female.    Assessment/Plan:    Left-sided trigeminal neuralgia  Aracelis Devi is a 69 year old female with left V2 trigeminal neuralgia.  Since she was last seen she is no longer having consistent pain, however does continue to have 1 to 2 seconds of pain several times throughout the day.  She self discontinued gabapentin after becoming pain-free.  We did discuss today she continues to have intermittent bouts of pain she may consider resuming gabapentin however it is her preference that she remain off of gabapentin at this time as she feels her pain is tolerable and is very transient.  We did discuss in the future should her pain become more consistent or severe she should resume gabapentin and contact the office for further advice.  We did review her MRI results which does show the left superior cerebellar artery is in close proximity to the cisternal portion of the left trigeminal nerve.  However we discussed given her significant control with gabapentin, I would not pursue surgical intervention at this time.  She agrees with this assessment and is not interested in surgical intervention either. She will plan to follow-up in the fall; sooner if needed.          Slow transit constipation  Patient reports constipation; already assessed by PCP  I encouraged her to increase her water intake  I also encouraged her to increase physical activity including walking  She will continue to follow-up with PCP regarding this concern       Diagnoses and all orders for this visit:    Left-sided trigeminal neuralgia    Slow transit constipation         I have spent a total time of 20 minutes on 04/16/24 in caring for this patient including Diagnostic results, Prognosis, Risks and benefits of tx options, Instructions for management, Patient and family education, Importance of tx compliance, Risk factor reductions, Impressions, Documenting in the medical record, Reviewing / ordering tests,  medicine, procedures  , and Obtaining or reviewing history  .      Subjective:    HPI Aracelis Devi is a 69 year old female who presents to follow up on her left V2 trigeminal neuralgia. She was last seen 2/8/24.    She returns today and states she self discontinued Gabapentin after she became completely pain free. She was taking 100 mg in the morning and 300 mg at bedtime. Since then she did undergo additional work up including: crp, cmp,and sed rate, which were unremarkable with the exception of minor hyper natremia likely related to dehydration.    MRI Brain/Trigem 4/8/2024:  Advanced white matter change within the periventricular aspect of the cerebral hemispheres bilaterally and within the brainstem consistent with chronic microangiopathic change. No mass, hemorrhage or signs of acute territorial infarction. Dedicated imaging of the brainstem demonstrates the left superior cerebellar artery in close proximity to the cisternal portion of the left trigeminal nerve. Correlate for signs of vascular compression.     She returns to the office today and states she continues to be mostly pain-free.  She reports occasionally she does have a zinger that last 1 or 2 seconds occurring a couple of times per day which she states reminds her of the trigeminal neuralgia is still present however for the most part remains pain-free throughout the day otherwise.  She states she has been able to return to brushing her teeth and all other activities normally.  She reports while on gabapentin she did not have any adverse effects and only self discontinued it after feeling like she no longer needed it.    She has been dealing with constipation which she states has been a lifelong issue.  She reports drinking insufficient amounts of water per day; mainly consuming iced tea.  She also reports she has become more sedentary and is no longer walking for exercise.  She has considered starting MiraLAX daily and has also discussed her  "difficulty with her primary care provider.    The following portions of the patient's history were reviewed and updated as appropriate: allergies, current medications, past family history, past medical history, past social history, past surgical history, and problem list.       Objective:    Blood pressure 135/65, pulse 64, temperature 97.7 °F (36.5 °C), temperature source Temporal, height 5' 3\" (1.6 m), weight 49.9 kg (110 lb), not currently breastfeeding.    Neurological Exam    On neurological examination patient is alert, awake, oriented and in no distress. Speech is fluent without dysarthria or aphasia.She is able to rise easily without assistance from a seated position. Casual gait is normal including stance, stride, and arm swing.      ROS:    Review of Systems   Constitutional:  Negative for appetite change, fatigue and fever.   HENT: Negative.  Negative for hearing loss, tinnitus, trouble swallowing and voice change.    Eyes: Negative.  Negative for photophobia, pain and visual disturbance.   Respiratory: Negative.  Negative for shortness of breath.    Cardiovascular: Negative.  Negative for palpitations.   Gastrointestinal: Negative.  Negative for nausea and vomiting.   Endocrine: Negative.  Negative for cold intolerance.   Genitourinary: Negative.  Negative for dysuria, frequency and urgency.   Musculoskeletal:  Negative for back pain, gait problem, myalgias, neck pain and neck stiffness.   Skin: Negative.  Negative for rash.   Allergic/Immunologic: Negative.    Neurological: Negative.  Negative for dizziness, tremors, seizures, syncope, facial asymmetry, speech difficulty, weakness, light-headedness, numbness and headaches.   Hematological: Negative.  Does not bruise/bleed easily.   Psychiatric/Behavioral: Negative.  Negative for confusion, hallucinations and sleep disturbance.    All other systems reviewed and are negative.    Reviewed ROS as entered by medical assistant.                  "

## 2024-04-16 NOTE — ASSESSMENT & PLAN NOTE
Aracelis Devi is a 69 year old female with left V2 trigeminal neuralgia.  Since she was last seen she is no longer having consistent pain, however does continue to have 1 to 2 seconds of pain several times throughout the day.  She self discontinued gabapentin after becoming pain-free.  We did discuss today she continues to have intermittent bouts of pain she may consider resuming gabapentin however it is her preference that she remain off of gabapentin at this time as she feels her pain is tolerable and is very transient.  We did discuss in the future should her pain become more consistent or severe she should resume gabapentin and contact the office for further advice.  We did review her MRI results which does show the left superior cerebellar artery is in close proximity to the cisternal portion of the left trigeminal nerve.  However we discussed given her significant control with gabapentin, I would not pursue surgical intervention at this time.  She agrees with this assessment and is not interested in surgical intervention either. She will plan to follow-up in the fall; sooner if needed.

## 2024-04-16 NOTE — ASSESSMENT & PLAN NOTE
Patient reports constipation; already assessed by PCP  I encouraged her to increase her water intake  I also encouraged her to increase physical activity including walking  She will continue to follow-up with PCP regarding this concern

## 2024-06-29 LAB
ALBUMIN SERPL-MCNC: 3.9 G/DL (ref 3.5–5.7)
ALP SERPL-CCNC: 71 U/L (ref 35–120)
ALT SERPL-CCNC: 6 U/L
ANION GAP SERPL CALCULATED.3IONS-SCNC: 7 MMOL/L (ref 3–11)
AST SERPL-CCNC: 14 U/L
BASOPHILS # BLD AUTO: 0 THOU/CMM (ref 0–0.1)
BASOPHILS NFR BLD AUTO: 1 %
BILIRUB SERPL-MCNC: 1 MG/DL (ref 0.2–1)
BUN SERPL-MCNC: 11 MG/DL (ref 7–25)
CALCIUM SERPL-MCNC: 9.2 MG/DL (ref 8.5–10.1)
CHLORIDE SERPL-SCNC: 102 MMOL/L (ref 100–109)
CHOLEST SERPL-MCNC: 232 MG/DL
CHOLEST/HDLC SERPL: 3.1 {RATIO}
CO2 SERPL-SCNC: 31 MMOL/L (ref 21–31)
CREAT SERPL-MCNC: 0.69 MG/DL (ref 0.4–1.1)
CYTOLOGY CMNT CVX/VAG CYTO-IMP: NORMAL
DIFFERENTIAL METHOD BLD: ABNORMAL
EOSINOPHIL # BLD AUTO: 0.1 THOU/CMM (ref 0–0.5)
EOSINOPHIL NFR BLD AUTO: 3 %
ERYTHROCYTE [DISTWIDTH] IN BLOOD BY AUTOMATED COUNT: 14.3 % (ref 12–16)
FERRITIN SERPL-MCNC: 75 NG/ML (ref 11–306.8)
GFR/BSA.PRED SERPLBLD CYS-BASED-ARV: 93 ML/MIN/{1.73_M2}
GLUCOSE SERPL-MCNC: 72 MG/DL (ref 65–99)
HCT VFR BLD AUTO: 39.9 % (ref 35–43)
HDLC SERPL-MCNC: 74 MG/DL (ref 23–92)
HGB BLD-MCNC: 13.1 G/DL (ref 11.5–14.5)
IRON SATN MFR SERPL: 44 % (ref 20–50)
IRON SERPL-MCNC: 121 UG/DL (ref 50–212)
LDLC SERPL CALC-MCNC: 133 MG/DL
LYMPHOCYTES # BLD AUTO: 0.9 THOU/CMM (ref 1–3)
LYMPHOCYTES NFR BLD AUTO: 21 %
MCH RBC QN AUTO: 30.6 PG (ref 26–34)
MCHC RBC AUTO-ENTMCNC: 32.8 G/DL (ref 32–37)
MCV RBC AUTO: 93 FL (ref 80–100)
MONOCYTES # BLD AUTO: 0.5 THOU/CMM (ref 0.3–1)
MONOCYTES NFR BLD AUTO: 11 %
NEUTROPHILS # BLD AUTO: 2.9 THOU/CMM (ref 1.8–7.8)
NEUTROPHILS NFR BLD AUTO: 64 %
NONHDLC SERPL-MCNC: 158 MG/DL
PLATELET # BLD AUTO: 211 THOU/CMM (ref 140–350)
PMV BLD REES-ECKER: 8.1 FL (ref 7.5–11.3)
POTASSIUM SERPL-SCNC: 4.1 MMOL/L (ref 3.5–5.2)
PROT SERPL-MCNC: 6.5 G/DL (ref 6.3–8.3)
RBC # BLD AUTO: 4.28 MILL/CMM (ref 3.7–4.7)
SODIUM SERPL-SCNC: 140 MMOL/L (ref 135–145)
TIBC SERPL-MCNC: 272 UG/DL (ref 260–430)
TRANSFERRIN SERPL-MCNC: 194 MG/DL (ref 203–362)
TRIGL SERPL-MCNC: 123 MG/DL
WBC # BLD AUTO: 4.5 THOU/CMM (ref 4–10)

## 2024-07-16 ENCOUNTER — TELEPHONE (OUTPATIENT)
Dept: FAMILY MEDICINE CLINIC | Facility: CLINIC | Age: 69
End: 2024-07-16

## 2024-07-16 NOTE — TELEPHONE ENCOUNTER
Contacted [facility] requesting a fax of most recent lab results.  No answer LM. Please have them fax labs to [phone].

## 2024-07-25 NOTE — TELEPHONE ENCOUNTER
Patient called to discuss lab results. Patient notified unfortunately at this time we have not received results from [facility] at this time. Patient requests the office reach back out to [facility] to receive lab work results. Patient states she would like to review as soon as possible. Please advise.

## 2024-08-15 ENCOUNTER — TELEPHONE (OUTPATIENT)
Dept: NEUROLOGY | Facility: CLINIC | Age: 69
End: 2024-08-15

## 2024-08-15 NOTE — TELEPHONE ENCOUNTER
Received via [employer] from [employer] notification of missing data.  Request scanned into .

## 2024-08-21 DIAGNOSIS — G50.0 LEFT-SIDED TRIGEMINAL NEURALGIA: Primary | ICD-10-CM

## 2024-08-21 RX ORDER — GABAPENTIN 100 MG/1
100 CAPSULE ORAL DAILY
Qty: 30 CAPSULE | Refills: 3 | Status: SHIPPED | OUTPATIENT
Start: 2024-08-21 | End: 2024-08-26 | Stop reason: SDUPTHER

## 2024-08-21 NOTE — TELEPHONE ENCOUNTER
Juliette - please fax completed form to [facility] at fax number on letter. Thanks!    Paulina - [facility] denied lab order as TN is not a consistent diagnosis w/[facility] lab. Do you have another diagnosis code to provide?

## 2024-08-21 NOTE — TELEPHONE ENCOUNTER
Additional diagnoses added to lab orders:  Left-sided trigeminal neuralgia [G50.0]  - Primary      Rheumatoid arthritis involving multiple sites with positive rheumatoid factor (HCC) [M05.79]      Vision changes [H53.9]      On prednisone therapy [Z79.52]        Please re-print requisition and forward to [facility] at number provided on fax so they can re-process lab claim for insurance.

## 2024-08-21 NOTE — TELEPHONE ENCOUNTER
Medication: gabapentin (Neurontin) 100 mg capsule    Dose/Frequency: 100 mg     Quantity: 30 capsule     Pharmacy:   [facility] [address] [place] [address]  [address] [place] [id number]  Phone: [phone]  Fax: [phone]       Office:   [] PCP/Provider -   [x] Speciality/Provider - MANSI Ford     Does the patient have enough for 3 days?   [] Yes   [x] No - Send as HP to POD

## 2024-08-21 NOTE — TELEPHONE ENCOUNTER
Per 4/16/24 LOV note:  She self discontinued gabapentin after becoming pain-free. We did discuss today she continues to have intermittent bouts of pain she may consider resuming gabapentin however it is her preference that she remain off of gabapentin at this time as she feels her pain is tolerable and is very transient. We did discuss in the future should her pain become more consistent or severe she should resume gabapentin and contact the office for further advice.     Script pended below.     Paulina- please sign if agreeable. Thank you.

## 2024-08-26 ENCOUNTER — OFFICE VISIT (OUTPATIENT)
Dept: NEUROLOGY | Facility: CLINIC | Age: 69
End: 2024-08-26
Payer: COMMERCIAL

## 2024-08-26 ENCOUNTER — TELEPHONE (OUTPATIENT)
Dept: NEUROLOGY | Facility: CLINIC | Age: 69
End: 2024-08-26

## 2024-08-26 VITALS
HEIGHT: 63 IN | OXYGEN SATURATION: 98 % | HEART RATE: 64 BPM | BODY MASS INDEX: 20.62 KG/M2 | DIASTOLIC BLOOD PRESSURE: 80 MMHG | WEIGHT: 116.4 LBS | TEMPERATURE: 98.6 F | SYSTOLIC BLOOD PRESSURE: 138 MMHG

## 2024-08-26 DIAGNOSIS — G50.0 LEFT-SIDED TRIGEMINAL NEURALGIA: Primary | ICD-10-CM

## 2024-08-26 PROCEDURE — 99214 OFFICE O/P EST MOD 30 MIN: CPT

## 2024-08-26 RX ORDER — GABAPENTIN 100 MG/1
CAPSULE ORAL
Qty: 120 CAPSULE | Refills: 0 | Status: SHIPPED | OUTPATIENT
Start: 2024-08-26

## 2024-08-26 NOTE — ASSESSMENT & PLAN NOTE
Aracelis Devi is a 69 year old female with left V2 trigeminal neuralgia. She self discontinued gabapentin after becoming pain-free several months ago, however over the last 1 month she has begun having pain again. She is now having severe sharp stabbing pain worse with chewing, talking, drinking hot/cold liquids, and with opening her mouth wide like with bushing her teeth. She has resumed Gabapentin 100 mg 3 days ago, but has not had any significant improvement in pain yet. She is agreeable to gradually increasing her dose, as such she will increase by 100 mg every 7 days until at an initial goal dose of 200 mg bid. She was advised to monitor for any drowsiness or dizziness. She was advised to call me in 3 weeks with an update (or send a [employer] message) and will follow up in office in 3 months; sooner if needed.        Plan:  - Start Gabapentin 100 mg-  Take 100 mg bid x 7 days, then increase to 100 mg in the morning and 200 mg at bedtime x 7 days, then 200 mg bid thereafter   - Monitor for drowsiness or dizziness   - Call me in 3 weeks with an update  - Otherwise follow up in 3 months

## 2024-08-26 NOTE — PATIENT INSTRUCTIONS
- Start Gabapentin 100 mg-  Take 100 mg bid x 7 days, then increase to 100 mg in the morning and 200 mg at bedtime x 7 days, then 200 mg bid thereafter   - Monitor for drowsiness or dizziness   - Call me in 3 weeks if no improvement   - Otherwise follow up in 3 months

## 2024-08-26 NOTE — PROGRESS NOTES
Patient ID: Aracelis Devi is a 69 y.o. female.    Assessment/Plan:    Left-sided trigeminal neuralgia  Aracelis Devi is a 69 year old female with left V2 trigeminal neuralgia. She self discontinued gabapentin after becoming pain-free several months ago, however over the last 1 month she has begun having pain again. She is now having severe sharp stabbing pain worse with chewing, talking, drinking hot/cold liquids, and with opening her mouth wide like with bushing her teeth. She has resumed Gabapentin 100 mg 3 days ago, but has not had any significant improvement in pain yet. She is agreeable to gradually increasing her dose, as such she will increase by 100 mg every 7 days until at an initial goal dose of 200 mg bid. She was advised to monitor for any drowsiness or dizziness. She was advised to call me in 3 weeks with an update (or send a [employer] message) and will follow up in office in 3 months; sooner if needed.        Plan:  - Start Gabapentin 100 mg-  Take 100 mg bid x 7 days, then increase to 100 mg in the morning and 200 mg at bedtime x 7 days, then 200 mg bid thereafter   - Monitor for drowsiness or dizziness   - Call me in 3 weeks with an update  - Otherwise follow up in 3 months        Diagnoses and all orders for this visit:    Left-sided trigeminal neuralgia  -     gabapentin (Neurontin) 100 mg capsule; Take 100 mg bid x 7 days, then increase to 100 mg in the morning and 200 mg at bedtime x 7 days, then 200 mg bid thereafter         I have spent a total time of 20 minutes in caring for this patient on the day of the visit/encounter including Prognosis, Risks and benefits of tx options, Instructions for management, Patient and family education, Importance of tx compliance, Risk factor reductions, Impressions, Documenting in the medical record, Reviewing / ordering tests, medicine, procedures  , and Obtaining or reviewing history  .      Subjective:    HPI Aracelis Devi is a 69 year old female who presents to  "follow up on her left V2 trigeminal neuralgia. She was last seen 4/16/2024.     She returns today and states she self discontinued Gabapentin after she became completely pain free several months ago. She was taking 100 mg in the morning and 300 mg at bedtime. She states pain resumed about a month ago. She states initially her pain was intermittent, but over time has become more recurrent. She reports intermittent stabbing pain in her left V2 distribution of her face. She states it is worse with chewing, talking, drinking hot or cold liquids, or opening her mouth wide. She states her pain is worse in the morning, and better in the evening. It reoccurs 5-6 times per day and stops her in her tracks. On Friday she did resume Gabapentin 100 mg at bedtime, but she has not yet noticed any significant improvement in her pain. Of note, she is chronically on Prednisone 5 mg daily for rheumatoid arthritis.     MRI Brain/Trigem 4/8/2024:  Advanced white matter change within the periventricular aspect of the cerebral hemispheres bilaterally and within the brainstem consistent with chronic microangiopathic change. No mass, hemorrhage or signs of acute territorial infarction. Dedicated imaging of the brainstem demonstrates the left superior cerebellar artery in close proximity to the cisternal portion of the left trigeminal nerve. Correlate for signs of vascular compression.      The following portions of the patient's history were reviewed and updated as appropriate: allergies, current medications, past family history, past medical history, past social history, past surgical history, and problem list.       Objective:    Blood pressure 138/80, pulse 64, temperature 98.6 °F (37 °C), temperature source Temporal, height 5' 3\" (1.6 m), weight 52.8 kg (116 lb 6.4 oz), SpO2 98%, not currently breastfeeding.    Neurological Exam    On neurological examination patient is alert, awake, oriented and in no distress. Speech is fluent without " dysarthria or aphasia. Cranial nerves 2-12 were symmetrically intact bilaterally. She intermittently grimaces in pain. Casual gait is normal including stance, stride, and arm swing.       ROS:    Review of Systems   Constitutional:  Negative for appetite change, fatigue and fever.   HENT: Negative.  Negative for hearing loss, tinnitus, trouble swallowing and voice change.    Eyes: Negative.  Negative for photophobia, pain and visual disturbance.   Respiratory: Negative.  Negative for shortness of breath.    Cardiovascular: Negative.  Negative for palpitations.   Gastrointestinal: Negative.  Negative for nausea and vomiting.   Endocrine: Negative.  Negative for cold intolerance.   Genitourinary: Negative.  Negative for dysuria, frequency and urgency.   Musculoskeletal:  Negative for back pain, gait problem, myalgias, neck pain and neck stiffness.        Pain in jaw becoming severe. Jen is not helping    Skin: Negative.  Negative for rash.   Allergic/Immunologic: Negative.    Neurological: Negative.  Negative for dizziness, tremors, seizures, syncope, facial asymmetry, speech difficulty, weakness, light-headedness, numbness and headaches.   Hematological: Negative.  Does not bruise/bleed easily.   Psychiatric/Behavioral: Negative.  Negative for confusion, hallucinations and sleep disturbance.    All other systems reviewed and are negative.    Reviewed ROS as entered by medical assistant.

## 2024-08-26 NOTE — TELEPHONE ENCOUNTER
Called pt and asked what the appointment for 8/26 was for as it was a same day add on and she has an appointment in October for a follow up. Pt explained she is in a lot of pain and needed a sooner appointment. Alessandra was informed.

## 2024-08-29 NOTE — TELEPHONE ENCOUNTER
Current script is for pt to titrate up to 200 mg BID (2 tablet BID). Spoke with pt and she says she has pills left from [facility] pharmacy but needs script from [facility]. They say they won't send script until 90 day supply is sent.    Called [facility]. Spoke with Dalia. She says they have been trying to contact our office to request a 90 day supply. Advised her that this is a script to titrate medication and when pt is up to maintenance dose and not experiencing any side effects we will then send a script for 90 day supply.    Rep verbalizes understanding and will get medication sent to pt for 30 day supply.    Spoke with pt again and informed her of all. Pt appreciative and will follow up with [facility] to set up delivery. Nothing further at this time.

## 2024-08-29 NOTE — TELEPHONE ENCOUNTER
Pt called informed by [facility] Gabapentin needs a 90 day supply. It is how they issue the medicine. Pt has 5 pills left.   Please send script to [facility] mail order.

## 2024-09-11 ENCOUNTER — OFFICE VISIT (OUTPATIENT)
Dept: FAMILY MEDICINE CLINIC | Facility: CLINIC | Age: 69
End: 2024-09-11
Payer: COMMERCIAL

## 2024-09-11 VITALS
TEMPERATURE: 97.8 F | SYSTOLIC BLOOD PRESSURE: 124 MMHG | BODY MASS INDEX: 20.48 KG/M2 | HEIGHT: 63 IN | RESPIRATION RATE: 18 BRPM | DIASTOLIC BLOOD PRESSURE: 62 MMHG | HEART RATE: 62 BPM | WEIGHT: 115.6 LBS | OXYGEN SATURATION: 94 %

## 2024-09-11 DIAGNOSIS — M05.79 RHEUMATOID ARTHRITIS INVOLVING MULTIPLE SITES WITH POSITIVE RHEUMATOID FACTOR (HCC): Primary | ICD-10-CM

## 2024-09-11 DIAGNOSIS — M81.0 AGE-RELATED OSTEOPOROSIS WITHOUT CURRENT PATHOLOGICAL FRACTURE: ICD-10-CM

## 2024-09-11 DIAGNOSIS — H61.22 IMPACTED CERUMEN OF LEFT EAR: ICD-10-CM

## 2024-09-11 DIAGNOSIS — Z12.11 COLON CANCER SCREENING: ICD-10-CM

## 2024-09-11 DIAGNOSIS — D50.9 IRON DEFICIENCY ANEMIA, UNSPECIFIED IRON DEFICIENCY ANEMIA TYPE: ICD-10-CM

## 2024-09-11 DIAGNOSIS — G50.0 LEFT-SIDED TRIGEMINAL NEURALGIA: ICD-10-CM

## 2024-09-11 DIAGNOSIS — K59.01 SLOW TRANSIT CONSTIPATION: ICD-10-CM

## 2024-09-11 DIAGNOSIS — D84.821 IMMUNODEFICIENCY DUE TO LONG TERM DRUG THERAPY  (HCC): ICD-10-CM

## 2024-09-11 DIAGNOSIS — H93.12 TINNITUS OF LEFT EAR: ICD-10-CM

## 2024-09-11 DIAGNOSIS — Z79.52 ON PREDNISONE THERAPY: ICD-10-CM

## 2024-09-11 DIAGNOSIS — Z79.899 IMMUNODEFICIENCY DUE TO LONG TERM DRUG THERAPY  (HCC): ICD-10-CM

## 2024-09-11 DIAGNOSIS — E78.2 MIXED HYPERLIPIDEMIA: ICD-10-CM

## 2024-09-11 PROBLEM — D72.829 LEUKOCYTOSIS: Status: RESOLVED | Noted: 2022-09-29 | Resolved: 2024-09-11

## 2024-09-11 PROBLEM — R53.82 CHRONIC FATIGUE: Status: RESOLVED | Noted: 2021-11-15 | Resolved: 2024-09-11

## 2024-09-11 PROCEDURE — G2211 COMPLEX E/M VISIT ADD ON: HCPCS | Performed by: FAMILY MEDICINE

## 2024-09-11 PROCEDURE — 86580 TB INTRADERMAL TEST: CPT

## 2024-09-11 PROCEDURE — 99214 OFFICE O/P EST MOD 30 MIN: CPT | Performed by: FAMILY MEDICINE

## 2024-09-11 NOTE — ASSESSMENT & PLAN NOTE
Unfortunate, she is having a flare of her trigeminal neuralgia.  She is following with neurology and is back on gabapentin 200 twice daily.  This can certainly be titrated upward.

## 2024-09-11 NOTE — ASSESSMENT & PLAN NOTE
She was taken off her Boniva by her rheumatologist and will be following with them in the near future.  There was some concern for bone fragility.

## 2024-09-11 NOTE — ASSESSMENT & PLAN NOTE
Continue close rheumatology follow-up.  She does not appear to have any active rheumatologic lesions at this time.

## 2024-09-11 NOTE — ASSESSMENT & PLAN NOTE
Continue routine lab monitoring.  She declines statin therapy at this time.  Her 10-year risk is 8.8%.

## 2024-09-11 NOTE — PROGRESS NOTES
Ambulatory Visit  Name: Aracelis Devi      : 1955      MRN: [id number]  Encounter Provider: Mani Crump Jr, MD  Encounter Date: 2024   Encounter department: [facility]      Assessment & Plan  Rheumatoid arthritis involving multiple sites with positive rheumatoid factor (HCC)  Continue close rheumatology follow-up.  She does not appear to have any active rheumatologic lesions at this time.       Left-sided trigeminal neuralgia  Unfortunate, she is having a flare of her trigeminal neuralgia.  She is following with neurology and is back on gabapentin 200 twice daily.  This can certainly be titrated upward.       Slow transit constipation  She is going to try 1 scoop of MiraLAX daily       Mixed hyperlipidemia  Continue routine lab monitoring.  She declines statin therapy at this time.  Her 10-year risk is 8.8%.       Iron deficiency anemia, unspecified iron deficiency anemia type  Recent labs showed relatively normal iron as well as CBC.       Age-related osteoporosis without current pathological fracture  She was taken off her Boniva by her rheumatologist and will be following with them in the near future.  There was some concern for bone fragility.       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  She is going to get a Cologuard done this year as well.  Orders:    Cologuard    On prednisone therapy  Continue on low-dose is on therapy per rheumatology.       Impacted cerumen of left ear  I removed half of the cerumen but she is having discomfort with the procedure at this time with a lighted curette.  I am going to have her see ENT as the cerumen is close to her TM.    Orders:    Ambulatory Referral to Otolaryngology; Future    Tinnitus of left ear  She is having some ongoing ringing in bilateral ears.  She had some cerumen  "impaction bilaterally.  I removed a small amount of cerumen from the left ear canal but she was having some pain so we stopped the procedure.  She has been referred to ENT.  Orders:    Ambulatory Referral to Otolaryngology; Future         History of Present Illness patient presents today for follow-up of chronic health issues.  Fortunately, rheumatologic issues seem to be pretty stable on low-dose prednisone.  She has some chronic arthralgias but seems to be quite stable.  She is having some persistent constipation.  She notes she moves her bowels about 3-4 times per week.  She has some straining on occasion but no rectal bleeding.  She has been trying occasional Dulcolax with good results.    Constipation  Pertinent negatives include no abdominal pain, diarrhea, difficulty urinating or fever.     Review of Systems   Constitutional:  Negative for appetite change, chills, fatigue, fever and unexpected weight change.   HENT:  Negative for trouble swallowing.    Eyes:  Negative for visual disturbance.   Respiratory:  Negative for cough, chest tightness, shortness of breath and wheezing.    Cardiovascular:  Negative for chest pain, palpitations and leg swelling.   Gastrointestinal:  Positive for constipation. Negative for abdominal distention, abdominal pain, blood in stool and diarrhea.   Endocrine: Negative for polyuria.   Genitourinary:  Negative for difficulty urinating and flank pain.   Musculoskeletal:  Positive for arthralgias. Negative for myalgias.   Skin:  Negative for rash.   Neurological:  Negative for dizziness and light-headedness.   Hematological:  Negative for adenopathy. Does not bruise/bleed easily.   Psychiatric/Behavioral:  Negative for dysphoric mood and sleep disturbance. The patient is not nervous/anxious.      Objective     /62 (BP Location: Left arm, Patient Position: Sitting, Cuff Size: Standard)   Pulse 62   Temp 97.8 °F (36.6 °C) (Tympanic)   Resp 18   Ht 5' 3\" (1.6 m)   Wt 52.4 kg " (115 lb 9.6 oz)   SpO2 94%   BMI 20.48 kg/m²     Physical Exam  Constitutional:       General: She is not in acute distress.     Appearance: She is well-developed. She is not diaphoretic.   HENT:      Head: Normocephalic.      Right Ear: External ear normal.      Left Ear: External ear normal.      Nose: Nose normal.   Eyes:      General: No scleral icterus.        Right eye: No discharge.         Left eye: No discharge.      Conjunctiva/sclera: Conjunctivae normal.      Pupils: Pupils are equal, round, and reactive to light.   Neck:      Thyroid: No thyromegaly.      Trachea: No tracheal deviation.   Cardiovascular:      Rate and Rhythm: Normal rate and regular rhythm.      Heart sounds: Normal heart sounds. No murmur heard.  Pulmonary:      Effort: Pulmonary effort is normal. No respiratory distress.      Breath sounds: Normal breath sounds. No stridor. No wheezing, rhonchi or rales.   Abdominal:      General: Bowel sounds are normal. There is no distension.      Palpations: Abdomen is soft.      Tenderness: There is no abdominal tenderness. There is no guarding.   Musculoskeletal:         General: Deformity (Chronic deformities due to rheumatoid arthritis) present. No tenderness. Normal range of motion.      Right lower leg: No edema.      Left lower leg: No edema.   Lymphadenopathy:      Cervical: No cervical adenopathy.   Skin:     General: Skin is warm.      Coloration: Skin is not pale.      Findings: No erythema or rash.   Neurological:      Cranial Nerves: No cranial nerve deficit.      Coordination: Coordination normal.   Psychiatric:         Mood and Affect: Mood normal.         Behavior: Behavior normal.         Thought Content: Thought content normal.         Mani Crump Jr, MD

## 2024-09-11 NOTE — ASSESSMENT & PLAN NOTE
She is back on gabapentin 200 mg twice daily.  She is being monitored by neurology in this regard.  I did  her today that she can certainly titrate this gabapentin at much higher than this as needed.

## 2024-09-11 NOTE — ASSESSMENT & PLAN NOTE
I removed half of the cerumen but she is having discomfort with the procedure at this time with a lighted curette.  I am going to have her see ENT as the cerumen is close to her TM.    Orders:    Ambulatory Referral to Otolaryngology; Future

## 2024-09-13 ENCOUNTER — CLINICAL SUPPORT (OUTPATIENT)
Dept: FAMILY MEDICINE CLINIC | Facility: CLINIC | Age: 69
End: 2024-09-13
Payer: COMMERCIAL

## 2024-09-13 DIAGNOSIS — Z11.1 SCREENING FOR TUBERCULOSIS: Primary | ICD-10-CM

## 2024-09-13 LAB
INDURATION: 0 MM
TB SKIN TEST: NEGATIVE

## 2024-09-25 LAB — COLOGUARD RESULT REPORTABLE: NORMAL

## 2024-10-02 ENCOUNTER — IMMUNIZATIONS (OUTPATIENT)
Dept: FAMILY MEDICINE CLINIC | Facility: CLINIC | Age: 69
End: 2024-10-02
Payer: COMMERCIAL

## 2024-10-02 ENCOUNTER — APPOINTMENT (OUTPATIENT)
Dept: LAB | Facility: MEDICAL CENTER | Age: 69
End: 2024-10-02
Payer: COMMERCIAL

## 2024-10-02 DIAGNOSIS — Z79.631 LONG TERM METHOTREXATE USER: ICD-10-CM

## 2024-10-02 DIAGNOSIS — Z23 ENCOUNTER FOR IMMUNIZATION: Primary | ICD-10-CM

## 2024-10-02 DIAGNOSIS — Z51.81 ENCOUNTER FOR THERAPEUTIC DRUG MONITORING: ICD-10-CM

## 2024-10-02 DIAGNOSIS — M05.9 JUVENILE SEROPOSITIVE ARTHRITIS (HCC): ICD-10-CM

## 2024-10-02 DIAGNOSIS — M80.00XD AGE-RELATED OSTEOPOROSIS WITH CURRENT PATHOLOGICAL FRACTURE WITH ROUTINE HEALING: ICD-10-CM

## 2024-10-02 LAB
25(OH)D3 SERPL-MCNC: 28.7 NG/ML (ref 30–100)
ALBUMIN SERPL BCG-MCNC: 3.9 G/DL (ref 3.5–5)
ALP SERPL-CCNC: 83 U/L (ref 34–104)
ALT SERPL W P-5'-P-CCNC: 7 U/L (ref 7–52)
ANION GAP SERPL CALCULATED.3IONS-SCNC: 10 MMOL/L (ref 4–13)
AST SERPL W P-5'-P-CCNC: 14 U/L (ref 13–39)
BASOPHILS # BLD AUTO: 0.02 THOUSANDS/ΜL (ref 0–0.1)
BASOPHILS NFR BLD AUTO: 0 % (ref 0–1)
BILIRUB SERPL-MCNC: 0.79 MG/DL (ref 0.2–1)
BUN SERPL-MCNC: 11 MG/DL (ref 5–25)
CALCIUM SERPL-MCNC: 9.1 MG/DL (ref 8.4–10.2)
CHLORIDE SERPL-SCNC: 101 MMOL/L (ref 96–108)
CO2 SERPL-SCNC: 31 MMOL/L (ref 21–32)
CREAT SERPL-MCNC: 0.74 MG/DL (ref 0.6–1.3)
CRP SERPL QL: 5.2 MG/L
EOSINOPHIL # BLD AUTO: 0.08 THOUSAND/ΜL (ref 0–0.61)
EOSINOPHIL NFR BLD AUTO: 2 % (ref 0–6)
ERYTHROCYTE [DISTWIDTH] IN BLOOD BY AUTOMATED COUNT: 13.6 % (ref 11.6–15.1)
GFR SERPL CREATININE-BSD FRML MDRD: 82 ML/MIN/1.73SQ M
GLUCOSE P FAST SERPL-MCNC: 117 MG/DL (ref 65–99)
HCT VFR BLD AUTO: 41.1 % (ref 34.8–46.1)
HGB BLD-MCNC: 13.2 G/DL (ref 11.5–15.4)
IMM GRANULOCYTES # BLD AUTO: 0.03 THOUSAND/UL (ref 0–0.2)
IMM GRANULOCYTES NFR BLD AUTO: 1 % (ref 0–2)
LYMPHOCYTES # BLD AUTO: 1 THOUSANDS/ΜL (ref 0.6–4.47)
LYMPHOCYTES NFR BLD AUTO: 19 % (ref 14–44)
MCH RBC QN AUTO: 30.7 PG (ref 26.8–34.3)
MCHC RBC AUTO-ENTMCNC: 32.1 G/DL (ref 31.4–37.4)
MCV RBC AUTO: 96 FL (ref 82–98)
MONOCYTES # BLD AUTO: 0.43 THOUSAND/ΜL (ref 0.17–1.22)
MONOCYTES NFR BLD AUTO: 8 % (ref 4–12)
NEUTROPHILS # BLD AUTO: 3.75 THOUSANDS/ΜL (ref 1.85–7.62)
NEUTS SEG NFR BLD AUTO: 70 % (ref 43–75)
NRBC BLD AUTO-RTO: 0 /100 WBCS
PLATELET # BLD AUTO: 240 THOUSANDS/UL (ref 149–390)
PMV BLD AUTO: 9.4 FL (ref 8.9–12.7)
POTASSIUM SERPL-SCNC: 3.8 MMOL/L (ref 3.5–5.3)
PROT SERPL-MCNC: 6.5 G/DL (ref 6.4–8.4)
RBC # BLD AUTO: 4.3 MILLION/UL (ref 3.81–5.12)
SODIUM SERPL-SCNC: 142 MMOL/L (ref 135–147)
WBC # BLD AUTO: 5.31 THOUSAND/UL (ref 4.31–10.16)

## 2024-10-02 PROCEDURE — 82306 VITAMIN D 25 HYDROXY: CPT

## 2024-10-02 PROCEDURE — 36415 COLL VENOUS BLD VENIPUNCTURE: CPT

## 2024-10-02 PROCEDURE — 90662 IIV NO PRSV INCREASED AG IM: CPT

## 2024-10-02 PROCEDURE — 80053 COMPREHEN METABOLIC PANEL: CPT

## 2024-10-02 PROCEDURE — G0008 ADMIN INFLUENZA VIRUS VAC: HCPCS

## 2024-10-02 PROCEDURE — 86140 C-REACTIVE PROTEIN: CPT

## 2024-10-02 PROCEDURE — 85025 COMPLETE CBC W/AUTO DIFF WBC: CPT

## 2024-10-08 LAB — COLOGUARD RESULT REPORTABLE: NORMAL

## 2024-10-11 ENCOUNTER — TELEPHONE (OUTPATIENT)
Age: 69
End: 2024-10-11

## 2024-10-11 PROBLEM — H61.22 IMPACTED CERUMEN OF LEFT EAR: Status: RESOLVED | Noted: 2024-09-11 | Resolved: 2024-10-11

## 2024-10-11 NOTE — TELEPHONE ENCOUNTER
Pt called requesting a refill on solifenacin (VESICARE) 10 MG tablet .    This medication was discontinued on 9/11/24 by PCP.     Please advise - [phone]

## 2024-10-15 NOTE — TELEPHONE ENCOUNTER
Left  for pt to call back regarding medication. Per orry       Please clarify with the patient.  My understanding is that she wanted to stop this medication.  This is for bladder incontinence.       And let us know ,     Thank you !

## 2024-10-15 NOTE — TELEPHONE ENCOUNTER
Pt called and stated she requested refill by accident and no longer wants to take this medication.

## 2024-10-30 ENCOUNTER — VBI (OUTPATIENT)
Dept: ADMINISTRATIVE | Facility: OTHER | Age: 69
End: 2024-10-30

## 2024-12-09 ENCOUNTER — VBI (OUTPATIENT)
Dept: ADMINISTRATIVE | Facility: OTHER | Age: 69
End: 2024-12-09

## 2024-12-09 NOTE — TELEPHONE ENCOUNTER
12/09/24 11:56 AM     Chart reviewed for CRC: Colonoscopy was/were not submitted to the patient's insurance.     Yumi Michele MA   [facility]

## 2024-12-17 ENCOUNTER — TELEPHONE (OUTPATIENT)
Dept: NEUROLOGY | Facility: CLINIC | Age: 69
End: 2024-12-17

## 2024-12-17 NOTE — TELEPHONE ENCOUNTER
Received [facility] network lab forms from  [employer]. Forms filled out and signed, faxed, confirmed, and scanned into media

## 2024-12-19 ENCOUNTER — OFFICE VISIT (OUTPATIENT)
Dept: NEUROLOGY | Facility: CLINIC | Age: 69
End: 2024-12-19
Payer: COMMERCIAL

## 2024-12-19 VITALS
TEMPERATURE: 98.3 F | HEART RATE: 57 BPM | OXYGEN SATURATION: 97 % | BODY MASS INDEX: 20.82 KG/M2 | DIASTOLIC BLOOD PRESSURE: 82 MMHG | SYSTOLIC BLOOD PRESSURE: 130 MMHG | HEIGHT: 63 IN | WEIGHT: 117.5 LBS

## 2024-12-19 DIAGNOSIS — G50.0 LEFT-SIDED TRIGEMINAL NEURALGIA: Primary | ICD-10-CM

## 2024-12-19 PROCEDURE — 99214 OFFICE O/P EST MOD 30 MIN: CPT

## 2024-12-19 RX ORDER — HYDROXYCHLOROQUINE SULFATE 200 MG/1
200 TABLET, FILM COATED ORAL DAILY
COMMUNITY
Start: 2024-10-01 | End: 2025-10-01

## 2024-12-19 NOTE — PROGRESS NOTES
"Name: Aracelis Devi      : 1955      MRN: [id number]  Encounter Provider: MANSI Ford  Encounter Date: 2024   Encounter department: [facility] [place]  :  Assessment & Plan  Left-sided trigeminal neuralgia  Aracelis Devi is a 69 year old female with left V2 trigeminal neuralgia maintained on Gabapentin 100 mg daily. She states her pain is much more controlled compared to prior but still occurs 1-2 times a day briefly. Despite this she is not interested in increasing her Gabapentin any further. She denies any adverse effects, and states she just does not like to take medication. She is interested in a neurosurgical referral to discuss surgical intervention in hopes of being able to completely discontinue gabapentin in the future; referral provided per patient request. She will continue Gabapentin 100 mg qd and may consider increasing in the future if needed. She will follow up in 6 months; sooner if needed.     Orders:    Ambulatory referral to Neurosurgery; Future      Patient Instructions   - Continue Gabapentin 100 mg; consider increasing further   - Monitor for drowsiness or dizziness   - Referral to Neurosurgery   - Follow up in 6 months     History of Present Illness     HPI Aracelis Devi is a 69 year old female with left V2 trigeminal neuralgia who presents for follow up. She was last seen 2024 at that time she was having pain again after self discontinuing Gabapentin after becoming pain free. We had her resume Gabapentin 100 mg with a titration schedule up to 200 mg bid.     She returns to the office today and states she is taking Gabapentin 100 mg once a day. She states her pain is still occurring once or twice a day but states it is a \"quick pinch like a needle\" just for a second and then it goes away. She states the pain is tolerable and she does not like to take medication so is not interested in increasing her Gabapentin any further.      MRI " Brain/Trigem 4/8/2024:  Advanced white matter change within the periventricular aspect of the cerebral hemispheres bilaterally and within the brainstem consistent with chronic microangiopathic change. No mass, hemorrhage or signs of acute territorial infarction. Dedicated imaging of the brainstem demonstrates the left superior cerebellar artery in close proximity to the cisternal portion of the left trigeminal nerve. Correlate for signs of vascular compression.     Review of Systems   Constitutional:  Negative for appetite change, fatigue and fever.   HENT: Negative.  Negative for hearing loss, tinnitus, trouble swallowing and voice change.    Eyes: Negative.  Negative for photophobia, pain and visual disturbance.   Respiratory: Negative.  Negative for shortness of breath.    Cardiovascular: Negative.  Negative for palpitations.   Gastrointestinal: Negative.  Negative for nausea and vomiting.   Endocrine: Negative.  Negative for cold intolerance.   Genitourinary: Negative.  Negative for dysuria, frequency and urgency.   Musculoskeletal:  Negative for back pain, gait problem, myalgias, neck pain and neck stiffness.   Skin: Negative.  Negative for rash.   Allergic/Immunologic: Negative.    Neurological: Negative.  Negative for dizziness, tremors, seizures, syncope, facial asymmetry, speech difficulty, weakness, light-headedness, numbness and headaches.   Hematological: Negative.  Does not bruise/bleed easily.   Psychiatric/Behavioral: Negative.  Negative for confusion, hallucinations and sleep disturbance.    All other systems reviewed and are negative.    I have personally reviewed the MA's review of systems and made changes as necessary.    Current Outpatient Medications on File Prior to Visit   Medication Sig Dispense Refill    celecoxib (CeleBREX) 200 mg capsule Take 200 mg by mouth daily      folic acid (FOLVITE) 1 mg tablet Take 1 tablet (1 mg total) by mouth daily Start to take 30 days prior to surgery 30  "tablet 1    gabapentin (Neurontin) 100 mg capsule Take 100 mg bid x 7 days, then increase to 100 mg in the morning and 200 mg at bedtime x 7 days, then 200 mg bid thereafter (Patient taking differently: Take 100 mg by mouth daily Take 100 mg bid x 7 days, then increase to 100 mg in the morning and 200 mg at bedtime x 7 days, then 200 mg bid thereafter) 120 capsule 0    methotrexate 2.5 MG tablet TAKE 8 TABLETS BY MOUTH ONCE A WEEK AS DIRECTED      Multiple Vitamin (multivitamin) tablet Take 1 tablet by mouth daily Start to take 30 days prior to surgery 30 tablet 1    predniSONE 5 mg tablet Take 5 mg by mouth daily       amoxicillin (AMOXIL) 500 mg capsule Take 2,000 mg by mouth if needed 2000 mg prior to dental work. (Patient not taking: Reported on 12/19/2024)      hydroxychloroquine (PLAQUENIL) 200 mg tablet Take 200 mg by mouth daily (Patient not taking: Reported on 12/19/2024)       No current facility-administered medications on file prior to visit.         Objective   /82 (BP Location: Left arm, Patient Position: Sitting, Cuff Size: Standard)   Pulse 57   Temp 98.3 °F (36.8 °C) (Temporal)   Ht 5' 3\" (1.6 m)   Wt 53.3 kg (117 lb 8 oz)   SpO2 97%   BMI 20.81 kg/m²     Neurological Exam    On neurological examination patient is alert, awake, oriented and in no distress. Speech is fluent without dysarthria or aphasia. Cranial nerves 2-12 were symmetrically intact bilaterally. She is able to rise easily without assistance from a seated position. Casual gait is normal including stance, stride, and arm swing.        Administrative Statements   I have spent a total time of 20 minutes in caring for this patient on the day of the visit/encounter including Diagnostic results, Prognosis, Risks and benefits of tx options, Instructions for management, Patient and family education, Importance of tx compliance, Risk factor reductions, Impressions, Counseling / Coordination of care, Documenting in the medical " record, Reviewing / ordering tests, medicine, procedures  , and Obtaining or reviewing history  .

## 2024-12-19 NOTE — ASSESSMENT & PLAN NOTE
Aracelis Devi is a 69 year old female with left V2 trigeminal neuralgia maintained on Gabapentin 100 mg daily. She states her pain is much more controlled compared to prior but still occurs 1-2 times a day briefly. Despite this she is not interested in increasing her Gabapentin any further. She denies any adverse effects, and states she just does not like to take medication. She is interested in a neurosurgical referral to discuss surgical intervention in hopes of being able to completely discontinue gabapentin in the future; referral provided per patient request. She will continue Gabapentin 100 mg qd and may consider increasing in the future if needed. She will follow up in 6 months; sooner if needed.     Orders:    Ambulatory referral to Neurosurgery; Future

## 2024-12-19 NOTE — PATIENT INSTRUCTIONS
- Continue Gabapentin 100 mg; consider increasing further   - Monitor for drowsiness or dizziness   - Referral to Neurosurgery   - Follow up in 6 months

## 2024-12-23 DIAGNOSIS — G50.0 LEFT-SIDED TRIGEMINAL NEURALGIA: ICD-10-CM

## 2024-12-24 RX ORDER — GABAPENTIN 100 MG/1
100 CAPSULE ORAL DAILY
Qty: 90 CAPSULE | Refills: 0 | Status: SHIPPED | OUTPATIENT
Start: 2024-12-24

## 2024-12-24 NOTE — TELEPHONE ENCOUNTER
Pt requesting refill of gabapentin 100mg     LOV - 12/19/24    Per OV note:  She will continue Gabapentin 100 mg qd     Script pended below.     Paulina - please sign if agreeable. Thank you!

## 2024-12-26 ENCOUNTER — CONSULT (OUTPATIENT)
Dept: NEUROSURGERY | Facility: CLINIC | Age: 69
End: 2024-12-26
Payer: COMMERCIAL

## 2024-12-26 VITALS
OXYGEN SATURATION: 97 % | WEIGHT: 118 LBS | SYSTOLIC BLOOD PRESSURE: 130 MMHG | HEART RATE: 59 BPM | TEMPERATURE: 98.3 F | BODY MASS INDEX: 20.91 KG/M2 | DIASTOLIC BLOOD PRESSURE: 68 MMHG | HEIGHT: 63 IN | RESPIRATION RATE: 20 BRPM

## 2024-12-26 DIAGNOSIS — M06.9 RHEUMATOID ARTHRITIS (HCC): Primary | ICD-10-CM

## 2024-12-26 DIAGNOSIS — G50.1 ATYPICAL FACIAL PAIN: ICD-10-CM

## 2024-12-26 DIAGNOSIS — G50.0 LEFT-SIDED TRIGEMINAL NEURALGIA: ICD-10-CM

## 2024-12-26 PROCEDURE — 99204 OFFICE O/P NEW MOD 45 MIN: CPT | Performed by: NEUROLOGICAL SURGERY

## 2024-12-26 NOTE — PROGRESS NOTES
Name: Aracelis Devi      : 1955      MRN: [id number]  Encounter Provider: Raymundo Richardson MD  Encounter Date: 2024   Encounter department: [facility]  :  Assessment & Plan  Atypical facial pain  V2 distribution atypical facial pain treated with gabapentin with an MRI correlate for laterality.  Her chief concern is that of gabapentin and the association with balance and dizziness  I would recommend a trial on oxcarbazepine and a taper of the gabapentin which may be as effective with a lower side effect profile for her.  If this medication is ineffective then we can declare her to be medication resistant.  Surgical intervention with microvascular decompression is more effective and typical facial pain than atypical facial pain however can be effective in both disorders.  Will plan on seeing her back in the office if she fails to improve with a second trial of medication.    Left-sided trigeminal neuralgia  See above  Orders:    Ambulatory referral to Neurosurgery    Rheumatoid arthritis (HCC)  This is important to consider and that she is on multi medication therapy.  Currently she is on methotrexate which can affect wound healing.           History of Present Illness   This is a 69-year-old female with 12-month history of atypical facial pain.  This pain began as a continuous pain radiating from the external left nares to the area of the left ear.  She had had a piercing done in this region and developed severe pain.  The pain was present / initially without periods of give up.  She tried over-the-counter medications and local anesthetic medications which helped minimally.  There were no signs of infection.  There was no purulence.  In spite of this her pain continue to worsen.  This pain was severe and it changed her life with provocations including wind blowing or chewing or even drinking.  This continued until approximately 6 months ago when she went to her  "family doctor.  She was started on gabapentin and this was slowly escalated.  The patient's pain has subsided significantly since that time.  She does not want to be on this medication and that a chief concern of hers.  She also feels that she has developed increased dizziness and balance difficulties and relates this to the medication.  She now rarely gets bouts of pain.  Today she desires consideration for strategies to eliminate the need of medications and the side effects associated with them.     The pain is always in the mid face in the V2 distribution  The pain has not spread from this original location.  The pain always begins at the lateral naris and radiates back rather than the other way around  When taking the medication she rarely has bouts of pain.        Review of Systems   Constitutional:  Positive for fatigue.   HENT:  Positive for tinnitus.    Eyes: Negative.    Respiratory: Negative.     Cardiovascular: Negative.    Gastrointestinal: Negative.    Endocrine: Negative.    Genitourinary: Negative.    Neurological:  Positive for facial asymmetry (left sheek) and headaches (2-3 days a week).   Psychiatric/Behavioral:  Positive for sleep disturbance.     I have personally reviewed the MA's review of systems and made changes as necessary.         Objective   Resp 20   Ht 5' 3\" (1.6 m)   Wt 53.5 kg (118 lb)   BMI 20.90 kg/m²     Physical Exam  Vitals and nursing note reviewed.   Constitutional:       General: She is not in acute distress.     Appearance: Normal appearance. She is not ill-appearing, toxic-appearing or diaphoretic.   HENT:      Head: Normocephalic.      Nose: Nose normal.   Eyes:      Extraocular Movements: Extraocular movements intact.      Pupils: Pupils are equal, round, and reactive to light.   Musculoskeletal:         General: No swelling, tenderness, deformity or signs of injury. Normal range of motion.      Cervical back: Normal range of motion and neck supple. No rigidity.      " Right lower leg: No edema.      Left lower leg: No edema.   Lymphadenopathy:      Cervical: No cervical adenopathy.   Skin:     General: Skin is warm and dry.      Coloration: Skin is not jaundiced.      Findings: No erythema or rash.      Comments: There is no sign of the previous piercing or signs of granuloma or infection at the lateral nares   Neurological:      Mental Status: She is alert and oriented to person, place, and time.      Cranial Nerves: No cranial nerve deficit.      Sensory: No sensory deficit (She has no sensory deficit to fine touch and pinprick in the V2 or any other facial distribution).      Motor: No weakness.      Coordination: Coordination normal.      Gait: Gait abnormal (She cannot perform tandem gait without sidestepping.).      Deep Tendon Reflexes: Reflexes normal.   Psychiatric:         Mood and Affect: Mood normal.         Behavior: Behavior normal.         Thought Content: Thought content normal.         Judgment: Judgment normal.       Neurological Exam  Mental Status  Alert. Oriented to person, place, and time.    Cranial Nerves  CN III, IV, VI: Extraocular movements intact bilaterally. Pupils equal round and reactive to light bilaterally.    Gait   Abnormal gait (She cannot perform tandem gait without sidestepping.).      Radiology Results Review: I personally reviewed the following image studies in PACS and associated radiology reports: MRI brain. My interpretation of the radiology images/reports is: MRI of the brain is carefully reviewed.  There is a branch of the superior cerebellar artery which is splaying the 5th nerve.  This can be seen in the image below.  There is also a venous structure which is lateral to the nerve likely contributing to compression..

## 2024-12-26 NOTE — ASSESSMENT & PLAN NOTE
V2 distribution atypical facial pain treated with gabapentin with an MRI correlate for laterality.  Her chief concern is that of gabapentin and the association with balance and dizziness  I would recommend a trial on oxcarbazepine and a taper of the gabapentin which may be as effective with a lower side effect profile for her.  If this medication is ineffective then we can declare her to be medication resistant.  Surgical intervention with microvascular decompression is more effective and typical facial pain than atypical facial pain however can be effective in both disorders.  Will plan on seeing her back in the office if she fails to improve with a second trial of medication.

## 2024-12-27 ENCOUNTER — TELEPHONE (OUTPATIENT)
Dept: NEUROLOGY | Facility: CLINIC | Age: 69
End: 2024-12-27

## 2024-12-27 DIAGNOSIS — G50.0 LEFT-SIDED TRIGEMINAL NEURALGIA: Primary | ICD-10-CM

## 2024-12-28 NOTE — TELEPHONE ENCOUNTER
Reviewed neurosurgery's notes. Please ask patient if she would like to trial oxcarbazepine and taper off of gabapentin?

## 2024-12-30 NOTE — TELEPHONE ENCOUNTER
Spoke w/pt. Advised her of recommendation from NS (12/26/24 Consult) and Paulina's note below. Pt is agreeable to trying oxcarbazepine. Please send to [facility]. She will continue to take gabapentin until she receives new med.       Paulina - please send rx for oxcarbazepine. Thank you.

## 2024-12-31 RX ORDER — OXCARBAZEPINE 150 MG/1
150 TABLET, FILM COATED ORAL 2 TIMES DAILY
Qty: 180 TABLET | Refills: 0 | Status: SHIPPED | OUTPATIENT
Start: 2024-12-31

## 2024-12-31 NOTE — TELEPHONE ENCOUNTER
Sent Oxcarbazepine 150 mg to be taken once daily x 1 week then 1 tablet twice daily thereafter  She should stop Gabapentin when she starts Oxcarbazepine  I would also like her to obtain updated CMP in 3 weeks to assess for any LFT elevation or hyponatremia.   She should call with an update in 4 weeks with how she is feeling  Side effects to monitor for include: headache, blurred vision, dizziness, nausea, vomiting or gait difficulty/imbalance.

## 2024-12-31 NOTE — TELEPHONE ENCOUNTER
Called pt, no answer. Left detailed message (ok per consent on file) advising pt of Alessandra's response and instructions. Advised pt to call back if she would need to go over instructions further or with any additional questions.

## 2025-01-15 ENCOUNTER — TELEPHONE (OUTPATIENT)
Age: 70
End: 2025-01-15

## 2025-01-15 NOTE — TELEPHONE ENCOUNTER
Pt is requesting CMP lab order be faxed to [facility] fax#[phone] today so she can call them tomorrow morning to schedule an appt.

## 2025-01-16 NOTE — TELEPHONE ENCOUNTER
Lab script for CMP faxed to [facility] at [phone]. Fax confirmation scanned into chart.

## 2025-01-17 ENCOUNTER — APPOINTMENT (OUTPATIENT)
Dept: LAB | Facility: MEDICAL CENTER | Age: 70
End: 2025-01-17
Payer: COMMERCIAL

## 2025-01-17 DIAGNOSIS — D50.9 IRON DEFICIENCY ANEMIA, UNSPECIFIED IRON DEFICIENCY ANEMIA TYPE: ICD-10-CM

## 2025-01-17 DIAGNOSIS — K59.01 SLOW TRANSIT CONSTIPATION: ICD-10-CM

## 2025-01-17 DIAGNOSIS — M05.79 RHEUMATOID ARTHRITIS INVOLVING MULTIPLE SITES WITH POSITIVE RHEUMATOID FACTOR (HCC): ICD-10-CM

## 2025-01-17 DIAGNOSIS — G50.0 LEFT-SIDED TRIGEMINAL NEURALGIA: ICD-10-CM

## 2025-01-17 DIAGNOSIS — E78.2 MIXED HYPERLIPIDEMIA: ICD-10-CM

## 2025-01-17 LAB
ALBUMIN SERPL BCG-MCNC: 4.1 G/DL (ref 3.5–5)
ALP SERPL-CCNC: 91 U/L (ref 34–104)
ALT SERPL W P-5'-P-CCNC: 5 U/L (ref 7–52)
ANION GAP SERPL CALCULATED.3IONS-SCNC: 7 MMOL/L (ref 4–13)
AST SERPL W P-5'-P-CCNC: 14 U/L (ref 13–39)
BASOPHILS # BLD AUTO: 0.03 THOUSANDS/ΜL (ref 0–0.1)
BASOPHILS NFR BLD AUTO: 1 % (ref 0–1)
BILIRUB SERPL-MCNC: 0.67 MG/DL (ref 0.2–1)
BUN SERPL-MCNC: 7 MG/DL (ref 5–25)
CALCIUM SERPL-MCNC: 9.1 MG/DL (ref 8.4–10.2)
CHLORIDE SERPL-SCNC: 98 MMOL/L (ref 96–108)
CHOLEST SERPL-MCNC: 232 MG/DL (ref ?–200)
CO2 SERPL-SCNC: 32 MMOL/L (ref 21–32)
CREAT SERPL-MCNC: 0.68 MG/DL (ref 0.6–1.3)
CRP SERPL QL: 2 MG/L
EOSINOPHIL # BLD AUTO: 0.13 THOUSAND/ΜL (ref 0–0.61)
EOSINOPHIL NFR BLD AUTO: 3 % (ref 0–6)
ERYTHROCYTE [DISTWIDTH] IN BLOOD BY AUTOMATED COUNT: 13.9 % (ref 11.6–15.1)
ERYTHROCYTE [SEDIMENTATION RATE] IN BLOOD: 18 MM/HOUR (ref 0–29)
FERRITIN SERPL-MCNC: 53 NG/ML (ref 11–307)
GFR SERPL CREATININE-BSD FRML MDRD: 88 ML/MIN/1.73SQ M
GLUCOSE P FAST SERPL-MCNC: 82 MG/DL (ref 65–99)
HCT VFR BLD AUTO: 40.6 % (ref 34.8–46.1)
HDLC SERPL-MCNC: 84 MG/DL
HGB BLD-MCNC: 13.1 G/DL (ref 11.5–15.4)
IMM GRANULOCYTES # BLD AUTO: 0.02 THOUSAND/UL (ref 0–0.2)
IMM GRANULOCYTES NFR BLD AUTO: 0 % (ref 0–2)
IRON SATN MFR SERPL: 24 % (ref 15–50)
IRON SERPL-MCNC: 64 UG/DL (ref 50–212)
LDLC SERPL CALC-MCNC: 132 MG/DL (ref 0–100)
LYMPHOCYTES # BLD AUTO: 0.93 THOUSANDS/ΜL (ref 0.6–4.47)
LYMPHOCYTES NFR BLD AUTO: 19 % (ref 14–44)
MCH RBC QN AUTO: 29.9 PG (ref 26.8–34.3)
MCHC RBC AUTO-ENTMCNC: 32.3 G/DL (ref 31.4–37.4)
MCV RBC AUTO: 93 FL (ref 82–98)
MONOCYTES # BLD AUTO: 0.52 THOUSAND/ΜL (ref 0.17–1.22)
MONOCYTES NFR BLD AUTO: 11 % (ref 4–12)
NEUTROPHILS # BLD AUTO: 3.22 THOUSANDS/ΜL (ref 1.85–7.62)
NEUTS SEG NFR BLD AUTO: 66 % (ref 43–75)
NRBC BLD AUTO-RTO: 0 /100 WBCS
PLATELET # BLD AUTO: 213 THOUSANDS/UL (ref 149–390)
PMV BLD AUTO: 9.5 FL (ref 8.9–12.7)
POTASSIUM SERPL-SCNC: 3.7 MMOL/L (ref 3.5–5.3)
PROT SERPL-MCNC: 6.7 G/DL (ref 6.4–8.4)
RBC # BLD AUTO: 4.38 MILLION/UL (ref 3.81–5.12)
SODIUM SERPL-SCNC: 137 MMOL/L (ref 135–147)
TIBC SERPL-MCNC: 270.2 UG/DL (ref 250–450)
TRANSFERRIN SERPL-MCNC: 193 MG/DL (ref 203–362)
TRIGL SERPL-MCNC: 78 MG/DL (ref ?–150)
TSH SERPL DL<=0.05 MIU/L-ACNC: 1.47 UIU/ML (ref 0.45–4.5)
UIBC SERPL-MCNC: 206 UG/DL (ref 155–355)
WBC # BLD AUTO: 4.85 THOUSAND/UL (ref 4.31–10.16)

## 2025-01-17 PROCEDURE — 85652 RBC SED RATE AUTOMATED: CPT

## 2025-01-17 PROCEDURE — 80053 COMPREHEN METABOLIC PANEL: CPT

## 2025-01-17 PROCEDURE — 83540 ASSAY OF IRON: CPT

## 2025-01-17 PROCEDURE — 82728 ASSAY OF FERRITIN: CPT

## 2025-01-17 PROCEDURE — 86140 C-REACTIVE PROTEIN: CPT

## 2025-01-17 PROCEDURE — 80061 LIPID PANEL: CPT

## 2025-01-17 PROCEDURE — 85025 COMPLETE CBC W/AUTO DIFF WBC: CPT

## 2025-01-17 PROCEDURE — 36415 COLL VENOUS BLD VENIPUNCTURE: CPT

## 2025-01-17 PROCEDURE — 83550 IRON BINDING TEST: CPT

## 2025-01-17 PROCEDURE — 84443 ASSAY THYROID STIM HORMONE: CPT

## 2025-01-21 ENCOUNTER — RESULTS FOLLOW-UP (OUTPATIENT)
Dept: FAMILY MEDICINE CLINIC | Facility: CLINIC | Age: 70
End: 2025-01-21

## 2025-02-04 DIAGNOSIS — G50.0 LEFT-SIDED TRIGEMINAL NEURALGIA: ICD-10-CM

## 2025-02-04 NOTE — TELEPHONE ENCOUNTER
Patient called in and is requesting to have MANSI Ford  to please send new script for the following medications to [email or web address].     gabapentin (NEURONTIN) 100 mg capsule [[id number]]    Order Details  Dose: 100 mg Route: Oral Frequency: Daily   Dispense Quantity: 90 capsule Refills: 0          Sig: Take 1 capsule (100 mg total) by mouth daily   [facility] - [place], [place] - [address] [place] [address]  [address] [facility] [address], [facility] [address]  Phone: [phone]  Fax: [phone]     Please assist.

## 2025-02-05 ENCOUNTER — APPOINTMENT (OUTPATIENT)
Dept: LAB | Facility: MEDICAL CENTER | Age: 70
End: 2025-02-05
Payer: COMMERCIAL

## 2025-02-05 DIAGNOSIS — Z79.631 ENCOUNTER FOR MONITORING OF METHOTREXATE THERAPY: ICD-10-CM

## 2025-02-05 DIAGNOSIS — M05.9 RHEUMATOID ARTHRITIS WITH POSITIVE RHEUMATOID FACTOR, INVOLVING UNSPECIFIED SITE (HCC): ICD-10-CM

## 2025-02-05 DIAGNOSIS — Z51.81 ENCOUNTER FOR MONITORING OF METHOTREXATE THERAPY: ICD-10-CM

## 2025-02-05 LAB
ALBUMIN SERPL BCG-MCNC: 3.8 G/DL (ref 3.5–5)
ALP SERPL-CCNC: 79 U/L (ref 34–104)
ALT SERPL W P-5'-P-CCNC: 6 U/L (ref 7–52)
ANION GAP SERPL CALCULATED.3IONS-SCNC: 10 MMOL/L (ref 4–13)
AST SERPL W P-5'-P-CCNC: 15 U/L (ref 13–39)
BASOPHILS # BLD AUTO: 0.03 THOUSANDS/ΜL (ref 0–0.1)
BASOPHILS NFR BLD AUTO: 0 % (ref 0–1)
BILIRUB SERPL-MCNC: 0.53 MG/DL (ref 0.2–1)
BUN SERPL-MCNC: 8 MG/DL (ref 5–25)
CALCIUM SERPL-MCNC: 9.1 MG/DL (ref 8.4–10.2)
CHLORIDE SERPL-SCNC: 102 MMOL/L (ref 96–108)
CO2 SERPL-SCNC: 30 MMOL/L (ref 21–32)
CREAT SERPL-MCNC: 0.75 MG/DL (ref 0.6–1.3)
EOSINOPHIL # BLD AUTO: 0.1 THOUSAND/ΜL (ref 0–0.61)
EOSINOPHIL NFR BLD AUTO: 2 % (ref 0–6)
ERYTHROCYTE [DISTWIDTH] IN BLOOD BY AUTOMATED COUNT: 14 % (ref 11.6–15.1)
GFR SERPL CREATININE-BSD FRML MDRD: 80 ML/MIN/1.73SQ M
GLUCOSE P FAST SERPL-MCNC: 120 MG/DL (ref 65–99)
HCT VFR BLD AUTO: 40.1 % (ref 34.8–46.1)
HGB BLD-MCNC: 12.8 G/DL (ref 11.5–15.4)
IMM GRANULOCYTES # BLD AUTO: 0.02 THOUSAND/UL (ref 0–0.2)
IMM GRANULOCYTES NFR BLD AUTO: 0 % (ref 0–2)
LYMPHOCYTES # BLD AUTO: 1 THOUSANDS/ΜL (ref 0.6–4.47)
LYMPHOCYTES NFR BLD AUTO: 15 % (ref 14–44)
MCH RBC QN AUTO: 30 PG (ref 26.8–34.3)
MCHC RBC AUTO-ENTMCNC: 31.9 G/DL (ref 31.4–37.4)
MCV RBC AUTO: 94 FL (ref 82–98)
MONOCYTES # BLD AUTO: 0.43 THOUSAND/ΜL (ref 0.17–1.22)
MONOCYTES NFR BLD AUTO: 6 % (ref 4–12)
NEUTROPHILS # BLD AUTO: 5.25 THOUSANDS/ΜL (ref 1.85–7.62)
NEUTS SEG NFR BLD AUTO: 77 % (ref 43–75)
NRBC BLD AUTO-RTO: 0 /100 WBCS
PLATELET # BLD AUTO: 227 THOUSANDS/UL (ref 149–390)
PMV BLD AUTO: 9.5 FL (ref 8.9–12.7)
POTASSIUM SERPL-SCNC: 3.7 MMOL/L (ref 3.5–5.3)
PROT SERPL-MCNC: 6 G/DL (ref 6.4–8.4)
RBC # BLD AUTO: 4.26 MILLION/UL (ref 3.81–5.12)
SODIUM SERPL-SCNC: 142 MMOL/L (ref 135–147)
WBC # BLD AUTO: 6.83 THOUSAND/UL (ref 4.31–10.16)

## 2025-02-05 PROCEDURE — 85025 COMPLETE CBC W/AUTO DIFF WBC: CPT

## 2025-02-05 PROCEDURE — 80053 COMPREHEN METABOLIC PANEL: CPT

## 2025-02-05 PROCEDURE — 36415 COLL VENOUS BLD VENIPUNCTURE: CPT

## 2025-02-05 RX ORDER — GABAPENTIN 100 MG/1
100 CAPSULE ORAL DAILY
Qty: 90 CAPSULE | Refills: 0 | Status: SHIPPED | OUTPATIENT
Start: 2025-02-05

## 2025-02-25 ENCOUNTER — RA CDI HCC (OUTPATIENT)
Dept: OTHER | Facility: HOSPITAL | Age: 70
End: 2025-02-25

## 2025-03-11 ENCOUNTER — OFFICE VISIT (OUTPATIENT)
Dept: FAMILY MEDICINE CLINIC | Facility: CLINIC | Age: 70
End: 2025-03-11
Payer: COMMERCIAL

## 2025-03-11 DIAGNOSIS — E78.2 MIXED HYPERLIPIDEMIA: ICD-10-CM

## 2025-03-11 DIAGNOSIS — Z79.899 IMMUNODEFICIENCY DUE TO LONG TERM DRUG THERAPY  (HCC): ICD-10-CM

## 2025-03-11 DIAGNOSIS — K59.01 SLOW TRANSIT CONSTIPATION: ICD-10-CM

## 2025-03-11 DIAGNOSIS — F32.1 CURRENT MODERATE EPISODE OF MAJOR DEPRESSIVE DISORDER WITHOUT PRIOR EPISODE (HCC): ICD-10-CM

## 2025-03-11 DIAGNOSIS — F41.9 ANXIETY DISORDER, UNSPECIFIED TYPE: ICD-10-CM

## 2025-03-11 DIAGNOSIS — D84.821 IMMUNODEFICIENCY DUE TO LONG TERM DRUG THERAPY  (HCC): ICD-10-CM

## 2025-03-11 DIAGNOSIS — Z00.00 MEDICARE ANNUAL WELLNESS VISIT, SUBSEQUENT: Primary | ICD-10-CM

## 2025-03-11 DIAGNOSIS — Z12.31 ENCOUNTER FOR SCREENING MAMMOGRAM FOR BREAST CANCER: ICD-10-CM

## 2025-03-11 DIAGNOSIS — M05.79 RHEUMATOID ARTHRITIS INVOLVING MULTIPLE SITES WITH POSITIVE RHEUMATOID FACTOR (HCC): ICD-10-CM

## 2025-03-11 PROCEDURE — 99214 OFFICE O/P EST MOD 30 MIN: CPT | Performed by: FAMILY MEDICINE

## 2025-03-11 PROCEDURE — G0444 DEPRESSION SCREEN ANNUAL: HCPCS | Performed by: FAMILY MEDICINE

## 2025-03-11 PROCEDURE — G0439 PPPS, SUBSEQ VISIT: HCPCS | Performed by: FAMILY MEDICINE

## 2025-03-11 PROCEDURE — G2211 COMPLEX E/M VISIT ADD ON: HCPCS | Performed by: FAMILY MEDICINE

## 2025-03-11 RX ORDER — LORAZEPAM 0.5 MG/1
0.5 TABLET ORAL EVERY 8 HOURS PRN
Qty: 20 TABLET | Refills: 0 | Status: SHIPPED | OUTPATIENT
Start: 2025-03-11

## 2025-03-11 RX ORDER — PAROXETINE 10 MG/1
10 TABLET, FILM COATED ORAL DAILY
Qty: 90 TABLET | Refills: 3 | Status: SHIPPED | OUTPATIENT
Start: 2025-03-11

## 2025-03-11 NOTE — PROGRESS NOTES
Name: Aracelis Devi      : 1955      MRN: [id number]  Encounter Provider: Mani Crump Jr, MD  Encounter Date: 3/11/2025   Encounter department: [facility]    Assessment & Plan  Medicare annual wellness visit, subsequent  Up-to-date on flu shot.  She declines COVID shot at this time.  She has had Prevnar 20.       Encounter for screening mammogram for breast cancer    Orders:    Mammo screening bilateral w 3d and cad; Future    Current moderate episode of major depressive disorder without prior episode (HCC)  She does appear to have a significant recurrence of depression with anxiety.  Some of this is situational as she is not doing well with [facility].  She is looking for potential another job or some volunteer work.  In the meantime, we are going to restart her paroxetine which she had taken previously for many years with good results and I did prescribe lorazepam as needed.    Interestingly, she does not really recall taking paroxetine and we will need to keep an eye on her memory.                                                                                                                                                                                                                                                                                                                                                                                                                                                                                                                                                                                                                                                                                                                                                                                                                                                                                                                                                                                                                                                                      Orders:    LORazepam (Ativan) 0.5 mg tablet; Take 1 tablet (0.5 mg total) by mouth every 8 (eight) hours as needed for anxiety    Ambulatory referral to Psych Services; Future    Anxiety disorder, unspecified type  Restart paroxetine as well as lorazepam as needed I did refer her for some talk therapy which I think would be quite helpful  Orders:    PARoxetine (PAXIL) 10 mg tablet; Take 1 tablet (10 mg total) by mouth daily    LORazepam (Ativan) 0.5 mg tablet; Take 1 tablet (0.5 mg total) by mouth every 8 (eight) hours as needed for anxiety    Ambulatory referral to Psych Services; Future    Mixed hyperlipidemia  Monitor lipids       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       Slow transit constipation  Bowels are pretty stable at this time.       Rheumatoid arthritis involving multiple sites with positive rheumatoid factor (HCC)  Continue follow-up with rheumatology.  She remains on methotrexate and prednisone at this time.          Preventive health issues were discussed with patient, and age appropriate screening tests were ordered as noted in patient's After Visit Summary. Personalized health advice and appropriate referrals for health education or preventive services given if needed, as noted in patient's After Visit Summary.    History of Present Illness     HPI patient presents today companied by her  for wellness visit.  Unfortunate, she is experiencing significant depressed mood and anxiety.  This seems of gotten much worse since her jail last year.  She finds herself waking up anxious that she has nothing to do that day.  She gets a bit depressed and seems to really miss work and the interaction that she had with patient's.  She denies any thoughts of self-harm.  She is sleeping relatively  well.  She does have some chronic arthralgias which are managed by rheumatology.  Patient Care Team:  Mani Bahena Jr., MD as PCP - General  Ken Walker DO as PCP - PCP-[facility]-Rosi Dillard MD as PCP - PCP-Julio (RTE)  Delilah Hobbs DO    Review of Systems   Constitutional:  Negative for appetite change, chills, fatigue, fever and unexpected weight change.   HENT:  Negative for trouble swallowing.    Eyes:  Negative for visual disturbance.   Respiratory:  Negative for cough, chest tightness, shortness of breath and wheezing.    Cardiovascular:  Negative for chest pain, palpitations and leg swelling.   Gastrointestinal:  Negative for abdominal distention, abdominal pain, blood in stool, constipation and diarrhea.   Endocrine: Negative for polyuria.   Genitourinary:  Negative for difficulty urinating and flank pain.   Musculoskeletal:  Positive for arthralgias (Mostly controlled at this time). Negative for myalgias.   Skin:  Negative for rash.   Neurological:  Negative for dizziness and light-headedness.   Hematological:  Negative for adenopathy. Does not bruise/bleed easily.   Psychiatric/Behavioral:  Positive for dysphoric mood. Negative for sleep disturbance and suicidal ideas. The patient is nervous/anxious.      Medical History Reviewed by provider this encounter:       Annual Wellness Visit Questionnaire   Aracelis is here for her Subsequent Wellness visit.     Health Risk Assessment:   Patient rates overall health as fair. Patient feels that their physical health rating is same. Patient is satisfied with their life. Eyesight was rated as same. Hearing was rated as slightly worse. Patient feels that their emotional and mental health rating is slightly worse. Patients states they are often angry. Patient states they are often unusually tired/fatigued. Pain experienced in the last 7 days has been some. Patient's pain rating has been 8/10. Patient states that she  has experienced no weight loss or gain in last 6 months.     Depression Screening:   PHQ-2 Score: 6  PHQ-9 Score: 15      Fall Risk Screening:   In the past year, patient has experienced: no history of falling in past year      Urinary Incontinence Screening:   Patient has leaked urine accidently in the last six months.     Home Safety:  Patient does not have trouble with stairs inside or outside of their home. Patient has working smoke alarms and has no working carbon monoxide detector. Home safety hazards include: none.     Nutrition:   Current diet is Regular.     Medications:   Patient is currently taking over-the-counter supplements. OTC medications include: see medication list. Patient is able to manage medications.     Activities of Daily Living (ADLs)/Instrumental Activities of Daily Living (IADLs):   Walk and transfer into and out of bed and chair?: Yes  Dress and groom yourself?: Yes    Bathe or shower yourself?: Yes    Feed yourself? Yes  Do your laundry/housekeeping?: Yes  Manage your money, pay your bills and track your expenses?: Yes  Make your own meals?: Yes    Do your own shopping?: Yes    Previous Hospitalizations:   Any hospitalizations or ED visits within the last 12 months?: No      Advance Care Planning:   Living will: Yes    Durable POA for healthcare: Yes    Advanced directive: Yes    End of Life Decisions reviewed with patient: Yes      PREVENTIVE SCREENINGS      Cardiovascular Screening:    General: Screening Not Indicated and History Lipid Disorder      Diabetes Screening:     General: Screening Current      Colorectal Cancer Screening:     General: Screening Current      Breast Cancer Screening:     General: Risks and Benefits Discussed    Due for: Mammogram        Cervical Cancer Screening:    General: Screening Not Indicated      Osteoporosis Screening:    General: Screening Not Indicated and History Osteoporosis      Abdominal Aortic Aneurysm (AAA) Screening:        General: Screening  Not Indicated      Lung Cancer Screening:     General: Screening Not Indicated      Hepatitis C Screening:    General: Screening Current    Screening, Brief Intervention, and Referral to Treatment (SBIRT)     Screening  Typical number of drinks in a day: 0  Typical number of drinks in a week: 0  Interpretation: Low risk drinking behavior.    Single Item Drug Screening:  How often have you used an illegal drug (including marijuana) or a prescription medication for non-medical reasons in the past year? never    Single Item Drug Screen Score: 0  Interpretation: Negative screen for possible drug use disorder    Brief Intervention  Alcohol & drug use screenings were reviewed. No concerns regarding substance use disorder identified.     Annual Depression Screening  Time spent screening and evaluating the patient for depression during today's encounter was 5 minutes.    Other Counseling Topics:   Calcium and vitamin D intake and regular weightbearing exercise.     Social Drivers of Health     Financial Resource Strain: Low Risk  (11/6/2023)    Overall Financial Resource Strain (CARDIA)     Difficulty of Paying Living Expenses: Not hard at all   Food Insecurity: No Food Insecurity (3/11/2025)    Hunger Vital Sign     Worried About Running Out of Food in the Last Year: Never true     Ran Out of Food in the Last Year: Never true   Transportation Needs: No Transportation Needs (3/11/2025)    PRAPARE - Transportation     Lack of Transportation (Medical): No     Lack of Transportation (Non-Medical): No   Housing Stability: Low Risk  (3/11/2025)    Housing Stability Vital Sign     Unable to Pay for Housing in the Last Year: No     Number of Times Moved in the Last Year: 1     Homeless in the Last Year: No   Utilities: Not At Risk (3/11/2025)    [facility] Utilities     Threatened with loss of utilities: No     No results found.    Objective   /80 (BP Location: Left arm, Patient Position: Sitting, Cuff Size: Standard)   Pulse 65    Resp 14   Wt 52.2 kg (115 lb)   SpO2 98%   BMI 20.37 kg/m²     Physical Exam  Depression Screening Follow-up Plan: Patient's depression screening was positive with a PHQ-2 score of 6. Their PHQ-9 score was 15.  Meds will be added today.

## 2025-03-11 NOTE — ASSESSMENT & PLAN NOTE
Restart paroxetine as well as lorazepam as needed I did refer her for some talk therapy which I think would be quite helpful  Orders:    PARoxetine (PAXIL) 10 mg tablet; Take 1 tablet (10 mg total) by mouth daily    LORazepam (Ativan) 0.5 mg tablet; Take 1 tablet (0.5 mg total) by mouth every 8 (eight) hours as needed for anxiety    Ambulatory referral to Psych Services; Future

## 2025-03-11 NOTE — ASSESSMENT & PLAN NOTE
She does appear to have a significant recurrence of depression with anxiety.  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  She is looking for potential another job or some volunteer work.  In the meantime, we are going to restart her paroxetine which she had taken previously for many years with good results and I did prescribe lorazepam as needed.    Interestingly, she does not really recall taking paroxetine and we will need to keep an eye on her memory.                                                                                                                                                                                                                                                                                                                                                                                                                                                                                                                                                                                                                                                                                                                                                                                                                                                                                                                                                                                                                                                                     Orders:    LORazepam (Ativan) 0.5 mg tablet; Take 1 tablet (0.5 mg total) by mouth every 8 (eight) hours as needed for anxiety    Ambulatory referral to Psych Services; Future

## 2025-03-11 NOTE — ASSESSMENT & PLAN NOTE
Continue follow-up with rheumatology.  She remains on methotrexate and prednisone at this time.

## 2025-03-12 ENCOUNTER — TELEPHONE (OUTPATIENT)
Age: 70
End: 2025-03-12

## 2025-03-12 VITALS
BODY MASS INDEX: 20.37 KG/M2 | HEART RATE: 65 BPM | RESPIRATION RATE: 14 BRPM | DIASTOLIC BLOOD PRESSURE: 78 MMHG | OXYGEN SATURATION: 98 % | WEIGHT: 115 LBS | SYSTOLIC BLOOD PRESSURE: 136 MMHG

## 2025-03-12 NOTE — TELEPHONE ENCOUNTER
Patient has been added to the Talk Therapy wait list with a referral.    Insurance: Alma Johns  Insurance Type:    Commercial []   Medicaid [x]   [facility] (if applicable)   Medicare []  Location Preference: any  Provider Preference: any  Virtual: Yes [x] No []  Were outside resources sent: Yes [] No [x]

## 2025-03-12 NOTE — PATIENT INSTRUCTIONS
"Patient Education     Depression in adults   The Basics   Written by the doctors and editors at [facility]   What is depression? -- Depression is a disorder that makes you sad, but it is different from normal sadness. Depression can make it hard for you to work, study, or do everyday tasks.  What causes depression? -- Depression is caused by problems with chemicals in the brain called \"neurotransmitters.\" Some people might be more likely to have depression if it runs in their family. Other things might also play a role, including hormones, certain health problems, medicines, stress, being mistreated as a child, family problems, and problems with friends or at school or work.  How do I know if I am depressed? -- People with depression feel down most of the time for at least 2 weeks. They also have at least 1 of these 2 symptoms:   They no longer enjoy or care about doing the things that they used to like to do.   They feel sad, down, hopeless, or cranky most of the day, almost every day.  People with depression can also have other symptoms. Examples include:   Changes in your appetite or weight. You might eat too little or too much, or gain or lose weight without trying.   Sleeping too much or too little   Feeling tired or like you have no energy   Feeling guilty, helpless, or like you are worth nothing   Trouble with concentration or memory   Acting restless or have trouble staying still, or moving or speaking more slowly than normal   Repeated thoughts of death or killing yourself  If you think that you might be depressed, see your doctor or nurse. Only someone trained in mental health can tell for sure if you are depressed.  How is depression diagnosed? -- Your doctor or nurse will do a physical exam, ask you questions, and might order tests. Depression can have a big impact on your life. Luckily, depression can be treated, and the sooner treatment is started, the better it works.  Get help right away if you are " "thinking of hurting or killing yourself! -- If you ever feel like you might hurt yourself or someone else, help is available:   In the [place], contact the 360 Suicide & Crisis Lifeline:   To speak to someone, call or text 582.   To talk to someone online, go to www.Auth0.org/chat.   Call your doctor or nurse, and tell them it is urgent.   Call for an ambulance (in the [place] and [place], call 9-1-1).   Go to the emergency department at the nearest hospital.  What are the treatments for depression? -- Your doctor or nurse will work with you to make a treatment plan. Treatment can include:   Helping you learn more about depression   Counseling (with a psychiatrist, psychologist, nurse, or )   Medicines that relieve depression   Creating a plan to limit access to items that you might use to harm yourself   Other treatments that pass magnetic waves or electricity into the brain  In addition to treatment, getting regular physical activity can also help you feel better.  People with depression that is not too severe can get better by taking medicines or talking with a counselor. People with severe depression usually need medicines to get better, and might also need to see a counselor.  Another treatment involves placing a device against the scalp to pass magnetic waves into the brain. This is called \"transcranial magnetic stimulation\" (\"TMS\"). Doctors might suggest TMS if medicines and counseling have not helped.  Some people with severe depression might need a treatment called \"electroconvulsive therapy\" (\"ECT\"). During ECT, doctors pass an electric current through a person's brain in a safe way.  When will I feel better? -- Most treatment options take a little while to start working.   Many people who take medicines start to feel better within 2 weeks, but it might be 4 to 8 weeks before the medicine has its full effect.   Many people who see a counselor start to feel better within a few weeks, but it might " take 8 to 10 weeks to get the greatest benefit.  If the first treatment you try does not help you, tell your doctor or nurse, but do not give up. Some people need to try different treatments or combinations of treatments before they find an approach that works. Your doctor, nurse, or counselor can work with you to find the treatment that is right for you. They can also help you figure out how to cope while you search for the right treatment or are waiting for your treatment to start working.  How do I decide which treatment to have? -- You and your doctor or nurse will need to work together to choose a treatment for you. Medicines might work a little faster than counseling. But medicines can also cause side effects. Plus, some people do not like the idea of taking medicine.  Seeing a counselor involves talking about your feelings. That is also hard for some people.  What if I take medicine for depression and I want to have a baby? -- Some depression medicines can cause problems for an unborn baby. But having untreated depression during pregnancy can also cause problems. If you want to get pregnant, tell your doctor but do not stop taking your medicines. Together, you can plan the safest way for you to have your baby.  It's also important to talk with your doctor if you want to breastfeed after your baby is born. Breastfeeding has lots of benefits for both mother and baby. Some depression medicines are safer than others to use while breastfeeding. But having untreated depression after giving birth can also cause problems, so do not stop taking your medicines. Your doctor can work with you to plan the safest way for you to feed your baby.  All topics are updated as new evidence becomes available and our peer review process is complete.  This topic retrieved from [employer] on: Mar 06, 2024.  Topic 27999 Version 22.0  Release: 32.2.4 - C32.64  © 2024 UpToDate, Inc. and/or its affiliates. All rights reserved.  figure 1:  "Mood disorders caused by problems in the brain     Mooddisorders, such as depression and bipolar disorder, are caused by problems with\"neurotransmitters.\" These are chemicals in the brain that can affect your emotions.Treatments for mood disorders seem to work by changing the levels of certainneurotransmitters.  Graphic 52099 Version 4.0  Consumer Information Use and Disclaimer   Disclaimer: This generalized information is a limited summary of diagnosis, treatment, and/or medication information. It is not meant to be comprehensive and should be used as a tool to help the user understand and/or assess potential diagnostic and treatment options. It does NOT include all information about conditions, treatments, medications, side effects, or risks that may apply to a specific patient. It is not intended to be medical advice or a substitute for the medical advice, diagnosis, or treatment of a health care provider based on the health care provider's examination and assessment of a patient's specific and unique circumstances. Patients must speak with a health care provider for complete information about their health, medical questions, and treatment options, including any risks or benefits regarding use of medications. This information does not endorse any treatments or medications as safe, effective, or approved for treating a specific patient. UpToDate, Inc. and its affiliates disclaim any warranty or liability relating to this information or the use thereof.The use of this information is governed by the Terms of Use, available at https://www.Swagbucksuwer.com/en/know/clinical-effectiveness-terms. 2024© UpToDate, Inc. and its affiliates and/or licensors. All rights reserved.  Copyright   © 2024 UpToDate, Inc. and/or its affiliates. All rights reserved.    "

## 2025-03-12 NOTE — TELEPHONE ENCOUNTER
Contacted patient in regards to Routine Referral in attempts to verify patient's needs of services and add patient to proper wait list. LVM for patient to contact intake dept  in regards to routine referral at [phone]. 1st attempt.

## 2025-03-13 ENCOUNTER — HOSPITAL ENCOUNTER (OUTPATIENT)
Dept: MAMMOGRAPHY | Facility: MEDICAL CENTER | Age: 70
Discharge: HOME | End: 2025-03-13
Payer: COMMERCIAL

## 2025-03-13 VITALS — HEIGHT: 63 IN | BODY MASS INDEX: 20.38 KG/M2 | WEIGHT: 115 LBS

## 2025-03-13 DIAGNOSIS — Z12.31 ENCOUNTER FOR SCREENING MAMMOGRAM FOR BREAST CANCER: ICD-10-CM

## 2025-03-13 PROCEDURE — 77067 SCR MAMMO BI INCL CAD: CPT

## 2025-03-13 PROCEDURE — 77063 BREAST TOMOSYNTHESIS BI: CPT

## 2025-03-17 ENCOUNTER — RESULTS FOLLOW-UP (OUTPATIENT)
Dept: FAMILY MEDICINE CLINIC | Facility: CLINIC | Age: 70
End: 2025-03-17

## 2025-03-18 NOTE — TELEPHONE ENCOUNTER
Relayed results to patient as per provider message.     Mani Bahena Jr., MD  3/17/2025  6:22 PM EDT   Mammogram okay.  Repeat 1 year.    Patient expressed understanding and did not have any further questions.

## 2025-03-19 ENCOUNTER — VBI (OUTPATIENT)
Dept: ADMINISTRATIVE | Facility: OTHER | Age: 70
End: 2025-03-19

## 2025-03-19 NOTE — TELEPHONE ENCOUNTER
03/19/25 10:04 AM     Chart reviewed for Mammogram was/were submitted to the patient's insurance.     Yumi Michele MA   [facility]

## 2025-04-10 ENCOUNTER — VBI (OUTPATIENT)
Dept: ADMINISTRATIVE | Facility: OTHER | Age: 70
End: 2025-04-10

## 2025-04-10 NOTE — TELEPHONE ENCOUNTER
04/10/25 8:18 AM     Chart reviewed for CRC: Colonoscopy was/were not submitted to the patient's insurance.     Yumi Michele MA   [facility]

## 2025-04-28 ENCOUNTER — TELEPHONE (OUTPATIENT)
Age: 70
End: 2025-04-28

## 2025-04-29 ENCOUNTER — OFFICE VISIT (OUTPATIENT)
Dept: FAMILY MEDICINE CLINIC | Facility: CLINIC | Age: 70
End: 2025-04-29
Payer: COMMERCIAL

## 2025-04-29 VITALS
BODY MASS INDEX: 20.37 KG/M2 | HEART RATE: 64 BPM | SYSTOLIC BLOOD PRESSURE: 140 MMHG | WEIGHT: 115 LBS | DIASTOLIC BLOOD PRESSURE: 76 MMHG | TEMPERATURE: 97.5 F | OXYGEN SATURATION: 98 %

## 2025-04-29 DIAGNOSIS — K59.01 SLOW TRANSIT CONSTIPATION: ICD-10-CM

## 2025-04-29 DIAGNOSIS — N39.41 URGE INCONTINENCE OF URINE: Primary | ICD-10-CM

## 2025-04-29 DIAGNOSIS — M05.79 RHEUMATOID ARTHRITIS INVOLVING MULTIPLE SITES WITH POSITIVE RHEUMATOID FACTOR (HCC): ICD-10-CM

## 2025-04-29 DIAGNOSIS — Z79.899 IMMUNODEFICIENCY DUE TO LONG TERM DRUG THERAPY  (HCC): ICD-10-CM

## 2025-04-29 DIAGNOSIS — G50.0 LEFT-SIDED TRIGEMINAL NEURALGIA: ICD-10-CM

## 2025-04-29 DIAGNOSIS — D84.821 IMMUNODEFICIENCY DUE TO LONG TERM DRUG THERAPY  (HCC): ICD-10-CM

## 2025-04-29 LAB
BACTERIA UR QL AUTO: ABNORMAL /HPF
BILIRUB UR QL STRIP: NEGATIVE
CLARITY UR: ABNORMAL
COLOR UR: YELLOW
GLUCOSE UR STRIP-MCNC: NEGATIVE MG/DL
HGB UR QL STRIP.AUTO: ABNORMAL
HYALINE CASTS #/AREA URNS LPF: ABNORMAL /LPF
KETONES UR STRIP-MCNC: NEGATIVE MG/DL
LEUKOCYTE ESTERASE UR QL STRIP: ABNORMAL
MUCOUS THREADS UR QL AUTO: ABNORMAL
NITRITE UR QL STRIP: NEGATIVE
NON-SQ EPI CELLS URNS QL MICRO: ABNORMAL /HPF
PH UR STRIP.AUTO: 6.5 [PH]
PROT UR STRIP-MCNC: ABNORMAL MG/DL
RBC #/AREA URNS AUTO: ABNORMAL /HPF
SL AMB  POCT GLUCOSE, UA: ABNORMAL
SL AMB LEUKOCYTE ESTERASE,UA: ABNORMAL
SL AMB POCT BILIRUBIN,UA: ABNORMAL
SL AMB POCT BLOOD,UA: ABNORMAL
SL AMB POCT CLARITY,UA: ABNORMAL
SL AMB POCT COLOR,UA: YELLOW
SL AMB POCT KETONES,UA: ABNORMAL
SL AMB POCT NITRITE,UA: 0.2
SL AMB POCT PH,UA: 5
SL AMB POCT SPECIFIC GRAVITY,UA: 1010
SL AMB POCT URINE PROTEIN: 15
SL AMB POCT UROBILINOGEN: ABNORMAL
SP GR UR STRIP.AUTO: 1.01 (ref 1–1.03)
UROBILINOGEN UR STRIP-ACNC: <2 MG/DL
WBC #/AREA URNS AUTO: ABNORMAL /HPF

## 2025-04-29 PROCEDURE — 81001 URINALYSIS AUTO W/SCOPE: CPT | Performed by: FAMILY MEDICINE

## 2025-04-29 PROCEDURE — 87086 URINE CULTURE/COLONY COUNT: CPT | Performed by: FAMILY MEDICINE

## 2025-04-29 PROCEDURE — 81002 URINALYSIS NONAUTO W/O SCOPE: CPT | Performed by: FAMILY MEDICINE

## 2025-04-29 PROCEDURE — G2211 COMPLEX E/M VISIT ADD ON: HCPCS | Performed by: FAMILY MEDICINE

## 2025-04-29 PROCEDURE — 99214 OFFICE O/P EST MOD 30 MIN: CPT | Performed by: FAMILY MEDICINE

## 2025-04-29 RX ORDER — CEPHALEXIN 500 MG/1
500 CAPSULE ORAL EVERY 8 HOURS SCHEDULED
Qty: 21 CAPSULE | Refills: 0 | Status: SHIPPED | OUTPATIENT
Start: 2025-04-29 | End: 2025-05-06

## 2025-04-29 NOTE — ASSESSMENT & PLAN NOTE
She has some chronic urge incontinence of urine.  Check UA as well as C&S to rule out infection.  I placed her on Keflex however waiting for this as his urine dip did show some leukocytes and red cells.  Continue close follow-up with dermatology  Orders:    POCT urine dip    UA (URINE) with reflex to Scope    Urine culture    cephalexin (KEFLEX) 500 mg capsule; Take 1 capsule (500 mg total) by mouth every 8 (eight) hours for 7 days    Ambulatory Referral to Urology; Future    US kidney and bladder with pvr; Future

## 2025-04-29 NOTE — PROGRESS NOTES
Name: Aracelis Devi      : 1955      MRN: [id number]  Encounter Provider: Mani Crump Jr, MD  Encounter Date: 2025   Encounter department: [facility]IMARY [facility]  :  Assessment & Plan  Urge incontinence of urine  She has some chronic urge incontinence of urine.  Check UA as well as C&S to rule out infection.  I placed her on Keflex however waiting for this as his urine dip did show some leukocytes and red cells.  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       Left-sided trigeminal neuralgia  Symptoms are stable at this time.                History of Present Illness   HPI patient presents today complaining of some ongoing incontinence.  She notes she will often have a sensation of needing to void and then have urinary incontinence.  She often also gets incontinence when standing abruptly.  She denies dysuria or hematuria currently.  She has had no fever or chills.  Denies flank pain.  Review of Systems   Constitutional:  Negative for appetite change, chills, fatigue, fever and unexpected weight change.   HENT:  Negative for trouble swallowing.    Eyes:  Negative for visual disturbance.   Respiratory:  Negative for cough, chest tightness, shortness of breath and wheezing.    Cardiovascular:  Negative for chest pain, palpitations and leg swelling.   Gastrointestinal:  Negative for abdominal distention, abdominal pain, blood in stool, constipation and diarrhea.   Endocrine: Negative for polyuria.   Genitourinary:  Positive for  frequency and urgency. Negative for difficulty urinating, dysuria, flank pain and hematuria.   Musculoskeletal:  Negative for arthralgias and myalgias.   Skin:  Negative for rash.   Neurological:  Negative for dizziness and light-headedness.   Hematological:  Negative for adenopathy. Does not bruise/bleed easily.   Psychiatric/Behavioral:  Negative for dysphoric mood and sleep disturbance. The patient is not nervous/anxious.        Objective   /76   Pulse 64   Temp 97.5 °F (36.4 °C)   Wt 52.2 kg (115 lb)   SpO2 98%   BMI 20.37 kg/m²      Physical Exam  Constitutional:       General: She is not in acute distress.     Appearance: She is well-developed. She is not diaphoretic.   HENT:      Head: Normocephalic.      Right Ear: External ear normal.      Left Ear: External ear normal.      Nose: Nose normal.   Eyes:      General: No scleral icterus.        Right eye: No discharge.         Left eye: No discharge.      Conjunctiva/sclera: Conjunctivae normal.      Pupils: Pupils are equal, round, and reactive to light.   Neck:      Thyroid: No thyromegaly.      Trachea: No tracheal deviation.   Cardiovascular:      Rate and Rhythm: Normal rate and regular rhythm.      Heart sounds: Normal heart sounds. No murmur heard.  Pulmonary:      Effort: Pulmonary effort is normal. No respiratory distress.      Breath sounds: Normal breath sounds. No stridor. No wheezing, rhonchi or rales.   Abdominal:      General: Bowel sounds are normal. There is no distension.      Palpations: Abdomen is soft.      Tenderness: There is no abdominal tenderness. There is no guarding.   Musculoskeletal:         General: No tenderness or deformity. Normal range of motion.      Right lower leg: No edema.      Left lower leg: No edema.   Lymphadenopathy:      Cervical: No cervical adenopathy.   Skin:     General: Skin is warm.      Coloration: Skin is not pale.      Findings: No erythema or rash.   Neurological:      Cranial Nerves: No  cranial nerve deficit.      Coordination: Coordination normal.   Psychiatric:         Mood and Affect: Mood normal.         Behavior: Behavior normal.         Thought Content: Thought content normal.

## 2025-04-30 ENCOUNTER — RESULTS FOLLOW-UP (OUTPATIENT)
Dept: FAMILY MEDICINE CLINIC | Facility: CLINIC | Age: 70
End: 2025-04-30

## 2025-04-30 DIAGNOSIS — R93.41 URETER FILLING DEFECT: Primary | ICD-10-CM

## 2025-04-30 LAB — BACTERIA UR CULT: NORMAL

## 2025-05-01 ENCOUNTER — TELEPHONE (OUTPATIENT)
Age: 70
End: 2025-05-01

## 2025-05-01 NOTE — TELEPHONE ENCOUNTER
:     New Patient     Appointment Scheduling:  What office location does the patient prefer?:[facility]   What is the reason for the patient's appointment?:  N39.41 (ICD-10-CM) - Urge incontinence of urine       Have patient records been requested?:  If No, are the records showing in Epic:   Appointment Details: Date 5/21/25   Time: 3   Location: [facility]  Provider:   boris  Does the appointment need further review? (Reason)        HISTORY:   Is the patient having active symptoms? If so, describe symptoms:  N39.41 (ICD-10-CM) - Urge incontinence of urine       Has the patient had any previous Urologist(s)?:no  Was the patient seen in the ED?:  Has the patient had any outside testing done?:  Does patient have Imaging/Lab Results:no  Does the patient have a personal history of any cancer?:no      INSURANCE:   Have you confirmed Patient's insurance? Yes  Is the insurance accepted?  yes  Is the insurance active? yes

## 2025-05-13 NOTE — PROGRESS NOTES
5/14/2025      Chief Complaint   Patient presents with    New Patient Visit    Urinary Incontinence     Assessment and Plan    70 y.o. female managed by New patient       1. Urge incontinence of urine  Assessment & Plan:    Urge incontinence   Ongoing for about a year now  UA-moderate leukocytes and blood  Plan to send for micro culture   Pvr- 21    renal with PVR scheduled on 5/20  Reviewed bladder irritants and adequate hydration  Encouraged dietary modifications   Plan to trial Myrbetriq 25 mg  Patient aware to call if medication is too expensive  Can trial trospium then  Plan to follow-up in 2 to 3 months for med recheck  Orders:  -     POCT urine dip  -     POCT Measure PVR  -     Mirabegron ER 25 MG TB24; Take 25 mg by mouth in the morning          History of Present Illness  Aracelis Devi is a 70 y.o. female here for evaluation of incontinence.  Patient reports symptoms been ongoing for a year.  She additionally has frequency.  She denies dysuria, flank pain or gross hematuria.  No history of recurrent urinary tract infections or nephrolithiasis.  She was recently treated with Keflex for 7 days by her PCP.  Urine culture showed mixed contaminants.  Symptoms ongoing after treatment.    She reports mainly drinking tea and soda throughout the day.  She reports 12 ounces of tea and 2 large cups of soda.  She is not that into water.    No concern for prolapse     Smoking hx- 5-6 year- 1.5 pack quit- 42 years ago       Review of Systems   Constitutional:  Negative for chills and fever.   HENT:  Negative for ear pain and sore throat.    Eyes:  Negative for pain and visual disturbance.   Respiratory:  Negative for cough and shortness of breath.    Cardiovascular:  Negative for chest pain and palpitations.   Gastrointestinal:  Negative for abdominal pain and vomiting.   Genitourinary:  Positive for frequency and urgency. Negative for difficulty urinating, dysuria and hematuria.   Musculoskeletal:  Negative for  "arthralgias and back pain.   Skin:  Negative for color change and rash.   Neurological:  Negative for seizures and syncope.   All other systems reviewed and are negative.               Vitals  Vitals:    25 1048   BP: 146/80   BP Location: Left arm   Patient Position: Sitting   Cuff Size: Adult   Pulse: 70   SpO2: 97%   Weight: 53.5 kg (118 lb)   Height: 5' 3\" (1.6 m)       Physical Exam  Constitutional:       Appearance: Normal appearance.   HENT:      Head: Normocephalic and atraumatic.   Pulmonary:      Effort: Pulmonary effort is normal.     Musculoskeletal:         General: Normal range of motion.      Cervical back: Normal range of motion.     Neurological:      General: No focal deficit present.      Mental Status: She is alert and oriented to person, place, and time. Mental status is at baseline.     Psychiatric:         Mood and Affect: Mood normal.         Behavior: Behavior normal.         Thought Content: Thought content normal.           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    Wears dentures     full upper    Wears glasses     reading     Social History     Socioeconomic History    Marital status: /Civil Union     Spouse name: None    Number of children: None    Years of education: None    Highest education level: None   Occupational History    None   Tobacco Use    Smoking status: Former     Current packs/day: 0.00     Types: Cigarettes     Quit date:      Years since quittin.3    Smokeless tobacco: Never   Vaping Use    Vaping status: Never Used   Substance and Sexual Activity    Alcohol use: Yes     Comment: rare, maybe on holidays    Drug use: No    Sexual activity: Not Currently     Comment: defer "   Other Topics Concern    None   Social History Narrative    None     Social Drivers of Health     Financial Resource Strain: Low Risk  (2023)    Overall Financial Resource Strain (CARDIA)     Difficulty of Paying Living Expenses: Not hard at all   Food Insecurity: No Food Insecurity (3/11/2025)    Nursing - Inadequate Food Risk Classification     Worried About Running Out of Food in the Last Year: Never true     Ran Out of Food in the Last Year: Never true     Ran Out of Food in the Last Year: Not on file   Transportation Needs: No Transportation Needs (3/11/2025)    PRAPARE - Transportation     Lack of Transportation (Medical): No     Lack of Transportation (Non-Medical): No   Physical Activity: Not on file   Stress: Not on file   Social Connections: Unknown (2024)    Received from [employer]    Social Qloud     How often do you feel lonely or isolated from those around you? (Adult - for ages 18 years and over): Not on file   Intimate Partner Violence: Not on file   Housing Stability: Low Risk  (3/11/2025)    Housing Stability Vital Sign     Unable to Pay for Housing in the Last Year: No     Number of Times Moved in the Last Year: 1     Homeless in the Last Year: No     Social History     Tobacco Use   Smoking Status Former    Current packs/day: 0.00    Types: Cigarettes    Quit date:     Years since quittin.3   Smokeless Tobacco Never     Family History   Problem Relation Age of Onset    Seizures Mother         epilepsy    Bone cancer Father     No Known Problems Sister     Thyroid disease Sister     No Known Problems Sister     No Known Problems Maternal Grandmother     No Known Problems Maternal Grandfather     No Known Problems Paternal Grandmother     No Known Problems Paternal Grandfather     No Known Problems Brother     No Known Problems Brother     No Known Problems Son     Breast cancer Neg Hx        The following portions of the patient's history were reviewed and updated as  "appropriate: allergies, current medications, past medical history, past social history, past surgical history and problem list.    Results  Recent Results (from the past hour)   POCT Measure PVR    Collection Time: 05/14/25 10:52 AM   Result Value Ref Range    POST-VOID RESIDUAL VOLUME, ML POC 21 mL   POCT urine dip    Collection Time: 05/14/25 11:04 AM   Result Value Ref Range    LEUKOCYTE ESTERASE,UA ++     NITRITE,UA -     SL AMB POCT UROBILINOGEN 0.2     POCT URINE PROTEIN -      PH,UA 6.0     BLOOD,UA ++     SPECIFIC GRAVITY,UA 1.005     KETONES,UA -     BILIRUBIN,UA -     GLUCOSE, UA -      COLOR,UA yellow     CLARITY,UA clear    ]  No results found for: \"PSA\"  Lab Results   Component Value Date    GLUCOSE 119 03/09/2015    CALCIUM 9.1 02/05/2025     03/09/2015    K 3.7 02/05/2025    CO2 30 02/05/2025     02/05/2025    BUN 8 02/05/2025    CREATININE 0.75 02/05/2025     Lab Results   Component Value Date    WBC 6.83 02/05/2025    HGB 12.8 02/05/2025    HCT 40.1 02/05/2025    MCV 94 02/05/2025     02/05/2025       "

## 2025-05-14 ENCOUNTER — CONSULT (OUTPATIENT)
Dept: UROLOGY | Facility: CLINIC | Age: 70
End: 2025-05-14
Attending: FAMILY MEDICINE
Payer: COMMERCIAL

## 2025-05-14 VITALS
DIASTOLIC BLOOD PRESSURE: 80 MMHG | HEIGHT: 63 IN | BODY MASS INDEX: 20.91 KG/M2 | WEIGHT: 118 LBS | HEART RATE: 70 BPM | OXYGEN SATURATION: 97 % | SYSTOLIC BLOOD PRESSURE: 146 MMHG

## 2025-05-14 DIAGNOSIS — N39.41 URGE INCONTINENCE OF URINE: Primary | ICD-10-CM

## 2025-05-14 LAB
AMORPH URATE CRY URNS QL MICRO: ABNORMAL
BACTERIA UR QL AUTO: ABNORMAL /HPF
BILIRUB UR QL STRIP: NEGATIVE
CLARITY UR: CLEAR
COLOR UR: ABNORMAL
GLUCOSE UR STRIP-MCNC: NEGATIVE MG/DL
HGB UR QL STRIP.AUTO: NEGATIVE
KETONES UR STRIP-MCNC: NEGATIVE MG/DL
LEUKOCYTE ESTERASE UR QL STRIP: ABNORMAL
NITRITE UR QL STRIP: NEGATIVE
NON-SQ EPI CELLS URNS QL MICRO: ABNORMAL /HPF
PH UR STRIP.AUTO: 6.5 [PH]
POST-VOID RESIDUAL VOLUME, ML POC: 21 ML
PROT UR STRIP-MCNC: NEGATIVE MG/DL
RBC #/AREA URNS AUTO: ABNORMAL /HPF
SL AMB  POCT GLUCOSE, UA: ABNORMAL
SL AMB LEUKOCYTE ESTERASE,UA: ABNORMAL
SL AMB POCT BILIRUBIN,UA: ABNORMAL
SL AMB POCT BLOOD,UA: ABNORMAL
SL AMB POCT CLARITY,UA: CLEAR
SL AMB POCT COLOR,UA: YELLOW
SL AMB POCT KETONES,UA: ABNORMAL
SL AMB POCT NITRITE,UA: ABNORMAL
SL AMB POCT PH,UA: 6
SL AMB POCT SPECIFIC GRAVITY,UA: 1
SL AMB POCT URINE PROTEIN: ABNORMAL
SL AMB POCT UROBILINOGEN: 0.2
SP GR UR STRIP.AUTO: 1.01 (ref 1–1.03)
UROBILINOGEN UR STRIP-ACNC: <2 MG/DL
WBC #/AREA URNS AUTO: ABNORMAL /HPF

## 2025-05-14 PROCEDURE — 87086 URINE CULTURE/COLONY COUNT: CPT

## 2025-05-14 PROCEDURE — 99203 OFFICE O/P NEW LOW 30 MIN: CPT

## 2025-05-14 PROCEDURE — 51798 US URINE CAPACITY MEASURE: CPT

## 2025-05-14 PROCEDURE — 81002 URINALYSIS NONAUTO W/O SCOPE: CPT

## 2025-05-14 PROCEDURE — 81001 URINALYSIS AUTO W/SCOPE: CPT

## 2025-05-14 RX ORDER — MIRABEGRON 25 MG/1
25 TABLET, FILM COATED, EXTENDED RELEASE ORAL DAILY
Qty: 90 TABLET | Refills: 3 | Status: SHIPPED | OUTPATIENT
Start: 2025-05-14 | End: 2025-05-14

## 2025-05-14 RX ORDER — MIRABEGRON 25 MG/1
25 TABLET, FILM COATED, EXTENDED RELEASE ORAL DAILY
Qty: 90 TABLET | Refills: 3 | Status: SHIPPED | OUTPATIENT
Start: 2025-05-14

## 2025-05-14 NOTE — ASSESSMENT & PLAN NOTE
Urge incontinence   Ongoing for about a year now  UA-moderate leukocytes and blood  Plan to send for micro culture   Pvr- 21   US renal with PVR scheduled on 5/20  Reviewed bladder irritants and adequate hydration  Encouraged dietary modifications   Plan to trial Myrbetriq 25 mg  Patient aware to call if medication is too expensive  Can trial trospium then  Plan to follow-up in 2 to 3 months for med recheck

## 2025-05-16 ENCOUNTER — RESULTS FOLLOW-UP (OUTPATIENT)
Dept: UROLOGY | Facility: MEDICAL CENTER | Age: 70
End: 2025-05-16

## 2025-05-16 LAB — BACTERIA UR CULT: NORMAL

## 2025-05-20 ENCOUNTER — HOSPITAL ENCOUNTER (OUTPATIENT)
Dept: ULTRASOUND IMAGING | Facility: MEDICAL CENTER | Age: 70
Discharge: HOME | End: 2025-05-20
Attending: FAMILY MEDICINE
Payer: COMMERCIAL

## 2025-05-20 DIAGNOSIS — N39.41 URGE INCONTINENCE OF URINE: ICD-10-CM

## 2025-05-20 PROCEDURE — 76770 US EXAM ABDO BACK WALL COMP: CPT

## 2025-05-28 ENCOUNTER — TELEPHONE (OUTPATIENT)
Age: 70
End: 2025-05-28

## 2025-05-28 NOTE — TELEPHONE ENCOUNTER
Reading room called about significant findings on ultrasound of the kidney and bladder from 5/20.  Called clerical to advise. No answer

## 2025-05-31 DIAGNOSIS — G50.0 LEFT-SIDED TRIGEMINAL NEURALGIA: ICD-10-CM

## 2025-06-02 RX ORDER — GABAPENTIN 100 MG/1
100 CAPSULE ORAL DAILY
Qty: 90 CAPSULE | Refills: 1 | Status: SHIPPED | OUTPATIENT
Start: 2025-06-02

## 2025-06-03 ENCOUNTER — HOSPITAL ENCOUNTER (OUTPATIENT)
Dept: CT IMAGING | Facility: HOSPITAL | Age: 70
Discharge: HOME | End: 2025-06-03
Attending: FAMILY MEDICINE
Payer: COMMERCIAL

## 2025-06-03 DIAGNOSIS — R93.41 URETER FILLING DEFECT: ICD-10-CM

## 2025-06-03 PROCEDURE — 74176 CT ABD & PELVIS W/O CONTRAST: CPT

## 2025-06-04 ENCOUNTER — RESULTS FOLLOW-UP (OUTPATIENT)
Dept: FAMILY MEDICINE CLINIC | Facility: CLINIC | Age: 70
End: 2025-06-04

## 2025-06-04 DIAGNOSIS — D73.89 SPLENIC LESION: Primary | ICD-10-CM

## 2025-06-12 DIAGNOSIS — F41.9 ANXIETY DISORDER, UNSPECIFIED TYPE: ICD-10-CM

## 2025-06-12 DIAGNOSIS — F32.1 CURRENT MODERATE EPISODE OF MAJOR DEPRESSIVE DISORDER WITHOUT PRIOR EPISODE (HCC): ICD-10-CM

## 2025-06-12 RX ORDER — LORAZEPAM 0.5 MG/1
0.5 TABLET ORAL EVERY 8 HOURS PRN
Qty: 20 TABLET | Refills: 0 | Status: SHIPPED | OUTPATIENT
Start: 2025-06-12

## 2025-06-12 NOTE — TELEPHONE ENCOUNTER
Medication: LORazepam (Ativan) 0.5 mg tablet     Dose/Frequency: Take 1 tablet (0.5 mg total) by mouth every 8 (eight) hours as needed for anxiety     Quantity: 20 tablet (7 day supply)     Pharmacy: [facility] [address] [place] [address]  Phone: [phone]  Fax: [phone]     Office:   [x] PCP/Provider - Mani Bahena Jr., MD   [] Speciality/Provider -     Does the patient have enough for 3 days?   [] Yes   [x] No - Send as HP to POD

## 2025-06-13 ENCOUNTER — TELEPHONE (OUTPATIENT)
Dept: FAMILY MEDICINE CLINIC | Facility: CLINIC | Age: 70
End: 2025-06-13

## 2025-06-13 ENCOUNTER — TELEPHONE (OUTPATIENT)
Age: 70
End: 2025-06-13

## 2025-06-13 NOTE — TELEPHONE ENCOUNTER
Pt called in stating a lady name Amy EMRE advising her to review test results. Informed I do not see any notes from [facility]. CT scan results were reviewed. US scheduled 6/17. Pt reports she ir frequently urinating.  Please let the patient know that her kidneys look okay.  They did see a small lesion on her spleen which does not look overly concerning but I have ordered a splenic ultrasound to make sure of this.  She should set this up nonurgently.

## 2025-06-13 NOTE — TELEPHONE ENCOUNTER
Pt stated she was returning a call for results.  Unsure what results she was looking for.  Stated Amy left the message.    Warm transferred to [facility] to see what she was looking for.

## 2025-06-17 ENCOUNTER — HOSPITAL ENCOUNTER (OUTPATIENT)
Dept: ULTRASOUND IMAGING | Facility: MEDICAL CENTER | Age: 70
Discharge: HOME | End: 2025-06-17
Attending: FAMILY MEDICINE
Payer: COMMERCIAL

## 2025-06-17 DIAGNOSIS — D73.89 SPLENIC LESION: ICD-10-CM

## 2025-06-17 PROCEDURE — 76705 ECHO EXAM OF ABDOMEN: CPT

## 2025-06-19 ENCOUNTER — TELEPHONE (OUTPATIENT)
Dept: NEUROLOGY | Facility: CLINIC | Age: 70
End: 2025-06-19

## 2025-06-19 NOTE — TELEPHONE ENCOUNTER
Spoke with patient to confirm patient's appointment on 6/23  with Alessandra.  Confirmed time, date, location.

## 2025-06-23 ENCOUNTER — OFFICE VISIT (OUTPATIENT)
Dept: NEUROLOGY | Facility: CLINIC | Age: 70
End: 2025-06-23
Payer: COMMERCIAL

## 2025-06-23 VITALS
TEMPERATURE: 98 F | OXYGEN SATURATION: 94 % | SYSTOLIC BLOOD PRESSURE: 126 MMHG | DIASTOLIC BLOOD PRESSURE: 86 MMHG | WEIGHT: 120 LBS | HEART RATE: 70 BPM | HEIGHT: 63 IN | BODY MASS INDEX: 21.26 KG/M2

## 2025-06-23 DIAGNOSIS — G50.0 LEFT-SIDED TRIGEMINAL NEURALGIA: Primary | ICD-10-CM

## 2025-06-23 PROCEDURE — 99213 OFFICE O/P EST LOW 20 MIN: CPT

## 2025-06-23 NOTE — ASSESSMENT & PLAN NOTE
Aracelis Devi is a 70 year old female with left V2 trigeminal neuralgia maintained on Gabapentin 100 mg daily. She states her pain is well controlled at this time. She denies any adverse effects. She is not interested in increasing her Gabapentin any further at this time and does not wish to follow up with neurosurgery to discuss vascular decompression. She will continue Gabapentin 100 mg qd and will follow up in 6 months; sooner if needed.

## 2025-06-23 NOTE — PROGRESS NOTES
Name: Aracelis Devi      : 1955      MRN: [id number]  Encounter Provider: MANSI Ford  Encounter Date: 2025   Encounter department: [facility] [place]  :  Assessment & Plan  Left-sided trigeminal neuralgia  Aracelis Devi is a 70 year old female with left V2 trigeminal neuralgia maintained on Gabapentin 100 mg daily. She states her pain is well controlled at this time. She denies any adverse effects. She is not interested in increasing her Gabapentin any further at this time and does not wish to follow up with neurosurgery to discuss vascular decompression. She will continue Gabapentin 100 mg qd and will follow up in 6 months; sooner if needed.           Patient Instructions   - Continue Gabapentin 100 mg daily   - Monitor for drowsiness or dizziness   - Follow up in 6 months      History of Present Illness     HPI Aracelis Devi is a 70 year old female with left V2 trigeminal neuralgia who presents for follow up. She was last seen 2024 at that time she was having split second pain 1-2 times per day while on Gabapentin 100 mg qd. She was not interested in increasing her dose any further.    Since she was last seen she did see Neurosurgery 24 who recommended a trial of Oxcarbazepine, and if ineffective then consideration for microvascular decompression. She did not tolerate trial of Oxcarbazepine and developed anxiety, abdominal pain, and imbalance. She therefore discontinued this and resumed Gabapentin. She returns to the office today and states she is taking Gabapentin 100 mg once a day in the morning. She denies any side effects. She reports pain occurs once in a while for a split second, otherwise not at all. She does not wish to follow up with Neurosurgery at this time. She states the pain is tolerable and she does not wish to increase her Gabapentin any further.      MRI Brain/Trigem 2024:  Advanced white matter change within the periventricular  "aspect of the cerebral hemispheres bilaterally and within the brainstem consistent with chronic microangiopathic change. No mass, hemorrhage or signs of acute territorial infarction. Dedicated imaging of the brainstem demonstrates the left superior cerebellar artery in close proximity to the cisternal portion of the left trigeminal nerve. Correlate for signs of vascular compression.     Review of Systems   Constitutional:  Negative for appetite change, fatigue and fever.   HENT: Negative.  Negative for hearing loss, tinnitus, trouble swallowing and voice change.    Eyes: Negative.  Negative for photophobia, pain and visual disturbance.   Respiratory: Negative.  Negative for shortness of breath.    Cardiovascular: Negative.  Negative for palpitations.   Gastrointestinal: Negative.  Negative for nausea and vomiting.   Endocrine: Negative.  Negative for cold intolerance.   Genitourinary: Negative.  Negative for dysuria, frequency and urgency.   Musculoskeletal:  Negative for back pain, gait problem, myalgias, neck pain and neck stiffness.   Skin: Negative.  Negative for rash.   Allergic/Immunologic: Negative.    Neurological:  Negative for dizziness, tremors, seizures, syncope, facial asymmetry, speech difficulty, weakness, light-headedness, numbness and headaches.   Hematological: Negative.  Does not bruise/bleed easily.   Psychiatric/Behavioral: Negative.  Negative for confusion, hallucinations and sleep disturbance.    All other systems reviewed and are negative.    I have personally reviewed the MA's review of systems and made changes as necessary.    Medications Ordered Prior to Encounter[1]      Objective   /86 (BP Location: Left arm, Patient Position: Sitting, Cuff Size: Standard)   Pulse 70   Temp 98 °F (36.7 °C) (Temporal)   Ht 5' 3\" (1.6 m)   Wt 54.4 kg (120 lb)   SpO2 94%   BMI 21.26 kg/m²     Neurological Exam  On neurological examination patient is alert, awake, oriented and in no distress. " Speech is fluent without dysarthria or aphasia. She is able to rise easily without assistance from a seated position. Casual gait is normal including stance, stride, and arm swing.      Administrative Statements   I have spent a total time of 20 minutes in caring for this patient on the day of the visit/encounter including Prognosis, Risks and benefits of tx options, Instructions for management, Patient and family education, Importance of tx compliance, Risk factor reductions, Impressions, Counseling / Coordination of care, Documenting in the medical record, Reviewing/placing orders in the medical record (including tests, medications, and/or procedures), and Obtaining or reviewing history  .         [1]   Current Outpatient Medications on File Prior to Visit   Medication Sig Dispense Refill    amoxicillin (AMOXIL) 500 mg capsule Take 2,000 mg by mouth if needed 2000 mg prior to dental work.      celecoxib (CeleBREX) 200 mg capsule Take 200 mg by mouth in the morning.      folic acid (FOLVITE) 1 mg tablet Take 1 tablet (1 mg total) by mouth daily Start to take 30 days prior to surgery 30 tablet 1    gabapentin (NEURONTIN) 100 mg capsule Take 1 capsule (100 mg total) by mouth daily 90 capsule 1    LORazepam (Ativan) 0.5 mg tablet Take 1 tablet (0.5 mg total) by mouth every 8 (eight) hours as needed for anxiety 20 tablet 0    methotrexate 2.5 MG tablet       Mirabegron ER 25 MG TB24 Take 25 mg by mouth in the morning 90 tablet 3    Multiple Vitamin (multivitamin) tablet Take 1 tablet by mouth daily Start to take 30 days prior to surgery 30 tablet 1    PARoxetine (PAXIL) 10 mg tablet Take 1 tablet (10 mg total) by mouth daily 90 tablet 3    predniSONE 5 mg tablet Take 5 mg by mouth in the morning.       No current facility-administered medications on file prior to visit.

## 2025-06-23 NOTE — PATIENT INSTRUCTIONS
- Continue Gabapentin 100 mg daily   - Monitor for drowsiness or dizziness   - Follow up in 6 months

## 2025-06-24 ENCOUNTER — TELEPHONE (OUTPATIENT)
Age: 70
End: 2025-06-24

## 2025-06-25 NOTE — TELEPHONE ENCOUNTER
Called US and they stated they will have it read hopefully by today. They are behind but will try to have it done by today. Tried to call meg no answer but did leave  for her letting her know as well

## 2025-06-27 ENCOUNTER — OFFICE VISIT (OUTPATIENT)
Dept: FAMILY MEDICINE CLINIC | Facility: CLINIC | Age: 70
End: 2025-06-27
Payer: COMMERCIAL

## 2025-06-27 VITALS
HEART RATE: 60 BPM | SYSTOLIC BLOOD PRESSURE: 132 MMHG | OXYGEN SATURATION: 96 % | WEIGHT: 120 LBS | BODY MASS INDEX: 21.26 KG/M2 | DIASTOLIC BLOOD PRESSURE: 80 MMHG

## 2025-06-27 DIAGNOSIS — R39.9 UTI SYMPTOMS: Primary | ICD-10-CM

## 2025-06-27 DIAGNOSIS — N39.41 URGE INCONTINENCE OF URINE: ICD-10-CM

## 2025-06-27 DIAGNOSIS — D84.821 IMMUNODEFICIENCY DUE TO LONG TERM DRUG THERAPY  (HCC): ICD-10-CM

## 2025-06-27 DIAGNOSIS — M05.79 RHEUMATOID ARTHRITIS INVOLVING MULTIPLE SITES WITH POSITIVE RHEUMATOID FACTOR (HCC): ICD-10-CM

## 2025-06-27 DIAGNOSIS — M81.0 AGE-RELATED OSTEOPOROSIS WITHOUT CURRENT PATHOLOGICAL FRACTURE: ICD-10-CM

## 2025-06-27 DIAGNOSIS — R39.15 URGENCY OF URINATION: ICD-10-CM

## 2025-06-27 DIAGNOSIS — F32.1 CURRENT MODERATE EPISODE OF MAJOR DEPRESSIVE DISORDER WITHOUT PRIOR EPISODE (HCC): ICD-10-CM

## 2025-06-27 DIAGNOSIS — Z79.899 IMMUNODEFICIENCY DUE TO LONG TERM DRUG THERAPY  (HCC): ICD-10-CM

## 2025-06-27 DIAGNOSIS — R35.0 FREQUENCY OF URINATION: ICD-10-CM

## 2025-06-27 LAB
BACTERIA UR QL AUTO: ABNORMAL /HPF
BILIRUB UR QL STRIP: NEGATIVE
CLARITY UR: CLEAR
COLOR UR: ABNORMAL
GLUCOSE UR STRIP-MCNC: NEGATIVE MG/DL
HGB UR QL STRIP.AUTO: NEGATIVE
KETONES UR STRIP-MCNC: NEGATIVE MG/DL
LEUKOCYTE ESTERASE UR QL STRIP: ABNORMAL
MUCOUS THREADS UR QL AUTO: ABNORMAL
NITRITE UR QL STRIP: NEGATIVE
NON-SQ EPI CELLS URNS QL MICRO: ABNORMAL /HPF
PH UR STRIP.AUTO: 6 [PH]
PROT UR STRIP-MCNC: NEGATIVE MG/DL
RBC #/AREA URNS AUTO: ABNORMAL /HPF
SL AMB  POCT GLUCOSE, UA: NEGATIVE
SL AMB LEUKOCYTE ESTERASE,UA: ABNORMAL
SL AMB POCT BILIRUBIN,UA: ABNORMAL
SL AMB POCT BLOOD,UA: 50
SL AMB POCT CLARITY,UA: CLEAR
SL AMB POCT COLOR,UA: ABNORMAL
SL AMB POCT KETONES,UA: ABNORMAL
SL AMB POCT NITRITE,UA: NEGATIVE
SL AMB POCT PH,UA: 5
SL AMB POCT SPECIFIC GRAVITY,UA: 1.01
SL AMB POCT URINE PROTEIN: ABNORMAL
SL AMB POCT UROBILINOGEN: ABNORMAL
SP GR UR STRIP.AUTO: 1.01 (ref 1–1.03)
UROBILINOGEN UR STRIP-ACNC: <2 MG/DL
WBC #/AREA URNS AUTO: ABNORMAL /HPF

## 2025-06-27 PROCEDURE — 81002 URINALYSIS NONAUTO W/O SCOPE: CPT | Performed by: FAMILY MEDICINE

## 2025-06-27 PROCEDURE — G2211 COMPLEX E/M VISIT ADD ON: HCPCS | Performed by: FAMILY MEDICINE

## 2025-06-27 PROCEDURE — 87186 SC STD MICRODIL/AGAR DIL: CPT | Performed by: FAMILY MEDICINE

## 2025-06-27 PROCEDURE — 81001 URINALYSIS AUTO W/SCOPE: CPT | Performed by: FAMILY MEDICINE

## 2025-06-27 PROCEDURE — 87077 CULTURE AEROBIC IDENTIFY: CPT | Performed by: FAMILY MEDICINE

## 2025-06-27 PROCEDURE — 87086 URINE CULTURE/COLONY COUNT: CPT | Performed by: FAMILY MEDICINE

## 2025-06-27 PROCEDURE — 99214 OFFICE O/P EST MOD 30 MIN: CPT | Performed by: FAMILY MEDICINE

## 2025-06-27 RX ORDER — SULFAMETHOXAZOLE AND TRIMETHOPRIM 800; 160 MG/1; MG/1
1 TABLET ORAL EVERY 12 HOURS SCHEDULED
Qty: 14 TABLET | Refills: 0 | Status: SHIPPED | OUTPATIENT
Start: 2025-06-27 | End: 2025-07-04

## 2025-06-27 NOTE — PROGRESS NOTES
Name: Aracelis Devi      : 1955      MRN: [id number]  Encounter Provider: Mani Crump Jr, MD  Encounter Date: 2025   Encounter department: [facility]  :  Assessment & Plan  Frequency of urination  Urine dip shows probable UTI.  Initiate Bactrim DS twice daily.  Await culture.  Orders:    POCT urine dip    Urinalysis with microscopic; Future    Urine culture; Future    Urgency of urination    Orders:    POCT urine dip    Urinalysis with microscopic; Future    Urine culture; Future    UTI symptoms  Start Bactrim and await culture symptoms.  Orders:    sulfamethoxazole-trimethoprim (BACTRIM DS) 800-160 mg per tablet; Take 1 tablet by mouth every 12 (twelve) hours for 7 days    Ambulatory referral to Urogynecology; Future    Rheumatoid arthritis involving multiple sites with positive rheumatoid factor (HCC)  Continue follow-up with rheumatology.       Urge incontinence of urine  She has chronic significant urinary incontinence and I have referred her to Dr. Juliette Cook.  Orders:    Ambulatory referral to Urogynecology; Future    Current moderate episode of major depressive disorder without prior episode (HCC)  Mood seems to be stable at this time.  She will continue on low-dose paroxetine.         Age-related osteoporosis without current pathological fracture  Rheumatology has stopped her bisphosphonates.  Continue rheumatology follow-up.  She does remain on low-dose prednisone.         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              History of Present Illness   Patient presents today with chronic incontinence which has worsened over the past 3 to 4 days.  She has some very mild dysuria.  She has no hematuria.  She notes she is having trouble controlling her bladder at all times.  She has chronic leakage.  She is following with urology.  She denies any fever or chills.  She had no flank pain.  She does have some chronic  arthralgias from her known rheumatoid arthritis.      Review of Systems   Constitutional:  Negative for appetite change, chills, fatigue, fever and unexpected weight change.   HENT:  Negative for trouble swallowing.    Eyes:  Negative for visual disturbance.   Respiratory:  Negative for cough, chest tightness, shortness of breath and wheezing.    Cardiovascular:  Negative for chest pain, palpitations and leg swelling.   Gastrointestinal:  Negative for abdominal distention, abdominal pain, blood in stool, constipation and diarrhea.   Endocrine: Negative for polyuria.   Genitourinary:  Positive for frequency. Negative for difficulty urinating and flank pain.   Musculoskeletal:  Negative for arthralgias and myalgias.   Skin:  Negative for rash.   Neurological:  Negative for dizziness and light-headedness.   Hematological:  Negative for adenopathy. Does not bruise/bleed easily.   Psychiatric/Behavioral:  Negative for dysphoric mood and sleep disturbance. The patient is not nervous/anxious.        Objective   /80   Pulse 60   Wt 54.4 kg (120 lb)   SpO2 96%   BMI 21.26 kg/m²      Physical Exam  Constitutional:       General: She is not in acute distress.     Appearance: She is well-developed. She is not diaphoretic.   HENT:      Head: Normocephalic.      Right Ear: External ear normal.      Left Ear: External ear normal.      Nose: Nose normal.     Eyes:      General: No scleral icterus.        Right eye: No discharge.         Left eye: No discharge.      Conjunctiva/sclera: Conjunctivae normal.      Pupils: Pupils are equal, round, and reactive to light.     Neck:      Thyroid: No thyromegaly.      Trachea: No tracheal deviation.     Cardiovascular:      Rate and Rhythm: Normal rate and regular rhythm.      Heart sounds: Normal heart sounds. No murmur heard.  Pulmonary:      Effort: Pulmonary effort is normal. No respiratory distress.      Breath sounds: Normal breath sounds. No stridor. No wheezing, rhonchi or  rales.   Abdominal:      General: Bowel sounds are normal. There is no distension.      Palpations: Abdomen is soft.      Tenderness: There is no abdominal tenderness. There is no guarding.     Musculoskeletal:         General: No tenderness or deformity (Chronic DJD changes). Normal range of motion.      Right lower leg: No edema.      Left lower leg: No edema.   Lymphadenopathy:      Cervical: No cervical adenopathy.     Skin:     General: Skin is warm.      Coloration: Skin is not pale.      Findings: No erythema or rash.     Neurological:      Cranial Nerves: No cranial nerve deficit.      Coordination: Coordination normal.     Psychiatric:         Mood and Affect: Mood normal.         Behavior: Behavior normal.         Thought Content: Thought content normal.

## 2025-06-27 NOTE — ASSESSMENT & PLAN NOTE
Rheumatology has stopped her bisphosphonates.  Continue rheumatology follow-up.  She does remain on low-dose prednisone.

## 2025-06-27 NOTE — ASSESSMENT & PLAN NOTE
She has chronic significant urinary incontinence and I have referred her to Dr. Juliette Cook.  Orders:    Ambulatory referral to Urogynecology; Future

## 2025-06-29 LAB
BACTERIA UR CULT: ABNORMAL
BACTERIA UR CULT: ABNORMAL

## 2025-06-30 ENCOUNTER — DOCUMENTATION (OUTPATIENT)
Dept: FAMILY MEDICINE CLINIC | Facility: CLINIC | Age: 70
End: 2025-06-30

## 2025-06-30 DIAGNOSIS — R39.9 UTI SYMPTOMS: Primary | ICD-10-CM

## 2025-07-01 ENCOUNTER — RESULTS FOLLOW-UP (OUTPATIENT)
Dept: FAMILY MEDICINE CLINIC | Facility: CLINIC | Age: 70
End: 2025-07-01

## 2025-07-02 ENCOUNTER — RA CDI HCC (OUTPATIENT)
Dept: OTHER | Facility: HOSPITAL | Age: 70
End: 2025-07-02

## 2025-07-03 NOTE — PROGRESS NOTES
HCC coding opportunities       Chart reviewed, no opportunity found: CHART REVIEWED, NO OPPORTUNITY FOUND   GR     Patients Insurance     Medicare Insurance: [employer]age           No

## 2025-08-22 ENCOUNTER — OFFICE VISIT (OUTPATIENT)
Dept: FAMILY MEDICINE CLINIC | Facility: CLINIC | Age: 70
End: 2025-08-22
Payer: COMMERCIAL

## 2025-08-22 VITALS
BODY MASS INDEX: 21.79 KG/M2 | DIASTOLIC BLOOD PRESSURE: 78 MMHG | OXYGEN SATURATION: 97 % | HEART RATE: 63 BPM | WEIGHT: 123 LBS | SYSTOLIC BLOOD PRESSURE: 130 MMHG

## 2025-08-22 DIAGNOSIS — B30.9 ACUTE VIRAL CONJUNCTIVITIS OF LEFT EYE: Primary | ICD-10-CM

## 2025-08-22 PROCEDURE — 99213 OFFICE O/P EST LOW 20 MIN: CPT | Performed by: INTERNAL MEDICINE

## 2025-08-22 PROCEDURE — G2211 COMPLEX E/M VISIT ADD ON: HCPCS | Performed by: INTERNAL MEDICINE

## 2025-08-22 RX ORDER — TOBRAMYCIN AND DEXAMETHASONE 3; 1 MG/ML; MG/ML
1 SUSPENSION/ DROPS OPHTHALMIC
Qty: 5 ML | Refills: 0 | Status: SHIPPED | OUTPATIENT
Start: 2025-08-22

## (undated) DEVICE — SYRINGE 30ML LL

## (undated) DEVICE — COBAN 6 IN STERILE

## (undated) DEVICE — SUT ETHIBOND 5 V-40 30 IN MB46G

## (undated) DEVICE — SURGICAL GOWN, XL SMARTSLEEVE: Brand: CONVERTORS

## (undated) DEVICE — NEEDLE 18 G X 1 1/2

## (undated) DEVICE — DRESSING MEPILEX AG BORDER 4 X 8 IN

## (undated) DEVICE — 2.8MM GUIDE WIRE WITH FLUTES 300MM/150MM CALIBRATION

## (undated) DEVICE — GLOVE SRG BIOGEL PI ORTHOPEDIC 7

## (undated) DEVICE — 2108 SERIES SAGITTAL BLADE, GROUND (20.5 X 1.27 X 85.0MM)

## (undated) DEVICE — GLOVE RADIATION SZ 7

## (undated) DEVICE — SKIN MARKER DUAL TIP WITH RULER CAP, FLEXIBLE RULER AND LABELS: Brand: DEVON

## (undated) DEVICE — 3M™ IOBAN™ 2 ANTIMICROBIAL INCISE DRAPE 6650EZ: Brand: IOBAN™ 2

## (undated) DEVICE — BETHLEHEM TOTAL HIP, KIT: Brand: CARDINAL HEALTH

## (undated) DEVICE — SUT VICRYL 2-0 CT-1 36 IN J945H

## (undated) DEVICE — 4-PORT MANIFOLD: Brand: NEPTUNE 2

## (undated) DEVICE — GLOVE SRG BIOGEL ORTHOPEDIC 6.5

## (undated) DEVICE — DRAPE C-ARM X-RAY

## (undated) DEVICE — IMPERVIOUS STOCKINETTE: Brand: DEROYAL

## (undated) DEVICE — 10FR FRAZIER SUCTION HANDLE: Brand: CARDINAL HEALTH

## (undated) DEVICE — SUT VICRYL 1 CTX 36 IN J977H

## (undated) DEVICE — PENCIL SMOKE EVAC TELESCOPING W/TUBING

## (undated) DEVICE — INTENDED FOR TISSUE SEPARATION, AND OTHER PROCEDURES THAT REQUIRE A SHARP SURGICAL BLADE TO PUNCTURE OR CUT.: Brand: BARD-PARKER ® CARBON RIB-BACK BLADES

## (undated) DEVICE — GLOVE INDICATOR PI UNDERGLOVE SZ 6.5 BLUE

## (undated) DEVICE — PLUMEPEN PRO 10FT

## (undated) DEVICE — BLADE OSCILLATING SAW 1/2

## (undated) DEVICE — ACE WRAP 6 IN UNSTERILE

## (undated) DEVICE — BIPOLAR SEALER 23-301-1 AQM MBS: Brand: AQUAMANTYS™

## (undated) DEVICE — SCD SEQUENTIAL COMPRESSION COMFORT SLEEVE MEDIUM KNEE LENGTH: Brand: KENDALL SCD

## (undated) DEVICE — ELECTRODE BLADE E-Z CLEAN 6.5IN -0014

## (undated) DEVICE — STERILE POLYISOPRENE POWDER-FREE SURGICAL GLOVES WITH EMOLLIENT COATING: Brand: PROTEXIS

## (undated) DEVICE — 3M™ IOBAN™ 2 ANTIMICROBIAL INCISE DRAPE 6648EZ: Brand: IOBAN™ 2

## (undated) DEVICE — POSITIONER HANA TABLE PACK

## (undated) DEVICE — TRAY FOLEY 16FR URIMETER SURESTEP

## (undated) DEVICE — CAPIT HIP COP -CERAMIC ON POLY

## (undated) DEVICE — DISPOSABLE OR TOWEL: Brand: CARDINAL HEALTH

## (undated) DEVICE — WEBRIL 6 IN UNSTERILE

## (undated) DEVICE — STERILE POLYISOPRENE POWDER-FREE SURGICAL GLOVES: Brand: PROTEXIS

## (undated) DEVICE — 450 ML BOTTLE OF 0.05% CHLORHEXIDINE GLUCONATE IN 99.95% STERILE WATER FOR IRRIGATION, USP AND APPLICATOR.: Brand: IRRISEPT ANTIMICROBIAL WOUND LAVAGE

## (undated) DEVICE — BULB SYRINGE,IRRIGATION WITH PROTECTIVE CAP: Brand: DOVER

## (undated) DEVICE — 3M™ STERI-DRAPE™ U-DRAPE 1015: Brand: STERI-DRAPE™

## (undated) DEVICE — U-DRAPE: Brand: CONVERTORS

## (undated) DEVICE — FRAZIER SUCTION INSTRUMENT 18 FR W/OBTURATOR, NO CONTROL VENT: Brand: FRAZIER

## (undated) DEVICE — SPECIMEN CONTAINER STERILE PEEL PACK

## (undated) DEVICE — 40529 DERMAPROX PAD 11'' X 15'' X 1'': Brand: 40529 DERMAPROX PAD 11'' X 15'' X 1''

## (undated) DEVICE — SUT MONOCRYL 3-0 PS-2 27 IN Y427H

## (undated) DEVICE — PADDING CAST 6IN COTTON STRL

## (undated) DEVICE — CHLORAPREP HI-LITE 26ML ORANGE

## (undated) DEVICE — 3M™ STERI-STRIP™ REINFORCED ADHESIVE SKIN CLOSURES, R1547, 1/2 IN X 4 IN (12 MM X 100 MM), 6 STRIPS/ENVELOPE: Brand: 3M™ STERI-STRIP™

## (undated) DEVICE — GLOVE SRG BIOGEL 6.5

## (undated) DEVICE — THE SIMPULSE SOLO SYSTEM WITH ULTREX RETRACTABLE SPLASH SHIELD TIP: Brand: SIMPULSE SOLO

## (undated) DEVICE — STIRRUP STRAP ADULT DISP

## (undated) DEVICE — CEMENT MIXING CARTRIDGE PRISM II

## (undated) DEVICE — SPONGE LAP 18 X 18 IN STRL RFD

## (undated) DEVICE — ABDUCTION PILLOW FOAM POSITIONER: Brand: CARDINAL HEALTH

## (undated) DEVICE — GAUZE SPONGES,USP TYPE VII GAUZE, 12 PLY: Brand: CURITY

## (undated) DEVICE — OCCLUSIVE GAUZE STRIP,3% BISMUTH TRIBROMOPHENATE IN PETROLATUM BLEND: Brand: XEROFORM

## (undated) DEVICE — CAPIT KNEE ATTUNE RP CEMENT - DEPUY

## (undated) DEVICE — 3M™ DURAPORE™ SURGICAL TAPE 1538-3, 3 INCH X 10 YARD (7,5CM X 9,1M), 4 ROLLS/BOX: Brand: 3M™ DURAPORE™

## (undated) DEVICE — TABLE COVER: Brand: CONVERTORS

## (undated) DEVICE — ADHESIVE SKIN CLSR DERMABOND NX

## (undated) DEVICE — 6617 IOBAN II PATIENT ISOLATION DRAPE 5/BX,4BX/CS: Brand: STERI-DRAPE™ IOBAN™ 2

## (undated) DEVICE — COOL TEMP PAD

## (undated) DEVICE — DRESSING MEPILEX AG BORDER POST-OP 4 X 12 IN

## (undated) DEVICE — 5.0MM CANNULATED DRILL BIT LARGE QC/300MM

## (undated) DEVICE — HOOD: Brand: FLYTE, SURGICOOL

## (undated) DEVICE — CHEST ROLL FOAM POSITIONER: Brand: CARDINAL HEALTH

## (undated) DEVICE — TIBURON HIP DRAPE WITH POUCHES: Brand: CONVERTORS

## (undated) DEVICE — BLADE OSCILLATOR 86.0 X 19.5MM

## (undated) DEVICE — ADHESIVE SKIN HIGH VISCOSITY EXOFIN 1ML

## (undated) DEVICE — VEST SURGEON DISPOSABLE

## (undated) DEVICE — SIGMOIDOSCOPIC SUCTION INSTRUMENT 18 FR W/WINGED CAP CONTROL AND 6 FOOT (1.8M) TUBING: Brand: SIGMOIDOSCOPIC

## (undated) DEVICE — BETHLEHEM UNIV TOTAL KNEE, KIT: Brand: CARDINAL HEALTH

## (undated) DEVICE — ABDOMINAL PAD: Brand: DERMACEA

## (undated) DEVICE — CUFF TOURNIQUET 30 X 4 IN QUICK CONNECT DISP 1BLA

## (undated) DEVICE — SUT MONOCRYL 4-0 PS-2 27 IN Y426H

## (undated) DEVICE — STOCKINETTE,IMPERVIOUS,12X48,STERILE: Brand: MEDLINE

## (undated) DEVICE — SUT MONOCRYL 3-0 PS-2 18 IN Y497G

## (undated) DEVICE — DRESSING MEPILEX AG BORDER POST-OP 4 X 6 IN

## (undated) DEVICE — DRAPE C-ARMOUR